# Patient Record
Sex: FEMALE | Race: WHITE | Employment: OTHER | URBAN - METROPOLITAN AREA
[De-identification: names, ages, dates, MRNs, and addresses within clinical notes are randomized per-mention and may not be internally consistent; named-entity substitution may affect disease eponyms.]

---

## 2022-03-09 LAB — EF %, EXTERNAL: NORMAL

## 2022-11-28 ENCOUNTER — APPOINTMENT (OUTPATIENT)
Dept: VASCULAR SURGERY | Age: 67
End: 2022-11-28
Attending: INTERNAL MEDICINE
Payer: MEDICARE

## 2022-11-28 ENCOUNTER — APPOINTMENT (OUTPATIENT)
Dept: GENERAL RADIOLOGY | Age: 67
End: 2022-11-28
Attending: INTERNAL MEDICINE
Payer: MEDICARE

## 2022-11-28 ENCOUNTER — APPOINTMENT (OUTPATIENT)
Dept: VASCULAR SURGERY | Age: 67
End: 2022-11-28
Attending: PHYSICIAN ASSISTANT
Payer: MEDICARE

## 2022-11-28 ENCOUNTER — APPOINTMENT (OUTPATIENT)
Dept: NON INVASIVE DIAGNOSTICS | Age: 67
End: 2022-11-28
Attending: PHYSICIAN ASSISTANT
Payer: MEDICARE

## 2022-11-28 ENCOUNTER — APPOINTMENT (OUTPATIENT)
Dept: CT IMAGING | Age: 67
End: 2022-11-28
Attending: PHYSICIAN ASSISTANT
Payer: MEDICARE

## 2022-11-28 ENCOUNTER — HOSPITAL ENCOUNTER (OUTPATIENT)
Age: 67
Setting detail: OUTPATIENT SURGERY
Discharge: HOME | End: 2022-11-28
Attending: INTERNAL MEDICINE | Admitting: INTERNAL MEDICINE
Payer: MEDICARE

## 2022-11-28 VITALS
RESPIRATION RATE: 17 BRPM | HEART RATE: 86 BPM | BODY MASS INDEX: 39.34 KG/M2 | HEIGHT: 71 IN | TEMPERATURE: 98 F | SYSTOLIC BLOOD PRESSURE: 119 MMHG | OXYGEN SATURATION: 97 % | WEIGHT: 281 LBS | DIASTOLIC BLOOD PRESSURE: 73 MMHG

## 2022-11-28 DIAGNOSIS — I35.0 AORTIC VALVE STENOSIS, ETIOLOGY OF CARDIAC VALVE DISEASE UNSPECIFIED: ICD-10-CM

## 2022-11-28 PROBLEM — E11.9 TYPE 2 DIABETES MELLITUS, WITHOUT LONG-TERM CURRENT USE OF INSULIN (HCC): Status: ACTIVE | Noted: 2022-11-28

## 2022-11-28 PROBLEM — I10 PRIMARY HYPERTENSION: Status: ACTIVE | Noted: 2022-11-28

## 2022-11-28 PROBLEM — E78.5 HLD (HYPERLIPIDEMIA): Status: ACTIVE | Noted: 2022-11-28

## 2022-11-28 PROBLEM — E78.5 HLD (HYPERLIPIDEMIA): Status: RESOLVED | Noted: 2022-11-28 | Resolved: 2022-11-28

## 2022-11-28 PROBLEM — I25.10 CORONARY ARTERY DISEASE INVOLVING NATIVE CORONARY ARTERY OF NATIVE HEART: Status: ACTIVE | Noted: 2022-11-28

## 2022-11-28 LAB
ALBUMIN SERPL-MCNC: 3.3 G/DL (ref 3.4–5)
ALBUMIN/GLOB SERPL: 0.9 {RATIO} (ref 0.8–1.7)
ALP SERPL-CCNC: 42 U/L (ref 45–117)
ALT SERPL-CCNC: 30 U/L (ref 13–56)
ANION GAP SERPL CALC-SCNC: 4 MMOL/L (ref 3–18)
ARTERIAL PATENCY WRIST A: POSITIVE
AST SERPL-CCNC: 31 U/L (ref 10–38)
BASE DEFICIT BLD-SCNC: 1.8 MMOL/L
BDY SITE: ABNORMAL
BILIRUB DIRECT SERPL-MCNC: 0.1 MG/DL (ref 0–0.2)
BILIRUB SERPL-MCNC: 0.6 MG/DL (ref 0.2–1)
BODY TEMPERATURE: 97.6
BUN SERPL-MCNC: 60 MG/DL (ref 7–18)
BUN/CREAT SERPL: 32 (ref 12–20)
CALCIUM SERPL-MCNC: 9.6 MG/DL (ref 8.5–10.1)
CHLORIDE SERPL-SCNC: 107 MMOL/L (ref 100–111)
CO2 SERPL-SCNC: 27 MMOL/L (ref 21–32)
CREAT SERPL-MCNC: 1.87 MG/DL (ref 0.6–1.3)
ECHO AO ASC DIAM: 2.8 CM
ECHO AO ASCENDING AORTA INDEX: 1.15 CM/M2
ECHO AO ROOT DIAM: 2.5 CM
ECHO AO ROOT INDEX: 1.02 CM/M2
ECHO AV MEAN GRADIENT: 57 MMHG
ECHO AV MEAN VELOCITY: 3.7 M/S
ECHO AV PEAK GRADIENT: 81 MMHG
ECHO AV PEAK VELOCITY: 4.5 M/S
ECHO AV VTI: 103.8 CM
ECHO LA VOL 4C: 131 ML (ref 22–52)
ECHO LA VOLUME INDEX A4C: 54 ML/M2 (ref 16–34)
ECHO LV E' SEPTAL VELOCITY: 5 CM/S
ECHO LV FRACTIONAL SHORTENING: 50 % (ref 28–44)
ECHO LV INTERNAL DIMENSION DIASTOLE INDEX: 1.72 CM/M2
ECHO LV INTERNAL DIMENSION DIASTOLIC: 4.2 CM (ref 3.9–5.3)
ECHO LV INTERNAL DIMENSION SYSTOLIC INDEX: 0.86 CM/M2
ECHO LV INTERNAL DIMENSION SYSTOLIC: 2.1 CM
ECHO LV IVSD: 1.5 CM (ref 0.6–0.9)
ECHO LV MASS 2D: 236.7 G (ref 67–162)
ECHO LV MASS INDEX 2D: 97 G/M2 (ref 43–95)
ECHO LV POSTERIOR WALL DIASTOLIC: 1.4 CM (ref 0.6–0.9)
ECHO LV RELATIVE WALL THICKNESS RATIO: 0.67
ECHO LVOT AREA: 2.5 CM2
ECHO LVOT DIAM: 1.8 CM
ECHO MV A VELOCITY: 1.92 M/S
ECHO MV E DECELERATION TIME (DT): 151.8 MS
ECHO MV E VELOCITY: 1.14 M/S
ECHO MV E/A RATIO: 0.59
ECHO MV E/E' SEPTAL: 22.8
ECHO MV MAX VELOCITY: 2 M/S
ECHO MV MEAN GRADIENT: 7 MMHG
ECHO MV MEAN VELOCITY: 1.2 M/S
ECHO MV PEAK GRADIENT: 16 MMHG
ECHO MV VTI: 35.1 CM
ECHO TV REGURGITANT MAX VELOCITY: 1.95 M/S
ECHO TV REGURGITANT PEAK GRADIENT: 15 MMHG
ERYTHROCYTE [DISTWIDTH] IN BLOOD BY AUTOMATED COUNT: 14.4 % (ref 11.6–14.5)
EST. AVERAGE GLUCOSE BLD GHB EST-MCNC: 117 MG/DL
GAS FLOW.O2 O2 DELIVERY SYS: ABNORMAL L/MIN
GAS FLOW.O2 SETTING OXYMISER: 16 BPM
GLOBULIN SER CALC-MCNC: 3.5 G/DL (ref 2–4)
GLUCOSE SERPL-MCNC: 128 MG/DL (ref 74–99)
HBA1C MFR BLD: 5.7 % (ref 4.2–5.6)
HCO3 BLD-SCNC: 22.2 MMOL/L (ref 22–26)
HCT VFR BLD AUTO: 31.6 % (ref 35–45)
HGB BLD-MCNC: 10.2 G/DL (ref 12–16)
INR PPP: 1.1 (ref 0.8–1.2)
LEFT BULB EDV: 50.2 CM/S
LEFT BULB PSV: 150 CM/S
LEFT CCA DIST DIAS: 13.9 CM/S
LEFT CCA DIST SYS: 105.9 CM/S
LEFT CCA MID DIAS: 12.27 CM/S
LEFT CCA MID SYS: 78.48 CM/S
LEFT CCA PROX DIAS: 17.1 CM/S
LEFT CCA PROX SYS: 80.1 CM/S
LEFT ECA DIAS: 13.89 CM/S
LEFT ECA SYS: 97.9 CM/S
LEFT ICA DIST DIAS: 31.6 CM/S
LEFT ICA DIST SYS: 108.2 CM/S
LEFT ICA MID DIAS: 33.9 CM/S
LEFT ICA MID SYS: 89.7 CM/S
LEFT ICA PROX DIAS: 36.2 CM/S
LEFT ICA PROX SYS: 126.8 CM/S
LEFT ICA/CCA SYS: 1.42 NO UNITS
LEFT VERTEBRAL DIAS: 6.15 CM/S
LEFT VERTEBRAL SYS: 47.9 CM/S
MCH RBC QN AUTO: 29.7 PG (ref 24–34)
MCHC RBC AUTO-ENTMCNC: 32.3 G/DL (ref 31–37)
MCV RBC AUTO: 92.1 FL (ref 78–100)
NRBC # BLD: 0 K/UL (ref 0–0.01)
NRBC BLD-RTO: 0 PER 100 WBC
PCO2 BLD: 33.1 MMHG (ref 35–45)
PH BLD: 7.43 [PH] (ref 7.35–7.45)
PLATELET # BLD AUTO: 210 K/UL (ref 135–420)
PMV BLD AUTO: 9.9 FL (ref 9.2–11.8)
PO2 BLD: 81 MMHG (ref 80–100)
POTASSIUM SERPL-SCNC: 4.8 MMOL/L (ref 3.5–5.5)
PROT SERPL-MCNC: 6.8 G/DL (ref 6.4–8.2)
PROTHROMBIN TIME: 14.1 SEC (ref 11.5–15.2)
RBC # BLD AUTO: 3.43 M/UL (ref 4.2–5.3)
RIGHT BULB EDV: 30.1 CM/S
RIGHT BULB PSV: 86.6 CM/S
RIGHT CCA DIST DIAS: 18.2 CM/S
RIGHT CCA DIST SYS: 64.4 CM/S
RIGHT CCA MID DIAS: 17.14 CM/S
RIGHT CCA MID SYS: 68.79 CM/S
RIGHT CCA PROX DIAS: 15.3 CM/S
RIGHT CCA PROX SYS: 51.5 CM/S
RIGHT ECA DIAS: 8.61 CM/S
RIGHT ECA SYS: 94.7 CM/S
RIGHT ICA DIST DIAS: 26.8 CM/S
RIGHT ICA DIST SYS: 78.5 CM/S
RIGHT ICA MID DIAS: 26.8 CM/S
RIGHT ICA MID SYS: 91.4 CM/S
RIGHT ICA PROX DIAS: 33.3 CM/S
RIGHT ICA PROX SYS: 107.6 CM/S
RIGHT ICA/CCA SYS: 1.7 NO UNITS
RIGHT VERTEBRAL DIAS: 25.37 CM/S
RIGHT VERTEBRAL SYS: 79.7 CM/S
SAO2 % BLD: 96.6 % (ref 92–97)
SERVICE CMNT-IMP: ABNORMAL
SODIUM SERPL-SCNC: 138 MMOL/L (ref 136–145)
SPECIMEN TYPE: ABNORMAL
TOTAL RESP. RATE, ITRR: 16
TSH SERPL DL<=0.05 MIU/L-ACNC: 0.99 UIU/ML (ref 0.36–3.74)
VAS LEFT SUBCLAVIAN PROX EDV: 0 CM/S
VAS LEFT SUBCLAVIAN PROX PSV: 94.4 CM/S
VAS RIGHT SUBCLAVIAN PROX EDV: 0 CM/S
VAS RIGHT SUBCLAVIAN PROX PSV: 74.6 CM/S
WBC # BLD AUTO: 7.9 K/UL (ref 4.6–13.2)

## 2022-11-28 PROCEDURE — 85027 COMPLETE CBC AUTOMATED: CPT

## 2022-11-28 PROCEDURE — 80076 HEPATIC FUNCTION PANEL: CPT

## 2022-11-28 PROCEDURE — C1769 GUIDE WIRE: HCPCS | Performed by: INTERNAL MEDICINE

## 2022-11-28 PROCEDURE — 99153 MOD SED SAME PHYS/QHP EA: CPT | Performed by: INTERNAL MEDICINE

## 2022-11-28 PROCEDURE — C1894 INTRO/SHEATH, NON-LASER: HCPCS | Performed by: INTERNAL MEDICINE

## 2022-11-28 PROCEDURE — 74011000636 HC RX REV CODE- 636: Performed by: INTERNAL MEDICINE

## 2022-11-28 PROCEDURE — C8929 TTE W OR WO FOL WCON,DOPPLER: HCPCS

## 2022-11-28 PROCEDURE — 77030027845 HC BND COM RDL D-STAT TELE -B: Performed by: INTERNAL MEDICINE

## 2022-11-28 PROCEDURE — 77030015766: Performed by: INTERNAL MEDICINE

## 2022-11-28 PROCEDURE — 93454 CORONARY ARTERY ANGIO S&I: CPT | Performed by: INTERNAL MEDICINE

## 2022-11-28 PROCEDURE — C1887 CATHETER, GUIDING: HCPCS | Performed by: INTERNAL MEDICINE

## 2022-11-28 PROCEDURE — 85610 PROTHROMBIN TIME: CPT

## 2022-11-28 PROCEDURE — 93005 ELECTROCARDIOGRAM TRACING: CPT

## 2022-11-28 PROCEDURE — 93880 EXTRACRANIAL BILAT STUDY: CPT

## 2022-11-28 PROCEDURE — 71045 X-RAY EXAM CHEST 1 VIEW: CPT

## 2022-11-28 PROCEDURE — 74011000258 HC RX REV CODE- 258: Performed by: INTERNAL MEDICINE

## 2022-11-28 PROCEDURE — 83036 HEMOGLOBIN GLYCOSYLATED A1C: CPT

## 2022-11-28 PROCEDURE — 36600 WITHDRAWAL OF ARTERIAL BLOOD: CPT

## 2022-11-28 PROCEDURE — 82803 BLOOD GASES ANY COMBINATION: CPT

## 2022-11-28 PROCEDURE — 74011250636 HC RX REV CODE- 250/636: Performed by: INTERNAL MEDICINE

## 2022-11-28 PROCEDURE — 93971 EXTREMITY STUDY: CPT

## 2022-11-28 PROCEDURE — 74011250637 HC RX REV CODE- 250/637

## 2022-11-28 PROCEDURE — 74011000250 HC RX REV CODE- 250: Performed by: INTERNAL MEDICINE

## 2022-11-28 PROCEDURE — 80048 BASIC METABOLIC PNL TOTAL CA: CPT

## 2022-11-28 PROCEDURE — 84443 ASSAY THYROID STIM HORMONE: CPT

## 2022-11-28 PROCEDURE — 99152 MOD SED SAME PHYS/QHP 5/>YRS: CPT | Performed by: INTERNAL MEDICINE

## 2022-11-28 PROCEDURE — 99205 OFFICE O/P NEW HI 60 MIN: CPT | Performed by: PHYSICIAN ASSISTANT

## 2022-11-28 PROCEDURE — 86900 BLOOD TYPING SEROLOGIC ABO: CPT

## 2022-11-28 PROCEDURE — 71250 CT THORAX DX C-: CPT

## 2022-11-28 PROCEDURE — 77030013797 HC KT TRNSDUC PRSSR EDWD -A: Performed by: INTERNAL MEDICINE

## 2022-11-28 RX ORDER — MIDAZOLAM HYDROCHLORIDE 1 MG/ML
INJECTION, SOLUTION INTRAMUSCULAR; INTRAVENOUS AS NEEDED
Status: DISCONTINUED | OUTPATIENT
Start: 2022-11-28 | End: 2022-11-28 | Stop reason: HOSPADM

## 2022-11-28 RX ORDER — GUAIFENESIN 100 MG/5ML
81 LIQUID (ML) ORAL DAILY
Status: DISCONTINUED | OUTPATIENT
Start: 2022-11-28 | End: 2022-11-28 | Stop reason: HOSPADM

## 2022-11-28 RX ORDER — POTASSIUM CITRATE 10 MEQ/1
20 TABLET, EXTENDED RELEASE ORAL
COMMUNITY
End: 2022-11-28

## 2022-11-28 RX ORDER — SODIUM CHLORIDE 9 MG/ML
250 INJECTION, SOLUTION INTRAVENOUS AS NEEDED
Status: DISCONTINUED | OUTPATIENT
Start: 2022-11-28 | End: 2022-11-28 | Stop reason: HOSPADM

## 2022-11-28 RX ORDER — FUROSEMIDE 20 MG/1
20 TABLET ORAL DAILY
COMMUNITY
End: 2022-11-28

## 2022-11-28 RX ORDER — HEPARIN SODIUM 200 [USP'U]/100ML
INJECTION, SOLUTION INTRAVENOUS
Status: COMPLETED | OUTPATIENT
Start: 2022-11-28 | End: 2022-11-28

## 2022-11-28 RX ORDER — SODIUM CHLORIDE 0.9 % (FLUSH) 0.9 %
5-40 SYRINGE (ML) INJECTION EVERY 8 HOURS
Status: DISCONTINUED | OUTPATIENT
Start: 2022-11-28 | End: 2022-11-28 | Stop reason: HOSPADM

## 2022-11-28 RX ORDER — GUAIFENESIN 100 MG/5ML
81 LIQUID (ML) ORAL
Status: DISCONTINUED | OUTPATIENT
Start: 2022-11-28 | End: 2022-11-28 | Stop reason: HOSPADM

## 2022-11-28 RX ORDER — HEPARIN SODIUM 1000 [USP'U]/ML
INJECTION, SOLUTION INTRAVENOUS; SUBCUTANEOUS AS NEEDED
Status: DISCONTINUED | OUTPATIENT
Start: 2022-11-28 | End: 2022-11-28 | Stop reason: HOSPADM

## 2022-11-28 RX ORDER — SODIUM CHLORIDE 0.9 % (FLUSH) 0.9 %
5-40 SYRINGE (ML) INJECTION AS NEEDED
Status: DISCONTINUED | OUTPATIENT
Start: 2022-11-28 | End: 2022-11-28 | Stop reason: HOSPADM

## 2022-11-28 RX ORDER — FENTANYL CITRATE 50 UG/ML
INJECTION, SOLUTION INTRAMUSCULAR; INTRAVENOUS AS NEEDED
Status: DISCONTINUED | OUTPATIENT
Start: 2022-11-28 | End: 2022-11-28 | Stop reason: HOSPADM

## 2022-11-28 RX ORDER — GUAIFENESIN 100 MG/5ML
LIQUID (ML) ORAL
Status: COMPLETED
Start: 2022-11-28 | End: 2022-11-28

## 2022-11-28 RX ORDER — SODIUM CHLORIDE 9 MG/ML
100 INJECTION, SOLUTION INTRAVENOUS CONTINUOUS
Status: DISCONTINUED | OUTPATIENT
Start: 2022-11-28 | End: 2022-11-28 | Stop reason: HOSPADM

## 2022-11-28 RX ORDER — LIDOCAINE HYDROCHLORIDE 10 MG/ML
INJECTION, SOLUTION EPIDURAL; INFILTRATION; INTRACAUDAL; PERINEURAL AS NEEDED
Status: DISCONTINUED | OUTPATIENT
Start: 2022-11-28 | End: 2022-11-28 | Stop reason: HOSPADM

## 2022-11-28 RX ADMIN — SODIUM CHLORIDE 100 ML/HR: 9 INJECTION, SOLUTION INTRAVENOUS at 08:04

## 2022-11-28 RX ADMIN — ASPIRIN 81 MG: 81 TABLET, CHEWABLE ORAL at 07:31

## 2022-11-28 RX ADMIN — Medication 81 MG: at 07:31

## 2022-11-28 RX ADMIN — PERFLUTREN 2 ML: 6.52 INJECTION, SUSPENSION INTRAVENOUS at 14:57

## 2022-11-28 NOTE — PROCEDURES
Cardiac Catheterization Procedure Note    Patient: Shyam Teague Age: 79 y.o. Sex: female    YOB: 1955 Admit Date: 11/28/2022 PCP: Maddie Vazquez MD   MRN: [id number]  CSN: [id number]           DATE: November 28, 2022   PHYSICIAN: Dr. Rene Padron MD, [facility]    POSTOPERATIVE DIAGNOSIS:   SOB, progressive  Severe AS  ASCVD with LAD stenosis  CHF, mixed    INDICATIONS:   SOB, progressive  Severe AS  ASCVD with LAD stenosis  CHF, mixed    PROCEDURE PERFORMED:   1. Left heart cardiac catheterization. 2. Selective left and right coronary angiography. 3. Left ventriculography. 4. Aortic root angiography  5. Right radial arterial access using ultrasound guidance  6. Radiologic supervision and interpretation  7. Aborted planned LAD stenting, aborted    DESCRIPTION OF PROCEDURE:   After informed consent where the procedure was discussed both during outpatient evaluation and again immediately prior to the procedure, the patient was brought to the cardiac catheterization suite. The patient was prepped and draped in the usual sterile fashion. After documentation of normal Adebayo and reverse Adebayo tests right radial artery was used for the procedure. After local lidocaine infiltration above the radial artery, right radial access was achieved using ultrasound guidance and with micro puncture needle. A 6Fr glide sheath was advanced and placed into the right radial artery using Seldinger technique. Combination of heparin, verapamil and nitroglycerin were injected into the artery through the sheath. A 5Fr. TIG catheter was advanced over J-glide wire and used to perform left ventriculography, selective right and left coronary angiography and aortic root angiography with images obtained in multiple projections and angulations. Planned PCI attempt to treat LAD stenosis was aborted due to additional imaging showing LM stenosis.     At the conclusion, all procedure catheters and wires were removed. Hemostasis was to be achieved using manual pressure with patient instructed to keep the right arm straight for 2 hours. MODERATE CONSCIOUS SEDATION:  Sedation was provided under my direct supervision with a sedation trained nurse using 1 mg of IV Versed and 50 mcg of IV Fentanyl. See hospital trained nurse sedation orders for pre and post service care. COMPLICATIONS: None    ESTIMATED BLOOD LOSS: None    DIAGNOSTIC CORONARY ANGIOGRAPHY FINDINGS:   The diagnostic angiography was characterized by right dominant coronary anatomy with no significant obstructive CAD    Left Main: The left main has an eccentric LM stenosis 70% narrowed with mild dampening with a 6Fr catheter  LAD: The LAD is a large sized vessel with proximal 40% narrowing and mid artery 80% stenosis involving a diagonal branch  Circumflex: The circumflex coronary artery was a large vessel with mid artery 40% narrowing  RCA: The RCA was a large dominant artery and appeared unobstructed    LEFT VENTRICULOGRAPHY:   Not done    AORTIC ROOT ANGIOGRAPHY:   Demonstrated normal type 1 aortic root with no aortic insufficiency. PLAN and RECOMMENDATION:  Medical management focused on prevention and disease modification.

## 2022-11-28 NOTE — PROGRESS NOTES
Right brachial sheath removed, sterile hemostatic dressing applied, pressure held for 20 min. Pressures dressing required and applied. Splint applied. Good radial pulse distal circulation and neuro check.

## 2022-11-28 NOTE — Clinical Note
Right groin, right radial and right brachial prepped with ChloraPrep and draped. Wet prep elapsed drying time: 3 mins.

## 2022-11-28 NOTE — Clinical Note
Contrast Dose Calculator:   Patient's age: 79.   Patient's sex: Female. Patient weight (kg) = 127.5. Creatinine level (mg/dL) = 1.12. Creatinine clearance (mL/min): 98.   Contrast concentration (mg/mL) = 300. MACD = 300 mL. Max Contrast dose per Creatinine Cl calculator = 220.5 mL.

## 2022-11-28 NOTE — Clinical Note
TRANSFER - IN REPORT:     Verbal report received from: Marlon Bruner RN. Report consisted of patient's Situation, Background, Assessment and   Recommendations(SBAR). Opportunity for questions and clarification was provided. Assessment completed upon patient's arrival to unit and care assumed. Patient transported with a Registered Nurse.

## 2022-11-28 NOTE — DISCHARGE INSTRUCTIONS
CALL CT SURGERY [phone] for any questions. Tentative CABG scheduled 12/5/22. [facility] DISCHARGE SUMMARY from Nurse:    Please hold your METFORMIN for 48 hours after your discharge. Restart as prescribed. PATIENT INSTRUCTIONS:    After general anesthesia or intravenous sedation, for 24 hours or while taking prescription Narcotics:  Limit your activities  Do not drive and operate hazardous machinery  Do not make important personal or business decisions  Do  not drink alcoholic beverages  If you have not urinated within 8 hours after discharge, please contact your surgeon on call. Report the following to your surgeon:  Excessive pain, swelling, redness or odor of or around the surgical area  Temperature over 100.5  Nausea and vomiting lasting longer than 4 hours or if unable to take medications  Any signs of decreased circulation or nerve impairment to extremity: change in color, persistent  numbness, tingling, coldness or increase pain  Any questions    What to do at Home:  Recommended activity: No lifting, Driving, or Strenuous exercise for 24 hours. If you experience any of the following symptoms bleeding,swelling,acute pain or numbness, fever, please follow up with Dr. Keily Kerr MD.    *  Please give a list of your current medications to your Primary Care Provider. *  Please update this list whenever your medications are discontinued, doses are      changed, or new medications (including over-the-counter products) are added. *  Please carry medication information at all times in case of emergency situations. These are general instructions for a healthy lifestyle:    No smoking/ No tobacco products/ Avoid exposure to second hand smoke  Surgeon General's Warning:  Quitting smoking now greatly reduces serious risk to your health.     Obesity, smoking, and sedentary lifestyle greatly increases your risk for illness    A healthy diet, regular physical exercise & weight monitoring are important for maintaining a healthy lifestyle    You may be retaining fluid if you have a history of heart failure or if you experience any of the following symptoms:  Weight gain of 3 pounds or more overnight or 5 pounds in a week, increased swelling in our hands or feet or shortness of breath while lying flat in bed. Please call your doctor as soon as you notice any of these symptoms; do not wait until your next office visit. The discharge information has been reviewed with the patient. The patient verbalized understanding. Discharge medications reviewed with the patient and appropriate educational materials and side effects teaching were provided. ___________________________________________________________________________________________________________________________________     Coronary Angiogram: What to Expect at [facility]     A coronary angiogram is a test to examine the large blood vessels of your heart (coronary arteries). The doctor inserted a thin, flexible tube (catheter) into a blood vessel in your groin or wrist.  Your groin or wrist may have a bruise and feel sore for a few days after the procedure. You can do light activities around the house. But do not do anything strenuous until your doctor says it is okay. This may be for several days. This care sheet gives you a general idea about how long it will take for you to recover. But each person recovers at a different pace. Follow the steps below to feel better as quickly as possible. How can you care for yourself at home? Activity    If the doctor gave you a sedative: For 24 hours, don't do anything that requires attention to detail, such as going to work, making important decisions, or signing any legal documents. It takes time for the medicine's effects to completely wear off. For your safety, do not drive or operate any machinery that could be dangerous.  Wait until the medicine wears off and you can think clearly and react easily. Do not do strenuous exercise and do not lift, pull, or push anything heavy until your doctor says it is okay. This may be for several days. You can walk around the house and do light activity, such as cooking. If the catheter was placed in your groin, try not to walk up stairs for the first couple of days. If the catheter was placed in your wrist, do not bend your wrist deeply for the first couple of days. Be careful using your hand to get into and out of a chair or bed. Get regular exercise. Walking may be a good choice. Try for at least 30 minutes on most days of the week. Diet    Drink plenty of fluids to help your body flush out the dye. If you have kidney, heart, or liver disease and have to limit fluids, talk with your doctor before you increase the amount of fluids you drink. Keep eating a heart-healthy diet that has lots of fruits, vegetables, and whole grains. If you have not been eating this way, talk to your doctor. You also may want to talk to a dietitian. This expert can help you to learn about healthy foods and plan meals. Medicines    Your doctor will tell you if and when you can restart your medicines. You will also be given instructions about taking any new medicines. If you stopped taking aspirin or some other blood thinner, your doctor will tell you when to start taking it again. Your doctor may prescribe a blood-thinning medicine like aspirin or clopidogrel (Plavix). It is very important that you take these medicines exactly as directed in order to keep the coronary artery open and reduce your risk of a heart attack. Be safe with medicines. Call your doctor if you think you are having a problem with your medicine. Care of the catheter site    For 1 or 2 days, keep a bandage over the spot where the catheter was inserted. The bandage probably will fall off in this time.      Put ice or a cold pack on the area for 10 to 20 minutes at a time to help with soreness or swelling. Put a thin cloth between the ice and your skin. You may shower 24 to 48 hours after the procedure, if your doctor okays it. Pat the incision dry. Do not soak the catheter site until it is healed. Don't take a bath for 1 week, or until your doctor tells you it is okay. Watch for bleeding from the site. A small amount of blood (up to the size of a quarter) on the bandage can be normal.     If you are bleeding, lie down and press on the area for 15 minutes to try to make it stop. If the bleeding does not stop, call your doctor or seek immediate medical care. Follow-up care is a key part of your treatment and safety. Be sure to make and go to all appointments, and call your doctor if you are having problems. It's also a good idea to know your test results and keep a list of the medicines you take. When should you call for help? Call 911 anytime you think you may need emergency care. For example, call if:    You passed out (lost consciousness). You have severe trouble breathing. You have sudden chest pain and shortness of breath, or you cough up blood. You have symptoms of a heart attack. These may include:  Chest pain or pressure, or a strange feeling in the chest.  Sweating. Shortness of breath. Nausea or vomiting. Pain, pressure, or a strange feeling in the back, neck, jaw, or upper belly, or in one or both shoulders or arms. Lightheadedness or sudden weakness. A fast or irregular heartbeat. After you call 911, the  may tell you to chew 1 adult-strength or 2 to 4 low-dose aspirin. Wait for an ambulance. Do not try to drive yourself. You have been diagnosed with angina, and you have symptoms that do not go away with rest or are not getting better within 5 minutes after you take a dose of nitroglycerin.    Call your doctor now or seek immediate medical care if:    You are bleeding from the area where the catheter was put in your artery. You have a fast-growing, painful lump at the catheter site. You have signs of infection, such as: Increased pain, swelling, warmth, or redness. Red streaks leading from the catheter site. Pus draining from the catheter site. A fever. Your leg or hand is painful, looks blue, or feels cold, numb, or tingly. Watch closely for changes in your health, and be sure to contact your doctor if you have any problems. Where can you learn more? Go to http://www.gray.com/  Enter D192 in the search box to learn more about \"Coronary Angiogram: What to Expect at Home. \"  Current as of: January 10, 2022               Content Version: 13.4  © 3470-1406 [employer]. Care instructions adapted under license by [employer] (which disclaims liability or warranty for this information). If you have questions about a medical condition or this instruction, always ask your healthcare professional. [place] [address] any warranty or liability for your use of this information. [facility] Activation    Thank you for requesting access to [facility]. Please follow the instructions below to securely access and download your online medical record. [facility] allows you to send messages to your doctor, view your test results, renew your prescriptions, schedule appointments, and more. How Do I Sign Up? In your internet browser, go to [email or web address]. Click on the First Time User? Click Here link in the Sign In box. You will see the New Member Sign Up page. Enter your [facility] Access Code exactly as it appears below. You will not need to use this code after youve completed the sign-up process. If you do not sign up before the expiration date, you must request a new code. [facility] Access Code:  Activation code not generated  Current [facility] Status: Active (This is the date your [facility] access code will )    Enter the last four digits of your Social Security Number (xxxx) and Date of Birth (mm/dd/yyyy) as indicated and click Submit. You will be taken to the next sign-up page. Create a [facility] ID. This will be your [facility] login ID and cannot be changed, so think of one that is secure and easy to remember. Create a [facility] password. You can change your password at any time. Enter your Password Reset Question and Answer. This can be used at a later time if you forget your password. Enter your e-mail address. You will receive e-mail notification when new information is available in [address] Click Sign Up. You can now view and download portions of your medical record. Click the Reaction link to download a portable copy of your medical information. Additional Information    If you have questions, please visit the Frequently Asked Questions section of the [facility] website at [email or web address] Remember, [facility] is NOT to be used for urgent needs. For medical emergencies, dial 911.    Patient armband removed and shredded

## 2022-11-28 NOTE — PROGRESS NOTES
Right wrist D-STAT band removed, sterile hemostatic dressing applied. Pressure held for 10 min. Splint applied. Safety instructions reviewed with the patient. Normal radial pulse, normal distal circulation and neuro check.

## 2022-11-28 NOTE — H&P
History and Physical Note      Patient: Marco Older Age: 79 y.o. Sex: female    YOB: 1955 Admit Date: 11/28/2022 PCP: Sonu Bonilla MD   MRN: [id number]  CSN: [id number]        H&P/Interval Update Note: The H&P has been reviewed, the patient has been examined and I concur with the findings of the H&P. There are no significant changes. It is appropriate to proceed with the planned procedure. 80 yo with severe aortic stenosis and CAD with progressive SOB. Has to stop and rest after 12 steps. No syncope. Review of Symptoms/Physical Examination (select if normal, otherwise findings as documented):  Head/Neck, Airway, Chest/Lungs, Heart, Abdomen, GI, , Extremities and Neurological    EXAM:  Visit Vitals  Breastfeeding No    Harsh late peaking crescendo mumrur. Abd soft with nromal BS. Large legs with 2+ edema. Jvp 10. Lungs clear. JVP flat. Grossly normal neuro exam.      Assessment:  Aortic stenosis, severe  ASCVD    Plan:  Left heart catheterization and PCI if indicated    Informed Consent:  I have discussed the planned procedure and any reasonable alternative options, including the risks, benefits, potential complications and side effects associated with the planned procedure and alternatives (including the risks of not undergoing the procedure), potential problems that might occur during recuperation, and the likelihood of achieving goals, the name of any additional practitioners performing any other specified significant tasks during the procedure, and the plan for sedation or anesthesia with the patient and (if present) family. The patient was provided an opportunity to ask questions; all questions were answered.   After careful consideration, the patient wishes to proceed with the planned procedure.  ________________________________________________________________________    Indication(s) for Cath Lab Visit: Aortic stenosis, ASCVD with LAD stenosis, progressive SOB     Chest Pain Symptom Assessment: dyspnea    Anginal Class w/in 2 weeks:  CCS 3    Heart Failure: Yes     Heart Failure Type/Class: Chronic systolic (congestive) heart failure-I50.22   Newly Diagnosed: Yes   _______________________________________________________________________    Pre-Sedation Assessment For Procedures Without An Anesthesia Provider  (must be completed within 48 hours prior to procedure)    1)  Plan for sedation/anesthesia:  Lidocaine       Target sedation level: Moderate        IV sedation medications:  Versed and Fentanyl    2)  ASA (American Society of Anesthesiologists) patient classification: 2     3)  Mallampati Score: 1  ________________________________________________________________________    Immediate Pre-Sedation Assessment  (Complete immediately prior to procedural sedation if there is no anesthesia provider)    The patient was examined immediately prior to sedation and is deemed to be an appropriate candidate for the planned sedation.     Vandana Baugh MD   November 28, 2022   7:57 AM

## 2022-11-28 NOTE — Clinical Note
TRANSFER - OUT REPORT:     Verbal report given to: Elie Murphy RN. Report consisted of patient's Situation, Background, Assessment and   Recommendations(SBAR). Opportunity for questions and clarification was provided. Patient transported with a Registered Nurse. Patient transported to: holding area.

## 2022-11-28 NOTE — PROGRESS NOTES
Arm splint removed and right wrist splint applied. Both sites without bleeding or swelling. Normal radial pulse, normal distal circulation and neuro check.

## 2022-11-29 ENCOUNTER — TELEPHONE (OUTPATIENT)
Dept: CARDIOTHORACIC SURGERY | Age: 67
End: 2022-11-29

## 2022-11-29 LAB
ATRIAL RATE: 90 BPM
CALCULATED P AXIS, ECG09: 49 DEGREES
CALCULATED R AXIS, ECG10: 20 DEGREES
CALCULATED T AXIS, ECG11: 68 DEGREES
DIAGNOSIS, 93000: NORMAL
LEFT CFV DIAM: 1.39 CM
LEFT FV PROX DIAM: 1.13 CM
LEFT GSV AT KNEE DIAM: 0.56 CM
LEFT GSV BK DIST DIAM: 0.2 CM
LEFT GSV BK MID DIAM: 0.33 CM
LEFT GSV BK PROX DIAM: 0.5 CM
LEFT GSV JUNC DIAM: 0.6 CM
LEFT GSV THIGH DIST DIAM: 0.44 CM
LEFT GSV THIGH MID DIAM: 0.56 CM
LEFT GSV THIGH PROX DIAM: 0.68 CM
LEFT POPLITEAL VEIN DIAM: 0.89 CM
LEFT SSV DIST DIAM: 0.29 CM
LEFT SSV JUNCTION DIAM: 0.45 CM
LEFT SSV MID DIAM: 0.42 CM
LEFT SSV PROX DIAM: 0.5 CM
P-R INTERVAL, ECG05: 162 MS
Q-T INTERVAL, ECG07: 360 MS
QRS DURATION, ECG06: 72 MS
QTC CALCULATION (BEZET), ECG08: 440 MS
VENTRICULAR RATE, ECG03: 90 BPM

## 2022-11-29 RX ORDER — AMIODARONE HYDROCHLORIDE 200 MG/1
400 TABLET ORAL 2 TIMES DAILY WITH MEALS
Qty: 20 TABLET | Refills: 0 | Status: SHIPPED | OUTPATIENT
Start: 2022-11-29 | End: 2022-12-04

## 2022-11-29 NOTE — TELEPHONE ENCOUNTER
[address] Thoracic Surgery  [address] [facility], [address] [phone]         Pre-op Cardiac Surgery Instructions       Patient Name:  Harleen Flanagan    YOB: 1955    Procedure Date and Time:  Monday, December 5, 2022 at 7:30 am    Check In Time:  5:30 am  at the main entrance of [facility]. (Usually 2 hours prior to procedure time)      Complete your three showers using the Hibiclens surgical soap starting on 12/2/22 night, 12/3/22 night, and 12/4/22 night. Be sure to use a clean washcloth each time. Must be different from your normal showering washcloth. Use the Bactroban nasal ointment as instructed above after each shower. Start taking the new medication called Amiodarone that was prescribed and sent to [facility]. Please take this medication with food. Nothing to eat or drink after midnight on 11/4/22 night. Hold ALL meds including Metformin, Insulin, ACEs, ARBs, Fish Oil on the day of surgery. Please bring all your medication bottles to the hospital the morning of your surgery. 7.   Begin practicing the incentive spirometer, as instructed. At least 10 times four times a day. 8. Plan to have someone at home with you to assist or you may need to consider a rehab facility pending insurance coverage. 9. If you have any questions or concerns prior to your surgery, please call the main   office number at [phone] to speak with the nurse(Scott).

## 2022-11-30 ENCOUNTER — HOSPITAL ENCOUNTER (OUTPATIENT)
Dept: LAB | Age: 67
Discharge: HOME | End: 2022-11-30
Payer: MEDICARE

## 2022-11-30 DIAGNOSIS — I35.0 AORTIC VALVE STENOSIS, ETIOLOGY OF CARDIAC VALVE DISEASE UNSPECIFIED: ICD-10-CM

## 2022-11-30 LAB
ANION GAP SERPL CALC-SCNC: 6 MMOL/L (ref 3–18)
BUN SERPL-MCNC: 45 MG/DL (ref 7–18)
BUN/CREAT SERPL: 29 (ref 12–20)
CALCIUM SERPL-MCNC: 9.5 MG/DL (ref 8.5–10.1)
CHLORIDE SERPL-SCNC: 104 MMOL/L (ref 100–111)
CO2 SERPL-SCNC: 28 MMOL/L (ref 21–32)
CREAT SERPL-MCNC: 1.56 MG/DL (ref 0.6–1.3)
GLUCOSE SERPL-MCNC: 128 MG/DL (ref 74–99)
POTASSIUM SERPL-SCNC: 4.6 MMOL/L (ref 3.5–5.5)
SODIUM SERPL-SCNC: 138 MMOL/L (ref 136–145)

## 2022-11-30 PROCEDURE — 80048 BASIC METABOLIC PNL TOTAL CA: CPT

## 2022-11-30 PROCEDURE — 36415 COLL VENOUS BLD VENIPUNCTURE: CPT

## 2022-12-01 DIAGNOSIS — I35.0 AORTIC VALVE STENOSIS, ACQUIRED: Primary | ICD-10-CM

## 2022-12-02 ENCOUNTER — HOSPITAL ENCOUNTER (OUTPATIENT)
Dept: PREADMISSION TESTING | Age: 67
Discharge: HOME | End: 2022-12-02

## 2022-12-02 DIAGNOSIS — I25.10 CORONARY ARTERY DISEASE INVOLVING NATIVE CORONARY ARTERY OF NATIVE HEART, UNSPECIFIED WHETHER ANGINA PRESENT: Primary | ICD-10-CM

## 2022-12-02 DIAGNOSIS — I35.0 AORTIC VALVE STENOSIS, ACQUIRED: ICD-10-CM

## 2022-12-02 NOTE — PROGRESS NOTES
[facility]         2 units PRBC and one unit Platelets allocated for 12/5/22 surgery

## 2022-12-04 LAB
ABO + RH BLD: NORMAL
BLD PROD TYP BPU: NORMAL
BLD PROD TYP BPU: NORMAL
BLOOD GROUP ANTIBODIES SERPL: NORMAL
BPU ID: NORMAL
BPU ID: NORMAL
CROSSMATCH RESULT,%XM: NORMAL
CROSSMATCH RESULT,%XM: NORMAL
SPECIMEN EXP DATE BLD: NORMAL
STATUS OF UNIT,%ST: NORMAL
STATUS OF UNIT,%ST: NORMAL
UNIT DIVISION, %UDIV: 0
UNIT DIVISION, %UDIV: 0

## 2022-12-04 RX ORDER — METOPROLOL TARTRATE 5 MG/5ML
2.5 INJECTION INTRAVENOUS ONCE
Status: CANCELLED | OUTPATIENT
Start: 2022-12-05 | End: 2022-12-05

## 2022-12-05 ENCOUNTER — ANESTHESIA EVENT (OUTPATIENT)
Dept: CARDIOTHORACIC SURGERY | Age: 67
DRG: 219 | End: 2022-12-05
Payer: MEDICARE

## 2022-12-05 ENCOUNTER — ANESTHESIA (OUTPATIENT)
Dept: CARDIOTHORACIC SURGERY | Age: 67
DRG: 219 | End: 2022-12-05
Payer: MEDICARE

## 2022-12-05 ENCOUNTER — HOSPITAL ENCOUNTER (INPATIENT)
Age: 67
LOS: 10 days | Discharge: NURSING, REHAB OR LONG-TERM CARE | End: 2022-12-15
Attending: THORACIC SURGERY (CARDIOTHORACIC VASCULAR SURGERY) | Admitting: THORACIC SURGERY (CARDIOTHORACIC VASCULAR SURGERY)
Payer: MEDICARE

## 2022-12-05 DIAGNOSIS — I25.10 CORONARY ARTERY DISEASE DUE TO CALCIFIED CORONARY LESION: ICD-10-CM

## 2022-12-05 DIAGNOSIS — I35.0 AORTIC VALVE STENOSIS, ETIOLOGY OF CARDIAC VALVE DISEASE UNSPECIFIED: ICD-10-CM

## 2022-12-05 DIAGNOSIS — G47.01 INSOMNIA DUE TO MEDICAL CONDITION: ICD-10-CM

## 2022-12-05 DIAGNOSIS — I25.84 CORONARY ARTERY DISEASE DUE TO CALCIFIED CORONARY LESION: ICD-10-CM

## 2022-12-05 DIAGNOSIS — Z95.2 S/P AVR (AORTIC VALVE REPLACEMENT) AND AORTOPLASTY: ICD-10-CM

## 2022-12-05 DIAGNOSIS — Z95.1 S/P CABG X 2: Primary | ICD-10-CM

## 2022-12-05 LAB
ANION GAP BLD CALC-SCNC: 11 MMOL/L (ref 10–20)
ANION GAP SERPL CALC-SCNC: 5 MMOL/L (ref 3–18)
BASE DEFICIT BLD-SCNC: 3.8 MMOL/L
BUN SERPL-MCNC: 37 MG/DL (ref 7–18)
BUN/CREAT SERPL: 21 (ref 12–20)
CA-I BLD-MCNC: 1.25 MMOL/L (ref 1.12–1.32)
CALCIUM SERPL-MCNC: 9.1 MG/DL (ref 8.5–10.1)
CHLORIDE BLD-SCNC: 108 MMOL/L (ref 98–107)
CHLORIDE SERPL-SCNC: 106 MMOL/L (ref 100–111)
CO2 BLD-SCNC: 22 MMOL/L (ref 19–24)
CO2 SERPL-SCNC: 27 MMOL/L (ref 21–32)
CREAT BLD-MCNC: 1.52 MG/DL (ref 0.6–1.3)
CREAT SERPL-MCNC: 1.74 MG/DL (ref 0.6–1.3)
GLUCOSE BLD STRIP.AUTO-MCNC: 120 MG/DL (ref 70–110)
GLUCOSE BLD STRIP.AUTO-MCNC: 133 MG/DL (ref 70–110)
GLUCOSE BLD STRIP.AUTO-MCNC: 142 MG/DL (ref 70–110)
GLUCOSE BLD STRIP.AUTO-MCNC: 155 MG/DL (ref 70–110)
GLUCOSE BLD-MCNC: 115 MG/DL (ref 65–100)
GLUCOSE SERPL-MCNC: 144 MG/DL (ref 74–99)
HCO3 BLD-SCNC: 22.7 MMOL/L (ref 22–26)
HISTORY CHECKED?,CKHIST: NORMAL
LACTATE BLD-SCNC: 0.51 MMOL/L (ref 0.4–2)
PCO2 BLD: 46.9 MMHG (ref 35–45)
PH BLD: 7.29 [PH] (ref 7.35–7.45)
PO2 BLD: 280 MMHG (ref 80–100)
POTASSIUM BLD-SCNC: 4.4 MMOL/L (ref 3.5–5.1)
POTASSIUM SERPL-SCNC: 4.6 MMOL/L (ref 3.5–5.5)
SAO2 % BLD: 100 %
SERVICE CMNT-IMP: ABNORMAL
SODIUM BLD-SCNC: 140 MMOL/L (ref 136–145)
SODIUM SERPL-SCNC: 138 MMOL/L (ref 136–145)
SPECIMEN SITE: ABNORMAL

## 2022-12-05 PROCEDURE — 77030008683 HC TU ET CUF COVD -A: Performed by: ANESTHESIOLOGY

## 2022-12-05 PROCEDURE — 77030018723 HC ELCTRD BLD COVD -A: Performed by: THORACIC SURGERY (CARDIOTHORACIC VASCULAR SURGERY)

## 2022-12-05 PROCEDURE — 77030040392 HC DRSG OPTIFOAM MDII -A: Performed by: THORACIC SURGERY (CARDIOTHORACIC VASCULAR SURGERY)

## 2022-12-05 PROCEDURE — 77030025415: Performed by: THORACIC SURGERY (CARDIOTHORACIC VASCULAR SURGERY)

## 2022-12-05 PROCEDURE — 77030015758: Performed by: THORACIC SURGERY (CARDIOTHORACIC VASCULAR SURGERY)

## 2022-12-05 PROCEDURE — 77030014491 HC PLEDG PTFE BARD -A: Performed by: THORACIC SURGERY (CARDIOTHORACIC VASCULAR SURGERY)

## 2022-12-05 PROCEDURE — 77030002986 HC SUT PROL J&J -A: Performed by: THORACIC SURGERY (CARDIOTHORACIC VASCULAR SURGERY)

## 2022-12-05 PROCEDURE — 77030013313: Performed by: THORACIC SURGERY (CARDIOTHORACIC VASCULAR SURGERY)

## 2022-12-05 PROCEDURE — 77030033070 HC RLD QLC FPCH COR-KNOT LSIS -C: Performed by: THORACIC SURGERY (CARDIOTHORACIC VASCULAR SURGERY)

## 2022-12-05 PROCEDURE — 85347 COAGULATION TIME ACTIVATED: CPT

## 2022-12-05 PROCEDURE — 82962 GLUCOSE BLOOD TEST: CPT

## 2022-12-05 PROCEDURE — B246ZZ4 ULTRASONOGRAPHY OF RIGHT AND LEFT HEART, TRANSESOPHAGEAL: ICD-10-PCS | Performed by: ANESTHESIOLOGY

## 2022-12-05 PROCEDURE — 77030016037 HC MANFLD MULTI QUES -B: Performed by: ANESTHESIOLOGY

## 2022-12-05 PROCEDURE — 77030018390 HC SPNG HEMSTAT2 J&J -B: Performed by: THORACIC SURGERY (CARDIOTHORACIC VASCULAR SURGERY)

## 2022-12-05 PROCEDURE — 77030010818: Performed by: THORACIC SURGERY (CARDIOTHORACIC VASCULAR SURGERY)

## 2022-12-05 PROCEDURE — 74011000250 HC RX REV CODE- 250: Performed by: ANESTHESIOLOGY

## 2022-12-05 PROCEDURE — 74011250636 HC RX REV CODE- 250/636: Performed by: PHYSICIAN ASSISTANT

## 2022-12-05 PROCEDURE — 02HV33Z INSERTION OF INFUSION DEVICE INTO SUPERIOR VENA CAVA, PERCUTANEOUS APPROACH: ICD-10-PCS | Performed by: ANESTHESIOLOGY

## 2022-12-05 PROCEDURE — 77030026855 HC PNCH AORT MYO AEMC -B: Performed by: THORACIC SURGERY (CARDIOTHORACIC VASCULAR SURGERY)

## 2022-12-05 PROCEDURE — 74011000250 HC RX REV CODE- 250: Performed by: PHYSICIAN ASSISTANT

## 2022-12-05 PROCEDURE — 77030019896 HC KT ARTERIAL LN TELE -B: Performed by: THORACIC SURGERY (CARDIOTHORACIC VASCULAR SURGERY)

## 2022-12-05 PROCEDURE — 77030005401 HC CATH RAD ARRO -A: Performed by: ANESTHESIOLOGY

## 2022-12-05 PROCEDURE — 77030033068 HC DEV COR-KNOT SUT KT LSIS -E: Performed by: THORACIC SURGERY (CARDIOTHORACIC VASCULAR SURGERY)

## 2022-12-05 PROCEDURE — 77030018719 HC DRSG PTCH ANTIMIC J&J -A: Performed by: ANESTHESIOLOGY

## 2022-12-05 PROCEDURE — 77030008771 HC TU NG SALEM SUMP -A: Performed by: ANESTHESIOLOGY

## 2022-12-05 PROCEDURE — 77030020061 HC IV BLD WRMR ADMIN SET 3M -B: Performed by: ANESTHESIOLOGY

## 2022-12-05 PROCEDURE — 77030002933 HC SUT MCRYL J&J -A: Performed by: THORACIC SURGERY (CARDIOTHORACIC VASCULAR SURGERY)

## 2022-12-05 PROCEDURE — 77030008500 HC TBNG CONN PRSS BD -A: Performed by: ANESTHESIOLOGY

## 2022-12-05 PROCEDURE — 77030010813: Performed by: THORACIC SURGERY (CARDIOTHORACIC VASCULAR SURGERY)

## 2022-12-05 PROCEDURE — 76060000036 HC ANESTHESIA 2.5 TO 3 HR: Performed by: THORACIC SURGERY (CARDIOTHORACIC VASCULAR SURGERY)

## 2022-12-05 PROCEDURE — 77030013079 HC BLNKT BAIR HGGR 3M -A: Performed by: ANESTHESIOLOGY

## 2022-12-05 PROCEDURE — 80048 BASIC METABOLIC PNL TOTAL CA: CPT

## 2022-12-05 PROCEDURE — 77030002913 HC SUT ETHBND J&J -B: Performed by: THORACIC SURGERY (CARDIOTHORACIC VASCULAR SURGERY)

## 2022-12-05 PROCEDURE — 77030033036 HC BLWR MISTR ACUMIST MEDT -C: Performed by: THORACIC SURGERY (CARDIOTHORACIC VASCULAR SURGERY)

## 2022-12-05 PROCEDURE — 77030013140 HC MSK NEB VYRM -A: Performed by: ANESTHESIOLOGY

## 2022-12-05 PROCEDURE — 77030005401 HC CATH RAD ARRO -A: Performed by: THORACIC SURGERY (CARDIOTHORACIC VASCULAR SURGERY)

## 2022-12-05 PROCEDURE — 77030007667 HC INSRT SUT HLD MEDT -B: Performed by: THORACIC SURGERY (CARDIOTHORACIC VASCULAR SURGERY)

## 2022-12-05 PROCEDURE — 77030018836 HC SOL IRR NACL ICUM -A: Performed by: THORACIC SURGERY (CARDIOTHORACIC VASCULAR SURGERY)

## 2022-12-05 PROCEDURE — 77030015683 HC ADPT CRDPLG MEDT -B: Performed by: THORACIC SURGERY (CARDIOTHORACIC VASCULAR SURGERY)

## 2022-12-05 PROCEDURE — 77030012407 HC DRN WND BARD -B: Performed by: THORACIC SURGERY (CARDIOTHORACIC VASCULAR SURGERY)

## 2022-12-05 PROCEDURE — 74011250637 HC RX REV CODE- 250/637: Performed by: PHYSICIAN ASSISTANT

## 2022-12-05 PROCEDURE — 77030002987 HC SUT PROL J&J -B: Performed by: THORACIC SURGERY (CARDIOTHORACIC VASCULAR SURGERY)

## 2022-12-05 PROCEDURE — 77030022199 HC SYS HARV VESL GTNG -G1: Performed by: THORACIC SURGERY (CARDIOTHORACIC VASCULAR SURGERY)

## 2022-12-05 PROCEDURE — 77030002945 HC SUT NRLN J&J -A: Performed by: THORACIC SURGERY (CARDIOTHORACIC VASCULAR SURGERY)

## 2022-12-05 PROCEDURE — C1769 GUIDE WIRE: HCPCS | Performed by: THORACIC SURGERY (CARDIOTHORACIC VASCULAR SURGERY)

## 2022-12-05 PROCEDURE — 76010000109 HC CV SURG 2.5 TO 3 HR: Performed by: THORACIC SURGERY (CARDIOTHORACIC VASCULAR SURGERY)

## 2022-12-05 PROCEDURE — 77030038692 HC WND DEB SYS IRMX -B: Performed by: THORACIC SURGERY (CARDIOTHORACIC VASCULAR SURGERY)

## 2022-12-05 PROCEDURE — 77030018547 HC SUT ETHBND1 J&J -B: Performed by: THORACIC SURGERY (CARDIOTHORACIC VASCULAR SURGERY)

## 2022-12-05 PROCEDURE — 77030040361 HC SLV COMPR DVT MDII -B: Performed by: THORACIC SURGERY (CARDIOTHORACIC VASCULAR SURGERY)

## 2022-12-05 PROCEDURE — 77030011267 HC ELECTRD BLD COVD -A: Performed by: THORACIC SURGERY (CARDIOTHORACIC VASCULAR SURGERY)

## 2022-12-05 PROCEDURE — 77030031139 HC SUT VCRL2 J&J -A: Performed by: THORACIC SURGERY (CARDIOTHORACIC VASCULAR SURGERY)

## 2022-12-05 PROCEDURE — 77030010929 HC CLMP VASC FGRTY EDWD -B: Performed by: THORACIC SURGERY (CARDIOTHORACIC VASCULAR SURGERY)

## 2022-12-05 PROCEDURE — 74011250636 HC RX REV CODE- 250/636: Performed by: ANESTHESIOLOGY

## 2022-12-05 PROCEDURE — C1751 CATH, INF, PER/CENT/MIDLINE: HCPCS | Performed by: THORACIC SURGERY (CARDIOTHORACIC VASCULAR SURGERY)

## 2022-12-05 PROCEDURE — 77030041021 HC STBLZR CARD OCTPS MEDT -G1: Performed by: THORACIC SURGERY (CARDIOTHORACIC VASCULAR SURGERY)

## 2022-12-05 PROCEDURE — 65620000000 HC RM CCU GENERAL

## 2022-12-05 PROCEDURE — 77030010827: Performed by: THORACIC SURGERY (CARDIOTHORACIC VASCULAR SURGERY)

## 2022-12-05 PROCEDURE — 77030040922 HC BLNKT HYPOTHRM STRY -A: Performed by: THORACIC SURGERY (CARDIOTHORACIC VASCULAR SURGERY)

## 2022-12-05 PROCEDURE — 77030028584 HC ELECTRD ECG PHYS -B: Performed by: THORACIC SURGERY (CARDIOTHORACIC VASCULAR SURGERY)

## 2022-12-05 PROCEDURE — 77030002996 HC SUT SLK J&J -A: Performed by: THORACIC SURGERY (CARDIOTHORACIC VASCULAR SURGERY)

## 2022-12-05 PROCEDURE — 77030026918 HC ADMN ST IV BLD BD -A: Performed by: ANESTHESIOLOGY

## 2022-12-05 PROCEDURE — 77030002996 HC SUT SLK J&J -A: Performed by: ANESTHESIOLOGY

## 2022-12-05 PROCEDURE — 77030034848: Performed by: THORACIC SURGERY (CARDIOTHORACIC VASCULAR SURGERY)

## 2022-12-05 PROCEDURE — 77030013797 HC KT TRNSDUC PRSSR EDWD -A: Performed by: ANESTHESIOLOGY

## 2022-12-05 PROCEDURE — 82947 ASSAY GLUCOSE BLOOD QUANT: CPT

## 2022-12-05 PROCEDURE — 2709999900 HC NON-CHARGEABLE SUPPLY: Performed by: THORACIC SURGERY (CARDIOTHORACIC VASCULAR SURGERY)

## 2022-12-05 RX ORDER — SODIUM CHLORIDE 0.9 % (FLUSH) 0.9 %
5-40 SYRINGE (ML) INJECTION EVERY 8 HOURS
Status: DISCONTINUED | OUTPATIENT
Start: 2022-12-05 | End: 2022-12-07

## 2022-12-05 RX ORDER — MUPIROCIN 20 MG/G
OINTMENT TOPICAL 2 TIMES DAILY
Status: DISPENSED | OUTPATIENT
Start: 2022-12-05 | End: 2022-12-10

## 2022-12-05 RX ORDER — VANCOMYCIN HYDROCHLORIDE 1 G/20ML
INJECTION, POWDER, LYOPHILIZED, FOR SOLUTION INTRAVENOUS
Status: DISPENSED
Start: 2022-12-05 | End: 2022-12-05

## 2022-12-05 RX ORDER — MANNITOL 20 G/100ML
INJECTION, SOLUTION INTRAVENOUS
Status: DISCONTINUED | OUTPATIENT
Start: 2022-12-05 | End: 2022-12-05 | Stop reason: HOSPADM

## 2022-12-05 RX ORDER — DEXTROSE MONOHYDRATE 100 MG/ML
0-250 INJECTION, SOLUTION INTRAVENOUS AS NEEDED
Status: DISCONTINUED | OUTPATIENT
Start: 2022-12-05 | End: 2022-12-08 | Stop reason: SDUPTHER

## 2022-12-05 RX ORDER — MANNITOL 20 G/100ML
INJECTION, SOLUTION INTRAVENOUS
Status: COMPLETED
Start: 2022-12-05 | End: 2022-12-05

## 2022-12-05 RX ORDER — PROPOFOL 10 MG/ML
INJECTION, EMULSION INTRAVENOUS
Status: COMPLETED
Start: 2022-12-05 | End: 2022-12-05

## 2022-12-05 RX ORDER — AMINOCAPROIC ACID 250 MG/ML
INJECTION, SOLUTION INTRAVENOUS AS NEEDED
Status: DISCONTINUED | OUTPATIENT
Start: 2022-12-05 | End: 2022-12-05 | Stop reason: HOSPADM

## 2022-12-05 RX ORDER — INSULIN LISPRO 100 [IU]/ML
INJECTION, SOLUTION INTRAVENOUS; SUBCUTANEOUS
Status: DISCONTINUED | OUTPATIENT
Start: 2022-12-05 | End: 2022-12-07

## 2022-12-05 RX ORDER — ONDANSETRON 2 MG/ML
4 INJECTION INTRAMUSCULAR; INTRAVENOUS
Status: DISCONTINUED | OUTPATIENT
Start: 2022-12-05 | End: 2022-12-15 | Stop reason: HOSPADM

## 2022-12-05 RX ORDER — PROPOFOL 10 MG/ML
INJECTION, EMULSION INTRAVENOUS AS NEEDED
Status: DISCONTINUED | OUTPATIENT
Start: 2022-12-05 | End: 2022-12-05 | Stop reason: HOSPADM

## 2022-12-05 RX ORDER — HEPARIN SODIUM 1000 [USP'U]/ML
INJECTION, SOLUTION INTRAVENOUS; SUBCUTANEOUS
Status: DISPENSED
Start: 2022-12-05 | End: 2022-12-05

## 2022-12-05 RX ORDER — ROCURONIUM BROMIDE 10 MG/ML
INJECTION, SOLUTION INTRAVENOUS AS NEEDED
Status: DISCONTINUED | OUTPATIENT
Start: 2022-12-05 | End: 2022-12-05 | Stop reason: HOSPADM

## 2022-12-05 RX ORDER — SODIUM CHLORIDE 9 MG/ML
75 INJECTION, SOLUTION INTRAVENOUS CONTINUOUS
Status: DISCONTINUED | OUTPATIENT
Start: 2022-12-05 | End: 2022-12-05

## 2022-12-05 RX ORDER — FENTANYL CITRATE 50 UG/ML
INJECTION, SOLUTION INTRAMUSCULAR; INTRAVENOUS AS NEEDED
Status: DISCONTINUED | OUTPATIENT
Start: 2022-12-05 | End: 2022-12-05 | Stop reason: HOSPADM

## 2022-12-05 RX ORDER — MIDAZOLAM HYDROCHLORIDE 1 MG/ML
INJECTION, SOLUTION INTRAMUSCULAR; INTRAVENOUS AS NEEDED
Status: DISCONTINUED | OUTPATIENT
Start: 2022-12-05 | End: 2022-12-05 | Stop reason: HOSPADM

## 2022-12-05 RX ORDER — SODIUM CHLORIDE 0.9 % (FLUSH) 0.9 %
5-40 SYRINGE (ML) INJECTION AS NEEDED
Status: DISCONTINUED | OUTPATIENT
Start: 2022-12-05 | End: 2022-12-07

## 2022-12-05 RX ORDER — SUCCINYLCHOLINE CHLORIDE 20 MG/ML
INJECTION INTRAMUSCULAR; INTRAVENOUS AS NEEDED
Status: DISCONTINUED | OUTPATIENT
Start: 2022-12-05 | End: 2022-12-05 | Stop reason: HOSPADM

## 2022-12-05 RX ORDER — PAPAVERINE HYDROCHLORIDE 30 MG/ML
INJECTION INTRAMUSCULAR; INTRAVENOUS
Status: DISPENSED
Start: 2022-12-05 | End: 2022-12-05

## 2022-12-05 RX ORDER — MAGNESIUM SULFATE 100 %
4 CRYSTALS MISCELLANEOUS AS NEEDED
Status: DISCONTINUED | OUTPATIENT
Start: 2022-12-05 | End: 2022-12-08 | Stop reason: SDUPTHER

## 2022-12-05 RX ORDER — ACETAMINOPHEN 650 MG/1
SUPPOSITORY RECTAL
Status: DISCONTINUED
Start: 2022-12-05 | End: 2022-12-05 | Stop reason: WASHOUT

## 2022-12-05 RX ADMIN — FENTANYL CITRATE 150 MCG: 50 INJECTION INTRAMUSCULAR; INTRAVENOUS at 08:55

## 2022-12-05 RX ADMIN — AMINOCAPROIC ACID 1 G/HR: 250 INJECTION, SOLUTION INTRAVENOUS at 07:55

## 2022-12-05 RX ADMIN — SODIUM CHLORIDE, PRESERVATIVE FREE 10 ML: 5 INJECTION INTRAVENOUS at 22:28

## 2022-12-05 RX ADMIN — ROCURONIUM BROMIDE 50 MG: 50 INJECTION INTRAVENOUS at 07:55

## 2022-12-05 RX ADMIN — CEFAZOLIN SODIUM 3 G: 1 INJECTION, POWDER, FOR SOLUTION INTRAMUSCULAR; INTRAVENOUS at 09:02

## 2022-12-05 RX ADMIN — SODIUM CHLORIDE 75 ML/HR: 9 INJECTION, SOLUTION INTRAVENOUS at 06:54

## 2022-12-05 RX ADMIN — PROPOFOL 150 MG: 10 INJECTION, EMULSION INTRAVENOUS at 07:55

## 2022-12-05 RX ADMIN — ROCURONIUM BROMIDE 50 MG: 50 INJECTION INTRAVENOUS at 09:12

## 2022-12-05 RX ADMIN — MIDAZOLAM HYDROCHLORIDE 2 MG: 2 INJECTION, SOLUTION INTRAMUSCULAR; INTRAVENOUS at 07:55

## 2022-12-05 RX ADMIN — FENTANYL CITRATE 100 MCG: 50 INJECTION INTRAMUSCULAR; INTRAVENOUS at 07:55

## 2022-12-05 RX ADMIN — MUPIROCIN: 20 OINTMENT TOPICAL at 17:50

## 2022-12-05 RX ADMIN — SUCCINYLCHOLINE CHLORIDE 100 MG: 20 INJECTION, SOLUTION INTRAMUSCULAR; INTRAVENOUS at 07:55

## 2022-12-05 RX ADMIN — ONDANSETRON 4 MG: 2 INJECTION INTRAMUSCULAR; INTRAVENOUS at 23:16

## 2022-12-05 RX ADMIN — SODIUM CHLORIDE, PRESERVATIVE FREE 10 ML: 5 INJECTION INTRAVENOUS at 16:33

## 2022-12-05 RX ADMIN — MANNITOL: 20 INJECTION, SOLUTION INTRAVENOUS at 08:40

## 2022-12-05 RX ADMIN — PHENYLEPHRINE HYDROCHLORIDE 20 MCG/MIN: 10 INJECTION INTRAVENOUS at 09:08

## 2022-12-05 RX ADMIN — AMINOCAPROIC ACID 5 G: 250 INJECTION, SOLUTION INTRAVENOUS at 07:55

## 2022-12-05 NOTE — ANESTHESIA PROCEDURE NOTES
Arterial Line Placement    Start time: 12/5/2022 8:49 AM  End time: 12/5/2022 8:49 AM  Performed by: Daniela Sosa MD  Authorized by: Daniela Sosa MD     Pre-Procedure  Indications:  Arterial pressure monitoring and blood sampling  Preanesthetic Checklist: patient identified, risks and benefits discussed, anesthesia consent, site marked, patient being monitored, timeout performed, patient being monitored and fire risk safety assessment completed and verbalized    Timeout Time: 08:49 EST      Procedure:   Prep:  Chlorhexidine  Seldinger Technique?: Yes    Orientation:  Left  Location:  Radial artery  Catheter size:  20 G  Number of attempts:  1  Cont Cardiac Output Sensor: No      Assessment:   Post-procedure:  Line secured and sterile dressing applied  Patient Tolerance:  Patient tolerated the procedure well with no immediate complications

## 2022-12-05 NOTE — PROGRESS NOTES
TRANSFER - OUT REPORT:    Verbal report given to Verito Dobson RN on [employer]  being transferred to CVT ICU for routine progression of care       Report consisted of patients Situation, Background, Assessment and   Recommendations(SBAR). Information from the following report(s) SBAR, OR Summary, Intake/Output, and MAR was reviewed with the receiving nurse. Lines:   Single Lumen Venous Catheter 12/05/22 Right (Active)       Peripheral IV 12/05/22 Right Antecubital (Active)       Arterial Line 12/05/22 (Active)       Arterial Line 12/05/22 (Active)        Opportunity for questions and clarification was provided.       Patient transported with:   Monitor  Registered Nurse

## 2022-12-05 NOTE — H&P
Mirta Jacobs is a 79 y.o. female who is being seen on consult for AS/CAD, at  Dr. Prince Gonsalez request.     Assessment:   Severe aortic stenosis   Left main and 2 vessel CAD   Obesity severe  BMI > 35   DM2 A1C 5.7  HTN  CKD? Elevated creatinine  H/o RIGHT leg vein sclerosis  Seasonal allergies on singulair  former tobacco use quit 2017  COVID vaccinated and boosted     Cardiac risk factors:  smoking/ tobacco exposure, family history, dyslipidemia, diabetes mellitus, obesity,  hypertension, stress, valvular heart disease  Plan: May benefit from SAVR/CABG. Will need to discontinue ACEi/ARB 48h prior to OR to avoid perioperative vasoplegic syndrome. Will also need the following tests to complete the workup and risk stratification. Type and Cross 2 units PRBC and 1 unit PLT  Coagulation profile / INR  Liver function tests  TSH  HbA1c  Echocardiogram resutlts  Need CT results  Room air arterial blood gas h/o smoking  Amiodarone for Atrial Fibrillation prophylaxis     Subjective:   No chief complaint on file. History of Present Illness:   Mirta Jacobs is a 79 y.o. female with history of HTN, DM2, aortic stenosis and cad  who presented for elective cardiac cath for possible stenting. She was found to have ostial LAD disease not amenable to stenting. She reports chest pressure with rest and exertion. SOB with exertion. Past Medical History:   Diagnosis Date    Aortic stenosis 11/28/2022    Coronary artery disease involving native coronary artery of native heart 11/28/2022    Primary hypertension 11/28/2022    Type 2 diabetes mellitus, without long-term current use of insulin ([address] 11/28/2022               Past Surgical History:   Procedure Laterality Date    HX HYSTERECTOMY             Social History:   She  reports that she has been smoking. She has been smoking an average of 1.5 packs per day. She does not have any smokeless tobacco history on file.   She  reports current alcohol use.      Family History:   No family history on file. No family history pertinent to CAD. Allergies and Intolerances: Allergies   Allergen Reactions    Albuterol Sulfate Shortness of Breath    Entex [Pseudoephedrine Tannate] Rash    Terbutaline Shortness of Breath    Terramycin [Oxytetracycline] Unknown (comments)         Home Medications:                Current Facility-Administered Medications   Medication Dose Route Frequency Provider Last Rate Last Admin    sodium chloride (NS) flush 5-40 mL  5-40 mL IntraVENous Q8H Willow Benitez APRN        sodium chloride (NS) flush 5-40 mL  5-40 mL IntraVENous PRN GEOVANNA Edmondson        aspirin chewable tablet 81 mg  81 mg Oral DAILY ShabanaGEOVANNA Read   81 mg at 22 7987    aspirin chewable tablet 81 mg  81 mg Oral NOW Raynard Lefort, MD        0.9% sodium chloride infusion  100 mL/hr IntraVENous CONTINUOUS Raynard Lefort,  mL/hr at 22 0900 250 mL at 22 0900      Prior to Admission Medications   Prescriptions Last Dose Informant Patient Reported? Taking?   atorvastatin (LIPITOR) 40 mg tablet 2022 at 1900   Yes Yes   Sig: Take 40 mg by mouth in the morning. azelastine (ASTELIN) 137 mcg (0.1 %) nasal spray 2022 at 0700   Yes Yes   Si Spray two (2) times a day. Use in each nostril as directed   chlorthalidone (HYGROTON) 25 mg tablet 2022 at 0700   Yes Yes   Sig: Take 25 mg by mouth in the morning. dulaglutide (Trulicity) 3 WL/6.7 mL pnij 2022 at 2130   Yes Yes   Sig: by SubCUTAneous route. empagliflozin (JARDIANCE) 25 mg tablet 2022 at 0700   Yes Yes   Sig: Take  by mouth daily. fexofenadine-pseudoephedrine (ALLEGRA-D)  mg Tb12 2022 at 0700   Yes Yes   Sig: Take 1 Tab by mouth every twelve (12) hours. fluticasone propionate (FLONASE) 50 mcg/actuation nasal spray 2022 at 1900   Yes Yes   Si Sprays by Both Nostrils route daily.    furosemide (Lasix) 20 mg tablet 11/27/2022 at 1900   Yes Yes   Sig: Take 20 mg by mouth daily. lisinopriL (PRINIVIL, ZESTRIL) 20 mg tablet 11/27/2022 at 0700   Yes Yes   Sig: Take 20 mg by mouth in the morning. metFORMIN (GLUCOPHAGE) 500 mg tablet 11/27/2022 at 1900   Yes Yes   Sig: Take 500 mg by mouth two (2) times daily (with meals). montelukast (SINGULAIR) 10 mg tablet 11/27/2022 at 1900   Yes Yes   Sig: Take 10 mg by mouth in the morning.   multivitamin (ONE A DAY) tablet 11/27/2022 at 0700   Yes Yes   Sig: Take 1 Tablet by mouth daily. potassium citrate (UROCIT-K10) 10 mEq (1,080 mg) TbER 11/27/2022 at 1900   Yes Yes   Sig: Take 20 mEq by mouth. Facility-Administered Medications: None      No current outpatient medications on file.          Review of Systems: (NEGATIVE unless checked or in HPI.)   Cardiac:   [] Chest pain or chest pressure  [] Shortness of breath upon activity  [] Shortness of breath when lying flat  [] Irregular heart rhythm     Vascular:   [] Pain in calf, thigh, or hip brought on by walking  [] Pain in feet at night that wakes you up from your sleep  [] Blood clot in your veins  [] Leg swelling  [] bulging leg veins     Pulmonary:   [] Oxygen at home  [] Productive cough  [] Wheezing     Neurologic:   [] Sudden weakness in arms or legs  [] Sudden numbness in arms or legs  [] Sudden onset of difficult speaking or slurred speech  [] Temporary loss of vision in one eye  [] Problems with dizziness     Gastrointestinal:   [] Blood in stool  [] Vomited blood     Genitourinary:   [] Burning when urinating  [] Blood in urine     Psychiatric:   [] Major depression     Hematologic:   [] Bleeding problems  [] Problems with blood clotting  [] bulging leg veins  [] calf pain with exertion     Dermatologic:   [] Rashes or ulcers     Constitutional:   [] Fever or chills  [] weight gain or loss     Ear/Nose/Throat:   [x] Dentition - sees dentist regularly  [] Change in hearing  [] Nose bleeds  [] Sore throat Musculoskeletal:   [] Back pain  [] Joint pain  [] Muscle pain     Physical Examination:   Visit Vitals  BP (!) 93/49 (BP 1 Location: Left upper arm, BP Patient Position: Reclining)   Pulse 88   Temp 98 °F (36.7 °C)   Resp 18   SpO2 94%   Breastfeeding No      PHYSICAL EXAMINATION:  Vital signs:       BP Readings from Last 3 Encounters:   22 (!) 93/49   22 (!) 156/58   16 154/75      Temp (24hrs), Av °F (36.7 °C), Min:98 °F (36.7 °C), Max:98 °F (36.7 °C)         Wt Readings from Last 3 Encounters:   22 127.5 kg (281 lb 1.4 oz)   16 122.5 kg (270 lb)          Ht Readings from Last 3 Encounters:   22 5' 11\" (1.803 m)   16 6' (1.829 m)      There is no height or weight on file to calculate BSA. General:   awake alert and oriented   Skin:   no rashes or skin lesions noted on limited exam   HEENT:  Normocephalic, atraumatic, PERRL, EOMI, no scleral icterus or pallor; no conjunctival hemmohage;  nasal and oral mucous are moist and without evidence of lesions. No thrush. Dentition good Neck supple, no bruits. Lymph Nodes:   no cervical, axillary or inguinal adenopathy   Lungs:   non-labored, bilaterally clear to aspiration- no crackles wheezes rales or rhonchi   Heart:  RRR, s1 and s2; no murmurs rubs or gallops, no edema, + pedal pulses   Abdomen:  soft, non-distended, active bowel sounds, no hepatomegaly, no splenomegaly. Appropriate surgical scars for stated surgeries. Non-tender   Genitourinary:  deferred   Extremities:   no clubbing, cyanosis; no joint effusions or swelling; Full ROM of all large joints to the upper and lower extremities; muscle mass appropriate for age   Neurologic:  No gross focal sensory abnormalities; 5/5 muscle strength to upper and lower extremities. Speech appropirate. Cranial nerves intact   Psychiatric:   appropriate and interactive.          Laboratory Data:            Lab Results   Component Value Date/Time     WBC 7.9 2022 07:40 AM HGB 10.2 (L) 11/28/2022 07:40 AM     HCT 31.6 (L) 11/28/2022 07:40 AM     PLATELET 014 72/46/6266 07:40 AM            Lab Results   Component Value Date/Time     Sodium 138 11/28/2022 07:40 AM     Potassium 4.8 11/28/2022 07:40 AM     Chloride 107 11/28/2022 07:40 AM     CO2 27 11/28/2022 07:40 AM     Glucose 128 (H) 11/28/2022 07:40 AM     BUN 60 (H) 11/28/2022 07:40 AM     Creatinine 1.87 (H) 11/28/2022 07:40 AM               Lab Results   Component Value Date/Time     Hemoglobin A1c 5.7 (H) 11/28/2022 07:40 AM          Recent Labs     11/28/22  0740   CA 9.6               Lab Results   Component Value Date/Time     TSH 0.99 11/28/2022 07:40 AM         EKG: (independently reviewed)   EKG Results         Procedure 720 Value Units Date/Time     EKG, 12 LEAD, INITIAL [[id number]] Collected: 11/28/22 0726     Order Status: Completed Updated: 11/28/22 0937       Ventricular Rate 90 BPM         Atrial Rate 90 BPM         P-R Interval 162 ms         QRS Duration 72 ms         Q-T Interval 360 ms         QTC Calculation (Bezet) 440 ms         Calculated P Axis 49 degrees         Calculated R Axis 20 degrees         Calculated T Axis 68 degrees         Diagnosis --       Normal sinus rhythm  Possible Left atrial enlargement  Low voltage QRS  Cannot rule out Anterior infarct , age undetermined  Abnormal ECG  No previous ECGs available                RADIOLOGY DATA: (images independently reviewed)    XR Results (most recent):  Results from Hospital Encounter encounter on 11/28/22     XR CHEST PORT     Narrative  EXAM: XR CHEST PORT     CLINICAL INDICATION/HISTORY: 79 years Female. Pre-TAVR baseline testing. Additional History: None     TECHNIQUE: Frontal view of the chest     COMPARISON: No comparison study is available at the time of this dictation. FINDINGS:     Partial obscuration of the lung apices secondary to overlying necklace. Multiple  devices project over the patient.      The cardiac silhouette is mild to moderately enlarged. Aortic calcifications  noted. Mild-to-moderate pulmonary vascular congestion. Diffuse bilateral  interstitial opacities. Hazy opacities at the peripheral right midlung zone,  likely subsegmental atelectasis or scar. Blunting of the right costophrenic sulcus. Left costophrenic sulcus appears  sharp. No definite pneumothorax. No acute osseous abnormality appreciated. Chronic fracture of the midshaft of  the right clavicle. Impression  1. Pulmonary vascular congestion and diffuse bilateral interstitial edema  versus infiltrates. 2.  Possible small right pleural effusion. 3.  Chronic silhouette enlargement. Burt Leventhal, PA-C  [facility]  [phone]        ATTENDING ADDENDUM:     I saw and examined the patient. I agree with history and physical examination as well as assessment documented above. 77-year-old patient with documented severe aortic valve stenosis symptomatic with increasing shortness of breath with exertion, severe orthopnea and paroxysmal nocturnal dyspnea as well as increasing peripheral edema on diuretics. The patient was seen and evaluated by Dr. Alek Aparicio and was brought in for a left heart catheterization with left and right coronary angiography today. I had the chance to review the patient's coronary angiogram images personally. I also discussed the coronary angiogram images as well as echocardiogram results with Dr. Leroy Valiente. The patient has severe ostial left mainstem coronary artery stenosis as well as severe left anterior descending artery stenosis. I think the patient will benefit from coronary revascularization as well as aortic valve replacement on an urgent basis due to symptomatic nature of both as well as presence of left mainstem coronary artery stenosis.   I spoke to the patient in great detail, explained her the significance of aortic valve stenosis, explained her the significance of left mainstem coronary artery disease as well as left anterior descending artery stenosis. I explained the treatment options including continued medical therapy which is not recommended, percutaneous coronary interventions for the left mainstem coronary artery left anterior descending artery lesions as well as TAVR for aortic valve replacement. I also explained her the option of coronary artery bypass graft surgery as well as surgical aortic valve replacement at the same time with a possible aortic root enlargement. I went over the risks and benefits of all 3 treatment options in great detail. The procedure aortic valve replacement and coronary artery bypass grafting, postprocedure hospital stay, recuperation after discharge from the hospital was discussed with the patient in great detail. Risks of the procedure including but not limited to infection the sternotomy incision, which might require drainage and debridement, bleeding and blood transfusion, perioperative myocardial infarction, perioperative stroke, necessity for permanent pacemaker, perioperative death was discussed with the patient in great detail. Patient understands and she is ready to proceed. I answered all of her questions to her satisfaction. Patient's creatinine is 1.87 today. Her creatinine in August 2022 was 1.1. I discussed with Dr. Alfred Carpio, patient has been on diuretics for worsening congestive heart failure since August.  She is also on lisinopril. We agreed to stop Lasix and lisinopril for 2 days. The plan is to check another creatinine on 11/30/2022. This will also give her kidneys a chance to recover from the contrast exposure today. Patient will also be instructed to call if her heart failure symptoms worsen in the meantime. I offered the patient to have surgery during this week, the patient would like to wait until next week.   She will be scheduled for aortic valve replacement and coronary artery bypass grafting 7 days from today. We will repeat an echocardiogram here at [facility] as her initial echocardiogram was done at another facility. I will also obtain a CT scan of the chest without contrast to evaluate the ascending aortic and arch atherosclerosis. She has a history of vein sclerosing on the right lower extremity. We will map her left lower extremity for adequacy of saphenous vein conduit.

## 2022-12-05 NOTE — PROGRESS NOTES
conducted a pre-surgery visit with Yudy Faria, who is a 79 y.o.,female. The  provided the following Interventions:  Initiated a relationship of care and support. Offered prayer and assurance of continued prayers on patient's behalf. There is no advance directive seen. Plan:  Chaplains will continue to follow and will provide pastoral care on an as needed/requested basis.  recommends bedside caregivers page  on duty if patient shows signs of acute spiritual or emotional distress.    Reiseñor 3   Board Certified [address] [place] [address]   [phone]

## 2022-12-05 NOTE — ANESTHESIA PREPROCEDURE EVALUATION
Relevant Problems   No relevant active problems       Anesthetic History   No history of anesthetic complications            Review of Systems / Medical History  Patient summary reviewed and pertinent labs reviewed    Pulmonary          Undiagnosed apnea      Comments: smoker   Neuro/Psych   Within defined limits           Cardiovascular    Hypertension  Valvular problems/murmurs: mitral stenosis and aortic stenosis        CAD and hyperlipidemia    Exercise tolerance: <4 METS  Comments: Severe AS  Mod MS  EF 55%   GI/Hepatic/Renal         Renal disease: CRI       Endo/Other    Diabetes: type 2    Anemia     Other Findings              Physical Exam    Airway  Mallampati: II  TM Distance: > 6 cm  Neck ROM: normal range of motion   Mouth opening: Normal     Cardiovascular    Rhythm: regular  Rate: normal    Murmur: Grade 4, Aortic area and Mitral area     Dental  No notable dental hx       Pulmonary  Breath sounds clear to auscultation               Abdominal  GI exam deferred       Other Findings            Anesthetic Plan    ASA: 4  Anesthesia type: general    Monitoring Plan: DIDIER, [place], CVP, Continuous noninvasive hemodynamic monitoring, BIS and Arterial line      Induction: Intravenous  Anesthetic plan and risks discussed with: Patient

## 2022-12-05 NOTE — ACP (ADVANCE CARE PLANNING)
Advance Care Planning   Advance Care Planning Inpatient Note  [facility]  Spiritual Care Department    Today's Date: 12/5/2022  Unit: [facility] CARDIAC SURGERY    Received request from . Upon review of chart and communication with care team, patient's decision making abilities are not in question. Patient was/were present in the room during visit. Goals of ACP Conversation:  Discuss Advance Care planning documents    Health Care Decision Makers:    No healthcare decision makers have been documented. Click here to complete [address] including selection of the Healthcare Decision Maker Relationship (ie \"Primary\")  Summary:  No Decision Maker named by patient at this time    Advance Care Planning Documents (Patient Wishes) on file:  None     Assessment:    Patient seen this morning as a pre-op visit and she will be staying following her surgery. There is no advance directive present.     Interventions:  Deferred conversation as patient not interested in completing an advance directive at this time    Care Preferences Communicated:  No    Outcomes/Plan:      [facility] on 12/5/2022 at 9:58 AM

## 2022-12-05 NOTE — OP NOTES
Date of procedure 12/5/2022  Preoperative diagnosis: Severe aortic valve stenosis, symptomatic, nonrheumatic, severe three-vessel coronary artery disease, symptomatic  Postop diagnosis: Same   Name of procedure: Procedure aborted due to equipment malfunction of the defibrillator device and paddles  Anesthesia: General endotracheal  Anesthesiologist: Dr. Dhruv Vidales  Intraprocedural complications: None  Estimated blood loss: None  Description:  Patient was taken the operating, placed by position on the operating table. General endotracheal anesthesia was induced without incidence. After placement of adequate monitoring lines and catheters, patient's anterior neck anterior chest anterior abdomen bilateral groins bilateral lower extremities were prepped and draped in usual sterile fashion. Adequate timeout procedures were performed. During the timeout procedures, it became apparent that the defibrillator device and defibrillator hand-held paddles were not compatible and we were not able to make the device and the paddles generate a shock. There was a continuous error message in the system. The biomedical engineering service was contacted. They were not able to have the equipment function reliably either. As this was a rather complicated case that would require prolonged cardioplegic arrest and most probably require defibrillation of the heart at the end of the procedure, I decided to cancel the procedure as we did not have 100% functioning defibrillator system. I informed the patient's sister and explained her the reason for cancellation. The patient was extubated and was brought to intensive care unit in stable condition.

## 2022-12-05 NOTE — ANESTHESIA PROCEDURE NOTES
Central Line Placement    Start time: 12/5/2022 8:50 AM  End time: 12/5/2022 8:50 AM  Performed by: Bozena Weaver MD  Authorized by: Bozena Weaver MD     Indications: vascular access, central pressure monitoring and need for vasopressors  Preanesthetic Checklist: patient identified, risks and benefits discussed, anesthesia consent, site marked, patient being monitored, timeout performed and fire risk safety assessment completed and verbalized    Timeout Time: 08:50 EST       Pre-procedure: All elements of maximal sterile barrier technique followed? Yes    2% Chlorhexidine for cutaneous antisepsis, Hand hygiene performed prior to catheter insertion and Ultrasound guidance    Sterile Ultrasound Technique followed?: Yes        Ultrasound Image Stored?  Image stored    Procedure:   Prep:  Chlorhexidine  Location: internal jugular  Orientation:  Right  Patient position:  Trendelenburg  Catheter type:  Single lumen  Catheter size:  7 Fr  Catheter length:  10 cm  Number of attempts:  1  Successful placement: Yes      Assessment:   Post-procedure:  Catheter secured, sterile dressing applied and sterile dressing with CHG applied  Assessment:  Blood return through all ports, free fluid flow and guidewire removal verified  Insertion:  Uncomplicated  Patient tolerance:  Patient tolerated the procedure well with no immediate complications

## 2022-12-05 NOTE — ANESTHESIA PROCEDURE NOTES
DIDIER        Procedure Details: probe placement, image aquisition & interpretation    Site marked, Timeout performed, 08:54 EST  Risks and benefits discussed with the patient and plans are to proceed    Procedure Note    Performed by: Israel Quintero MD  Authorized by: Israel Quintero MD     Indications: assessment of ascending aorta, assessment of surgical repair and suspected pericardial effusion  Modalities: PWD, CWD, CF, 2D  Probe Type: multiplane  Insertion: atraumatic  Patient Status: intubated and sedated  Echocardiographic and Doppler Measurements   Aorta  Size  Diam(cm)  Dissection PlaqueThick(mm)  Plaque Mobile    Ascending normal 2.4 No 0-3 No    Arch normal  No 0-3 No    Descending normal  No 0-3 No          Valves  Annulus  Stenosis  Area/Grad  Regurg  Leaflet   Morph  Leaflet   Motion    Aortic calcified severe 1.0 0 calcified, thickened restricted    Mitral  moderate  0 thickened restricted    Tricuspid normal none  0 normal normal          Atria  Size  SEC (smoke)  Thrombus  Tumor  Device    Rt Atrium normal No No No No    Lt Atrium dilated No No No No     Interatrial Septum Morphology: lipomatous hypertrophy    Interventricular Septum Morphology: normal  Ventricle  Cavity Size  Cavity Dimension Hypertrophy  Thrombus  Gloal FXN  EF    RV normal  No no normal     LV dilated  Yes No normal 50       Regional Function  (1 = normal, 2 = mildly hypokinetic, 3 = severely hypokinetic, 4 = akinetic, 5 = dyskinetic) LAV - [place]   ME LAV = 0  ME LAV = 90  ME LAV = 130                                     Pericardium: normal  Effusion: moderate  Post Intervention Follow-up Study  Ventricular Global Function: unchanged  Ventricular Regional Function: unchanged     Valve  Function  Regurgitation  Area    Aortic       Mitral no change      Tricuspid no change      Prosthetic        Complications: None

## 2022-12-05 NOTE — PERIOP NOTES
Pt . Dr Ruth Richmond informed. States no coverage at this time. \"States will address in the OR\" Reported to circulating nurse.

## 2022-12-05 NOTE — CONSULTS
Consult Note  Consult requested by: Marcela Morgan MD   Yandy Matias is a 79 y.o. female [address] [facility] who is being seen on consult for OSVALDO          HPI:  79 yr old obese female with hx of aortic stenosis, cad, hypertension, diabetes who presents for elective CABG and aortic valve surgery. SOB on exertion is her main complaint. Has LE edema  Surgery today was aborted due to lack of equipment  Had cardiac cath on 11/28 which showed severe AS and 3 vessel disease? Has hx of some CKD Stage 3 due to cardiac issues, htn and diabetes. Baseline cr around 1.12 with eGFR of 52. Cr on 11/28 was 1.87. Currently it is 1.74. Not aware of kidney issues in past.  Never saw a nephrologist or urologist.  No urological procedure in past.  No urinary complaints like hematuria,dysuria.   No NSAID use    Past Medical History:   Diagnosis Date    Aortic stenosis 11/28/2022    Coronary artery disease involving native coronary artery of native heart 11/28/2022    Primary hypertension 11/28/2022    Type 2 diabetes mellitus, without long-term current use of insulin ([facility] [address] 11/28/2022      Past Surgical History:   Procedure Laterality Date    HX HYSTERECTOMY         Social History     Socioeconomic History    Marital status:      Spouse name: Not on file    Number of children: Not on file    Years of education: Not on file    Highest education level: Not on file   Occupational History    Not on file   Tobacco Use    Smoking status: Heavy Smoker     Packs/day: 1.50     Types: Cigarettes    Smokeless tobacco: Not on file   Substance and Sexual Activity    Alcohol use: Yes     Comment: 1    Drug use: No    Sexual activity: Not on file   Other Topics Concern    Not on file   Social History Narrative    Not on file     Social Determinants of Health     Financial Resource Strain: Not on file   Food Insecurity: Not on file   Transportation Needs: Not on file   Physical Activity: Not on file   Stress: Not on file   Social Connections: Not on file   Intimate Partner Violence: Not on file   Housing Stability: Not on file       History reviewed. No pertinent family history. Allergies   Allergen Reactions    Albuterol Sulfate Shortness of Breath    Entex [Pseudoephedrine Tannate] Rash    Terbutaline Shortness of Breath    Terramycin [Oxytetracycline] Unknown (comments)        Home Medications:     Prior to Admission Medications   Prescriptions Last Dose Informant Patient Reported? Taking?   atorvastatin (LIPITOR) 40 mg tablet 2022  Yes Yes   Sig: Take 40 mg by mouth in the morning. azelastine (ASTELIN) 137 mcg (0.1 %) nasal spray 2022  Yes Yes   Si Spray two (2) times a day. Use in each nostril as directed   chlorthalidone (HYGROTON) 25 mg tablet 2022  Yes Yes   Sig: Take 25 mg by mouth in the morning. dulaglutide (Trulicity) 3 VR/5.4 mL pnij 2022  Yes Yes   Sig: by SubCUTAneous route. empagliflozin (JARDIANCE) 25 mg tablet 2022  Yes Yes   Sig: Take  by mouth daily. fexofenadine-pseudoephedrine (ALLEGRA-D)  mg Tb12 2022  Yes Yes   Sig: Take 1 Tab by mouth every twelve (12) hours. fluticasone propionate (FLONASE) 50 mcg/actuation nasal spray 2022  Yes Yes   Si Sprays by Both Nostrils route daily. metFORMIN (GLUCOPHAGE) 500 mg tablet 2022  Yes Yes   Sig: Take 500 mg by mouth two (2) times daily (with meals). montelukast (SINGULAIR) 10 mg tablet 2022  Yes Yes   Sig: Take 10 mg by mouth in the morning.   multivitamin (ONE A DAY) tablet 2022  Yes Yes   Sig: Take 1 Tablet by mouth daily.       Facility-Administered Medications: None       Current Facility-Administered Medications   Medication Dose Route Frequency    0.9% sodium chloride infusion  75 mL/hr IntraVENous CONTINUOUS    sodium chloride (NS) flush 5-40 mL  5-40 mL IntraVENous Q8H    sodium chloride (NS) flush 5-40 mL  5-40 mL IntraVENous PRN    mupirocin (BACTROBAN) 2 % ointment   Topical BID    vancomycin (VANCOCIN) 1,000 mg injection        papaverine 30 mg/mL injection        heparin (porcine) 1,000 unit/mL injection        insulin regular (MYXREDLIN, NOVOLIN, HUMULIN) 100 unit/100 mL (1 unit/mL) premix infusion        vancomycin (VANCOCIN) 1,000 mg injection        sugammadex (BRIDION) 100 mg/mL injection        insulin lispro (HUMALOG) injection   SubCUTAneous AC&HS    glucose chewable tablet 16 g  4 Tablet Oral PRN    glucagon (GLUCAGEN) injection 1 mg  1 mg IntraMUSCular PRN    dextrose 10% infusion 0-250 mL  0-250 mL IntraVENous PRN       Review of Systems:     Complete 10-point review of systems were obtained and discussed in length  with the patient. Complete review of systems was negative/unremarkable  except as mentioned in HPI section. Data Review:    Labs: Results:       Chemistry Recent Labs     12/05/22  0645   *      K 4.6      CO2 27   BUN 37*   CREA 1.74*   CA 9.1   AGAP 5   BUCR 21*      CBC w/Diff No results for input(s): WBC, RBC, HGB, HCT, PLT, GRANS, LYMPH, EOS, HGBEXT, HCTEXT, PLTEXT in the last 72 hours. Coagulation No results for input(s): PTP, INR, APTT, INREXT in the last 72 hours. Iron/Ferritin No results for input(s): IRON in the last 72 hours. No lab exists for component: TIBCCALC   BNP No results for input(s): BNPP in the last 72 hours. Cardiac Enzymes No results for input(s): CPK, CKND1, RHODA in the last 72 hours. No lab exists for component: CKRMB, TROIP   Liver Enzymes No results for input(s): TP, ALB, TBIL, AP in the last 72 hours.     No lab exists for component: SGOT, GPT, DBIL   Thyroid Studies Lab Results   Component Value Date/Time    TSH 0.99 11/28/2022 07:40 AM            Physical Assessment:   Visit Vitals  /61   Pulse 73   Temp 98.4 °F (36.9 °C)   Resp 14   Ht 5' 11\" (1.803 m)   Wt 130 kg (286 lb 9.6 oz)   SpO2 100%   BMI 39.97 kg/m²     Weight change:     Intake/Output Summary (Last 24 hours) at 12/5/2022 1512  Last data filed at 12/5/2022 1248  Gross per 24 hour   Intake 280 ml   Output 475 ml   Net -195 ml     Physical Exam:   General: comfortable, no acute distress   HEENT sclera anicteric, supple neck, no thyromegaly  CVS: S1S2 heard,  no rub. Positive SM in AA  RS: + air entry b/l,   Abd: Soft, Non tender, Not distended, Positive bowel sounds, no organomegaly, no CVA / supra pubic tenderness  Neuro: non focal, awake, alert , CN II-XII are grossly intact  Extrm: Plus 2 edema, no cyanosis, clubbing   Skin: no visible  Rash  Musculoskeletal: No gross joints or bone deformities     Procedures/imaging: see electronic medical records for all procedures, Xrays and details which were not copied into this note but were reviewed prior to creation of Plan      Impression & Plan:   IMPRESSION:   OSVALDO ? contrast nephropathy on CKD stage 3  Underlying diabetic and hypertensive nephropathy  Controlled diabetes  Severe AS   Diabetes  Hypertension   PLAN:    Follow cr for now  Monitor output closely  Avoid ACE I or diuretics for now  Hesitant to use fluids this far out from  contrast procedure especially with her cardiac status. will see how her cr does by tomorrow and decide whether she needs gentle fluid infusion. Keep MAP>65 at all times  Her risk for dialysis with CABG is around 2-4 percent. I explained this to her. She is ok with this. Her risk goes high with use of IABP or hypotension etc around procedure.   Daily labs for now         Soledad Croft MD  December 5, 2022  [facility]  Office [phone]

## 2022-12-05 NOTE — ANESTHESIA POSTPROCEDURE EVALUATION
Procedure(s):  CORONARY ARTERY BYPASS GRAFT TIMES TWO; AORTIC VALVE REPAIR/REPLACEMENT (INSPIRIS). general    Anesthesia Post Evaluation      Multimodal analgesia: multimodal analgesia used between 6 hours prior to anesthesia start to PACU discharge  Patient location during evaluation: ICU  Patient participation: complete - patient participated  Level of consciousness: awake  Pain score: 1  Airway patency: patent  Anesthetic complications: no  Cardiovascular status: acceptable  Respiratory status: acceptable  Hydration status: acceptable  Post anesthesia nausea and vomiting:  none  Final Post Anesthesia Temperature Assessment:  Normothermia (36.0-37.5 degrees C)      INITIAL Post-op Vital signs:   Vitals Value Taken Time   /61 12/05/22 1200   Temp 36.8 °C (98.2 °F) 12/05/22 1044   Pulse 72 12/05/22 1217   Resp 28 12/05/22 1217   SpO2 98 % 12/05/22 1217   Vitals shown include unvalidated device data.

## 2022-12-06 ENCOUNTER — ANESTHESIA EVENT (OUTPATIENT)
Dept: CARDIOTHORACIC SURGERY | Age: 67
DRG: 219 | End: 2022-12-06
Payer: MEDICARE

## 2022-12-06 ENCOUNTER — APPOINTMENT (OUTPATIENT)
Dept: GENERAL RADIOLOGY | Age: 67
End: 2022-12-06
Attending: PHYSICIAN ASSISTANT
Payer: MEDICARE

## 2022-12-06 LAB
ANION GAP SERPL CALC-SCNC: 5 MMOL/L (ref 3–18)
BASOPHILS # BLD: 0.1 K/UL (ref 0–0.1)
BASOPHILS NFR BLD: 1 % (ref 0–2)
BUN SERPL-MCNC: 30 MG/DL (ref 7–18)
BUN/CREAT SERPL: 22 (ref 12–20)
CALCIUM SERPL-MCNC: 8.4 MG/DL (ref 8.5–10.1)
CHLORIDE SERPL-SCNC: 106 MMOL/L (ref 100–111)
CO2 SERPL-SCNC: 24 MMOL/L (ref 21–32)
CREAT SERPL-MCNC: 1.38 MG/DL (ref 0.6–1.3)
DIFFERENTIAL METHOD BLD: ABNORMAL
EOSINOPHIL # BLD: 0.8 K/UL (ref 0–0.4)
EOSINOPHIL NFR BLD: 8 % (ref 0–5)
ERYTHROCYTE [DISTWIDTH] IN BLOOD BY AUTOMATED COUNT: 14.6 % (ref 11.6–14.5)
GLUCOSE BLD STRIP.AUTO-MCNC: 120 MG/DL (ref 70–110)
GLUCOSE BLD STRIP.AUTO-MCNC: 128 MG/DL (ref 70–110)
GLUCOSE BLD STRIP.AUTO-MCNC: 140 MG/DL (ref 70–110)
GLUCOSE BLD STRIP.AUTO-MCNC: 156 MG/DL (ref 70–110)
GLUCOSE SERPL-MCNC: 113 MG/DL (ref 74–99)
HCT VFR BLD AUTO: 29.5 % (ref 35–45)
HGB BLD-MCNC: 9.5 G/DL (ref 12–16)
IMM GRANULOCYTES # BLD AUTO: 0 K/UL (ref 0–0.04)
IMM GRANULOCYTES NFR BLD AUTO: 0 % (ref 0–0.5)
LYMPHOCYTES # BLD: 2.9 K/UL (ref 0.9–3.6)
LYMPHOCYTES NFR BLD: 30 % (ref 21–52)
MCH RBC QN AUTO: 29.9 PG (ref 24–34)
MCHC RBC AUTO-ENTMCNC: 32.2 G/DL (ref 31–37)
MCV RBC AUTO: 92.8 FL (ref 78–100)
MONOCYTES # BLD: 0.9 K/UL (ref 0.05–1.2)
MONOCYTES NFR BLD: 9 % (ref 3–10)
NEUTS SEG # BLD: 5 K/UL (ref 1.8–8)
NEUTS SEG NFR BLD: 52 % (ref 40–73)
NRBC # BLD: 0 K/UL (ref 0–0.01)
NRBC BLD-RTO: 0 PER 100 WBC
PLATELET # BLD AUTO: 246 K/UL (ref 135–420)
PMV BLD AUTO: 10.1 FL (ref 9.2–11.8)
POTASSIUM SERPL-SCNC: 4.1 MMOL/L (ref 3.5–5.5)
RBC # BLD AUTO: 3.18 M/UL (ref 4.2–5.3)
SODIUM SERPL-SCNC: 135 MMOL/L (ref 136–145)
WBC # BLD AUTO: 9.7 K/UL (ref 4.6–13.2)

## 2022-12-06 PROCEDURE — 71045 X-RAY EXAM CHEST 1 VIEW: CPT

## 2022-12-06 PROCEDURE — 65620000000 HC RM CCU GENERAL

## 2022-12-06 PROCEDURE — 2709999900 HC NON-CHARGEABLE SUPPLY

## 2022-12-06 PROCEDURE — 74011636637 HC RX REV CODE- 636/637: Performed by: PHYSICIAN ASSISTANT

## 2022-12-06 PROCEDURE — 94762 N-INVAS EAR/PLS OXIMTRY CONT: CPT

## 2022-12-06 PROCEDURE — 74011250637 HC RX REV CODE- 250/637: Performed by: INTERNAL MEDICINE

## 2022-12-06 PROCEDURE — 74011250637 HC RX REV CODE- 250/637: Performed by: PHYSICIAN ASSISTANT

## 2022-12-06 PROCEDURE — 74011000250 HC RX REV CODE- 250: Performed by: PHYSICIAN ASSISTANT

## 2022-12-06 PROCEDURE — 94640 AIRWAY INHALATION TREATMENT: CPT

## 2022-12-06 PROCEDURE — 85025 COMPLETE CBC W/AUTO DIFF WBC: CPT

## 2022-12-06 PROCEDURE — 99024 POSTOP FOLLOW-UP VISIT: CPT | Performed by: PHYSICIAN ASSISTANT

## 2022-12-06 PROCEDURE — 82962 GLUCOSE BLOOD TEST: CPT

## 2022-12-06 PROCEDURE — 99222 1ST HOSP IP/OBS MODERATE 55: CPT | Performed by: INTERNAL MEDICINE

## 2022-12-06 PROCEDURE — 80048 BASIC METABOLIC PNL TOTAL CA: CPT

## 2022-12-06 RX ORDER — GUAIFENESIN 600 MG/1
600 TABLET, EXTENDED RELEASE ORAL
Status: DISCONTINUED | OUTPATIENT
Start: 2022-12-06 | End: 2022-12-15 | Stop reason: HOSPADM

## 2022-12-06 RX ORDER — METOPROLOL TARTRATE 5 MG/5ML
2.5 INJECTION INTRAVENOUS ONCE
Status: COMPLETED | OUTPATIENT
Start: 2022-12-07 | End: 2022-12-07

## 2022-12-06 RX ORDER — SODIUM CHLORIDE 9 MG/ML
250 INJECTION, SOLUTION INTRAVENOUS AS NEEDED
Status: DISCONTINUED | OUTPATIENT
Start: 2022-12-06 | End: 2022-12-08

## 2022-12-06 RX ORDER — FUROSEMIDE 20 MG/1
20 TABLET ORAL DAILY
Status: DISCONTINUED | OUTPATIENT
Start: 2022-12-06 | End: 2022-12-07

## 2022-12-06 RX ORDER — ACETAMINOPHEN 325 MG/1
650 TABLET ORAL
Status: DISCONTINUED | OUTPATIENT
Start: 2022-12-06 | End: 2022-12-08

## 2022-12-06 RX ADMIN — MUPIROCIN: 20 OINTMENT TOPICAL at 17:40

## 2022-12-06 RX ADMIN — SODIUM CHLORIDE, PRESERVATIVE FREE 10 ML: 5 INJECTION INTRAVENOUS at 06:15

## 2022-12-06 RX ADMIN — Medication 2 UNITS: at 12:01

## 2022-12-06 RX ADMIN — SODIUM CHLORIDE, PRESERVATIVE FREE 30 ML: 5 INJECTION INTRAVENOUS at 13:06

## 2022-12-06 RX ADMIN — SODIUM CHLORIDE, PRESERVATIVE FREE 10 ML: 5 INJECTION INTRAVENOUS at 22:13

## 2022-12-06 RX ADMIN — MUPIROCIN: 20 OINTMENT TOPICAL at 09:01

## 2022-12-06 RX ADMIN — ACETAMINOPHEN 650 MG: 325 TABLET, FILM COATED ORAL at 12:06

## 2022-12-06 RX ADMIN — FUROSEMIDE 20 MG: 20 TABLET ORAL at 13:06

## 2022-12-06 NOTE — PROGRESS NOTES
[facility]          Chief Complaint:  Follow up for CAD and AS    Interval History:  left radial a line removed due to poor function and edema. ROS:    denies SOB or CP.  + chronic chest congestion she states from seasonal allergies    Exam:   Visit Vitals  BP (!) 115/56 (BP 1 Location: Right upper arm, BP Patient Position: At rest;Sitting)   Pulse 87   Temp 98 °F (36.7 °C)   Resp 17   Ht 5' 11\" (1.803 m)   Wt 130 kg (286 lb 9.6 oz)   SpO2 94%   BMI 39.97 kg/m²        Const:  alert and oriented. NAD   CV:   RRR without murmur or rub. PMI not displaced. Severe BLE edema   Respiratory:  Clear to auscultation without wheezes, rales or rhonchi. No accessory muscle use. ABD:  not distended but obese. Active sounds. SNT    Assessment:  Severe AS and 2v CAD    PLAN:  CXR. ACE wrap and elevate lower legs. NPO after mn for CABG, AVR tomorrow.  Cardiology consulted

## 2022-12-06 NOTE — CONSULTS
[facility] - Consult Note    Consultation request by Sameera Grullon MD for advice/opinion related to evaluating CAD (coronary artery disease) [I25.10]; Aortic stenosis [I35.0]    Date of  Admission: 12/5/2022  5:51 AM   Primary Care Physician:  Juliann Guillory MD     Assessment:     -Coronary artery disease with left main disease, EF 55% November 2022. Heart catheterization November 28, 2022 with left main 70%, LAD 40%, diagonal 80%  -Severe aortic stenosis, mean gradient 57 mmHg by echocardiogram November 28, 2022  -PAD with moderate left THOMAS 50-69% by ultrasound 11/2022  -Diabetes  -Hypertension  -Chronic kidney disease, stage III  -Chronic anemia    Primary cardiologist: Dr. Danielle Farr:     Patient is scheduled for CABG with aortic valve surgery tomorrow. Creatinine improving. Appreciate nephrology input. We will see postoperatively tomorrow. History of Present Illness: This is a 79 y.o. female admitted for CAD (coronary artery disease) [I25.10]; Aortic stenosis [I35.0]. Patient complains of:    Patient was scheduled for CABG and aortic valve surgery yesterday. Procedure was aborted due to equipment issues. Tentative plan is for CABG and AVR tomorrow. Initial attempt November 28 was to stent the LAD however upon further evaluation was found to have significant left main disease and therefore planning CABG. Patient has chronic chest congestion without any current shortness of breath or chest pain. Initially presented with shortness of breath triggering work-up this spring by her immunologist.    Patient has a history of hypertension as well as diabetes and kidney disease. Found to have ostial LAD disease not amenable to stenting as well as significant aortic stenosis.     Review of Symptoms:  Except as stated above include:  Constitutional:  Negative  Ears, nose, throat:  Negative  Respiratory: Chronic dyspnea  Cardiovascular:  negative  Gastrointestinal: negative  Genitourinary:  negative  Musculoskeletal:  Negative  Neurological:  Negative  Dermatological:  Negative  Hematological: Negative  Endocrinological: Negative  Allergy: Negative  Psychological:  Negative       Past Medical History:     Past Medical History:   Diagnosis Date    Aortic stenosis 11/28/2022    Coronary artery disease involving native coronary artery of native heart 11/28/2022    Primary hypertension 11/28/2022    Type 2 diabetes mellitus, without long-term current use of insulin ([address] 11/28/2022         Social History:     Social History     Socioeconomic History    Marital status:    Tobacco Use    Smoking status: Heavy Smoker     Packs/day: 1.50     Types: Cigarettes   Substance and Sexual Activity    Alcohol use: Yes     Comment: 1    Drug use: No        Family History:   History reviewed. No pertinent family history. Medications:      Allergies   Allergen Reactions    Albuterol Sulfate Shortness of Breath    Entex [Pseudoephedrine Tannate] Rash    Terbutaline Shortness of Breath    Terramycin [Oxytetracycline] Unknown (comments)        Current Facility-Administered Medications   Medication Dose Route Frequency    acetaminophen (TYLENOL) tablet 650 mg  650 mg Oral Q4H PRN    sodium chloride (OCEAN) 0.65 % nasal squeeze bottle 2 Spray  2 Spray Both Nostrils Q2H PRN    guaiFENesin ER (MUCINEX) tablet 600 mg  600 mg Oral Q8H PRN    [START ON 12/7/2022] metoprolol (LOPRESSOR) injection 2.5 mg  2.5 mg IntraVENous ONCE    [START ON 12/7/2022] ceFAZolin (ANCEF) 3 g in sterile water (preservative free) 30 mL IV syringe  3 g IntraVENous ONCE    furosemide (LASIX) tablet 20 mg  20 mg Oral DAILY    0.9% sodium chloride infusion 250 mL  250 mL IntraVENous PRN    sodium chloride (NS) flush 5-40 mL  5-40 mL IntraVENous Q8H    sodium chloride (NS) flush 5-40 mL  5-40 mL IntraVENous PRN    mupirocin (BACTROBAN) 2 % ointment   Topical BID    ondansetron (ZOFRAN) injection 4 mg  4 mg IntraVENous Q4H PRN    insulin lispro (HUMALOG) injection   SubCUTAneous AC&HS    glucose chewable tablet 16 g  4 Tablet Oral PRN    glucagon (GLUCAGEN) injection 1 mg  1 mg IntraMUSCular PRN    dextrose 10% infusion 0-250 mL  0-250 mL IntraVENous PRN         Physical Exam:   Visit Vitals  /62   Pulse 83   Temp 98 °F (36.7 °C)   Resp 18   Ht 5' 11\" (1.803 m)   Wt 130 kg (286 lb 9.6 oz)   SpO2 94%   BMI 39.97 kg/m²     BP Readings from Last 3 Encounters:   12/06/22 109/62   11/28/22 119/73   08/02/22 (!) 156/58     Pulse Readings from Last 3 Encounters:   12/06/22 83   11/28/22 86   08/02/22 77     Wt Readings from Last 3 Encounters:   12/05/22 130 kg (286 lb 9.6 oz)   11/28/22 127.5 kg (281 lb)   08/02/22 127.5 kg (281 lb 1.4 oz)       General:  alert, cooperative, no distress, appears stated age  Neck:  nontender  Lungs:  clear to auscultation bilaterally  Heart:  regular rate and rhythm, FRANSISCA  Abdomen:  abdomen is soft without significant tenderness, masses, organomegaly or guarding  Extremities:  extremities normal, atraumatic, no cyanosis or edema  Skin: Warm and dry.  no hyperpigmentation, vitiligo, or suspicious lesions  Neuro: alert, oriented x3, affect appropriate, no focal neurological deficits, moves all extremities well, no involuntary movements, reflexes at knee and ankle intact  Psych: non focal     Data Review:     Recent Labs     12/06/22  0358   WBC 9.7   HGB 9.5*   HCT 29.5*        Recent Labs     12/06/22  0358 12/05/22  0645   * 138   K 4.1 4.6    106   CO2 24 27   * 144*   BUN 30* 37*   CREA 1.38* 1.74*   CA 8.4* 9.1       Results for orders placed or performed during the hospital encounter of 11/28/22   EKG, 12 LEAD, INITIAL   Result Value Ref Range    Ventricular Rate 90 BPM    Atrial Rate 90 BPM    P-R Interval 162 ms    QRS Duration 72 ms    Q-T Interval 360 ms    QTC Calculation (Bezet) 440 ms    Calculated P Axis 49 degrees    Calculated R Axis 20 degrees    Calculated T Axis 68 degrees    Diagnosis       Normal sinus rhythm  Possible Left atrial enlargement  Low voltage QRS  Cannot rule out Anterior infarct , age undetermined  Abnormal ECG  No previous ECGs available  Confirmed by Misael Aponte MD, ----- ([id number]) on 11/29/2022 10:42:23 AM         All Cardiac Markers in the last 24 hours:  No results found for: CPK, CK, CKMMB, CKMB, RCK3, CKMBT, CKNDX, CKND1, RHODA, TROPT, TROIQ, AKSH, TROPT, TNIPOC, BNP, BNPP    Last Lipid:  No results found for: CHOL, CHOLX, CHLST, CHOLV, HDL, HDLP, LDL, LDLC, DLDLP, TGLX, TRIGL, TRIGP, CHHD, CHHDX    Signed By: Huey Boyd MD     December 6, 2022

## 2022-12-06 NOTE — PROGRESS NOTES
0715: Bedside and Verbal shift change report given to  writer by Jacinda Mercer. Report included the following information Procedure Summary, Intake/Output, MAR, Recent Results, Cardiac Rhythm ., Alarm Parameters , Quality Measures, and Dual Neuro Assessment. Opportunity for questions and clarification was provided. Assessment completed upon patients arrival to unit and care assumed. 0800: Pt awake, in seated position in bed, eating breakfast - AM BG obtained, 120mg/dl. Pt in no acute distress, denies pain or other discomfort. Noted left wrist edema, soft to palpation, left radial arterial line in place with (+) blood return, waveform dampened. Right arm NBP  10mmHg higher than arterial line pressure. 1025: G. Marlon Heredia, [id number] notified left radial arterial waveform appears dampened, line positional with swelling anterior and posterior wrist- no bleeding from site. Left radial arterial line removed per KO Macdonald order with direct pressure held to site until hemostasis achieved. 1200: PEP therapy ordered with ocean nasal spray and mucinex for Nasal and upper respiratory congestion. Pt with strong, productive cough with white phlegm. Pre op teaching performed with pt and her sister present included: heart hugger, incentive Spirometer, PEP [place]), activity and dietary restrictions as well as Diabetic management. Pt stated, \"I am not sure that I will do all of that\". Writer and Pts' sister provided emotional support and \"One day at a time \" approach to \"getting through the postoperative course\". Incentive spirometry x10 hourly with max goal 1350ml/breath and ROM activities and PEP therapy completed. 1920: Bedside and Verbal shift change report given to LULY Taylor RN by RORY Blackwell RN/writer. Report included the following information Procedure Summary, Intake/Output, MAR, Accordion, Recent Results, Med Rec Status, Cardiac Rhythm ., Alarm Parameters , Quality Measures, and Dual Neuro Assessment.

## 2022-12-06 NOTE — PROGRESS NOTES
1900: Bedside shift change report given to this nurse (oncoming nurse) by MINNIE Escobar RN (offgoing nurse). Report included the following information SBAR, Kardex, and Cardiac Rhythm NSR . Wound Prevention Checklist    Patient: Lashawn Lancaster (14 y.o. female)  Date: 12/5/2022  Diagnosis: CAD (coronary artery disease) [I25.10]  Aortic stenosis [I35.0] <principal problem not specified>    Precautions:         []  Heel prevention boots placed on patient    [x]  Patient turned q2h during shift    []  Lift team ordered    [x]  Patient on [place] bed/Specialty bed    [x]  Each Wound is documented during shift (Stage, Color, drainage, odor, measurements, and dressings)    [x]  Dual skin check done with Uriel Browne RN  Patient resting in bed, NSR on monitor. O2 sat in mid 90's on RA. Chapa in place and draining. No distress noted  2100: Patient resting in bed, continues to have productive cough resulting in thick white sputum. Nausea present. PRN medications given as ordered. 2300: Patient resting in bed, no distress noted. 0130: Patient sleeping, no distress noted. 0330: Patient sleeping, no distress noted  0530: Patient sleeping, no distress noted  0645: Dr Keiko Taylor at bedside  0700: Report given to JAVY Cadena

## 2022-12-06 NOTE — PROGRESS NOTES
RENAL DAILY PROGRESS NOTE              Subjective:       Complaint:    Overnight events noted  no nausea, vomiting, chest pain, short of breath, cough, seizure. IMPRESSION:   IMPRESSION:   OSVALDO ? contrast nephropathy on CKD stage 3  Underlying diabetic and hypertensive nephropathy  Controlled diabetes  Severe AS   Diabetes  Hypertension  Hyponatremia ?hypervolemic   PLAN:    Follow cr for now  Monitor output closely  Give a small dose of diuretic today  Keep MAP>65 at all times  Her risk for dialysis with CABG is around 2-4 percent. I explained this to her. She is ok with this. Her risk goes high with use of IABP or hypotension etc around procedure. Daily labs for now            Current Facility-Administered Medications   Medication Dose Route Frequency    acetaminophen (TYLENOL) tablet 650 mg  650 mg Oral Q4H PRN    sodium chloride (OCEAN) 0.65 % nasal squeeze bottle 2 Spray  2 Spray Both Nostrils Q2H PRN    guaiFENesin ER (MUCINEX) tablet 600 mg  600 mg Oral Q8H PRN    [START ON 12/7/2022] metoprolol (LOPRESSOR) injection 2.5 mg  2.5 mg IntraVENous ONCE    [START ON 12/7/2022] ceFAZolin (ANCEF) 3 g in sterile water (preservative free) 30 mL IV syringe  3 g IntraVENous ONCE    sodium chloride (NS) flush 5-40 mL  5-40 mL IntraVENous Q8H    sodium chloride (NS) flush 5-40 mL  5-40 mL IntraVENous PRN    mupirocin (BACTROBAN) 2 % ointment   Topical BID    ondansetron (ZOFRAN) injection 4 mg  4 mg IntraVENous Q4H PRN    insulin lispro (HUMALOG) injection   SubCUTAneous AC&HS    glucose chewable tablet 16 g  4 Tablet Oral PRN    glucagon (GLUCAGEN) injection 1 mg  1 mg IntraMUSCular PRN    dextrose 10% infusion 0-250 mL  0-250 mL IntraVENous PRN       Review of Symptoms: comprehensive ROS negative except above.    Objective:   Patient Vitals for the past 24 hrs:   Temp Pulse Resp BP SpO2   12/06/22 1200 98 °F (36.7 °C) 83 18 109/62 94 %   12/06/22 1100 -- 81 20 (!) 109/59 93 %   12/06/22 1000 -- 87 21 104/69 94 %   12/06/22 0900 -- 87 17 103/62 94 %   12/06/22 0800 97.5 °F (36.4 °C) 81 22 (!) 115/56 94 %   12/06/22 0700 -- 82 17 (!) 114/58 98 %   12/06/22 0600 -- 81 20 (!) 109/49 97 %   12/06/22 0500 -- 85 18 (!) 112/56 98 %   12/06/22 0400 98 °F (36.7 °C) 86 19 (!) 113/54 96 %   12/06/22 0300 -- 90 28 (!) 111/58 94 %   12/06/22 0200 -- 84 21 (!) 99/45 95 %   12/06/22 0100 -- 82 18 (!) 110/51 96 %   12/06/22 0000 97.9 °F (36.6 °C) 85 22 (!) 115/55 93 %   12/05/22 2300 -- 88 20 117/62 95 %   12/05/22 2200 -- 87 22 (!) 110/53 93 %   12/05/22 2100 -- 82 23 (!) 110/55 93 %   12/05/22 2000 98 °F (36.7 °C) 84 18 (!) 118/58 97 %   12/05/22 1900 -- 80 16 (!) 107/52 95 %        Weight change:      12/04 1901 - 12/06 0700  In: 280 [I.V.:280]  Out: 1555 [Urine:1555]    Intake/Output Summary (Last 24 hours) at 12/6/2022 1236  Last data filed at 12/6/2022 1218  Gross per 24 hour   Intake 360 ml   Output 1680 ml   Net -1320 ml     Physical Exam:   General: comfortable, no acute distress   HEENT sclera anicteric, supple neck, no thyromegaly  CVS: S1S2 heard,  no rub  RS: + air entry b/l,   Abd: Soft, Non tender, Not distended, Positive bowel sounds, no organomegaly, no CVA / supra pubic tenderness  Neuro: non focal, awake, alert , CN II-XII are grossly intact  Extrm: plus 2 edema, no cyanosis, clubbing   Skin: no visible  Rash  Musculoskeletal: No gross joints or bone deformities         Data Review:     LABS:   Hematology:   Recent Labs     12/06/22 0358   WBC 9.7   HGB 9.5*   HCT 29.5*     Chemistry:   Recent Labs     12/06/22 0358 12/05/22 0645   BUN 30* 37*   CREA 1.38* 1.74*   CA 8.4* 9.1   K 4.1 4.6   * 138    106   CO2 24 27   * 144*            Procedures/imaging: see electronic medical records for all procedures, Xrays and details which were not copied into this note but were reviewed prior to creation of Plan          Assessment & Plan:     See above        Stephanie Irby MD  12/6/2022

## 2022-12-06 NOTE — PROGRESS NOTES
Problem: Falls - Risk of  Goal: *Absence of Falls  Description: Document Santino Pedraza Fall Risk and appropriate interventions in the flowsheet.   Outcome: Progressing Towards Goal  Note: Fall Risk Interventions:  Mobility Interventions: Bed/chair exit alarm, Communicate number of staff needed for ambulation/transfer, Patient to call before getting OOB         Medication Interventions: Patient to call before getting OOB, Evaluate medications/consider consulting pharmacy, Bed/chair exit alarm         History of Falls Interventions: Bed/chair exit alarm, Evaluate medications/consider consulting pharmacy, Door open when patient unattended

## 2022-12-07 ENCOUNTER — APPOINTMENT (OUTPATIENT)
Dept: GENERAL RADIOLOGY | Age: 67
End: 2022-12-07
Attending: PHYSICIAN ASSISTANT
Payer: MEDICARE

## 2022-12-07 ENCOUNTER — ANESTHESIA (OUTPATIENT)
Dept: CARDIOTHORACIC SURGERY | Age: 67
DRG: 219 | End: 2022-12-07
Payer: MEDICARE

## 2022-12-07 LAB
ADMINISTERED INITIALS, ADMINIT: NORMAL
ANION GAP BLD CALC-SCNC: 10 MMOL/L (ref 10–20)
ANION GAP BLD CALC-SCNC: 10 MMOL/L (ref 10–20)
ANION GAP BLD CALC-SCNC: 11 MMOL/L (ref 10–20)
ANION GAP BLD CALC-SCNC: 12 MMOL/L (ref 10–20)
ANION GAP BLD CALC-SCNC: 12 MMOL/L (ref 10–20)
ANION GAP BLD CALC-SCNC: 13 MMOL/L (ref 10–20)
ANION GAP BLD CALC-SCNC: 14 MMOL/L (ref 10–20)
ANION GAP BLD CALC-SCNC: 15 MMOL/L (ref 10–20)
ANION GAP SERPL CALC-SCNC: 10 MMOL/L (ref 3–18)
ANION GAP SERPL CALC-SCNC: 6 MMOL/L (ref 3–18)
APTT PPP: 32.7 SEC (ref 23–36.4)
ARTERIAL PATENCY WRIST A: ABNORMAL
ATRIAL RATE: 97 BPM
BASE DEFICIT BLD-SCNC: 0.6 MMOL/L
BASE DEFICIT BLD-SCNC: 1.4 MMOL/L
BASE DEFICIT BLD-SCNC: 1.4 MMOL/L
BASE DEFICIT BLD-SCNC: 1.9 MMOL/L
BASE DEFICIT BLD-SCNC: 2 MMOL/L
BASE DEFICIT BLD-SCNC: 2.3 MMOL/L
BASE DEFICIT BLD-SCNC: 2.5 MMOL/L
BASE DEFICIT BLD-SCNC: 2.7 MMOL/L
BASE DEFICIT BLD-SCNC: 2.9 MMOL/L
BASE DEFICIT BLD-SCNC: 3 MMOL/L
BASE DEFICIT BLD-SCNC: 3.7 MMOL/L
BASE DEFICIT BLD-SCNC: 4 MMOL/L
BASE DEFICIT BLD-SCNC: 4.4 MMOL/L
BASE DEFICIT BLD-SCNC: 4.7 MMOL/L
BASE DEFICIT BLD-SCNC: 5.2 MMOL/L
BASE DEFICIT BLD-SCNC: 5.9 MMOL/L
BASOPHILS # BLD: 0 K/UL (ref 0–0.1)
BASOPHILS # BLD: 0.1 K/UL (ref 0–0.1)
BASOPHILS NFR BLD: 0 % (ref 0–2)
BASOPHILS NFR BLD: 1 % (ref 0–2)
BDY SITE: ABNORMAL
BODY TEMPERATURE: 98
BODY TEMPERATURE: 98.7
BUN SERPL-MCNC: 26 MG/DL (ref 7–18)
BUN SERPL-MCNC: 27 MG/DL (ref 7–18)
BUN/CREAT SERPL: 18 (ref 12–20)
BUN/CREAT SERPL: 21 (ref 12–20)
CA-I BLD-MCNC: 0.96 MMOL/L (ref 1.12–1.32)
CA-I BLD-MCNC: 0.98 MMOL/L (ref 1.12–1.32)
CA-I BLD-MCNC: 1.03 MMOL/L (ref 1.12–1.32)
CA-I BLD-MCNC: 1.06 MMOL/L (ref 1.12–1.32)
CA-I BLD-MCNC: 1.12 MMOL/L (ref 1.12–1.32)
CA-I BLD-MCNC: 1.13 MMOL/L (ref 1.12–1.32)
CA-I BLD-MCNC: 1.17 MMOL/L (ref 1.12–1.32)
CA-I BLD-MCNC: 1.17 MMOL/L (ref 1.12–1.32)
CA-I BLD-MCNC: 1.18 MMOL/L (ref 1.12–1.32)
CA-I BLD-MCNC: 1.19 MMOL/L (ref 1.12–1.32)
CALCIUM SERPL-MCNC: 8 MG/DL (ref 8.5–10.1)
CALCIUM SERPL-MCNC: 8.7 MG/DL (ref 8.5–10.1)
CALCULATED P AXIS, ECG09: 67 DEGREES
CALCULATED R AXIS, ECG10: 55 DEGREES
CALCULATED T AXIS, ECG11: 86 DEGREES
CHLORIDE BLD-SCNC: 100 MMOL/L (ref 98–107)
CHLORIDE BLD-SCNC: 102 MMOL/L (ref 98–107)
CHLORIDE BLD-SCNC: 105 MMOL/L (ref 98–107)
CHLORIDE BLD-SCNC: 105 MMOL/L (ref 98–107)
CHLORIDE BLD-SCNC: 106 MMOL/L (ref 98–107)
CHLORIDE BLD-SCNC: 97 MMOL/L (ref 98–107)
CHLORIDE BLD-SCNC: 98 MMOL/L (ref 98–107)
CHLORIDE BLD-SCNC: 99 MMOL/L (ref 98–107)
CHLORIDE SERPL-SCNC: 104 MMOL/L (ref 100–111)
CHLORIDE SERPL-SCNC: 106 MMOL/L (ref 100–111)
CO2 BLD-SCNC: 20 MMOL/L (ref 19–24)
CO2 BLD-SCNC: 21 MMOL/L (ref 19–24)
CO2 BLD-SCNC: 22 MMOL/L (ref 19–24)
CO2 BLD-SCNC: 23 MMOL/L (ref 19–24)
CO2 BLD-SCNC: 24 MMOL/L (ref 19–24)
CO2 BLD-SCNC: 25 MMOL/L (ref 19–24)
CO2 SERPL-SCNC: 23 MMOL/L (ref 21–32)
CO2 SERPL-SCNC: 27 MMOL/L (ref 21–32)
CREAT BLD-MCNC: 1.24 MG/DL (ref 0.6–1.3)
CREAT BLD-MCNC: 1.24 MG/DL (ref 0.6–1.3)
CREAT BLD-MCNC: 1.27 MG/DL (ref 0.6–1.3)
CREAT BLD-MCNC: 1.32 MG/DL (ref 0.6–1.3)
CREAT BLD-MCNC: 1.34 MG/DL (ref 0.6–1.3)
CREAT BLD-MCNC: 1.36 MG/DL (ref 0.6–1.3)
CREAT BLD-MCNC: 1.37 MG/DL (ref 0.6–1.3)
CREAT BLD-MCNC: 1.39 MG/DL (ref 0.6–1.3)
CREAT BLD-MCNC: 1.4 MG/DL (ref 0.6–1.3)
CREAT BLD-MCNC: 1.41 MG/DL (ref 0.6–1.3)
CREAT BLD-MCNC: 1.43 MG/DL (ref 0.6–1.3)
CREAT BLD-MCNC: 1.45 MG/DL (ref 0.6–1.3)
CREAT SERPL-MCNC: 1.31 MG/DL (ref 0.6–1.3)
CREAT SERPL-MCNC: 1.43 MG/DL (ref 0.6–1.3)
D50 ADMINISTERED, D50ADM: 0 ML
D50 ORDER, D50ORD: 0 ML
DIAGNOSIS, 93000: NORMAL
DIFFERENTIAL METHOD BLD: ABNORMAL
DIFFERENTIAL METHOD BLD: ABNORMAL
EOSINOPHIL # BLD: 0 K/UL (ref 0–0.4)
EOSINOPHIL # BLD: 0.8 K/UL (ref 0–0.4)
EOSINOPHIL NFR BLD: 0 % (ref 0–5)
EOSINOPHIL NFR BLD: 9 % (ref 0–5)
ERYTHROCYTE [DISTWIDTH] IN BLOOD BY AUTOMATED COUNT: 14.2 % (ref 11.6–14.5)
ERYTHROCYTE [DISTWIDTH] IN BLOOD BY AUTOMATED COUNT: 14.6 % (ref 11.6–14.5)
FIO2 ON VENT: 80 %
GAS FLOW.O2 O2 DELIVERY SYS: ABNORMAL L/MIN
GAS FLOW.O2 SETTING OXYMISER: 19 BPM
GAS FLOW.O2 SETTING OXYMISER: 22 BPM
GAS FLOW.O2 SETTING OXYMISER: 22 BPM
GLUCOSE BLD STRIP.AUTO-MCNC: 128 MG/DL (ref 70–110)
GLUCOSE BLD STRIP.AUTO-MCNC: 134 MG/DL (ref 70–110)
GLUCOSE BLD STRIP.AUTO-MCNC: 141 MG/DL (ref 70–110)
GLUCOSE BLD STRIP.AUTO-MCNC: 154 MG/DL (ref 70–110)
GLUCOSE BLD STRIP.AUTO-MCNC: 158 MG/DL (ref 70–110)
GLUCOSE BLD STRIP.AUTO-MCNC: 170 MG/DL (ref 70–110)
GLUCOSE BLD STRIP.AUTO-MCNC: 187 MG/DL (ref 74–106)
GLUCOSE BLD STRIP.AUTO-MCNC: 198 MG/DL (ref 70–110)
GLUCOSE BLD-MCNC: 146 MG/DL (ref 65–100)
GLUCOSE BLD-MCNC: 148 MG/DL (ref 65–100)
GLUCOSE BLD-MCNC: 151 MG/DL (ref 65–100)
GLUCOSE BLD-MCNC: 151 MG/DL (ref 65–100)
GLUCOSE BLD-MCNC: 178 MG/DL (ref 65–100)
GLUCOSE BLD-MCNC: 181 MG/DL (ref 65–100)
GLUCOSE BLD-MCNC: 202 MG/DL (ref 65–100)
GLUCOSE BLD-MCNC: 216 MG/DL (ref 65–100)
GLUCOSE BLD-MCNC: 233 MG/DL (ref 65–100)
GLUCOSE BLD-MCNC: 235 MG/DL (ref 65–100)
GLUCOSE BLD-MCNC: 253 MG/DL (ref 65–100)
GLUCOSE BLD-MCNC: 267 MG/DL (ref 65–100)
GLUCOSE SERPL-MCNC: 125 MG/DL (ref 74–99)
GLUCOSE SERPL-MCNC: 240 MG/DL (ref 74–99)
GLUCOSE, GLC: 128 MG/DL
GLUCOSE, GLC: 134 MG/DL
GLUCOSE, GLC: 154 MG/DL
GLUCOSE, GLC: 158 MG/DL
GLUCOSE, GLC: 170 MG/DL
GLUCOSE, GLC: 187 MG/DL
GLUCOSE, GLC: 198 MG/DL
GLUCOSE, GLC: 224 MG/DL
GLUCOSE, GLC: 233 MG/DL
HCO3 BLD-SCNC: 20.3 MMOL/L (ref 22–26)
HCO3 BLD-SCNC: 20.3 MMOL/L (ref 22–26)
HCO3 BLD-SCNC: 20.9 MMOL/L (ref 22–26)
HCO3 BLD-SCNC: 21.4 MMOL/L (ref 22–26)
HCO3 BLD-SCNC: 21.7 MMOL/L (ref 22–26)
HCO3 BLD-SCNC: 22 MMOL/L (ref 22–26)
HCO3 BLD-SCNC: 22.1 MMOL/L (ref 22–26)
HCO3 BLD-SCNC: 22.1 MMOL/L (ref 22–26)
HCO3 BLD-SCNC: 22.5 MMOL/L (ref 22–26)
HCO3 BLD-SCNC: 22.6 MMOL/L (ref 22–26)
HCO3 BLD-SCNC: 22.7 MMOL/L (ref 22–26)
HCO3 BLD-SCNC: 22.9 MMOL/L (ref 22–26)
HCO3 BLD-SCNC: 23 MMOL/L (ref 22–26)
HCO3 BLD-SCNC: 24 MMOL/L (ref 22–26)
HCO3 BLD-SCNC: 24.3 MMOL/L (ref 22–26)
HCO3 BLD-SCNC: 24.4 MMOL/L (ref 22–26)
HCO3 BLD-SCNC: 24.6 MMOL/L (ref 22–26)
HCO3 BLD-SCNC: 25.1 MMOL/L (ref 22–26)
HCT VFR BLD AUTO: 31.8 % (ref 35–45)
HCT VFR BLD AUTO: 32.2 % (ref 35–45)
HGB BLD-MCNC: 10.2 G/DL (ref 12–16)
HGB BLD-MCNC: 10.5 G/DL (ref 12–16)
HIGH TARGET, HITG: 150 MG/DL
IMM GRANULOCYTES # BLD AUTO: 0 K/UL
IMM GRANULOCYTES # BLD AUTO: 0 K/UL (ref 0–0.04)
IMM GRANULOCYTES NFR BLD AUTO: 0 %
IMM GRANULOCYTES NFR BLD AUTO: 0 % (ref 0–0.5)
INR PPP: 1.4 (ref 0.8–1.2)
INSULIN ADMINSTERED, INSADM: 3.5 UNITS/HOUR
INSULIN ADMINSTERED, INSADM: 4.9 UNITS/HOUR
INSULIN ADMINSTERED, INSADM: 5.4 UNITS/HOUR
INSULIN ADMINSTERED, INSADM: 5.5 UNITS/HOUR
INSULIN ADMINSTERED, INSADM: 5.9 UNITS/HOUR
INSULIN ADMINSTERED, INSADM: 6.4 UNITS/HOUR
INSULIN ADMINSTERED, INSADM: 6.6 UNITS/HOUR
INSULIN ADMINSTERED, INSADM: 6.6 UNITS/HOUR
INSULIN ADMINSTERED, INSADM: 7.8 UNITS/HOUR
INSULIN ORDER, INSORD: 3.5 UNITS/HOUR
INSULIN ORDER, INSORD: 4.9 UNITS/HOUR
INSULIN ORDER, INSORD: 5.4 UNITS/HOUR
INSULIN ORDER, INSORD: 5.5 UNITS/HOUR
INSULIN ORDER, INSORD: 5.9 UNITS/HOUR
INSULIN ORDER, INSORD: 6.4 UNITS/HOUR
INSULIN ORDER, INSORD: 6.6 UNITS/HOUR
INSULIN ORDER, INSORD: 6.6 UNITS/HOUR
INSULIN ORDER, INSORD: 7.8 UNITS/HOUR
IPAP/PIP, IPAPIP: 29
LACTATE BLD-SCNC: 0.77 MMOL/L (ref 0.4–2)
LACTATE BLD-SCNC: 0.82 MMOL/L (ref 0.4–2)
LACTATE BLD-SCNC: 0.83 MMOL/L (ref 0.4–2)
LACTATE BLD-SCNC: 0.9 MMOL/L (ref 0.4–2)
LACTATE BLD-SCNC: 0.92 MMOL/L (ref 0.4–2)
LACTATE BLD-SCNC: 1.06 MMOL/L (ref 0.4–2)
LACTATE BLD-SCNC: 1.1 MMOL/L (ref 0.4–2)
LACTATE BLD-SCNC: 1.75 MMOL/L (ref 0.4–2)
LACTATE BLD-SCNC: 2.48 MMOL/L (ref 0.4–2)
LACTATE BLD-SCNC: 2.72 MMOL/L (ref 0.4–2)
LACTATE BLD-SCNC: 2.74 MMOL/L (ref 0.4–2)
LACTATE BLD-SCNC: 3.13 MMOL/L (ref 0.4–2)
LACTATE SERPL-SCNC: 3.6 MMOL/L (ref 0.4–2)
LOW TARGET, LOT: 80 MG/DL
LYMPHOCYTES # BLD: 2.2 K/UL (ref 0.9–3.6)
LYMPHOCYTES # BLD: 2.5 K/UL (ref 0.9–3.6)
LYMPHOCYTES NFR BLD: 11 % (ref 21–52)
LYMPHOCYTES NFR BLD: 29 % (ref 21–52)
MAGNESIUM SERPL-MCNC: 2.7 MG/DL (ref 1.6–2.6)
MCH RBC QN AUTO: 29.7 PG (ref 24–34)
MCH RBC QN AUTO: 29.9 PG (ref 24–34)
MCHC RBC AUTO-ENTMCNC: 32.1 G/DL (ref 31–37)
MCHC RBC AUTO-ENTMCNC: 32.6 G/DL (ref 31–37)
MCV RBC AUTO: 91.7 FL (ref 78–100)
MCV RBC AUTO: 92.4 FL (ref 78–100)
MINUTES UNTIL NEXT BG, NBG: 60 MIN
MONOCYTES # BLD: 0.8 K/UL (ref 0.05–1.2)
MONOCYTES # BLD: 1.4 K/UL (ref 0.05–1.2)
MONOCYTES NFR BLD: 7 % (ref 3–10)
MONOCYTES NFR BLD: 9 % (ref 3–10)
MULTIPLIER, MUL: 0.02
MULTIPLIER, MUL: 0.03
MULTIPLIER, MUL: 0.04
MULTIPLIER, MUL: 0.05
MULTIPLIER, MUL: 0.06
MULTIPLIER, MUL: 0.07
MULTIPLIER, MUL: 0.08
NEUTS BAND NFR BLD MANUAL: 4 % (ref 0–5)
NEUTS SEG # BLD: 16.2 K/UL (ref 1.8–8)
NEUTS SEG # BLD: 4.5 K/UL (ref 1.8–8)
NEUTS SEG NFR BLD: 52 % (ref 40–73)
NEUTS SEG NFR BLD: 78 % (ref 40–73)
NRBC # BLD: 0 K/UL (ref 0–0.01)
NRBC # BLD: 0 K/UL (ref 0–0.01)
NRBC BLD-RTO: 0 PER 100 WBC
NRBC BLD-RTO: 0 PER 100 WBC
O2/TOTAL GAS SETTING VFR VENT: 40 %
O2/TOTAL GAS SETTING VFR VENT: 60 %
ORDER INITIALS, ORDINIT: NORMAL
P-R INTERVAL, ECG05: 168 MS
PAW @ MEAN EXP FLOW ON VENT: 13 CMH2O
PAW @ MEAN EXP FLOW ON VENT: 14 CMH2O
PAW @ MEAN EXP FLOW ON VENT: 15 CMH2O
PCO2 BLD: 34.5 MMHG (ref 35–45)
PCO2 BLD: 37.2 MMHG (ref 35–45)
PCO2 BLD: 37.2 MMHG (ref 35–45)
PCO2 BLD: 38.1 MMHG (ref 35–45)
PCO2 BLD: 38.3 MMHG (ref 35–45)
PCO2 BLD: 39 MMHG (ref 35–45)
PCO2 BLD: 41.5 MMHG (ref 35–45)
PCO2 BLD: 42.3 MMHG (ref 35–45)
PCO2 BLD: 43.8 MMHG (ref 35–45)
PCO2 BLD: 44.4 MMHG (ref 35–45)
PCO2 BLD: 44.7 MMHG (ref 35–45)
PCO2 BLD: 44.9 MMHG (ref 35–45)
PCO2 BLD: 45.3 MMHG (ref 35–45)
PCO2 BLD: 46.9 MMHG (ref 35–45)
PCO2 BLD: 47.9 MMHG (ref 35–45)
PCO2 BLD: 55.1 MMHG (ref 35–45)
PCO2 BLDV: 37.6 MMHG (ref 41–51)
PCO2 BLDV: 54.6 MMHG (ref 41–51)
PEEP RESPIRATORY: 10 CMH2O
PEEP RESPIRATORY: 10 CMH2O
PEEP RESPIRATORY: 10 CM[H2O]
PEEP RESPIRATORY: 8 CMH2O
PH BLD: 7.26 [PH] (ref 7.35–7.45)
PH BLD: 7.28 [PH] (ref 7.35–7.45)
PH BLD: 7.29 [PH] (ref 7.35–7.45)
PH BLD: 7.29 [PH] (ref 7.35–7.45)
PH BLD: 7.3 [PH] (ref 7.35–7.45)
PH BLD: 7.32 [PH] (ref 7.35–7.45)
PH BLD: 7.33 [PH] (ref 7.35–7.45)
PH BLD: 7.34 [PH] (ref 7.35–7.45)
PH BLD: 7.34 [PH] (ref 7.35–7.45)
PH BLD: 7.35 [PH] (ref 7.35–7.45)
PH BLD: 7.38 [PH] (ref 7.35–7.45)
PH BLD: 7.39 [PH] (ref 7.35–7.45)
PH BLD: 7.41 [PH] (ref 7.35–7.45)
PH BLD: 7.41 [PH] (ref 7.35–7.45)
PH BLDV: 7.27 [PH] (ref 7.32–7.42)
PH BLDV: 7.36 [PH] (ref 7.32–7.42)
PIP ISTAT,IPIP: 25
PIP ISTAT,IPIP: 28
PLATELET # BLD AUTO: 226 K/UL (ref 135–420)
PLATELET # BLD AUTO: 242 K/UL (ref 135–420)
PLATELET COMMENTS,PCOM: ABNORMAL
PMV BLD AUTO: 10 FL (ref 9.2–11.8)
PMV BLD AUTO: 10 FL (ref 9.2–11.8)
PO2 BLD: 101 MMHG (ref 80–100)
PO2 BLD: 101 MMHG (ref 80–100)
PO2 BLD: 127 MMHG (ref 80–100)
PO2 BLD: 145 MMHG (ref 80–100)
PO2 BLD: 153 MMHG (ref 80–100)
PO2 BLD: 166 MMHG (ref 80–100)
PO2 BLD: 168 MMHG (ref 80–100)
PO2 BLD: 199 MMHG (ref 80–100)
PO2 BLD: 226 MMHG (ref 80–100)
PO2 BLD: 251 MMHG (ref 80–100)
PO2 BLD: 274 MMHG (ref 80–100)
PO2 BLD: 332 MMHG (ref 80–100)
PO2 BLD: 333 MMHG (ref 80–100)
PO2 BLD: 397 MMHG (ref 80–100)
PO2 BLD: 86 MMHG (ref 80–100)
PO2 BLD: 95 MMHG (ref 80–100)
PO2 BLDV: 149 MMHG (ref 25–40)
PO2 BLDV: 254 MMHG (ref 25–40)
POTASSIUM BLD-SCNC: 3.6 MMOL/L (ref 3.5–5.1)
POTASSIUM BLD-SCNC: 3.9 MMOL/L (ref 3.5–5.1)
POTASSIUM BLD-SCNC: 4.2 MMOL/L (ref 3.5–5.1)
POTASSIUM BLD-SCNC: 4.3 MMOL/L (ref 3.5–5.1)
POTASSIUM BLD-SCNC: 4.7 MMOL/L (ref 3.5–5.1)
POTASSIUM BLD-SCNC: 4.9 MMOL/L (ref 3.5–5.1)
POTASSIUM BLD-SCNC: 6.1 MMOL/L (ref 3.5–5.1)
POTASSIUM SERPL-SCNC: 4.1 MMOL/L (ref 3.5–5.5)
POTASSIUM SERPL-SCNC: 4.3 MMOL/L (ref 3.5–5.5)
PRESSURE SUPPORT SETTING VENT: 10 CMH2O
PRESSURE SUPPORT SETTING VENT: 10 CM[H2O]
PROTHROMBIN TIME: 17.2 SEC (ref 11.5–15.2)
Q-T INTERVAL, ECG07: 376 MS
QRS DURATION, ECG06: 74 MS
QTC CALCULATION (BEZET), ECG08: 477 MS
RBC # BLD AUTO: 3.44 M/UL (ref 4.2–5.3)
RBC # BLD AUTO: 3.51 M/UL (ref 4.2–5.3)
RBC MORPH BLD: ABNORMAL
SAO2 % BLD: 100 %
SAO2 % BLD: 95.7 % (ref 92–97)
SAO2 % BLD: 96.7 % (ref 92–97)
SAO2 % BLD: 97.3 % (ref 92–97)
SAO2 % BLD: 98 %
SAO2 % BLD: 99 %
SAO2 % BLD: 99 % (ref 92–97)
SAO2 % BLD: 99.2 % (ref 92–97)
SAO2 % BLD: 99.8 % (ref 92–97)
SERVICE CMNT-IMP: ABNORMAL
SODIUM BLD-SCNC: 134 MMOL/L (ref 136–145)
SODIUM BLD-SCNC: 135 MMOL/L (ref 136–145)
SODIUM BLD-SCNC: 135 MMOL/L (ref 136–145)
SODIUM BLD-SCNC: 136 MMOL/L (ref 136–145)
SODIUM BLD-SCNC: 137 MMOL/L (ref 136–145)
SODIUM BLD-SCNC: 138 MMOL/L (ref 136–145)
SODIUM BLD-SCNC: 138 MMOL/L (ref 136–145)
SODIUM SERPL-SCNC: 137 MMOL/L (ref 136–145)
SODIUM SERPL-SCNC: 139 MMOL/L (ref 136–145)
SPECIMEN SITE: ABNORMAL
SPECIMEN TYPE: ABNORMAL
TOTAL RESP. RATE, ITRR: 16
TOTAL RESP. RATE, ITRR: 19
TOTAL RESP. RATE, ITRR: 22
VENTILATION MODE VENT: ABNORMAL
VENTRICULAR RATE, ECG03: 97 BPM
VT SETTING VENT: 520 ML
WBC # BLD AUTO: 19.8 K/UL (ref 4.6–13.2)
WBC # BLD AUTO: 8.6 K/UL (ref 4.6–13.2)

## 2022-12-07 PROCEDURE — 06BQ4ZZ EXCISION OF LEFT SAPHENOUS VEIN, PERCUTANEOUS ENDOSCOPIC APPROACH: ICD-10-PCS | Performed by: THORACIC SURGERY (CARDIOTHORACIC VASCULAR SURGERY)

## 2022-12-07 PROCEDURE — 77030012407 HC DRN WND BARD -B: Performed by: THORACIC SURGERY (CARDIOTHORACIC VASCULAR SURGERY)

## 2022-12-07 PROCEDURE — 83735 ASSAY OF MAGNESIUM: CPT

## 2022-12-07 PROCEDURE — 94762 N-INVAS EAR/PLS OXIMTRY CONT: CPT

## 2022-12-07 PROCEDURE — 2709999900 HC NON-CHARGEABLE SUPPLY: Performed by: THORACIC SURGERY (CARDIOTHORACIC VASCULAR SURGERY)

## 2022-12-07 PROCEDURE — 83605 ASSAY OF LACTIC ACID: CPT

## 2022-12-07 PROCEDURE — 77030041021 HC STBLZR CARD OCTPS MEDT -G1: Performed by: THORACIC SURGERY (CARDIOTHORACIC VASCULAR SURGERY)

## 2022-12-07 PROCEDURE — 77030010818: Performed by: THORACIC SURGERY (CARDIOTHORACIC VASCULAR SURGERY)

## 2022-12-07 PROCEDURE — 74011250637 HC RX REV CODE- 250/637: Performed by: PHYSICIAN ASSISTANT

## 2022-12-07 PROCEDURE — 77030032400 HC CATH VENT LT HRT LIVA -B: Performed by: THORACIC SURGERY (CARDIOTHORACIC VASCULAR SURGERY)

## 2022-12-07 PROCEDURE — 77030010938 HC CLP LIG TELE -A: Performed by: THORACIC SURGERY (CARDIOTHORACIC VASCULAR SURGERY)

## 2022-12-07 PROCEDURE — 74011250636 HC RX REV CODE- 250/636: Performed by: ANESTHESIOLOGY

## 2022-12-07 PROCEDURE — 77030031139 HC SUT VCRL2 J&J -A: Performed by: THORACIC SURGERY (CARDIOTHORACIC VASCULAR SURGERY)

## 2022-12-07 PROCEDURE — APPNB30 APP NON BILLABLE TIME 0-30 MINS: Performed by: PHYSICIAN ASSISTANT

## 2022-12-07 PROCEDURE — 77030041469 HC VLV AORT INSPIRIS EDWD -K4: Performed by: THORACIC SURGERY (CARDIOTHORACIC VASCULAR SURGERY)

## 2022-12-07 PROCEDURE — 77030011267 HC ELECTRD BLD COVD -A: Performed by: THORACIC SURGERY (CARDIOTHORACIC VASCULAR SURGERY)

## 2022-12-07 PROCEDURE — 33411 REPLACEMENT OF AORTIC VALVE: CPT | Performed by: THORACIC SURGERY (CARDIOTHORACIC VASCULAR SURGERY)

## 2022-12-07 PROCEDURE — 77030014491 HC PLEDG PTFE BARD -A: Performed by: THORACIC SURGERY (CARDIOTHORACIC VASCULAR SURGERY)

## 2022-12-07 PROCEDURE — 77030010813: Performed by: THORACIC SURGERY (CARDIOTHORACIC VASCULAR SURGERY)

## 2022-12-07 PROCEDURE — 85610 PROTHROMBIN TIME: CPT

## 2022-12-07 PROCEDURE — 77030033069 HC RLD QLC SGL COR-KNOT LSIS -B: Performed by: THORACIC SURGERY (CARDIOTHORACIC VASCULAR SURGERY)

## 2022-12-07 PROCEDURE — 74011000250 HC RX REV CODE- 250: Performed by: ANESTHESIOLOGY

## 2022-12-07 PROCEDURE — 77030033070 HC RLD QLC FPCH COR-KNOT LSIS -C: Performed by: THORACIC SURGERY (CARDIOTHORACIC VASCULAR SURGERY)

## 2022-12-07 PROCEDURE — 33533 CABG ARTERIAL SINGLE: CPT | Performed by: THORACIC SURGERY (CARDIOTHORACIC VASCULAR SURGERY)

## 2022-12-07 PROCEDURE — 93005 ELECTROCARDIOGRAM TRACING: CPT

## 2022-12-07 PROCEDURE — C1751 CATH, INF, PER/CENT/MIDLINE: HCPCS | Performed by: THORACIC SURGERY (CARDIOTHORACIC VASCULAR SURGERY)

## 2022-12-07 PROCEDURE — 33517 CABG ARTERY-VEIN SINGLE: CPT | Performed by: THORACIC SURGERY (CARDIOTHORACIC VASCULAR SURGERY)

## 2022-12-07 PROCEDURE — 74011000250 HC RX REV CODE- 250: Performed by: PHYSICIAN ASSISTANT

## 2022-12-07 PROCEDURE — 74011000272 HC RX REV CODE- 272: Performed by: THORACIC SURGERY (CARDIOTHORACIC VASCULAR SURGERY)

## 2022-12-07 PROCEDURE — 74011000258 HC RX REV CODE- 258: Performed by: ANESTHESIOLOGY

## 2022-12-07 PROCEDURE — C1768 GRAFT, VASCULAR: HCPCS | Performed by: THORACIC SURGERY (CARDIOTHORACIC VASCULAR SURGERY)

## 2022-12-07 PROCEDURE — 77030033068 HC DEV COR-KNOT SUT KT LSIS -E: Performed by: THORACIC SURGERY (CARDIOTHORACIC VASCULAR SURGERY)

## 2022-12-07 PROCEDURE — 77030013313: Performed by: THORACIC SURGERY (CARDIOTHORACIC VASCULAR SURGERY)

## 2022-12-07 PROCEDURE — 77030010827: Performed by: THORACIC SURGERY (CARDIOTHORACIC VASCULAR SURGERY)

## 2022-12-07 PROCEDURE — 94002 VENT MGMT INPAT INIT DAY: CPT

## 2022-12-07 PROCEDURE — 74011250636 HC RX REV CODE- 250/636: Performed by: PHYSICIAN ASSISTANT

## 2022-12-07 PROCEDURE — 77030002933 HC SUT MCRYL J&J -A: Performed by: THORACIC SURGERY (CARDIOTHORACIC VASCULAR SURGERY)

## 2022-12-07 PROCEDURE — 77030015758: Performed by: THORACIC SURGERY (CARDIOTHORACIC VASCULAR SURGERY)

## 2022-12-07 PROCEDURE — P9073 PLATELETS PHERESIS PATH REDU: HCPCS

## 2022-12-07 PROCEDURE — 77030002982 HC SUT POLYSRB J&J -A: Performed by: THORACIC SURGERY (CARDIOTHORACIC VASCULAR SURGERY)

## 2022-12-07 PROCEDURE — 5A1221Z PERFORMANCE OF CARDIAC OUTPUT, CONTINUOUS: ICD-10-PCS | Performed by: THORACIC SURGERY (CARDIOTHORACIC VASCULAR SURGERY)

## 2022-12-07 PROCEDURE — 77030018390 HC SPNG HEMSTAT2 J&J -B: Performed by: THORACIC SURGERY (CARDIOTHORACIC VASCULAR SURGERY)

## 2022-12-07 PROCEDURE — 77030015683 HC ADPT CRDPLG MEDT -B: Performed by: THORACIC SURGERY (CARDIOTHORACIC VASCULAR SURGERY)

## 2022-12-07 PROCEDURE — 77030002912 HC SUT ETHBND J&J -A: Performed by: THORACIC SURGERY (CARDIOTHORACIC VASCULAR SURGERY)

## 2022-12-07 PROCEDURE — 77030018547 HC SUT ETHBND1 J&J -B: Performed by: THORACIC SURGERY (CARDIOTHORACIC VASCULAR SURGERY)

## 2022-12-07 PROCEDURE — 77030019896 HC KT ARTERIAL LN TELE -B: Performed by: THORACIC SURGERY (CARDIOTHORACIC VASCULAR SURGERY)

## 2022-12-07 PROCEDURE — 85730 THROMBOPLASTIN TIME PARTIAL: CPT

## 2022-12-07 PROCEDURE — 74011000258 HC RX REV CODE- 258: Performed by: PHYSICIAN ASSISTANT

## 2022-12-07 PROCEDURE — 80048 BASIC METABOLIC PNL TOTAL CA: CPT

## 2022-12-07 PROCEDURE — C1769 GUIDE WIRE: HCPCS | Performed by: THORACIC SURGERY (CARDIOTHORACIC VASCULAR SURGERY)

## 2022-12-07 PROCEDURE — 77030040392 HC DRSG OPTIFOAM MDII -A: Performed by: THORACIC SURGERY (CARDIOTHORACIC VASCULAR SURGERY)

## 2022-12-07 PROCEDURE — 65620000000 HC RM CCU GENERAL

## 2022-12-07 PROCEDURE — 77030028842 HC SHNT IC CLRVW MEDT -B: Performed by: THORACIC SURGERY (CARDIOTHORACIC VASCULAR SURGERY)

## 2022-12-07 PROCEDURE — 85025 COMPLETE CBC W/AUTO DIFF WBC: CPT

## 2022-12-07 PROCEDURE — 77030038692 HC WND DEB SYS IRMX -B: Performed by: THORACIC SURGERY (CARDIOTHORACIC VASCULAR SURGERY)

## 2022-12-07 PROCEDURE — 021009W BYPASS CORONARY ARTERY, ONE ARTERY FROM AORTA WITH AUTOLOGOUS VENOUS TISSUE, OPEN APPROACH: ICD-10-PCS | Performed by: THORACIC SURGERY (CARDIOTHORACIC VASCULAR SURGERY)

## 2022-12-07 PROCEDURE — 77030022199 HC SYS HARV VESL GTNG -G1: Performed by: THORACIC SURGERY (CARDIOTHORACIC VASCULAR SURGERY)

## 2022-12-07 PROCEDURE — B246ZZ4 ULTRASONOGRAPHY OF RIGHT AND LEFT HEART, TRANSESOPHAGEAL: ICD-10-PCS | Performed by: ANESTHESIOLOGY

## 2022-12-07 PROCEDURE — 77030033036 HC BLWR MISTR ACUMIST MEDT -C: Performed by: THORACIC SURGERY (CARDIOTHORACIC VASCULAR SURGERY)

## 2022-12-07 PROCEDURE — 99024 POSTOP FOLLOW-UP VISIT: CPT | Performed by: PHYSICIAN ASSISTANT

## 2022-12-07 PROCEDURE — 82962 GLUCOSE BLOOD TEST: CPT

## 2022-12-07 PROCEDURE — 77030035694 HC MSK BIPAP FLL FAC PERFMAX PHIL -B

## 2022-12-07 PROCEDURE — 31500 INSERT EMERGENCY AIRWAY: CPT

## 2022-12-07 PROCEDURE — 82803 BLOOD GASES ANY COMBINATION: CPT

## 2022-12-07 PROCEDURE — 77030002945 HC SUT NRLN J&J -A: Performed by: THORACIC SURGERY (CARDIOTHORACIC VASCULAR SURGERY)

## 2022-12-07 PROCEDURE — 36600 WITHDRAWAL OF ARTERIAL BLOOD: CPT

## 2022-12-07 PROCEDURE — 77030002986 HC SUT PROL J&J -A: Performed by: THORACIC SURGERY (CARDIOTHORACIC VASCULAR SURGERY)

## 2022-12-07 PROCEDURE — 33517 CABG ARTERY-VEIN SINGLE: CPT | Performed by: PHYSICIAN ASSISTANT

## 2022-12-07 PROCEDURE — 76060000045 HC ANESTHESIA 7 TO 7.5 HR: Performed by: THORACIC SURGERY (CARDIOTHORACIC VASCULAR SURGERY)

## 2022-12-07 PROCEDURE — 74011636637 HC RX REV CODE- 636/637: Performed by: ANESTHESIOLOGY

## 2022-12-07 PROCEDURE — 77030025415: Performed by: THORACIC SURGERY (CARDIOTHORACIC VASCULAR SURGERY)

## 2022-12-07 PROCEDURE — 02HV33Z INSERTION OF INFUSION DEVICE INTO SUPERIOR VENA CAVA, PERCUTANEOUS APPROACH: ICD-10-PCS | Performed by: ANESTHESIOLOGY

## 2022-12-07 PROCEDURE — 33533 CABG ARTERIAL SINGLE: CPT | Performed by: PHYSICIAN ASSISTANT

## 2022-12-07 PROCEDURE — 77030007667 HC INSRT SUT HLD MEDT -B: Performed by: THORACIC SURGERY (CARDIOTHORACIC VASCULAR SURGERY)

## 2022-12-07 PROCEDURE — 77030005401 HC CATH RAD ARRO -A: Performed by: THORACIC SURGERY (CARDIOTHORACIC VASCULAR SURGERY)

## 2022-12-07 PROCEDURE — 77030034848: Performed by: THORACIC SURGERY (CARDIOTHORACIC VASCULAR SURGERY)

## 2022-12-07 PROCEDURE — 33508 ENDOSCOPIC VEIN HARVEST: CPT | Performed by: THORACIC SURGERY (CARDIOTHORACIC VASCULAR SURGERY)

## 2022-12-07 PROCEDURE — 77030002987 HC SUT PROL J&J -B: Performed by: THORACIC SURGERY (CARDIOTHORACIC VASCULAR SURGERY)

## 2022-12-07 PROCEDURE — 77030002996 HC SUT SLK J&J -A: Performed by: THORACIC SURGERY (CARDIOTHORACIC VASCULAR SURGERY)

## 2022-12-07 PROCEDURE — 71045 X-RAY EXAM CHEST 1 VIEW: CPT

## 2022-12-07 PROCEDURE — 74011636637 HC RX REV CODE- 636/637: Performed by: PHYSICIAN ASSISTANT

## 2022-12-07 PROCEDURE — 74011250636 HC RX REV CODE- 250/636: Performed by: THORACIC SURGERY (CARDIOTHORACIC VASCULAR SURGERY)

## 2022-12-07 PROCEDURE — P9016 RBC LEUKOCYTES REDUCED: HCPCS

## 2022-12-07 PROCEDURE — 76010000223 HC CV SURG 7 TO 7.5 HR INTENSV-TIER 1: Performed by: THORACIC SURGERY (CARDIOTHORACIC VASCULAR SURGERY)

## 2022-12-07 PROCEDURE — 88305 TISSUE EXAM BY PATHOLOGIST: CPT

## 2022-12-07 PROCEDURE — 74011636637 HC RX REV CODE- 636/637: Performed by: THORACIC SURGERY (CARDIOTHORACIC VASCULAR SURGERY)

## 2022-12-07 PROCEDURE — 77030002913 HC SUT ETHBND J&J -B: Performed by: THORACIC SURGERY (CARDIOTHORACIC VASCULAR SURGERY)

## 2022-12-07 PROCEDURE — 77030018836 HC SOL IRR NACL ICUM -A: Performed by: THORACIC SURGERY (CARDIOTHORACIC VASCULAR SURGERY)

## 2022-12-07 PROCEDURE — 02RF08Z REPLACEMENT OF AORTIC VALVE WITH ZOOPLASTIC TISSUE, OPEN APPROACH: ICD-10-PCS | Performed by: THORACIC SURGERY (CARDIOTHORACIC VASCULAR SURGERY)

## 2022-12-07 PROCEDURE — 88311 DECALCIFY TISSUE: CPT

## 2022-12-07 PROCEDURE — 33411 REPLACEMENT OF AORTIC VALVE: CPT | Performed by: PHYSICIAN ASSISTANT

## 2022-12-07 PROCEDURE — 77030026855 HC PNCH AORT MYO AEMC -B: Performed by: THORACIC SURGERY (CARDIOTHORACIC VASCULAR SURGERY)

## 2022-12-07 PROCEDURE — 02100A9 BYPASS CORONARY ARTERY, ONE ARTERY FROM LEFT INTERNAL MAMMARY WITH AUTOLOGOUS ARTERIAL TISSUE, OPEN APPROACH: ICD-10-PCS | Performed by: THORACIC SURGERY (CARDIOTHORACIC VASCULAR SURGERY)

## 2022-12-07 PROCEDURE — 77030018723 HC ELCTRD BLD COVD -A: Performed by: THORACIC SURGERY (CARDIOTHORACIC VASCULAR SURGERY)

## 2022-12-07 PROCEDURE — 77030010929 HC CLMP VASC FGRTY EDWD -B: Performed by: THORACIC SURGERY (CARDIOTHORACIC VASCULAR SURGERY)

## 2022-12-07 PROCEDURE — 77030010516 HC APPL HEMA CLP TELE -B: Performed by: THORACIC SURGERY (CARDIOTHORACIC VASCULAR SURGERY)

## 2022-12-07 PROCEDURE — P9045 ALBUMIN (HUMAN), 5%, 250 ML: HCPCS | Performed by: PHYSICIAN ASSISTANT

## 2022-12-07 PROCEDURE — 94660 CPAP INITIATION&MGMT: CPT

## 2022-12-07 PROCEDURE — 33508 ENDOSCOPIC VEIN HARVEST: CPT | Performed by: PHYSICIAN ASSISTANT

## 2022-12-07 PROCEDURE — 82947 ASSAY GLUCOSE BLOOD QUANT: CPT

## 2022-12-07 PROCEDURE — 85347 COAGULATION TIME ACTIVATED: CPT

## 2022-12-07 PROCEDURE — 74011000250 HC RX REV CODE- 250: Performed by: THORACIC SURGERY (CARDIOTHORACIC VASCULAR SURGERY)

## 2022-12-07 DEVICE — IMPLANTABLE DEVICE
Type: IMPLANTABLE DEVICE | Site: HEART | Status: FUNCTIONAL
Brand: EDWARDS BOVINE PERICARDIAL PATCH

## 2022-12-07 DEVICE — INSPIRIS RESILIA AORTIC VALVE
Type: IMPLANTABLE DEVICE | Site: AORTIC VALVE | Status: FUNCTIONAL
Brand: INSPIRIS RESILIA AORTIC VALVE

## 2022-12-07 RX ORDER — MAGNESIUM SULFATE 100 %
4 CRYSTALS MISCELLANEOUS AS NEEDED
Status: DISCONTINUED | OUTPATIENT
Start: 2022-12-07 | End: 2022-12-08 | Stop reason: SDUPTHER

## 2022-12-07 RX ORDER — MANNITOL 20 G/100ML
INJECTION, SOLUTION INTRAVENOUS
Status: DISCONTINUED | OUTPATIENT
Start: 2022-12-07 | End: 2022-12-07 | Stop reason: HOSPADM

## 2022-12-07 RX ORDER — NOREPINEPHRINE BITARTRATE/D5W 8 MG/250ML
2-16 PLASTIC BAG, INJECTION (ML) INTRAVENOUS
Status: DISCONTINUED | OUTPATIENT
Start: 2022-12-07 | End: 2022-12-08

## 2022-12-07 RX ORDER — DOCUSATE SODIUM 100 MG/1
100 CAPSULE, LIQUID FILLED ORAL 2 TIMES DAILY
Status: DISCONTINUED | OUTPATIENT
Start: 2022-12-07 | End: 2022-12-15 | Stop reason: HOSPADM

## 2022-12-07 RX ORDER — ASPIRIN 81 MG/1
81 TABLET ORAL DAILY
Status: DISCONTINUED | OUTPATIENT
Start: 2022-12-08 | End: 2022-12-15 | Stop reason: HOSPADM

## 2022-12-07 RX ORDER — HYDROCODONE BITARTRATE AND ACETAMINOPHEN 5; 325 MG/1; MG/1
1-2 TABLET ORAL
Status: DISCONTINUED | OUTPATIENT
Start: 2022-12-07 | End: 2022-12-08

## 2022-12-07 RX ORDER — FENTANYL CITRATE 50 UG/ML
12.5-25 INJECTION, SOLUTION INTRAMUSCULAR; INTRAVENOUS
Status: DISCONTINUED | OUTPATIENT
Start: 2022-12-07 | End: 2022-12-08

## 2022-12-07 RX ORDER — FENTANYL CITRATE 50 UG/ML
INJECTION, SOLUTION INTRAMUSCULAR; INTRAVENOUS AS NEEDED
Status: DISCONTINUED | OUTPATIENT
Start: 2022-12-07 | End: 2022-12-07 | Stop reason: HOSPADM

## 2022-12-07 RX ORDER — DESMOPRESSIN ACETATE 4 UG/ML
INJECTION, SOLUTION INTRAVENOUS; SUBCUTANEOUS
Status: COMPLETED
Start: 2022-12-07 | End: 2022-12-07

## 2022-12-07 RX ORDER — VANCOMYCIN HYDROCHLORIDE 1 G/20ML
INJECTION, POWDER, LYOPHILIZED, FOR SOLUTION INTRAVENOUS
Status: DISPENSED
Start: 2022-12-07 | End: 2022-12-08

## 2022-12-07 RX ORDER — SODIUM CHLORIDE 450 MG/100ML
500 INJECTION, SOLUTION INTRAVENOUS ONCE
Status: COMPLETED | OUTPATIENT
Start: 2022-12-07 | End: 2022-12-07

## 2022-12-07 RX ORDER — PROPOFOL 10 MG/ML
INJECTION, EMULSION INTRAVENOUS
Status: COMPLETED
Start: 2022-12-07 | End: 2022-12-07

## 2022-12-07 RX ORDER — HEPARIN SODIUM 5000 [USP'U]/ML
5000 INJECTION, SOLUTION INTRAVENOUS; SUBCUTANEOUS EVERY 8 HOURS
Status: DISCONTINUED | OUTPATIENT
Start: 2022-12-07 | End: 2022-12-15 | Stop reason: HOSPADM

## 2022-12-07 RX ORDER — PHENYLEPHRINE HCL IN 0.9% NACL 1 MG/10 ML
SYRINGE (ML) INTRAVENOUS AS NEEDED
Status: DISCONTINUED | OUTPATIENT
Start: 2022-12-07 | End: 2022-12-07 | Stop reason: HOSPADM

## 2022-12-07 RX ORDER — ATORVASTATIN CALCIUM 40 MG/1
40 TABLET, FILM COATED ORAL DAILY
Status: DISCONTINUED | OUTPATIENT
Start: 2022-12-08 | End: 2022-12-07

## 2022-12-07 RX ORDER — PROPOFOL 10 MG/ML
0-50 VIAL (ML) INTRAVENOUS
Status: DISCONTINUED | OUTPATIENT
Start: 2022-12-07 | End: 2022-12-08

## 2022-12-07 RX ORDER — CHLORHEXIDINE GLUCONATE 1.2 MG/ML
10 RINSE ORAL 2 TIMES DAILY
Status: DISCONTINUED | OUTPATIENT
Start: 2022-12-07 | End: 2022-12-15 | Stop reason: HOSPADM

## 2022-12-07 RX ORDER — ROCURONIUM BROMIDE 10 MG/ML
INJECTION, SOLUTION INTRAVENOUS AS NEEDED
Status: DISCONTINUED | OUTPATIENT
Start: 2022-12-07 | End: 2022-12-07 | Stop reason: HOSPADM

## 2022-12-07 RX ORDER — MANNITOL 20 G/100ML
INJECTION, SOLUTION INTRAVENOUS
Status: COMPLETED
Start: 2022-12-07 | End: 2022-12-07

## 2022-12-07 RX ORDER — ACETAMINOPHEN 325 MG/1
650 TABLET ORAL
Status: DISCONTINUED | OUTPATIENT
Start: 2022-12-07 | End: 2022-12-08 | Stop reason: SDUPTHER

## 2022-12-07 RX ORDER — VANCOMYCIN HYDROCHLORIDE 1 G/20ML
INJECTION, POWDER, LYOPHILIZED, FOR SOLUTION INTRAVENOUS AS NEEDED
Status: DISCONTINUED | OUTPATIENT
Start: 2022-12-07 | End: 2022-12-07 | Stop reason: HOSPADM

## 2022-12-07 RX ORDER — ATORVASTATIN CALCIUM 40 MG/1
40 TABLET, FILM COATED ORAL
Status: DISCONTINUED | OUTPATIENT
Start: 2022-12-07 | End: 2022-12-15 | Stop reason: HOSPADM

## 2022-12-07 RX ORDER — MIDAZOLAM HYDROCHLORIDE 1 MG/ML
INJECTION, SOLUTION INTRAMUSCULAR; INTRAVENOUS AS NEEDED
Status: DISCONTINUED | OUTPATIENT
Start: 2022-12-07 | End: 2022-12-07 | Stop reason: HOSPADM

## 2022-12-07 RX ORDER — PROTAMINE SULFATE 10 MG/ML
INJECTION, SOLUTION INTRAVENOUS AS NEEDED
Status: DISCONTINUED | OUTPATIENT
Start: 2022-12-07 | End: 2022-12-07 | Stop reason: HOSPADM

## 2022-12-07 RX ORDER — ALBUTEROL SULFATE 0.83 MG/ML
2.5 SOLUTION RESPIRATORY (INHALATION)
Status: DISCONTINUED | OUTPATIENT
Start: 2022-12-07 | End: 2022-12-15 | Stop reason: HOSPADM

## 2022-12-07 RX ORDER — OXYCODONE HYDROCHLORIDE 5 MG/1
5-10 TABLET ORAL
Status: DISPENSED | OUTPATIENT
Start: 2022-12-07 | End: 2022-12-08

## 2022-12-07 RX ORDER — CALCIUM CHLORIDE INJECTION 100 MG/ML
INJECTION, SOLUTION INTRAVENOUS AS NEEDED
Status: DISCONTINUED | OUTPATIENT
Start: 2022-12-07 | End: 2022-12-07 | Stop reason: HOSPADM

## 2022-12-07 RX ORDER — FAMOTIDINE 20 MG/1
20 TABLET, FILM COATED ORAL 2 TIMES DAILY
Status: DISCONTINUED | OUTPATIENT
Start: 2022-12-07 | End: 2022-12-11

## 2022-12-07 RX ORDER — LIDOCAINE HYDROCHLORIDE 20 MG/ML
INJECTION, SOLUTION EPIDURAL; INFILTRATION; INTRACAUDAL; PERINEURAL AS NEEDED
Status: DISCONTINUED | OUTPATIENT
Start: 2022-12-07 | End: 2022-12-07 | Stop reason: HOSPADM

## 2022-12-07 RX ORDER — ETOMIDATE 2 MG/ML
INJECTION INTRAVENOUS AS NEEDED
Status: DISCONTINUED | OUTPATIENT
Start: 2022-12-07 | End: 2022-12-07 | Stop reason: HOSPADM

## 2022-12-07 RX ORDER — PROPOFOL 10 MG/ML
INJECTION, EMULSION INTRAVENOUS
Status: DISCONTINUED | OUTPATIENT
Start: 2022-12-07 | End: 2022-12-07 | Stop reason: HOSPADM

## 2022-12-07 RX ORDER — NITROGLYCERIN 40 MG/100ML
0-200 INJECTION INTRAVENOUS
Status: DISCONTINUED | OUTPATIENT
Start: 2022-12-07 | End: 2022-12-08

## 2022-12-07 RX ORDER — SODIUM CHLORIDE 9 MG/ML
INJECTION, SOLUTION INTRAVENOUS
Status: DISCONTINUED | OUTPATIENT
Start: 2022-12-07 | End: 2022-12-07 | Stop reason: HOSPADM

## 2022-12-07 RX ORDER — MONTELUKAST SODIUM 10 MG/1
10 TABLET ORAL DAILY
Status: DISCONTINUED | OUTPATIENT
Start: 2022-12-08 | End: 2022-12-15 | Stop reason: HOSPADM

## 2022-12-07 RX ORDER — SODIUM CHLORIDE 0.9 % (FLUSH) 0.9 %
5-40 SYRINGE (ML) INJECTION EVERY 8 HOURS
Status: DISCONTINUED | OUTPATIENT
Start: 2022-12-07 | End: 2022-12-15 | Stop reason: HOSPADM

## 2022-12-07 RX ORDER — SODIUM CHLORIDE 9 MG/ML
10 INJECTION, SOLUTION INTRAVENOUS CONTINUOUS
Status: DISCONTINUED | OUTPATIENT
Start: 2022-12-07 | End: 2022-12-09

## 2022-12-07 RX ORDER — PROPOFOL 10 MG/ML
INJECTION, EMULSION INTRAVENOUS
Status: DISPENSED
Start: 2022-12-07 | End: 2022-12-08

## 2022-12-07 RX ORDER — AMIODARONE HYDROCHLORIDE 200 MG/1
200 TABLET ORAL 3 TIMES DAILY
Status: DISCONTINUED | OUTPATIENT
Start: 2022-12-07 | End: 2022-12-11

## 2022-12-07 RX ORDER — ALBUMIN HUMAN 50 G/1000ML
12.5 SOLUTION INTRAVENOUS
Status: COMPLETED | OUTPATIENT
Start: 2022-12-07 | End: 2022-12-07

## 2022-12-07 RX ORDER — CLOPIDOGREL BISULFATE 75 MG/1
75 TABLET ORAL DAILY
Status: DISCONTINUED | OUTPATIENT
Start: 2022-12-08 | End: 2022-12-15 | Stop reason: HOSPADM

## 2022-12-07 RX ORDER — DESMOPRESSIN ACETATE 4 UG/ML
INJECTION, SOLUTION INTRAVENOUS; SUBCUTANEOUS AS NEEDED
Status: DISCONTINUED | OUTPATIENT
Start: 2022-12-07 | End: 2022-12-07 | Stop reason: HOSPADM

## 2022-12-07 RX ORDER — METOPROLOL TARTRATE 25 MG/1
12.5 TABLET, FILM COATED ORAL EVERY 12 HOURS
Status: DISCONTINUED | OUTPATIENT
Start: 2022-12-07 | End: 2022-12-07

## 2022-12-07 RX ORDER — POLYETHYLENE GLYCOL 3350 17 G/17G
17 POWDER, FOR SOLUTION ORAL DAILY
Status: DISCONTINUED | OUTPATIENT
Start: 2022-12-08 | End: 2022-12-15

## 2022-12-07 RX ORDER — EPHEDRINE SULFATE/0.9% NACL/PF 25 MG/5 ML
SYRINGE (ML) INTRAVENOUS AS NEEDED
Status: DISCONTINUED | OUTPATIENT
Start: 2022-12-07 | End: 2022-12-07 | Stop reason: HOSPADM

## 2022-12-07 RX ORDER — SODIUM CHLORIDE 0.9 % (FLUSH) 0.9 %
5-40 SYRINGE (ML) INJECTION AS NEEDED
Status: DISCONTINUED | OUTPATIENT
Start: 2022-12-07 | End: 2022-12-15 | Stop reason: HOSPADM

## 2022-12-07 RX ORDER — HYDROMORPHONE HYDROCHLORIDE 2 MG/ML
INJECTION, SOLUTION INTRAMUSCULAR; INTRAVENOUS; SUBCUTANEOUS AS NEEDED
Status: DISCONTINUED | OUTPATIENT
Start: 2022-12-07 | End: 2022-12-07 | Stop reason: HOSPADM

## 2022-12-07 RX ORDER — BISACODYL 5 MG
10 TABLET, DELAYED RELEASE (ENTERIC COATED) ORAL DAILY
Status: DISCONTINUED | OUTPATIENT
Start: 2022-12-08 | End: 2022-12-12

## 2022-12-07 RX ORDER — MORPHINE SULFATE 2 MG/ML
2-4 INJECTION, SOLUTION INTRAMUSCULAR; INTRAVENOUS
Status: DISCONTINUED | OUTPATIENT
Start: 2022-12-07 | End: 2022-12-11

## 2022-12-07 RX ORDER — DEXTROSE MONOHYDRATE 100 MG/ML
0-250 INJECTION, SOLUTION INTRAVENOUS AS NEEDED
Status: DISCONTINUED | OUTPATIENT
Start: 2022-12-07 | End: 2022-12-08 | Stop reason: SDUPTHER

## 2022-12-07 RX ORDER — HEPARIN SODIUM 1000 [USP'U]/ML
INJECTION, SOLUTION INTRAVENOUS; SUBCUTANEOUS AS NEEDED
Status: DISCONTINUED | OUTPATIENT
Start: 2022-12-07 | End: 2022-12-07 | Stop reason: HOSPADM

## 2022-12-07 RX ORDER — NALOXONE HYDROCHLORIDE 0.4 MG/ML
0.4 INJECTION, SOLUTION INTRAMUSCULAR; INTRAVENOUS; SUBCUTANEOUS AS NEEDED
Status: DISCONTINUED | OUTPATIENT
Start: 2022-12-07 | End: 2022-12-15 | Stop reason: HOSPADM

## 2022-12-07 RX ORDER — PROTAMINE SULFATE 10 MG/ML
INJECTION, SOLUTION INTRAVENOUS
Status: DISPENSED
Start: 2022-12-07 | End: 2022-12-08

## 2022-12-07 RX ADMIN — PHENYLEPHRINE HYDROCHLORIDE 40 MCG/MIN: 10 INJECTION INTRAVENOUS at 15:44

## 2022-12-07 RX ADMIN — SODIUM CHLORIDE 10 ML/HR: 9 INJECTION, SOLUTION INTRAVENOUS at 15:29

## 2022-12-07 RX ADMIN — HEPARIN SODIUM 5000 UNITS: 5000 INJECTION INTRAVENOUS; SUBCUTANEOUS at 22:51

## 2022-12-07 RX ADMIN — SODIUM CHLORIDE, PRESERVATIVE FREE 20 ML: 5 INJECTION INTRAVENOUS at 22:40

## 2022-12-07 RX ADMIN — DESMOPRESSIN ACETATE 16 MCG: 4 INJECTION INTRAVENOUS; SUBCUTANEOUS at 13:34

## 2022-12-07 RX ADMIN — Medication 10 MG: at 13:35

## 2022-12-07 RX ADMIN — FENTANYL CITRATE 100 MCG: 50 INJECTION INTRAMUSCULAR; INTRAVENOUS at 08:21

## 2022-12-07 RX ADMIN — MIDAZOLAM HYDROCHLORIDE 2 MG: 2 INJECTION, SOLUTION INTRAMUSCULAR; INTRAVENOUS at 07:42

## 2022-12-07 RX ADMIN — PHENYLEPHRINE HYDROCHLORIDE 10 MCG/MIN: 10 INJECTION INTRAVENOUS at 07:51

## 2022-12-07 RX ADMIN — HEPARIN SODIUM 35000 UNITS: 1000 INJECTION, SOLUTION INTRAVENOUS; SUBCUTANEOUS at 09:33

## 2022-12-07 RX ADMIN — DEXTROSE MONOHYDRATE 2 MCG/MIN: 50 INJECTION, SOLUTION INTRAVENOUS at 15:15

## 2022-12-07 RX ADMIN — FENTANYL CITRATE 50 MCG: 50 INJECTION INTRAMUSCULAR; INTRAVENOUS at 13:53

## 2022-12-07 RX ADMIN — HYDROMORPHONE HYDROCHLORIDE 1 MG: 2 INJECTION, SOLUTION INTRAMUSCULAR; INTRAVENOUS; SUBCUTANEOUS at 08:34

## 2022-12-07 RX ADMIN — AMIODARONE HYDROCHLORIDE 200 MG: 200 TABLET ORAL at 22:46

## 2022-12-07 RX ADMIN — SODIUM CHLORIDE 6.6 UNITS/HR: 9 INJECTION, SOLUTION INTRAVENOUS at 20:03

## 2022-12-07 RX ADMIN — FENTANYL CITRATE 50 MCG: 50 INJECTION INTRAMUSCULAR; INTRAVENOUS at 14:14

## 2022-12-07 RX ADMIN — HYDROMORPHONE HYDROCHLORIDE 0.5 MG: 2 INJECTION, SOLUTION INTRAMUSCULAR; INTRAVENOUS; SUBCUTANEOUS at 09:51

## 2022-12-07 RX ADMIN — FAMOTIDINE 20 MG: 10 INJECTION, SOLUTION INTRAVENOUS at 15:57

## 2022-12-07 RX ADMIN — METOPROLOL TARTRATE 2.5 MG: 5 INJECTION, SOLUTION INTRAVENOUS at 06:50

## 2022-12-07 RX ADMIN — CEFAZOLIN 2 G: 1 INJECTION, POWDER, FOR SOLUTION INTRAMUSCULAR; INTRAVENOUS at 19:35

## 2022-12-07 RX ADMIN — FENTANYL CITRATE 25 MCG: 50 INJECTION, SOLUTION INTRAMUSCULAR; INTRAVENOUS at 22:45

## 2022-12-07 RX ADMIN — ROCURONIUM BROMIDE 30 MG: 50 INJECTION INTRAVENOUS at 09:17

## 2022-12-07 RX ADMIN — ROCURONIUM BROMIDE 50 MG: 50 INJECTION INTRAVENOUS at 07:43

## 2022-12-07 RX ADMIN — FENTANYL CITRATE 50 MCG: 50 INJECTION INTRAMUSCULAR; INTRAVENOUS at 11:47

## 2022-12-07 RX ADMIN — EPINEPHRINE 2 MCG/MIN: 1 INJECTION INTRAMUSCULAR; INTRAVENOUS; SUBCUTANEOUS at 15:41

## 2022-12-07 RX ADMIN — AMINOCAPROIC ACID 1 G/HR: 250 INJECTION, SOLUTION INTRAVENOUS at 08:30

## 2022-12-07 RX ADMIN — CEFAZOLIN SODIUM 3 G: 1 INJECTION, POWDER, FOR SOLUTION INTRAMUSCULAR; INTRAVENOUS at 08:29

## 2022-12-07 RX ADMIN — SODIUM CHLORIDE 500 ML: 4.5 INJECTION, SOLUTION INTRAVENOUS at 17:01

## 2022-12-07 RX ADMIN — DEXTROSE MONOHYDRATE 4 MCG/MIN: 50 INJECTION, SOLUTION INTRAVENOUS at 15:58

## 2022-12-07 RX ADMIN — LIDOCAINE HYDROCHLORIDE 100 MG: 20 INJECTION, SOLUTION EPIDURAL; INFILTRATION; INTRACAUDAL; PERINEURAL at 07:42

## 2022-12-07 RX ADMIN — AMINOCAPROIC ACID 1 G/HR: 250 INJECTION, SOLUTION INTRAVENOUS at 15:39

## 2022-12-07 RX ADMIN — PROPOFOL 25 MCG/KG/MIN: 10 INJECTION, EMULSION INTRAVENOUS at 10:07

## 2022-12-07 RX ADMIN — SODIUM CHLORIDE: 9 INJECTION, SOLUTION INTRAVENOUS at 07:42

## 2022-12-07 RX ADMIN — HYDROMORPHONE HYDROCHLORIDE 0.5 MG: 2 INJECTION, SOLUTION INTRAMUSCULAR; INTRAVENOUS; SUBCUTANEOUS at 11:23

## 2022-12-07 RX ADMIN — ROCURONIUM BROMIDE 20 MG: 50 INJECTION INTRAVENOUS at 08:00

## 2022-12-07 RX ADMIN — MIDAZOLAM HYDROCHLORIDE 2 MG: 2 INJECTION, SOLUTION INTRAMUSCULAR; INTRAVENOUS at 13:36

## 2022-12-07 RX ADMIN — ALBUMIN (HUMAN) 12.5 G: 12.5 INJECTION, SOLUTION INTRAVENOUS at 15:28

## 2022-12-07 RX ADMIN — PROTAMINE SULFATE 250 MG: 10 INJECTION, SOLUTION INTRAVENOUS at 13:18

## 2022-12-07 RX ADMIN — ALBUMIN (HUMAN) 12.5 G: 12.5 INJECTION, SOLUTION INTRAVENOUS at 16:04

## 2022-12-07 RX ADMIN — Medication 150 MCG: at 07:51

## 2022-12-07 RX ADMIN — MIDAZOLAM HYDROCHLORIDE 2 MG: 2 INJECTION, SOLUTION INTRAMUSCULAR; INTRAVENOUS at 09:47

## 2022-12-07 RX ADMIN — ETOMIDATE 26 MG: 2 INJECTION, SOLUTION INTRAVENOUS at 07:42

## 2022-12-07 RX ADMIN — CHLORHEXIDINE GLUCONATE 0.12% ORAL RINSE 10 ML: 1.2 LIQUID ORAL at 17:46

## 2022-12-07 RX ADMIN — FENTANYL CITRATE 25 MCG: 50 INJECTION, SOLUTION INTRAMUSCULAR; INTRAVENOUS at 17:44

## 2022-12-07 RX ADMIN — Medication 150 MCG: at 13:16

## 2022-12-07 RX ADMIN — ROCURONIUM BROMIDE 30 MG: 50 INJECTION INTRAVENOUS at 11:00

## 2022-12-07 RX ADMIN — FENTANYL CITRATE 50 MCG: 50 INJECTION INTRAMUSCULAR; INTRAVENOUS at 09:06

## 2022-12-07 RX ADMIN — CALCIUM CHLORIDE 5 G: 100 INJECTION INTRAVENOUS; INTRAVENTRICULAR at 14:04

## 2022-12-07 RX ADMIN — EPINEPHRINE 1 MCG/MIN: 1 INJECTION INTRAMUSCULAR; INTRAVENOUS; SUBCUTANEOUS at 08:31

## 2022-12-07 RX ADMIN — ROCURONIUM BROMIDE 20 MG: 50 INJECTION INTRAVENOUS at 13:40

## 2022-12-07 RX ADMIN — SODIUM CHLORIDE, PRESERVATIVE FREE 10 ML: 5 INJECTION INTRAVENOUS at 22:46

## 2022-12-07 RX ADMIN — PROPOFOL 25 MCG/KG/MIN: 10 INJECTION, EMULSION INTRAVENOUS at 15:45

## 2022-12-07 RX ADMIN — SODIUM CHLORIDE, PRESERVATIVE FREE 10 ML: 5 INJECTION INTRAVENOUS at 06:15

## 2022-12-07 RX ADMIN — MANNITOL: 20 INJECTION, SOLUTION INTRAVENOUS at 08:00

## 2022-12-07 RX ADMIN — Medication 3 UNITS: at 13:42

## 2022-12-07 RX ADMIN — MUPIROCIN: 20 OINTMENT TOPICAL at 17:47

## 2022-12-07 RX ADMIN — ATORVASTATIN CALCIUM 40 MG: 40 TABLET, FILM COATED ORAL at 22:47

## 2022-12-07 RX ADMIN — FENTANYL CITRATE 100 MCG: 50 INJECTION INTRAMUSCULAR; INTRAVENOUS at 09:29

## 2022-12-07 RX ADMIN — Medication 3 UNITS: at 11:34

## 2022-12-07 RX ADMIN — ALBUMIN (HUMAN) 12.5 G: 12.5 INJECTION, SOLUTION INTRAVENOUS at 16:20

## 2022-12-07 RX ADMIN — SODIUM CHLORIDE 3.5 UNITS/HR: 9 INJECTION, SOLUTION INTRAVENOUS at 15:42

## 2022-12-07 RX ADMIN — CEFAZOLIN SODIUM 3 G: 1 INJECTION, POWDER, FOR SOLUTION INTRAMUSCULAR; INTRAVENOUS at 12:29

## 2022-12-07 RX ADMIN — ROCURONIUM BROMIDE 20 MG: 50 INJECTION INTRAVENOUS at 12:45

## 2022-12-07 RX ADMIN — FENTANYL CITRATE 50 MCG: 50 INJECTION INTRAMUSCULAR; INTRAVENOUS at 12:36

## 2022-12-07 RX ADMIN — EPINEPHRINE 2 MCG/MIN: 1 INJECTION INTRAMUSCULAR; INTRAVENOUS; SUBCUTANEOUS at 08:11

## 2022-12-07 RX ADMIN — NOREPINEPHRINE BITARTRATE 2 MCG/MIN: 1 INJECTION, SOLUTION, CONCENTRATE INTRAVENOUS at 13:27

## 2022-12-07 RX ADMIN — ROCURONIUM BROMIDE 20 MG: 50 INJECTION INTRAVENOUS at 10:00

## 2022-12-07 RX ADMIN — PROTAMINE SULFATE 50 MG: 10 INJECTION, SOLUTION INTRAVENOUS at 13:49

## 2022-12-07 RX ADMIN — Medication 10 MG: at 13:20

## 2022-12-07 RX ADMIN — FAMOTIDINE 20 MG: 20 TABLET ORAL at 17:47

## 2022-12-07 RX ADMIN — ROCURONIUM BROMIDE 30 MG: 50 INJECTION INTRAVENOUS at 08:18

## 2022-12-07 RX ADMIN — MORPHINE SULFATE 4 MG: 2 INJECTION, SOLUTION INTRAMUSCULAR; INTRAVENOUS at 21:44

## 2022-12-07 RX ADMIN — Medication 10 MG: at 13:28

## 2022-12-07 NOTE — PROGRESS NOTES
[facility] DAILY PROGRESS NOTE              Subjective:       Complaint: intubated post procedure  Overnight events noted  On albumin, half normal saline drip,on franki and epinephrine drips  S/p chest tubes  Received transfusion during OR    IMPRESSION:   OSVALDO ? contrast nephropathy on CKD stage 3  S/p CABG and AVR 12/7/22  Underlying diabetic and hypertensive nephropathy  Controlled diabetes  Severe AS   Diabetes  Hypertension   PLAN:    Follow cr for now  Monitor output closely  Avoid ACE I or diuretics for now  Daily labs for now  Will follow closely for any dialysis needs.  Currently making about 75 cc per hr of urine            Current Facility-Administered Medications   Medication Dose Route Frequency    aminocaproic acid (AMICAR) 10 g in 0.9% sodium chloride 500 mL infusion  1 g/hr IntraVENous CONTINUOUS    EPINEPHrine (ADRENALIN) 4 mg in 0.9% sodium chloride 250 mL infusion  1-10 mcg/min IntraVENous TITRATE    PHENYLephrine (FRANKI-SYNEPHRINE) 30 mg in 0.9% sodium chloride 250 mL infusion   mcg/min IntraVENous TITRATE    [START ON 12/8/2022] montelukast (SINGULAIR) tablet 10 mg  10 mg Oral DAILY    0.9% sodium chloride infusion  10 mL/hr IntraVENous CONTINUOUS    sodium chloride (NS) flush 5-40 mL  5-40 mL IntraVENous Q8H    sodium chloride (NS) flush 5-40 mL  5-40 mL IntraVENous PRN    acetaminophen (TYLENOL) tablet 650 mg  650 mg Oral Q4H PRN    HYDROcodone-acetaminophen (NORCO) 5-325 mg per tablet 1-2 Tablet  1-2 Tablet Oral Q6H PRN    morphine injection 2-4 mg  2-4 mg IntraVENous Q4H PRN    naloxone (NARCAN) injection 0.4 mg  0.4 mg IntraVENous PRN    ceFAZolin (ANCEF) 2 g in sterile water (preservative free) 20 mL IV syringe  2 g IntraVENous Q8H    amiodarone (CORDARONE) tablet 200 mg  200 mg Oral TID    albuterol (PROVENTIL VENTOLIN) nebulizer solution 2.5 mg  2.5 mg Nebulization Q4H PRN    nitroGLYcerin (TRIDIL) 400 mcg/ml infusion  0-200 mcg/min IntraVENous TITRATE    EPINEPHrine (ADRENALIN) 8 mg in 0.9% sodium chloride 250 mL infusion  1-10 mcg/min IntraVENous TITRATE    NOREPINephrine (LEVOPHED) 8 mg in 5% dextrose 250mL (32 mcg/mL) infusion  2-16 mcg/min IntraVENous TITRATE    [START ON 12/8/2022] aspirin delayed-release tablet 81 mg  81 mg Oral DAILY    [START ON 12/8/2022] clopidogreL (PLAVIX) tablet 75 mg  75 mg Oral DAILY    propofol (DIPRIVAN) 10 mg/mL infusion  0-50 mcg/kg/min IntraVENous TITRATE    chlorhexidine (PERIDEX) 0.12 % mouthwash 10 mL  10 mL Oral BID    docusate sodium (COLACE) capsule 100 mg  100 mg Oral BID    ELECTROLYTE REPLACEMENT PROTOCOL - Potassium Standard Dosing  1 Each Other PRN    ELECTROLYTE REPLACEMENT PROTOCOL - Magnesium  1 Each Other PRN    heparin (porcine) injection 5,000 Units  5,000 Units SubCUTAneous Q8H    glucose chewable tablet 16 g  4 Tablet Oral PRN    glucagon (GLUCAGEN) injection 1 mg  1 mg IntraMUSCular PRN    dextrose 10% infusion 0-250 mL  0-250 mL IntraVENous PRN    [START ON 12/8/2022] bisacodyL (DULCOLAX) tablet 10 mg  10 mg Oral DAILY    famotidine (PEPCID) tablet 20 mg  20 mg Oral BID    fentaNYL citrate (PF) injection 12.5-25 mcg  12.5-25 mcg IntraVENous Q15MIN PRN    oxyCODONE IR (ROXICODONE) tablet 5-10 mg  5-10 mg Oral Q4H PRN    [START ON 12/8/2022] polyethylene glycol (MIRALAX) packet 17 g  17 g Oral DAILY    atorvastatin (LIPITOR) tablet 40 mg  40 mg Oral QHS    insulin regular (MYXREDLIN, NOVOLIN, HUMULIN) 100 unit/100 mL (1 unit/mL) premix infusion        protamine 10 mg/mL injection        vancomycin (VANCOCIN) 1,000 mg injection        propofoL (DIPRIVAN) 10 mg/mL injection        PHENYLephrine (FRANKI-SYNEPHRINE) 30 mg in 0.9% sodium chloride 250 mL infusion   mcg/min IntraVENous TITRATE    insulin regular (MYXREDLIN, NOVOLIN, HUMULIN) 100 units/100 ml NS infusion (premix)  0-50 Units/hr IntraVENous TITRATE    acetaminophen (TYLENOL) tablet 650 mg  650 mg Oral Q4H PRN    sodium chloride (OCEAN) 0.65 % nasal squeeze bottle 2 Spray  2 Spray Both Nostrils Q2H PRN    guaiFENesin ER (MUCINEX) tablet 600 mg  600 mg Oral Q8H PRN    0.9% sodium chloride infusion 250 mL  250 mL IntraVENous PRN    aminocaproic acid (AMICAR) 10 g in 0.9% sodium chloride 500 mL infusion  1 g/hr IntraVENous CONTINUOUS    EPINEPHrine (ADRENALIN) 4 mg in 0.9% sodium chloride 250 mL infusion  1-10 mcg/min IntraVENous TITRATE    PHENYLephrine (FRANKI-SYNEPHRINE) 30 mg in 0.9% sodium chloride 250 mL infusion   mcg/min IntraVENous TITRATE    mupirocin (BACTROBAN) 2 % ointment   Topical BID    ondansetron (ZOFRAN) injection 4 mg  4 mg IntraVENous Q4H PRN    glucose chewable tablet 16 g  4 Tablet Oral PRN    glucagon (GLUCAGEN) injection 1 mg  1 mg IntraMUSCular PRN    dextrose 10% infusion 0-250 mL  0-250 mL IntraVENous PRN       Review of Symptoms: comprehensive ROS negative except above.    Objective:   Patient Vitals for the past 24 hrs:   Temp Pulse Resp BP SpO2   12/07/22 1645 98.6 °F (37 °C) 92 22 -- 100 %   12/07/22 1639 -- 93 22 -- 100 %   12/07/22 1600 98.8 °F (37.1 °C) 96 19 125/63 100 %   12/07/22 1524 -- 97 19 -- 100 %   12/07/22 1500 98.3 °F (36.8 °C) 97 16 106/62 100 %   12/07/22 1458 -- 97 16 -- 100 %   12/07/22 1455 98.2 °F (36.8 °C) 94 14 111/79 97 %   12/07/22 0700 -- 76 17 106/79 98 %   12/07/22 0600 -- 81 19 (!) 108/58 96 %   12/07/22 0500 -- 90 23 116/65 91 %   12/07/22 0400 97.8 °F (36.6 °C) 81 18 127/75 92 %   12/07/22 0300 -- 80 21 132/64 96 %   12/07/22 0200 -- 88 28 116/65 91 %   12/07/22 0100 -- 82 21 (!) 113/51 96 %   12/07/22 0000 97.8 °F (36.6 °C) 79 23 (!) 125/52 95 %   12/06/22 2300 -- 87 16 (!) 118/55 91 %   12/06/22 2200 -- 89 20 122/76 95 %   12/06/22 2100 -- 90 26 134/66 94 %   12/06/22 2000 98.2 °F (36.8 °C) 89 20 125/71 93 %   12/06/22 1900 -- 87 23 (!) 119/57 92 %   12/06/22 1800 -- 86 24 (!) 114/55 90 %        Weight change:      12/05 1901 - 12/07 0700  In: 1680 [P.O.:1680]  Out: 6330 [Urine:6330]    Intake/Output Summary (Last 24 hours) at 12/7/2022 1722  Last data filed at 12/7/2022 1700  Gross per 24 hour   Intake 3422.31 ml   Output 5441 ml   Net -2018.69 ml     Physical Exam:   General: intubated  HEENT sclera anicteric, supple neck, no thyromegaly  CVS: S1S2 heard,  no rub  RS: + air entry b/l,   Abd: Soft, Non tender  Extrm: plus 2 edema, no cyanosis, clubbing   Skin: no visible  Rash  Musculoskeletal: No gross joints or bone deformities         Data Review:     LABS:   Hematology:   Recent Labs     12/07/22  1519 12/07/22  0520 12/06/22  0358   WBC 19.8* 8.6 9.7   HGB 10.5* 10.2* 9.5*   HCT 32.2* 31.8* 29.5*     Chemistry:   Recent Labs     12/07/22  1519 12/07/22  0520 12/06/22  0358 12/05/22  0645   BUN 26* 27* 30* 37*   CREA 1.43* 1.31* 1.38* 1.74*   CA 8.0* 8.7 8.4* 9.1   K 4.3 4.1 4.1 4.6    137 135* 138    104 106 106   CO2 23 27 24 27   * 125* 113* 144*            Procedures/imaging: see electronic medical records for all procedures, Xrays and details which were not copied into this note but were reviewed prior to creation of Plan          Assessment & Plan:     See above        Kalie Webber MD  12/7/2022  5:22 PM

## 2022-12-07 NOTE — PROGRESS NOTES
Problem: CABG: Pre-Op Day  Goal: Activity/Safety  Outcome: Progressing Towards Goal  Goal: Consults, if ordered  Outcome: Progressing Towards Goal  Goal: Diagnostic Test/Procedures  Outcome: Progressing Towards Goal  Goal: Nutrition/Diet  Outcome: Progressing Towards Goal  Goal: Medications  Outcome: Progressing Towards Goal  Goal: Treatments/Interventions/Procedures  Outcome: Progressing Towards Goal  Goal: Discharge Planning  Outcome: Progressing Towards Goal  Goal: Psychosocial  Outcome: Progressing Towards Goal  Goal: *Hemodynamically stable  Outcome: Progressing Towards Goal  Goal: *Consent obtained, permits and diagnostics complete  Outcome: Progressing Towards Goal

## 2022-12-07 NOTE — PROGRESS NOTES
1500: Pt arrived from [facility] OR. Intubated 24 at the lip. OGT present, connected to intermittent suction. Right IJ Cordis and triple lumen present. Right a-line present. V-wires present, connected to external pacer, pacer off. VSS. 3 chest tubes present, 2 mediastinum, 1 left pleural. All hooked to suction. Chapa present. Epi infusing at 2. Milton at 40. Levo at 20.     1530: ABG reviewed. Changes made to vent by RT per protocol. 1600: ABG reviewed. Changes made to vent by RT per protocol.     1700: Propofol turned off per MD.     1800: Pt awake, nods head to yes or no questions and squeezes both hands but is occasionally drowsy. 1830: Lactic acid drawn per MD. Measured at 3.5. MD notified. No new orders received. 1900: Bedside and Verbal shift change report given to Moses Prater RN (oncoming nurse) by Aquiles Frey RN (offgoing nurse). Report included the following information SBAR, Kardex, Procedure Summary, Intake/Output, MAR, Recent Results, and Med Rec Status.

## 2022-12-07 NOTE — PROGRESS NOTES
1900: Bedside shift change report given to this nurse (oncoming nurse) by Lisa Abraham RN (offgoing nurse). Report included the following information SBAR, Kardex, and Cardiac Rhythm NSR . Wound Prevention Checklist    Patient: Silke Rodriguez (2 y.o. female)  Date: 12/6/2022  Diagnosis: CAD (coronary artery disease) [I25.10]  Aortic stenosis [I35.0] <principal problem not specified>    Precautions:         []  Heel prevention boots placed on patient    [x]  Patient turned q2h during shift    []  Lift team ordered    [x]  Patient on [facility] bed/Specialty bed    [x]  Each Wound is documented during shift (Stage, Color, drainage, odor, measurements, and dressings)    [x]  Dual skin check done with Lisa Abraham RN  Patient resting in bed, NSR on monitor. O2 sat in mid 90's via NC. Chapa in place and draining. Patient denies pain, no distress noted. 2000: CHG bath completed  2130: Patient resting in bed, no distress noted. Reiterated to patient pre-op procedure including NPO post midnight and CHG baths. Patient verbalized understanding of instructions given. 2300: Patient resting in bed, no distress noted. 0000: Patient NPO in preparation for surgery  0200: Patient sleeping in bed, O2 sats occasionally drop into high 80's while sleeping but rebound to high 90's. Currently on 2 liters O2 via NC.   0400: CHG bath completed  0600: Patient sleeping in bed, no distress noted. 6716: Pre procedure metoprolol 2.5mg IV given  0720: Report given to MINNIE sEcobar RN  and Waynetta Lefort, RN (surgical staff).     Yahir Timmons

## 2022-12-07 NOTE — OP NOTES
Date of service: 12/7/2022  Preoperative diagnosis: Severe symptomatic aortic valve stenosis, severe left main stem coronary artery disease as well as left anterior descending artery stenosis, symptomatic with increasing shortness of breath with exertion and peripheral edema, elevated creatinine, former tobacco use, severe obesity BMI greater than 35, diabetes mellitus type 2  Postop diagnosis: Same  Procedure: Aortic valve replacement with an annulus enlargement procedure, noncoronary sinus, placement of 23 mm Fitzpatrick Inspiris bovine pericardial bioprosthesis    Coronary artery bypass graft x2. Left internal mammary artery to left anterior descending artery, saphenous vein graft to the obtuse marginal branch  Endoscopic harvesting of left greater saphenous vein    Anesthesia: General endotracheal  Anesthesiologist: Dr. Kalyan Rahman  Intraprocedural complications: None  Estimated blood loss: 500 mL  Surgeon: Andreia Mattson M.D., FACS  Assistant: Dipika Leiva PA-C  Assistant: TALIA Crook PA-C was present during the coronary artery distal anastomoses, replacement of the aortic valve and closure. Findings: Aortic valve is tricuspid. There was extremely heavily calcified and severely stenotic. Aortic valve annulus measured 18 mm on the intraoperative transapical echocardiogram.  And aortic annulus enlargement procedure was performed with placement of 23 mm Fitzpatrick Inspirs bovine pericardial bioprosthesis. Left anterior descending artery was a small caliber vessel anastomosis constructed in the distal third. Obtuse marginal branch was a large caliber vessel anastomosis constructed in the mid third. Left internal mammary artery had excellent length and torrential flow. Greater saphenous vein from left lower extremity was a medium quality vessel.   Patient tolerated procedure very well, she came off the cardiopulmonary bypass on minimal dose of inotropic support in normal sinus rhythm with excellent hemodynamics. Intraoperative transesophageal echocardiogram showed well-placed bioprosthetic valve with no evidence of periprosthetic leak and preserved left and right ventricular function. Patient was extremely losing on closure. Description:  Patient was taken the operating, placed supine position on the operating table. General endotracheal anesthesia was induced without incidence. After placement of adequate monitoring lines and catheters, patient's anterior neck anterior chest anterior abdomen bilateral groins bilateral lower extremities were prepped and draped in usual sterile fashion. Adequate timeout procedures were performed. A median sternotomy skin incision was made using #10 blade. Incision was carried down to the underlying sternum using electrocautery. Sternum was divided midline using sternal saw. Left internal mammary artery was then harvested in the usual fashion. Another surgical team harvested a single segment of greater saphenous vein from the left lower extremity using the endoscopic vein harvest technique. Patient was heparinized using full dose intravenous heparin. Pericardium was exposed and opened. Ascending aorta was cannulated as well as right atrium. I placed an antegrade cardioplegia catheter into the ascending aorta. I placed the vent through the right superior pulmonary vein into the left ventricle. Patient was placed on cardiopulmonary bypass. Heart was decompressed. I did the distal anastomosis of the coronary artery bypass graft on the beating heart without crossclamping. I first exposed the obtuse marginal branch. He was immobilized. Arteriotomy was made followed by insertion of 1.25 mm shunt. Saphenous vein graft was then brought into the field and anastomosed to the obtuse marginal arteriotomy using 7-0 Prolene in running fashion. I then exposed the left anterior descending artery. He was immobilized.   Arteriotomy and 1.25 mm shunt was placed. End of the left internal mammary artery was then anastomosed to the LAD arteriotomy using 7-0 Prolene in running fashion. I checked the flow in the left internal mammary artery and it had excellent triphasic flow with excellent diastolic augmentation. Patient was then started to be cooled to 33 degrees. A cross-clamp was applied to the distal ascending aorta and cardioplegia was delivered into the aortic root. I administered 1500 cc of [place] solution with complete electromechanical arrest and uniform cooling. I then performed a oblique ascending aortotomy and aortic valve was exposed. Aortic valve was heavily calcified with calcification extending onto the anterior leaflet of the mitral valve. Aortic valve was excised. Open tip suction catheter was used to capture debris. Aortic annulus was decalcified. Aortic annulus left ventricle and ascending aorta was copiously irrigated and suctioned. I sized the valve. I was barely able to get a 21 sizer without placement of the valve sutures. Due to the patient's body size, we decided to enlarge the aortic annulus/root with a root enlargement procedure. I then extended the ascending aortotomy to the aortic root at the mid portion of the noncoronary sinus. I dissected the left atrial wall away from the aortic root. I then extended the aortotomy through the aortic annulus into the aortic and mitral curtain. I then brought a generous sized bovine pericardial patch. The patch was sutured to the aortic annulus and edges of the ascending aortotomy using 4-0 Prolene in running fashion. I then brought in a 23 mm sizer which fit very nicely to the enlarged annulus. I then placed the aortic valve sutures into the aortic annulus. The sutures on the noncoronary cusp were passed through the patch in an outside to inside fashion. Sutures were 2-0 Ethibond, placed in a horizontal mattress manner, without any pledgets.   A total of 20 1 sutures were placed. The 23 mm bioprosthetic valve was then brought into the field. Sutures were passed through the valve sewing cuff. Valve was lowered into the position. Sutures were then secured sequentially using the core knot suture securing device. Patient was then started to be rewarmed. Ascending aortotomy was approximated by incorporating the teardrop shaped bovine pericardial patch all the way to the left corner of the aortotomy. This was done using 4-0 Prolene in a single layer. I de-aired the left ventricle by ventilating the lungs and filling the heart with blood as well as shaking the left ventricle prior to tying the aortotomy closure sutures. I then administered cardioplegia in an antegrade fashion through the root. At this time a single aortotomy was made in the ascending aorta under the same cross-clamp using #11 blade and 4.0 mm punch. Proximal end of the saphenous vein graft was anastomosed to the ascending aortotomy using 6-0 Prolene in running fashion. I also used de-airing maneuvers prior to tying the proximal anastomotic suture. Of note is that we used carbon dioxide to flood the surgical field throughout the procedure. At this time I placed 2 ventricular pacing wires and ascending aortic cross-clamp was removed. The heart return to a paced rhythm followed by normal sinus rhythm. After an adequate rewarming and reperfusion period, patient was weaned off the cardiopulmonary bypass on minimal dose of inotropic support with excellent hemodynamics. Intraoperative transesophageal echocardiogram showed well-preserved left and right ventricular function and well-seated aortic valve with no evidence of periprosthetic leak. Patient was decannulated in usual fashion and protamine was administered. Meticulous hemostasis was achieved. I applied vancomycin paste to the sternal bone marrow in the beginning of the procedure as well as at the end of the procedure for a total of 2 times.   Sternum was approximated using her sternal wires x8. Suprasternal fascia and rectus abdominis fascia was approximated using 0 Vicryl in running fashion. Subcutaneous tissues were approximated using 0 Vicryl in running fashion. Skin is approximated using 3-0 Monocryl. Patient tolerated procedure very well, she was taken to the intensive care unit in stable intubated condition. Sponge needle instrument counts were correct. There were no complications.

## 2022-12-07 NOTE — PROGRESS NOTES
Post CABG, BBS equal, placed on Ventilator SIMV   12/07/22 1458   Patient Observations   Pulse (Heart Rate) 97   Resp Rate 16   O2 Sat (%) 100 %   Airway - Endotracheal Tube 12/07/22   Placement Date/Time: 12/07/22 0727     Insertion Depth (cm) 24 cm   Line Bryson Lips   Side Secured Centered   Respiratory   Respiratory (WDL) WDL   Ventilator Initiate/Discontinue   Ventilator Initiate Yes   Vent Settings   FIO2 (%) 80 %   SpO2/FIO2 Ratio 125   SIMV Rate Set 16   Back-Up Rate 16   Vt Set (ml) 520 ml   Pressure Support (cm H2O) 10 cm H2O   PEEP/VENT (cm H2O) 10 cm H20   Insp Time (sec) 0.8 sec   Insp Rise Time % 50 %   Flow Trigger 3   Expiratory Sensitivity 25 %   Ventilator Measurements   Resp Rate Observed 16   Vt Exhaled (Machine Breath) (ml) 522 ml   Ve Observed (l/min) 8.39 l/min   PIP Observed (cm H2O) 29 cm H2O   Plateau Pressure (cm H2O) 24 cm H2O   Driving Pressure 14 [id number]   MAP (cm H2O) 14   I:E Ratio Actual 1:3.7   Static Compliance (ml/cm H20) 40 ml/cm H20   Safety & Alarms   Circuit Temperature   (HME)   Backup Mode Checked/Apnea Yes   Pressure Max 40 cm H2O   Ve Min 2   Ve Max 20   Vt Min 200 ml   RR Max 40   Ambu Bag Yes   Ambu Mask Yes   Airway Procedures   $$ Airway Procedures Intubation   Vent Method/Mode   Ventilation Method Conventional   Ventilator Mode SIMV;VC+   $$ Ventilator Initial Initial Vent Day

## 2022-12-07 NOTE — PROGRESS NOTES
CVT PRE-PROCEDURE CHECKLIST    PLEASE VERIFY THAT PATIENT HAS AN INPATIENT ADMISSION ORDER    1. Verify Allergies: Allergies   Allergen Reactions    Albuterol Sulfate Shortness of Breath    Entex [Pseudoephedrine Tannate] Rash    Terbutaline Shortness of Breath    Terramycin [Oxytetracycline] Unknown (comments)       2. Procedure consent signed by patient and physician and witnessed by nurse: Leena Frederick    3. Anesthesia consent signed by patient and anesthesiologist and witnessed by nurse: Yes    4. Blood consent signed: Yes  A. Type and cross match done (date):11/28/2022  B. Blood band number(place on right wrist): [id number]  C. Number of units available:2 units PRBC, 1 unit FFP  D. Blood transfusion history faxed to blood bank: No    5.  2 sheets of patient labels on chart: Yes    6. Patient ID bracelet and allergy bracelet on patient(place on right wrist) and readable: Yes    7. NPO since midnight: 0000 AM    8. Vital signs obtained within 1 hour of procedure (for CABG's include both arms): Not applicable: will be completed by oncoming shift    9. Clipped per orders at:NA    10. Bathed with CHG-Chlorhexidine (the night before and the morning of- DOCUMENT) at:2000 and 0400    11. All jewelry, glasses, contact lenses, dentures, hearing aides removed: Yes    12. IV access: #1right ac #2 triple lumen right IJ-must have 2 IV sites at least one must be an #18G    13. DP/ PT pulses marked bilaterally: Yes    14. Anticoagulation stopped 4 hours prior to OR start time: date NA; time NA    15. Last beta blocker dose (within 12 hours of OR start) : date12/07/2022; [id number]    16. Blood Glucose within 60 mins of OR start time (diabetics): 0710      17. UA within 30 days: YES    18. Pre-op antibiotic ordered and at bedside to hand to OR staff (do not start) Yes    19. Rapid COVID test ordered and sent- put in per protocol if not already done within last 96 hours     20.  NO IV's in Not applicable arm if radial harvest is expected.       Questions-call [phone] for the on call provider

## 2022-12-07 NOTE — ANESTHESIA PROCEDURE NOTES
Arterial Line Placement    Start time: 12/7/2022 8:29 AM  End time: 12/7/2022 8:29 AM  Performed by: Vane Castillo MD  Authorized by: Vane Castillo MD     Pre-Procedure  Indications:  Arterial pressure monitoring and blood sampling  Preanesthetic Checklist: patient identified, risks and benefits discussed, anesthesia consent, site marked, timeout performed and fire risk safety assessment completed and verbalized    Timeout Time: 08:29 EST      Procedure:   Prep:  Chlorhexidine  Seldinger Technique?: Yes    Orientation:  Right  Location:  Radial artery  Catheter size:  20 G  Number of attempts:  1  Cont Cardiac Output Sensor: No      Assessment:   Post-procedure:  Line secured and sterile dressing applied  Patient Tolerance:  Patient tolerated the procedure well with no immediate complications

## 2022-12-07 NOTE — PROGRESS NOTES
[facility]          Chief Complaint:  Follow up for CAD and AS    Interval History:  BB at 0650. NPO after mn. .      ROS:    no CP or SOB    Exam:   Visit Vitals  BP (!) 125/52 (BP 1 Location: Right upper arm, BP Patient Position: At rest)   Pulse 79   Temp 98.2 °F (36.8 °C)   Resp 23   Ht 5' 11\" (1.803 m)   Wt 130 kg (286 lb 9.6 oz)   SpO2 95%   BMI 39.97 kg/m²        Const:  alert and oriented. NAD   CV:   RRR without murmur or rub. PMI not displaced. Large BLE  edema   Respiratory:  Clear to auscultation without wheezes, rales or rhonchi. No accessory muscle use.        Assessment:  2 v CAD and severe AS    PLAN:  OK for OR today

## 2022-12-07 NOTE — PROGRESS NOTES
Titrate Peep 8/ABG r   12/07/22 1830   Patient Observations   Pulse (Heart Rate) 87   Resp Rate 22   O2 Sat (%) 100 %   Vent Settings   FIO2 (%) 40 %   SpO2/FIO2 Ratio 250   SIMV Rate Set 22   Back-Up Rate 22   Vt Set (ml) 520 ml   Pressure Support (cm H2O) 10 cm H2O   PEEP/VENT (cm H2O) 8 cm H20   Insp Time (sec) 0.8 sec   Insp Rise Time % 50 %   Flow Trigger 3   Expiratory Sensitivity 25 %   Ventilator Measurements   Resp Rate Observed 22   Vt Exhaled (Machine Breath) (ml) 551 ml   Ve Observed (l/min) 11.8 l/min   PIP Observed (cm H2O) 25 cm H2O   MAP (cm H2O) 13   I:E Ratio Actual 1:2.4   esults

## 2022-12-07 NOTE — ANESTHESIA PROCEDURE NOTES
DIDIER  Date/Time: 12/7/2022 8:30 AM      Procedure Details: probe placement, image aquisition & interpretation    Timeout performed, 08:30 EST  Risks and benefits discussed with the patient and plans are to proceed    Procedure Note    Performed by: Fred Kuhn MD  Authorized by: Fred Kuhn MD     Indications: assessment of ascending aorta, assessment of surgical repair and suspected pericardial effusion  Modalities: 2D, CF, CWD, PWD  Probe Type: multiplane  Insertion: atraumatic  Patient Status: intubated and sedated  Echocardiographic and Doppler Measurements   Aorta  Size  Diam(cm)  Dissection PlaqueThick(mm)  Plaque Mobile    Ascending normal  No 0-3 No    Arch normal  No 0-3 No    Descending normal  No 0-3 No          Valves  Annulus  Stenosis  Area/Grad  Regurg  Leaflet   Morph  Leaflet   Motion    Aortic calcified severe  0 calcified, thickened restricted    Mitral  moderate  0 calcified, thickened restricted    Tricuspid normal none  0 normal normal          Atria  Size  SEC (smoke)  Thrombus  Tumor  Device    Rt Atrium normal No No No No    Lt Atrium dilated No No No No     Interatrial Septum Morphology: lipomatous hypertrophy    Interventricular Septum Morphology: normal  Ventricle  Cavity Size  Cavity Dimension Hypertrophy  Thrombus  Gloal FXN  EF    RV normal  Yes no normal     LV normal  Yes No normal 55       Regional Function  (1 = normal, 2 = mildly hypokinetic, 3 = severely hypokinetic, 4 = akinetic, 5 = dyskinetic) LAV - [place]   ME LAV = 0  ME LAV = 90  ME LAV = 130                                     Pericardium: normal  Effusion: moderate  Post Intervention Follow-up Study  Ventricular Global Function: unchanged  Ventricular Regional Function: unchanged     Valve  Function  Regurgitation  Area    Aortic       Mitral no change      Tricuspid no change      Prosthetic normal       Complications: None

## 2022-12-07 NOTE — ANESTHESIA PREPROCEDURE EVALUATION
Relevant Problems   No relevant active problems       Anesthetic History               Review of Systems / Medical History  Patient summary reviewed and pertinent labs reviewed    Pulmonary          Undiagnosed apnea and smoker         Neuro/Psych   Within defined limits           Cardiovascular    Hypertension  Valvular problems/murmurs: mitral stenosis and aortic stenosis    CHF: NYHA Classification II, dyspnea on exertion    CAD    Exercise tolerance: <4 METS  Comments: Severe AS  Mod MS  EF 55%   GI/Hepatic/Renal         Renal disease: CRI       Endo/Other    Diabetes: type 2    Morbid obesity     Other Findings              Physical Exam    Airway  Mallampati: II  TM Distance: > 6 cm  Neck ROM: normal range of motion   Mouth opening: Normal     Cardiovascular          Murmur: Grade 4, Aortic area     Dental  No notable dental hx       Pulmonary  Breath sounds clear to auscultation               Abdominal  GI exam deferred       Other Findings            Anesthetic Plan    ASA: 4  Anesthesia type: general    Monitoring Plan: Arterial line, BIS, Continuous noninvasive hemodynamic monitoring, CVP, [place] and DIDIER    Post procedure ventilation     Anesthetic plan and risks discussed with: Family detailed exam

## 2022-12-07 NOTE — PROGRESS NOTES
Titrate FIO2 60%, increase RR 19/ABG results   12/07/22 1524   Patient Observations   Pulse (Heart Rate) 97   Resp Rate 19   O2 Sat (%) 100 %   Vent Settings   FIO2 (%) 60 %   SpO2/FIO2 Ratio 166.67   SIMV Rate Set 19   Back-Up Rate 19   Vt Set (ml) 520 ml   Pressure Support (cm H2O) 10 cm H2O   PEEP/VENT (cm H2O) 10 cm H20   Insp Time (sec) 0.8 sec   Insp Rise Time % 50 %   Flow Trigger 3   Expiratory Sensitivity 25 %   Ventilator Measurements   Resp Rate Observed 19   Vt Exhaled (Machine Breath) (ml) 542 ml   Ve Observed (l/min) 10.2 l/min   PIP Observed (cm H2O) 28 cm H2O   Plateau Pressure (cm H2O) 24 cm H2O   Driving Pressure 14 [id number]   MAP (cm H2O) 15   I:E Ratio Actual 1:3.0   Static Compliance (ml/cm H20) 43 ml/cm H20

## 2022-12-07 NOTE — PROGRESS NOTES
TRANSFER - OUT REPORT:    Verbal report given to [facility] CHYNA RN(name) on [employer]  being transferred to CVT ICU(unit) for routine post - op       Report consisted of patients Situation, Background, Assessment and   Recommendations(SBAR). Information from the following report(s) SBAR, Kardex, Procedure Summary, MAR, Procedure Verification, and Quality Measures was reviewed with the receiving nurse. Lines:   Single Lumen Venous Catheter 12/05/22 Right (Active)   Central Line Being Utilized Yes 12/07/22 0400   Criteria for Appropriate Use Hemodynamically unstable, requiring monitoring lines, vasopressors, or volume resuscitation 12/07/22 0400   Site Assessment Clean, dry, & intact 12/07/22 0400   Infiltration Assessment 0 12/07/22 0400   Affected Extremity/Extremities Color distal to insertion site pink (or appropriate for race); Pulses palpable;Range of motion performed 12/07/22 0400   Date of Last Dressing Change 12/06/22 12/07/22 0400   Dressing Status Clean, dry, & intact 12/07/22 0400   Dressing Type Disk with Chlorhexadine gluconate (CHG); Transparent 12/07/22 0400   Action Taken Open ports on tubing capped 12/07/22 0400   Hub Color/Line Status Flushed;Patent 12/07/22 0400   Positive Blood Return (Medial Site) Yes 12/07/22 0400   Alcohol Cap Used Yes 12/07/22 0400       Triple Lumen 12/05/22 Anterior;Right Neck (Active)   Central Line Being Utilized Yes 12/07/22 0400   Criteria for Appropriate Use Hemodynamically unstable, requiring monitoring lines, vasopressors, or volume resuscitation 12/07/22 0400   Site Assessment Clean, dry, & intact 12/07/22 0400   Infiltration Assessment 0 12/07/22 0400   Affected Extremity/Extremities Color distal to insertion site pink (or appropriate for race); Range of motion performed;Pulses palpable 12/07/22 0400   Date of Last Dressing Change 12/06/22 12/07/22 0400   Dressing Status Clean, dry, & intact 12/07/22 0400   Dressing Type Disk with Chlorhexadine gluconate (CHG); Transparent 12/07/22 0400   Action Taken Open ports on tubing capped 12/07/22 0400   Proximal Hub Color/Line Status Brown;Flushed 12/07/22 0400   Positive Blood Return (Medial Site) Yes 12/07/22 0400   Medial Hub Color/Line Status White;Flushed 12/07/22 0400   Positive Blood Return (Lateral Site) Yes 12/07/22 0400   Distal Hub Color/Line Status Blue;Flushed 12/07/22 0400   Positive Blood Return (Site #3) Yes 12/07/22 0400   Alcohol Cap Used Yes 12/07/22 0400       Peripheral IV 12/05/22 Right Antecubital (Active)   Site Assessment Clean, dry, & intact 12/07/22 0400   Phlebitis Assessment 0 12/07/22 0400   Infiltration Assessment 0 12/07/22 0400   Dressing Status Clean, dry, & intact 12/07/22 0400   Dressing Type Transparent 12/07/22 0400   Hub Color/Line Status Pink;Capped;Flushed;Patent 12/07/22 0400   Action Taken Open ports on tubing capped 12/07/22 0400   Alcohol Cap Used Yes 12/07/22 0400       Arterial Line 12/07/22 (Active)       Arterial Line 12/07/22 (Active)        Opportunity for questions and clarification was provided.       Patient transported with:   Monitor  Registered Nurse  Tech

## 2022-12-07 NOTE — PROGRESS NOTES
Titrate FIO2 40%, increase RR 22/ABG results   12/07/22 1639   Patient Observations   Pulse (Heart Rate) 93   Resp Rate 22   O2 Sat (%) 100 %   Vent Settings   FIO2 (%) 40 %   SpO2/FIO2 Ratio 250   SIMV Rate Set 22   Back-Up Rate 22   Vt Set (ml) 520 ml   Pressure Support (cm H2O) 10 cm H2O   PEEP/VENT (cm H2O) 10 cm H20   Insp Time (sec) 0.8 sec   Insp Rise Time % 50 %   Flow Trigger 3   Expiratory Sensitivity 25 %   Ventilator Measurements   Resp Rate Observed 22   Vt Exhaled (Machine Breath) (ml) 541 ml   Ve Observed (l/min) 11.9 l/min   PIP Observed (cm H2O) 28 cm H2O   Plateau Pressure (cm H2O) 24 cm H2O   Driving Pressure 14 [id number]   MAP (cm H2O) 16   I:E Ratio Actual 1:2.4   Static Compliance (ml/cm H20) 41 ml/cm H20   Vent Method/Mode   Ventilation Method Conventional   Ventilator Mode SIMV;VC+

## 2022-12-07 NOTE — PROGRESS NOTES
TRANSFER - IN REPORT:    Verbal report received from [address] RNN(name) on [employer]  being received from CVT ICU(unit) for ordered procedure      Report consisted of patients Situation, Background, Assessment and   Recommendations(SBAR). Information from the following report(s) SBAR, Kardex, Procedure Summary, Intake/Output, MAR, Recent Results, Pre Procedure Checklist, and Procedure Verification was reviewed with the receiving nurse. Opportunity for questions and clarification was provided. Assessment completed upon patients arrival to unit and care assumed.

## 2022-12-08 ENCOUNTER — APPOINTMENT (OUTPATIENT)
Dept: GENERAL RADIOLOGY | Age: 67
End: 2022-12-08
Attending: PHYSICIAN ASSISTANT
Payer: MEDICARE

## 2022-12-08 LAB
ACT BLD: 119 SECS (ref 79–138)
ACT BLD: 131 SECS (ref 79–138)
ACT BLD: 131 SECS (ref 79–138)
ACT BLD: 149 SECS (ref 79–138)
ACT BLD: 456 SECS (ref 79–138)
ACT BLD: 461 SECS (ref 79–138)
ACT BLD: 480 SECS (ref 79–138)
ACT BLD: 504 SECS (ref 79–138)
ACT BLD: 516 SECS (ref 79–138)
ACT BLD: 522 SECS (ref 79–138)
ACT BLD: 558 SECS (ref 79–138)
ADMINISTERED INITIALS, ADMINIT: NORMAL
ANION GAP BLD CALC-SCNC: 12 MMOL/L (ref 10–20)
ANION GAP BLD CALC-SCNC: 14 MMOL/L (ref 10–20)
ANION GAP SERPL CALC-SCNC: 10 MMOL/L (ref 3–18)
BASE DEFICIT BLD-SCNC: 1.9 MMOL/L
BASE DEFICIT BLD-SCNC: 2.9 MMOL/L
BASOPHILS # BLD: 0.1 K/UL (ref 0–0.1)
BASOPHILS # BLD: 0.1 K/UL (ref 0–0.1)
BASOPHILS NFR BLD: 0 % (ref 0–2)
BASOPHILS NFR BLD: 0 % (ref 0–2)
BDY SITE: ABNORMAL
BLD PROD TYP BPU: NORMAL
BODY TEMPERATURE: 97.8
BPU ID: NORMAL
BUN SERPL-MCNC: 31 MG/DL (ref 7–18)
BUN/CREAT SERPL: 20 (ref 12–20)
CA-I BLD-MCNC: 1.22 MMOL/L (ref 1.12–1.32)
CA-I BLD-MCNC: 1.23 MMOL/L (ref 1.12–1.32)
CALCIUM SERPL-MCNC: 8.5 MG/DL (ref 8.5–10.1)
CALLED TO:,BCALL1: NORMAL
CALLED TO:,BCALL2: NORMAL
CHLORIDE BLD-SCNC: 100 MMOL/L (ref 98–107)
CHLORIDE BLD-SCNC: 102 MMOL/L (ref 98–107)
CHLORIDE SERPL-SCNC: 104 MMOL/L (ref 100–111)
CO2 BLD-SCNC: 22 MMOL/L (ref 19–24)
CO2 BLD-SCNC: 22 MMOL/L (ref 19–24)
CO2 SERPL-SCNC: 24 MMOL/L (ref 21–32)
CREAT BLD-MCNC: 1.62 MG/DL (ref 0.6–1.3)
CREAT BLD-MCNC: 1.62 MG/DL (ref 0.6–1.3)
CREAT SERPL-MCNC: 1.55 MG/DL (ref 0.6–1.3)
D50 ADMINISTERED, D50ADM: 0 ML
D50 ORDER, D50ORD: 0 ML
DIFFERENTIAL METHOD BLD: ABNORMAL
DIFFERENTIAL METHOD BLD: ABNORMAL
EOSINOPHIL # BLD: 0 K/UL (ref 0–0.4)
EOSINOPHIL # BLD: 0 K/UL (ref 0–0.4)
EOSINOPHIL NFR BLD: 0 % (ref 0–5)
EOSINOPHIL NFR BLD: 0 % (ref 0–5)
ERYTHROCYTE [DISTWIDTH] IN BLOOD BY AUTOMATED COUNT: 15.2 % (ref 11.6–14.5)
ERYTHROCYTE [DISTWIDTH] IN BLOOD BY AUTOMATED COUNT: 19.7 % (ref 11.6–14.5)
FIO2 ON VENT: 36 %
GAS FLOW.O2 O2 DELIVERY SYS: ABNORMAL L/MIN
GLUCOSE BLD STRIP.AUTO-MCNC: 102 MG/DL (ref 70–110)
GLUCOSE BLD STRIP.AUTO-MCNC: 106 MG/DL (ref 70–110)
GLUCOSE BLD STRIP.AUTO-MCNC: 106 MG/DL (ref 70–110)
GLUCOSE BLD STRIP.AUTO-MCNC: 111 MG/DL (ref 70–110)
GLUCOSE BLD STRIP.AUTO-MCNC: 112 MG/DL (ref 70–110)
GLUCOSE BLD STRIP.AUTO-MCNC: 115 MG/DL (ref 70–110)
GLUCOSE BLD STRIP.AUTO-MCNC: 116 MG/DL (ref 70–110)
GLUCOSE BLD STRIP.AUTO-MCNC: 116 MG/DL (ref 70–110)
GLUCOSE BLD STRIP.AUTO-MCNC: 122 MG/DL (ref 70–110)
GLUCOSE BLD STRIP.AUTO-MCNC: 164 MG/DL (ref 70–110)
GLUCOSE BLD STRIP.AUTO-MCNC: 236 MG/DL (ref 70–110)
GLUCOSE BLD STRIP.AUTO-MCNC: 97 MG/DL (ref 70–110)
GLUCOSE BLD STRIP.AUTO-MCNC: 99 MG/DL (ref 70–110)
GLUCOSE BLD-MCNC: 106 MG/DL (ref 65–100)
GLUCOSE BLD-MCNC: 123 MG/DL (ref 65–100)
GLUCOSE SERPL-MCNC: 97 MG/DL (ref 74–99)
GLUCOSE, GLC: 102 MG/DL
GLUCOSE, GLC: 106 MG/DL
GLUCOSE, GLC: 106 MG/DL
GLUCOSE, GLC: 111 MG/DL
GLUCOSE, GLC: 112 MG/DL
GLUCOSE, GLC: 115 MG/DL
GLUCOSE, GLC: 116 MG/DL
GLUCOSE, GLC: 116 MG/DL
GLUCOSE, GLC: 122 MG/DL
GLUCOSE, GLC: 123 MG/DL
GLUCOSE, GLC: 164 MG/DL
GLUCOSE, GLC: 97 MG/DL
GLUCOSE, GLC: 99 MG/DL
HCO3 BLD-SCNC: 23 MMOL/L (ref 22–26)
HCO3 BLD-SCNC: 23.2 MMOL/L (ref 22–26)
HCT VFR BLD AUTO: 23.3 % (ref 35–45)
HCT VFR BLD AUTO: 24.3 % (ref 35–45)
HGB BLD-MCNC: 7.6 G/DL (ref 12–16)
HGB BLD-MCNC: 7.7 G/DL (ref 12–16)
HIGH TARGET, HITG: 150 MG/DL
HISTORY CHECKED?,CKHIST: NORMAL
IMM GRANULOCYTES # BLD AUTO: 0.1 K/UL (ref 0–0.04)
IMM GRANULOCYTES # BLD AUTO: 0.1 K/UL (ref 0–0.04)
IMM GRANULOCYTES NFR BLD AUTO: 1 % (ref 0–0.5)
IMM GRANULOCYTES NFR BLD AUTO: 1 % (ref 0–0.5)
INSULIN ADMINSTERED, INSADM: 3 UNITS/HOUR
INSULIN ADMINSTERED, INSADM: 3.1 UNITS/HOUR
INSULIN ADMINSTERED, INSADM: 3.4 UNITS/HOUR
INSULIN ADMINSTERED, INSADM: 3.7 UNITS/HOUR
INSULIN ADMINSTERED, INSADM: 3.7 UNITS/HOUR
INSULIN ADMINSTERED, INSADM: 4.1 UNITS/HOUR
INSULIN ADMINSTERED, INSADM: 4.2 UNITS/HOUR
INSULIN ADMINSTERED, INSADM: 4.4 UNITS/HOUR
INSULIN ADMINSTERED, INSADM: 4.5 UNITS/HOUR
INSULIN ADMINSTERED, INSADM: 4.5 UNITS/HOUR
INSULIN ADMINSTERED, INSADM: 5 UNITS/HOUR
INSULIN ADMINSTERED, INSADM: 5 UNITS/HOUR
INSULIN ADMINSTERED, INSADM: 8.3 UNITS/HOUR
INSULIN ORDER, INSORD: 3 UNITS/HOUR
INSULIN ORDER, INSORD: 3.1 UNITS/HOUR
INSULIN ORDER, INSORD: 3.4 UNITS/HOUR
INSULIN ORDER, INSORD: 3.7 UNITS/HOUR
INSULIN ORDER, INSORD: 3.7 UNITS/HOUR
INSULIN ORDER, INSORD: 4.1 UNITS/HOUR
INSULIN ORDER, INSORD: 4.2 UNITS/HOUR
INSULIN ORDER, INSORD: 4.4 UNITS/HOUR
INSULIN ORDER, INSORD: 4.5 UNITS/HOUR
INSULIN ORDER, INSORD: 4.5 UNITS/HOUR
INSULIN ORDER, INSORD: 5 UNITS/HOUR
INSULIN ORDER, INSORD: 5 UNITS/HOUR
INSULIN ORDER, INSORD: 8.3 UNITS/HOUR
LACTATE BLD-SCNC: 0.79 MMOL/L (ref 0.4–2)
LACTATE BLD-SCNC: 1.14 MMOL/L (ref 0.4–2)
LOW TARGET, LOT: 80 MG/DL
LYMPHOCYTES # BLD: 2 K/UL (ref 0.9–3.6)
LYMPHOCYTES # BLD: 2.5 K/UL (ref 0.9–3.6)
LYMPHOCYTES NFR BLD: 13 % (ref 21–52)
LYMPHOCYTES NFR BLD: 16 % (ref 21–52)
MCH RBC QN AUTO: 29.1 PG (ref 24–34)
MCH RBC QN AUTO: 30.3 PG (ref 24–34)
MCHC RBC AUTO-ENTMCNC: 31.7 G/DL (ref 31–37)
MCHC RBC AUTO-ENTMCNC: 32.6 G/DL (ref 31–37)
MCV RBC AUTO: 89.3 FL (ref 78–100)
MCV RBC AUTO: 95.7 FL (ref 78–100)
MINUTES UNTIL NEXT BG, NBG: 120 MIN
MINUTES UNTIL NEXT BG, NBG: 60 MIN
MONOCYTES # BLD: 1.8 K/UL (ref 0.05–1.2)
MONOCYTES # BLD: 1.9 K/UL (ref 0.05–1.2)
MONOCYTES NFR BLD: 12 % (ref 3–10)
MONOCYTES NFR BLD: 13 % (ref 3–10)
MULTIPLIER, MUL: 0.08
NEUTS SEG # BLD: 10.8 K/UL (ref 1.8–8)
NEUTS SEG # BLD: 11.1 K/UL (ref 1.8–8)
NEUTS SEG NFR BLD: 70 % (ref 40–73)
NEUTS SEG NFR BLD: 74 % (ref 40–73)
NRBC # BLD: 0 K/UL (ref 0–0.01)
NRBC # BLD: 0 K/UL (ref 0–0.01)
NRBC BLD-RTO: 0 PER 100 WBC
NRBC BLD-RTO: 0 PER 100 WBC
ORDER INITIALS, ORDINIT: NORMAL
PCO2 BLD: 39.6 MMHG (ref 35–45)
PCO2 BLD: 43.6 MMHG (ref 35–45)
PH BLD: 7.33 [PH] (ref 7.35–7.45)
PH BLD: 7.38 [PH] (ref 7.35–7.45)
PLATELET # BLD AUTO: 158 K/UL (ref 135–420)
PLATELET # BLD AUTO: 194 K/UL (ref 135–420)
PMV BLD AUTO: 10.6 FL (ref 9.2–11.8)
PMV BLD AUTO: 10.7 FL (ref 9.2–11.8)
PO2 BLD: 89 MMHG (ref 80–100)
PO2 BLD: 92 MMHG (ref 80–100)
POTASSIUM BLD-SCNC: 4.2 MMOL/L (ref 3.5–5.1)
POTASSIUM BLD-SCNC: 4.3 MMOL/L (ref 3.5–5.1)
POTASSIUM SERPL-SCNC: 4.3 MMOL/L (ref 3.5–5.5)
RBC # BLD AUTO: 2.54 M/UL (ref 4.2–5.3)
RBC # BLD AUTO: 2.61 M/UL (ref 4.2–5.3)
SAO2 % BLD: 97 %
SAO2 % BLD: 97 %
SERVICE CMNT-IMP: ABNORMAL
SERVICE CMNT-IMP: ABNORMAL
SODIUM BLD-SCNC: 133 MMOL/L (ref 136–145)
SODIUM BLD-SCNC: 137 MMOL/L (ref 136–145)
SODIUM SERPL-SCNC: 138 MMOL/L (ref 136–145)
SPECIMEN SITE: ABNORMAL
SPECIMEN SITE: ABNORMAL
STATUS OF UNIT,%ST: NORMAL
UNIT DIVISION, %UDIV: 0
WBC # BLD AUTO: 15 K/UL (ref 4.6–13.2)
WBC # BLD AUTO: 15.4 K/UL (ref 4.6–13.2)

## 2022-12-08 PROCEDURE — 36430 TRANSFUSION BLD/BLD COMPNT: CPT

## 2022-12-08 PROCEDURE — 97166 OT EVAL MOD COMPLEX 45 MIN: CPT

## 2022-12-08 PROCEDURE — 74011250636 HC RX REV CODE- 250/636: Performed by: PHYSICIAN ASSISTANT

## 2022-12-08 PROCEDURE — P9016 RBC LEUKOCYTES REDUCED: HCPCS

## 2022-12-08 PROCEDURE — 74011250637 HC RX REV CODE- 250/637: Performed by: PHYSICIAN ASSISTANT

## 2022-12-08 PROCEDURE — 93005 ELECTROCARDIOGRAM TRACING: CPT

## 2022-12-08 PROCEDURE — 99232 SBSQ HOSP IP/OBS MODERATE 35: CPT | Performed by: INTERNAL MEDICINE

## 2022-12-08 PROCEDURE — 85025 COMPLETE CBC W/AUTO DIFF WBC: CPT

## 2022-12-08 PROCEDURE — 74011250636 HC RX REV CODE- 250/636: Performed by: ANESTHESIOLOGY

## 2022-12-08 PROCEDURE — 82962 GLUCOSE BLOOD TEST: CPT

## 2022-12-08 PROCEDURE — 74011000250 HC RX REV CODE- 250: Performed by: PHYSICIAN ASSISTANT

## 2022-12-08 PROCEDURE — 80048 BASIC METABOLIC PNL TOTAL CA: CPT

## 2022-12-08 PROCEDURE — 97535 SELF CARE MNGMENT TRAINING: CPT

## 2022-12-08 PROCEDURE — 74011636637 HC RX REV CODE- 636/637: Performed by: PHYSICIAN ASSISTANT

## 2022-12-08 PROCEDURE — 94762 N-INVAS EAR/PLS OXIMTRY CONT: CPT

## 2022-12-08 PROCEDURE — P9045 ALBUMIN (HUMAN), 5%, 250 ML: HCPCS | Performed by: PHYSICIAN ASSISTANT

## 2022-12-08 PROCEDURE — 71045 X-RAY EXAM CHEST 1 VIEW: CPT

## 2022-12-08 PROCEDURE — 74011000258 HC RX REV CODE- 258: Performed by: PHYSICIAN ASSISTANT

## 2022-12-08 PROCEDURE — 99024 POSTOP FOLLOW-UP VISIT: CPT | Performed by: PHYSICIAN ASSISTANT

## 2022-12-08 PROCEDURE — 2709999900 HC NON-CHARGEABLE SUPPLY

## 2022-12-08 PROCEDURE — 97530 THERAPEUTIC ACTIVITIES: CPT

## 2022-12-08 PROCEDURE — 94660 CPAP INITIATION&MGMT: CPT

## 2022-12-08 PROCEDURE — 97161 PT EVAL LOW COMPLEX 20 MIN: CPT

## 2022-12-08 PROCEDURE — 65620000000 HC RM CCU GENERAL

## 2022-12-08 PROCEDURE — 82947 ASSAY GLUCOSE BLOOD QUANT: CPT

## 2022-12-08 PROCEDURE — APPNB60 APP NON BILLABLE TIME 46-60 MINS: Performed by: PHYSICIAN ASSISTANT

## 2022-12-08 RX ORDER — FOLIC ACID 5 MG/ML
1 INJECTION, SOLUTION INTRAMUSCULAR; INTRAVENOUS; SUBCUTANEOUS DAILY
Status: DISCONTINUED | OUTPATIENT
Start: 2022-12-08 | End: 2022-12-08 | Stop reason: CLARIF

## 2022-12-08 RX ORDER — DEXTROSE MONOHYDRATE 100 MG/ML
0-250 INJECTION, SOLUTION INTRAVENOUS AS NEEDED
Status: DISCONTINUED | OUTPATIENT
Start: 2022-12-08 | End: 2022-12-15 | Stop reason: HOSPADM

## 2022-12-08 RX ORDER — INSULIN GLARGINE 100 [IU]/ML
20 INJECTION, SOLUTION SUBCUTANEOUS DAILY
Status: DISCONTINUED | OUTPATIENT
Start: 2022-12-08 | End: 2022-12-10

## 2022-12-08 RX ORDER — INSULIN LISPRO 100 [IU]/ML
INJECTION, SOLUTION INTRAVENOUS; SUBCUTANEOUS
Status: DISCONTINUED | OUTPATIENT
Start: 2022-12-08 | End: 2022-12-15 | Stop reason: HOSPADM

## 2022-12-08 RX ORDER — SODIUM CHLORIDE 9 MG/ML
250 INJECTION, SOLUTION INTRAVENOUS AS NEEDED
Status: DISCONTINUED | OUTPATIENT
Start: 2022-12-08 | End: 2022-12-09

## 2022-12-08 RX ORDER — METOCLOPRAMIDE HYDROCHLORIDE 5 MG/ML
10 INJECTION INTRAMUSCULAR; INTRAVENOUS EVERY 6 HOURS
Status: COMPLETED | OUTPATIENT
Start: 2022-12-08 | End: 2022-12-09

## 2022-12-08 RX ORDER — ALBUMIN HUMAN 50 G/1000ML
12.5 SOLUTION INTRAVENOUS
Status: COMPLETED | OUTPATIENT
Start: 2022-12-08 | End: 2022-12-08

## 2022-12-08 RX ORDER — ACETAMINOPHEN 500 MG
1000 TABLET ORAL EVERY 6 HOURS
Status: DISCONTINUED | OUTPATIENT
Start: 2022-12-08 | End: 2022-12-11

## 2022-12-08 RX ORDER — OXYCODONE HYDROCHLORIDE 5 MG/1
5-10 TABLET ORAL
Status: DISCONTINUED | OUTPATIENT
Start: 2022-12-08 | End: 2022-12-11

## 2022-12-08 RX ORDER — LANOLIN ALCOHOL/MO/W.PET/CERES
1000 CREAM (GRAM) TOPICAL DAILY
Status: DISCONTINUED | OUTPATIENT
Start: 2022-12-08 | End: 2022-12-15 | Stop reason: HOSPADM

## 2022-12-08 RX ORDER — FOLIC ACID 1 MG/1
1 TABLET ORAL DAILY
Status: DISCONTINUED | OUTPATIENT
Start: 2022-12-08 | End: 2022-12-15 | Stop reason: HOSPADM

## 2022-12-08 RX ORDER — MAGNESIUM SULFATE 100 %
4 CRYSTALS MISCELLANEOUS AS NEEDED
Status: DISCONTINUED | OUTPATIENT
Start: 2022-12-08 | End: 2022-12-15 | Stop reason: HOSPADM

## 2022-12-08 RX ADMIN — Medication 6 UNITS: at 21:21

## 2022-12-08 RX ADMIN — Medication 1 TABLET: at 08:42

## 2022-12-08 RX ADMIN — ACETAMINOPHEN 1000 MG: 500 TABLET ORAL at 18:01

## 2022-12-08 RX ADMIN — HEPARIN SODIUM 5000 UNITS: 5000 INJECTION INTRAVENOUS; SUBCUTANEOUS at 23:30

## 2022-12-08 RX ADMIN — CLOPIDOGREL BISULFATE 75 MG: 75 TABLET ORAL at 08:41

## 2022-12-08 RX ADMIN — ASPIRIN 81 MG: 81 TABLET, COATED ORAL at 08:41

## 2022-12-08 RX ADMIN — SODIUM CHLORIDE, PRESERVATIVE FREE 10 ML: 5 INJECTION INTRAVENOUS at 07:06

## 2022-12-08 RX ADMIN — PHENYLEPHRINE HYDROCHLORIDE 50 MCG/MIN: 10 INJECTION INTRAVENOUS at 12:13

## 2022-12-08 RX ADMIN — FAMOTIDINE 20 MG: 20 TABLET ORAL at 08:42

## 2022-12-08 RX ADMIN — MUPIROCIN: 20 OINTMENT TOPICAL at 18:01

## 2022-12-08 RX ADMIN — SODIUM CHLORIDE, PRESERVATIVE FREE 10 ML: 5 INJECTION INTRAVENOUS at 14:27

## 2022-12-08 RX ADMIN — METOCLOPRAMIDE 10 MG: 5 INJECTION, SOLUTION INTRAMUSCULAR; INTRAVENOUS at 08:39

## 2022-12-08 RX ADMIN — MUPIROCIN: 20 OINTMENT TOPICAL at 10:58

## 2022-12-08 RX ADMIN — METOCLOPRAMIDE 10 MG: 5 INJECTION, SOLUTION INTRAMUSCULAR; INTRAVENOUS at 12:13

## 2022-12-08 RX ADMIN — MORPHINE SULFATE 4 MG: 2 INJECTION, SOLUTION INTRAMUSCULAR; INTRAVENOUS at 03:34

## 2022-12-08 RX ADMIN — OXYCODONE HYDROCHLORIDE 10 MG: 5 TABLET ORAL at 08:40

## 2022-12-08 RX ADMIN — Medication 20 UNITS: at 08:39

## 2022-12-08 RX ADMIN — AMIODARONE HYDROCHLORIDE 200 MG: 200 TABLET ORAL at 21:09

## 2022-12-08 RX ADMIN — CYANOCOBALAMIN TAB 1000 MCG 1000 MCG: 1000 TAB at 08:42

## 2022-12-08 RX ADMIN — SODIUM CHLORIDE, PRESERVATIVE FREE 10 ML: 5 INJECTION INTRAVENOUS at 21:13

## 2022-12-08 RX ADMIN — CHLORHEXIDINE GLUCONATE 0.12% ORAL RINSE 10 ML: 1.2 LIQUID ORAL at 08:38

## 2022-12-08 RX ADMIN — CEFAZOLIN 2 G: 1 INJECTION, POWDER, FOR SOLUTION INTRAMUSCULAR; INTRAVENOUS at 13:25

## 2022-12-08 RX ADMIN — DOCUSATE SODIUM 100 MG: 100 CAPSULE, LIQUID FILLED ORAL at 18:00

## 2022-12-08 RX ADMIN — POLYETHYLENE GLYCOL 3350 17 G: 17 POWDER, FOR SOLUTION ORAL at 08:43

## 2022-12-08 RX ADMIN — PHENYLEPHRINE HYDROCHLORIDE 30 MCG/MIN: 10 INJECTION INTRAVENOUS at 06:44

## 2022-12-08 RX ADMIN — FOLIC ACID 1 MG: 1 TABLET ORAL at 08:42

## 2022-12-08 RX ADMIN — FAMOTIDINE 20 MG: 20 TABLET ORAL at 18:01

## 2022-12-08 RX ADMIN — HEPARIN SODIUM 5000 UNITS: 5000 INJECTION INTRAVENOUS; SUBCUTANEOUS at 16:14

## 2022-12-08 RX ADMIN — METOCLOPRAMIDE 10 MG: 5 INJECTION, SOLUTION INTRAMUSCULAR; INTRAVENOUS at 18:00

## 2022-12-08 RX ADMIN — CHLORHEXIDINE GLUCONATE 0.12% ORAL RINSE 10 ML: 1.2 LIQUID ORAL at 18:01

## 2022-12-08 RX ADMIN — HEPARIN SODIUM 5000 UNITS: 5000 INJECTION INTRAVENOUS; SUBCUTANEOUS at 08:39

## 2022-12-08 RX ADMIN — SODIUM CHLORIDE 4.2 UNITS/HR: 9 INJECTION, SOLUTION INTRAVENOUS at 03:15

## 2022-12-08 RX ADMIN — BISACODYL 10 MG: 5 TABLET, COATED ORAL at 08:42

## 2022-12-08 RX ADMIN — SODIUM CHLORIDE, PRESERVATIVE FREE 10 ML: 5 INJECTION INTRAVENOUS at 02:54

## 2022-12-08 RX ADMIN — ATORVASTATIN CALCIUM 40 MG: 40 TABLET, FILM COATED ORAL at 21:09

## 2022-12-08 RX ADMIN — HYDROCODONE BITARTRATE AND ACETAMINOPHEN 2 TABLET: 5; 325 TABLET ORAL at 05:28

## 2022-12-08 RX ADMIN — AMIODARONE HYDROCHLORIDE 200 MG: 200 TABLET ORAL at 09:05

## 2022-12-08 RX ADMIN — OXYCODONE HYDROCHLORIDE 10 MG: 5 TABLET ORAL at 21:09

## 2022-12-08 RX ADMIN — CEFAZOLIN 2 G: 1 INJECTION, POWDER, FOR SOLUTION INTRAMUSCULAR; INTRAVENOUS at 21:09

## 2022-12-08 RX ADMIN — MONTELUKAST 10 MG: 10 TABLET, FILM COATED ORAL at 08:42

## 2022-12-08 RX ADMIN — ALBUMIN (HUMAN) 12.5 G: 12.5 INJECTION, SOLUTION INTRAVENOUS at 09:05

## 2022-12-08 RX ADMIN — EPINEPHRINE 2 MCG/MIN: 1 INJECTION INTRAMUSCULAR; INTRAVENOUS; SUBCUTANEOUS at 20:26

## 2022-12-08 RX ADMIN — AMIODARONE HYDROCHLORIDE 200 MG: 200 TABLET ORAL at 16:15

## 2022-12-08 RX ADMIN — DOCUSATE SODIUM 100 MG: 100 CAPSULE, LIQUID FILLED ORAL at 08:42

## 2022-12-08 RX ADMIN — OXYCODONE HYDROCHLORIDE 10 MG: 5 TABLET ORAL at 13:25

## 2022-12-08 RX ADMIN — MORPHINE SULFATE 2 MG: 2 INJECTION, SOLUTION INTRAMUSCULAR; INTRAVENOUS at 11:00

## 2022-12-08 RX ADMIN — OXYCODONE HYDROCHLORIDE 10 MG: 5 TABLET ORAL at 18:01

## 2022-12-08 RX ADMIN — CEFAZOLIN 2 G: 1 INJECTION, POWDER, FOR SOLUTION INTRAMUSCULAR; INTRAVENOUS at 06:28

## 2022-12-08 NOTE — ANESTHESIA POSTPROCEDURE EVALUATION
Procedure(s):  CORONARY ARTERY BYPASS GRAFT TIMES CORONARY ARTERY BYPASS GRAFT TIMES TWO; AORTIC VALVE REPAIR/REPLACEMENT (INSPIRIS)/ ANNULAR ENLARGEMENT PROCEDURE/ NON CORONARY SINUS. general    Anesthesia Post Evaluation      Multimodal analgesia: multimodal analgesia used between 6 hours prior to anesthesia start to PACU discharge  Patient location during evaluation: ICU  Patient participation: complete - patient participated  Level of consciousness: awake  Pain score: 5  Airway patency: patent  Anesthetic complications: no  Cardiovascular status: acceptable  Respiratory status: acceptable  Hydration status: acceptable  Post anesthesia nausea and vomiting:  none  Final Post Anesthesia Temperature Assessment:  Normothermia (36.0-37.5 degrees C)      INITIAL Post-op Vital signs:   Vitals Value Taken Time   /56 12/08/22 0700   Temp 38 °C (100.4 °F) 12/08/22 0540   Pulse 93 12/08/22 0717   Resp 25 12/08/22 0717   SpO2 97 % 12/08/22 0717   Vitals shown include unvalidated device data.

## 2022-12-08 NOTE — PROGRESS NOTES
12/07/22 1949   Vent Settings   FIO2 (%) 30 %   SpO2/FIO2 Ratio 326.67   SIMV Rate Set 22   Back-Up Rate 22   Vt Set (ml) 520 ml   PEEP/VENT (cm H2O) 6 cm H20   Insp Time (sec) 0.8 sec   Insp Rise Time % 50 %   Flow Trigger 3   Expiratory Sensitivity 25 %   Ventilator Measurements   Resp Rate Observed 25   Vt Exhaled (Machine Breath) (ml) 606 ml   Ve Observed (l/min) 14.2 l/min   PIP Observed (cm H2O) 21 cm H2O   Plateau Pressure (cm H2O) 24 cm H2O   Driving Pressure 18 [id number]   MAP (cm H2O) 11   I:E Ratio Actual 1:1.9   Static Compliance (ml/cm H20) 34 ml/cm H20   Vent Method/Mode   Ventilation Method Conventional   Ventilator Mode VC+;SIMV   Ventilator Mode ID [id number]   PS wean to 8, PEEP 6 at 30%. RT will continue to monitor respiratory status.

## 2022-12-08 NOTE — PROGRESS NOTES
12/07/22 2140   Oxygen Therapy   O2 Sat (%) 95 %   Pulse via Oximetry 100 beats per minute   O2 Device Nasal cannula   O2 Flow Rate (L/min) 4 l/min   FIO2 (%) 36 %   Received an order for extubation. Placed on 4L nasal cannula. Pt is alert and oriented. No respiratory issue at this time. RN at the bedside. RT will continue to monitor respiratory status.

## 2022-12-08 NOTE — PROGRESS NOTES
conducted a Follow up consultation and Spiritual Assessment for Sissy Watkins, who is a 79 y.o.,female. The  provided the following Interventions:  Continued the relationship of care and support to a [employer] patient to offer holy communion if desired. However, patient immediately stated, \"Oh no thanks! \"  The following outcomes were achieved:  Patient declined 's visit at this time. Assessment:  There are no further spiritual or Temple issues which require Spiritual Care Services interventions at this time. Plan:  Chaplains will continue to follow and will provide pastoral care on an as needed/requested basis.  recommends bedside caregivers page  on duty if patient shows signs of acute spiritual or emotional distress.      Alfreda Duty, Yann 5 [phone]

## 2022-12-08 NOTE — PROGRESS NOTES
Physician Progress Note      Tiffanie Adhikari  [facility] #:                  [id number]  :                       1955  ADMIT DATE:       2022 5:51 AM  DISCH DATE:  RESPONDING  PROVIDER #:        Delfina Stuart MD          QUERY TEXT:    Pt admitted for a TAVR which changed to AVR w/ Cabgx2 . Pt noted to have severe aortic stenosis symptomatic with SOB on exertion, severe orthopnea and paroxysmal nocturnal dyspnea as well as increasing peripheral edema on diuretics. If possible, please document in progress notes and discharge summary if you are evaluating and/or treating any of the following: The medical record reflects the following:         The medical record reflects the following:    Risk Factors: Severe symptomatic aortic valve stenosis, severe left main stem coronary artery disease as well as left anterior descending artery stenosis,    Clinical Indicators: OP note -symptomatic with increasing shortness of breath with exertion and peripheral edema, elevated creatinine, former tobacco use, severe obesity BMI greater than 35, diabetes mellitus type 2    H&P:   66-year-old patient with documented severe aortic valve stenosis symptomatic with increasing shortness of breath with exertion, severe orthopnea and paroxysmal nocturnal dyspnea as well as increasing peripheral edema on diuretics   (pre-op) Cardiology PN:   Large BLE edema    Treatment:  Aortic valve replacement    Thank you  Brianna Richardson RN CRCR BANG  , [facility] [email or web address]  Options provided:  -- Sub-acute on Chronic Diastolic CHF/HFpEF as evidenced by severe aortic stenosis symptomatic with SOB on exertion, severe orthopnea and paroxysmal nocturnal dyspnea as well as increasing peripheral edema on diuretics requiring Aortic valve replacement  -- Chronic Diastolic CHF/HFpEF  -- Other - I will add my own diagnosis  -- Disagree - Not applicable / Not valid  -- Disagree - Clinically unable to determine / Unknown  -- Refer to Clinical Documentation Reviewer    PROVIDER RESPONSE TEXT:    This patient has Sub-acute CHF/HFpEF as evidenced by severe aortic stenosis symptomatic with SOB on exertion, severe orthopnea and paroxysmal nocturnal dyspnea as well as increasing peripheral edema on diuretics requiring Aortic valve replacement . Query created by:  Lindsay Monday on 12/8/2022 9:37 AM      Electronically signed by:  Sonal Holland MD 12/8/2022 4:24 PM

## 2022-12-08 NOTE — PROGRESS NOTES
Problem: Mobility Impaired (Adult and Pediatric)  Goal: *Acute Goals and Plan of Care (Insert Text)  Description: Physical Therapy Goals  Initiated 12/8/2022 and to be accomplished within 7 day(s)  1. Patient will move from supine to sit and sit to supine  in bed with modified independence. 2.  Patient will transfer from bed to chair and chair to bed with minimal assistance/contact guard assist using the least restrictive device. 3.  Patient will perform sit to stand with minimal assistance/contact guard assist.  4.  Patient will ambulate with minimal assistance/contact guard assist for 50 feet with the least restrictive device. 5.  Patient will ascend/descend 4 stairs with B handrail(s) with minimal assistance/contact guard assist.    PLOF: Pt lives alone in a [facility] [place] with 4 OLU. She was [place] with either SPC or RW PTA. Outcome: Progressing Towards Goal     PHYSICAL THERAPY EVALUATION    Patient: [facility] (37 y.o. female)  Date: 12/8/2022  Primary Diagnosis: CAD (coronary artery disease) [I25.10]  Aortic stenosis [I35.0]  Procedure(s) (LRB):  CORONARY ARTERY BYPASS GRAFT TIMES CORONARY ARTERY BYPASS GRAFT TIMES TWO; AORTIC VALVE REPAIR/REPLACEMENT (INSPIRIS)/ ANNULAR ENLARGEMENT PROCEDURE/ NON CORONARY SINUS (N/A) 1 Day Post-Op   Precautions:  Fall, Skin, Sternal      ASSESSMENT :  Patient is 78 yo female admitted to hospital for CABGx2 and presents today POD 1 and not agreeable to therapy and was sitting up in chair upon arrival. RN in room to give pain medications and motivate pt. Pt adamantly against getting up 2/2 pain despite max education on mobility benefits. Patient was educated on role of therapy and sternal precautions, though seemed apathetic towards therapists education. Precautions written on white board. Objective assessment performed, grossly 3+/5 LLE from pain, 4/5 RLE. Patient educated not to get up without assist and oriented to call bell; patient demonstrated understanding. Patient demonstrates decreased strength, mobility, and endurance. Patient will benefit from skilled intervention to address the above impairments. Patient's rehabilitation potential is considered to be Fair  Factors which may influence rehabilitation potential include:   []         None noted  []         Mental ability/status  [x]         Medical condition  [x]         Home/family situation and support systems  [x]         Safety awareness  [x]         Pain tolerance/management  []         Other:      PLAN :  Recommendations and Planned Interventions:   [x]           Bed Mobility Training             [x]    Neuromuscular Re-Education  [x]           Transfer Training                   []    Orthotic/Prosthetic Training  [x]           Gait Training                          []    Modalities  [x]           Therapeutic Exercises           []    Edema Management/Control  [x]           Therapeutic Activities            [x]    Family Training/Education  [x]           Patient Education  []           Other (comment):    Frequency/Duration: Patient will be followed by physical therapy 1-2 times per day/4-7 days per week to address goals. Further Equipment Recommendations for Discharge: N/A    AMPAC: Current research shows that an AM-PAC score of 17 (13 without stairs) or less is not associated with a discharge to the patient's home setting. Based on an AM-PAC score of 16/24 (**/20 if omitting stairs) and their current functional mobility deficits, it is recommended that the patient have 3-5 sessions per week of Physical Therapy at d/c to increase the patient's independence. This AMPAC score should be considered in conjunction with interdisciplinary team recommendations to determine the most appropriate discharge setting. Patient's social support, diagnosis, medical stability, and prior level of function should also be taken into consideration. SUBJECTIVE:   Patient stated Oh Please.  I will tomorrow.     OBJECTIVE DATA SUMMARY:     Past Medical History:   Diagnosis Date    Aortic stenosis 11/28/2022    Coronary artery disease involving native coronary artery of native heart 11/28/2022    Primary hypertension 11/28/2022    Type 2 diabetes mellitus, without long-term current use of insulin ([facility] [address] 11/28/2022     Past Surgical History:   Procedure Laterality Date    HX HYSTERECTOMY       Barriers to Learning/Limitations: None  Compensate with: N/A  Home Situation:  Home Situation  Home Environment: Private residence  # Steps to Enter: 4  One/Two Story Residence: One story  Living Alone: Yes  Support Systems: Other Family Member(s), Friend/Neighbor  Current DME Used/Available at Home: None  Tub or Shower Type: Tub/Shower combination  Critical Behavior:  Neurologic State: Alert  Orientation Level: Oriented X4  Cognition: Decreased command following;Decreased attention/concentration  Safety/Judgement: Fall prevention  Psychosocial  Patient Behaviors: Agitated; Uncooperative; Withdrawn  Needs Expressed: Educational  Purposeful Interaction: Yes  Pt Identified Daily Priority: Clinical issues (comment)  Caritas Process: Nurture loving kindness;Enable mohan/hope;Establish trust;Nurture spiritual self;Teaching/learning; Attend basic human needs;Create healing environment;Supportive expression  Caring Interventions: Reassure; Therapeutic modalities  Reassure: Therapeutic listening; Informing; Acceptance; Instilling mohan and hope;Caring rounds  Therapeutic Modalities: Humor; Intentional therapeutic touch  Other Caring Modalities: hourly rounds       Strength:    Strength: Generally decreased, functional (B ; B hand swelling observed)       Tone & Sensation:   Tone: Normal    Sensation: Intact        Range Of Motion:  AROM: Generally decreased, functional    Transfers:  Sit to Stand:  (pt refused, stating she will tomorrow)       Therapeutic Exercises:   Edu pt on frequent ankle pumps to prevent clots  Pain:  Pain level pre-treatment: 8/10 -chest and back  Pain level post-treatment: 8/10   Pain Intervention(s) : Medication (see MAR); Rest,  Repositioning  Response to intervention: Nurse notified, See doc flow    Activity Tolerance:   Pt unwilling to work with therapists  Please refer to the flowsheet for vital signs taken during this treatment. After treatment:   [x]         Patient left in no apparent distress sitting up in chair  []         Patient left in no apparent distress in bed  [x]         Call bell left within reach  [x]         Nursing notified  []         Caregiver present  []         Bed alarm activated  []         SCDs applied    COMMUNICATION/EDUCATION:   [x]         Role of Physical Therapy in the acute care setting. [x]         Fall prevention education was provided and the patient/caregiver indicated understanding. [x]         Patient/family have participated as able in goal setting and plan of care. []         Patient/family agree to work toward stated goals and plan of care. []         Patient understands intent and goals of therapy, but is neutral about his/her participation. []         Patient is unable to participate in goal setting/plan of care: ongoing with therapy staff.  []         Other:     Thank you for this referral.  Abril Morejon   Time Calculation: 17 mins      Eval Complexity: History: LOW Complexity : Zero comorbidities / personal factors that will impact the outcome / POCExam:LOW Complexity : 1-2 Standardized tests and measures addressing body structure, function, activity limitation and / or participation in recreation  Presentation: LOW Complexity : Stable, uncomplicated  Clinical Decision Making:Low Complexity    Overall Complexity:LOW     [facility] AM-PAC® Basic Mobility Inpatient Short Form (6-Clicks) Version 2    How much HELP from another person does the patient currently need    (If the patient hasn't done an activity recently, how much help from another person do you think he/she would need if he/she tried?)   Total (Total A or Dep)   A Lot  (Mod to Max A)   A Little (Sup or Min A)   None (Mod I to I)   Turning from your back to your side while in a flat bed without using bedrails? [] 1 [] 2 [x] 3 [] 4   2. Moving from lying on your back to sitting on the side of a flat bed without using bedrails? [] 1 [] 2 [x] 3 [] 4   3. Moving to and from a bed to a chair (including a wheelchair)? [] 1 [] 2 [x] 3 [] 4   4. Standing up from a chair using your arms (e.g., wheelchair, or bedside chair)? [] 1 [] 2 [x] 3 [] 4   5. Walking in hospital room? [] 1 [x] 2 [] 3 [] 4   6. Climbing 3-5 steps with a railing?+   [] 1 [x] 2 [] 3 [] 4   +If stair climbing cannot be assessed, skip item #6. Sum responses from items 1-5. Current research shows that an AM-PAC score of 17 (13 without stairs) or less is not associated with a discharge to the patient's home setting. Based on an AM-PAC score of 16/24 (**/20 if omitting stairs) and their current functional mobility deficits, it is recommended that the patient have 3-5 sessions per week of Physical Therapy at d/c to increase the patient's independence.

## 2022-12-08 NOTE — PROGRESS NOTES
[facility] DAILY PROGRESS NOTE              Subjective:       Complaint: extubated. Receiving transfusion  Overnight events noted  On pressors  S/p chest tubes  UO is maintained    IMPRESSION:   OSVALDO ? contrast nephropathy on CKD stage 3 ? ATN related to CABG procedure  S/p CABG and AVR 12/7/22  Underlying diabetic and hypertensive nephropathy  Controlled diabetes  Severe AS   Diabetes  Hypertension   PLAN:    Follow cr for now  Monitor output closely  Avoid ACE I or diuretics for now  Daily labs for now  Will follow closely for any dialysis needs.  Currently no indication  She will need diuresis going forward at some point            Current Facility-Administered Medications   Medication Dose Route Frequency    metoclopramide HCl (REGLAN) injection 10 mg  10 mg IntraVENous Q6H    insulin glargine (LANTUS) injection 20 Units  20 Units SubCUTAneous DAILY    insulin lispro (HUMALOG) injection   SubCUTAneous AC&HS    glucose chewable tablet 16 g  4 Tablet Oral PRN    glucagon (GLUCAGEN) injection 1 mg  1 mg IntraMUSCular PRN    dextrose 10% infusion 0-250 mL  0-250 mL IntraVENous PRN    0.9% sodium chloride infusion 250 mL  250 mL IntraVENous PRN    cyanocobalamin tablet 1,000 mcg  1,000 mcg Oral DAILY    multivitamin, tx-iron-ca-min (THERA-M w/ IRON) tablet 1 Tablet  1 Tablet Oral DAILY    folic acid (FOLVITE) tablet 1 mg  1 mg Oral DAILY    EPINEPHrine (ADRENALIN) 4 mg in 0.9% sodium chloride 250 mL infusion  1-10 mcg/min IntraVENous TITRATE    PHENYLephrine (FRANKI-SYNEPHRINE) 30 mg in 0.9% sodium chloride 250 mL infusion   mcg/min IntraVENous TITRATE    montelukast (SINGULAIR) tablet 10 mg  10 mg Oral DAILY    0.9% sodium chloride infusion  10 mL/hr IntraVENous CONTINUOUS    sodium chloride (NS) flush 5-40 mL  5-40 mL IntraVENous Q8H    sodium chloride (NS) flush 5-40 mL  5-40 mL IntraVENous PRN    HYDROcodone-acetaminophen (NORCO) 5-325 mg per tablet 1-2 Tablet  1-2 Tablet Oral Q6H PRN    morphine injection 2-4 mg  2-4 mg IntraVENous Q4H PRN    naloxone (NARCAN) injection 0.4 mg  0.4 mg IntraVENous PRN    ceFAZolin (ANCEF) 2 g in sterile water (preservative free) 20 mL IV syringe  2 g IntraVENous Q8H    amiodarone (CORDARONE) tablet 200 mg  200 mg Oral TID    albuterol (PROVENTIL VENTOLIN) nebulizer solution 2.5 mg  2.5 mg Nebulization Q4H PRN    aspirin delayed-release tablet 81 mg  81 mg Oral DAILY    clopidogreL (PLAVIX) tablet 75 mg  75 mg Oral DAILY    chlorhexidine (PERIDEX) 0.12 % mouthwash 10 mL  10 mL Oral BID    docusate sodium (COLACE) capsule 100 mg  100 mg Oral BID    ELECTROLYTE REPLACEMENT PROTOCOL - Potassium Standard Dosing  1 Each Other PRN    ELECTROLYTE REPLACEMENT PROTOCOL - Magnesium  1 Each Other PRN    heparin (porcine) injection 5,000 Units  5,000 Units SubCUTAneous Q8H    bisacodyL (DULCOLAX) tablet 10 mg  10 mg Oral DAILY    famotidine (PEPCID) tablet 20 mg  20 mg Oral BID    fentaNYL citrate (PF) injection 12.5-25 mcg  12.5-25 mcg IntraVENous Q15MIN PRN    oxyCODONE IR (ROXICODONE) tablet 5-10 mg  5-10 mg Oral Q4H PRN    polyethylene glycol (MIRALAX) packet 17 g  17 g Oral DAILY    atorvastatin (LIPITOR) tablet 40 mg  40 mg Oral QHS    insulin regular (MYXREDLIN, NOVOLIN, HUMULIN) 100 units/100 ml NS infusion (premix)  0-50 Units/hr IntraVENous TITRATE    acetaminophen (TYLENOL) tablet 650 mg  650 mg Oral Q4H PRN    sodium chloride (OCEAN) 0.65 % nasal squeeze bottle 2 Spray  2 Spray Both Nostrils Q2H PRN    guaiFENesin ER (MUCINEX) tablet 600 mg  600 mg Oral Q8H PRN    PHENYLephrine (FRANKI-SYNEPHRINE) 30 mg in 0.9% sodium chloride 250 mL infusion   mcg/min IntraVENous TITRATE    mupirocin (BACTROBAN) 2 % ointment   Topical BID    ondansetron (ZOFRAN) injection 4 mg  4 mg IntraVENous Q4H PRN       Review of Symptoms: comprehensive ROS negative except above.    Objective:   Patient Vitals for the past 24 hrs:   Temp Pulse Resp BP SpO2   12/08/22 1000 99.5 °F (37.5 °C) 89 24 (!) 118/58 99 % 12/08/22 0943 97.8 °F (36.6 °C) 87 19 (!) 126/53 100 %   12/08/22 0900 -- 85 28 (!) 124/54 100 %   12/08/22 0800 97.9 °F (36.6 °C) 91 30 (!) 125/59 99 %   12/08/22 0700 -- 93 20 (!) 127/56 98 %   12/08/22 0600 -- 94 21 (!) 113/52 94 %   12/08/22 0500 -- 99 28 (!) 107/38 98 %   12/08/22 0400 97.6 °F (36.4 °C) 99 23 (!) 111/58 98 %   12/08/22 0341 -- -- -- -- 97 %   12/08/22 0300 -- 100 26 -- 98 %   12/08/22 0200 97.7 °F (36.5 °C) 100 27 -- 99 %   12/08/22 0000 97.7 °F (36.5 °C) (!) 101 22 (!) 109/52 --   12/07/22 2300 97.7 °F (36.5 °C) 98 23 -- 99 %   12/07/22 2248 -- -- -- -- 98 %   12/07/22 2200 100.4 °F (38 °C) 96 28 (!) 130/53 99 %   12/07/22 2140 -- 100 -- -- 95 %   12/07/22 2125 -- 96 25 -- 99 %   12/07/22 2100 100 °F (37.8 °C) 97 30 116/70 98 %   12/07/22 2049 -- 91 24 -- 98 %   12/07/22 2000 99.5 °F (37.5 °C) 92 22 (!) 111/48 99 %   12/07/22 1949 -- 93 25 -- 98 %   12/07/22 1832 -- 87 22 -- 100 %   12/07/22 1830 -- 87 22 -- 100 %   12/07/22 1800 98.6 °F (37 °C) 88 (!) 0 (!) 107/52 100 %   12/07/22 1700 98.6 °F (37 °C) 91 16 (!) 119/57 100 %   12/07/22 1645 98.6 °F (37 °C) 92 22 -- 100 %   12/07/22 1639 -- 93 22 -- 100 %   12/07/22 1600 98.8 °F (37.1 °C) 96 19 125/63 100 %   12/07/22 1524 -- 97 19 -- 100 %   12/07/22 1500 98.3 °F (36.8 °C) 97 16 106/62 100 %   12/07/22 1458 -- 97 16 -- 100 %   12/07/22 1455 98.2 °F (36.8 °C) 94 14 111/79 97 %          Weight change:      12/06 1901 - 12/08 0700  In: 4044.3 [P.O.:1702;  I.V.:1617.3]  Out: 3847 [YFSWA:5666; Drains:688]    Intake/Output Summary (Last 24 hours) at 12/8/2022 1058  Last data filed at 12/8/2022 1000  Gross per 24 hour   Intake 2804.31 ml   Output 2435 ml   Net 369.31 ml       Physical Exam:   General: intubated  HEENT sclera anicteric, supple neck, no thyromegaly  CVS: S1S2 heard,  no rub  RS: + air entry b/l,   Abd: Soft, Non tender  Extrm: plus 2 edema, no cyanosis, clubbing   Skin: no visible  Rash  Musculoskeletal: No gross joints or bone deformities         Data Review:     LABS:   Hematology:   Recent Labs     12/08/22  0400 12/07/22  1519 12/07/22  0520 12/06/22  0358   WBC 15.0* 19.8* 8.6 9.7   HGB 7.7* 10.5* 10.2* 9.5*   HCT 24.3* 32.2* 31.8* 29.5*       Chemistry:   Recent Labs     12/08/22  0400 12/07/22  1519 12/07/22  0520 12/06/22  0358   BUN 31* 26* 27* 30*   CREA 1.55* 1.43* 1.31* 1.38*   CA 8.5 8.0* 8.7 8.4*   K 4.3 4.3 4.1 4.1    139 137 135*    106 104 106   CO2 24 23 27 24   GLU 97 240* 125* 113*              Procedures/imaging: see electronic medical records for all procedures, Xrays and details which were not copied into this note but were reviewed prior to creation of Plan          Assessment & Plan:     See above        Tao Muir MD  12/8/2022

## 2022-12-08 NOTE — DIABETES MGMT
Diabetes/ Glycemic Control Plan of Care  Recommendations:   Diabetes consult noted related to GlucoStabilizer insulin gtt  Blood glucose currently controlled. Noted pt transitioning to subcutaneous insulin  Will continue inpatient monitoring. Assessment:   DX:   1. Aortic valve stenosis, etiology of cardiac valve disease unspecified [I35.0 (ICD-10-CM)]        2. Coronary artery disease due to calcified coronary lesion [I25.10, I25.84 (ICD-10-CM)]           Fasting/ Morning blood glucose:   Lab Results   Component Value Date/Time    Glucose 97 12/08/2022 04:00 AM    Glucose (POC) 123 (H) 12/08/2022 09:59 AM    Glucose (POC) 102 12/08/2022 07:51 AM    Glucose,  (H) 12/07/2022 06:08 PM     IV Fluids containing dextrose: none  Steroids:  none     Blood glucose values:        Latest Reference Range & Units 12/8/22 03:53 12/8/22 05:16 12/8/22 06:17 12/8/22 07:51 12/8/22 09:59   GLUCOSE,FAST - POC 65 - 100 mg/dL 116 (H) 97 99 102 123 (H)   (H): Data is abnormally high  Within target range (70-180mg/dL):  yes  Current insulin orders:   GlucoStabilizer insulin gtt    Total Daily Dose previous 24 hours = approximately 60 units on 12/7/2022  Current A1c:   Lab Results   Component Value Date/Time    Hemoglobin A1c 5.7 (H) 11/28/2022 07:40 AM      Equivalent to an ave Blood Glucose of 117 mg/dl for 2-3 months prior to admission  Adequate glycemic control PTA: yes  Nutrition/Diet:   Active Orders   Diet    ADULT DIET Regular; 4 carb choices (60 gm/meal); no concentrated sweets. no red meat. Meal Intake:  Patient Vitals for the past 168 hrs:   % Diet Eaten   12/08/22 0920 1 - 25%   12/06/22 1700 76 - 100%   12/06/22 0955 76 - 100%     Supplement Intake:  No data found. Home diabetes medications:   Key Antihyperglycemic Medications               empagliflozin (JARDIANCE) 25 mg tablet (Taking) Take  by mouth daily.     metFORMIN (GLUCOPHAGE) 500 mg tablet (Taking) Take 500 mg by mouth two (2) times daily (with meals). dulaglutide (Trulicity) 3 DL/3.3 mL pnij (Taking) by SubCUTAneous route. Plan/Goals:   Blood glucose will be within target of 70 - 180 mg/dl within 72 hours  Reinforce dietary and medication compliance at home.        Education:  [] Refer to Diabetes Education Record                       [x] Education not indicated at this time     Coleridge Pallas, RN [facility]  Ext [phone]

## 2022-12-08 NOTE — PROGRESS NOTES
CARDIAC SURGERY PROGRESS NOTE    2022  7:18 AM     CC:  Post Operative Day # 1     Chart, images and labs reviewed. Discussed with available staff. Interval History/Events of Past 24 hours:   Extubated last evening to NC, placed on BiPAP for \"snoring\". CO2 44. Stable hemodynamics on epi 2 rachel 60, lactate normal, creat up to 1.5 with adequate UO. CT 30-40/hour. HCT 24%    Subjective:  Patient seen and examined on rounds today. Sitting in chair with BiPAP. NAD  Pain level: moderate  Ambulating: well     Objective:  Vital signs:   Visit Vitals  BP (!) 113/52   Pulse 94   Temp 97.6 °F (36.4 °C)   Resp 21   Ht 5' 11\" (1.803 m)   Wt 127.6 kg (281 lb 6.4 oz)   SpO2 94% Comment: 4 LPM   BMI 39.25 kg/m²     Temp (24hrs), Av.6 °F (37 °C), Min:97.6 °F (36.4 °C), Max:100.4 °F (38 °C)    Admission Weight: Last Weight   Weight: 130 kg (286 lb 9.6 oz) Weight: 127.6 kg (281 lb 6.4 oz)     Telemetry: SR 85    Physical Examination:     General:  Alert, oriented   Lungs: Clear to auscultation without rales, wheezes or rhonchi. Chest: Dressings clean and dry. Heart: regular rate and rhythm, No murmur. Abdomen: Soft and non-tender without masses. Bowel sounds not present. Extremities: Warm and well perfused. Edema large. Incisions: .left ACE clean soff, clean and dry  Neuro: No deficit. Beta-blocker: No  ASA: Yes   Statin: Yes  ACE-I: No  Diuretic: No     DVT Prophylaxis:   pneumatic compression boots: Yes  Compression stockings:  Yes  Heparin:  No    Chest tubes:  Present.   Air Leak:  No    Pacing wires:  Present            Labs:  Lab Results   Component Value Date/Time    WBC 15.0 (H) 2022 04:00 AM    HCT 24.3 (L) 2022 04:00 AM     2022 04:00 AM      Lab Results   Component Value Date/Time     2022 04:00 AM    K 4.3 2022 04:00 AM     2022 04:00 AM    CO2 24 2022 04:00 AM    GLU 97 2022 04:00 AM    BUN 31 (H) 2022 04:00 AM    CREA 1.55 (H) 12/08/2022 04:00 AM    CREA 1.43 (H) 12/07/2022 03:19 PM    CREA 1.31 (H) 12/07/2022 05:20 AM       CXR: no ptx. No effusion or infiltrate. Gastric dil     Assessment:  S/P  CABG x 2, Ao enlargement,AVR, 23 mm tissue  Respiratory parameters stable  Cardiac status stable     Plans:  1. Vasoactive drip titration per Dr. Med Quiñonez. 2.  Likely transition to HFNC, add bronch hygine Rx  3. Reglan  4. Lantus and transition to [place]   5. Transfuse one unit blood    Heart Hugger use is encouraged to mitigate pain and will also assist with deep breathing and incentive spirometry goals. Ronaldo Larose PA-C    PLEASE NOTE:  This document has been produced using voice recognition software. Unrecognized errors in transcription may be present. NOTE TO PATIENT:  The purpose of this note is to communicate optimally with the other providers involved in your care. It is written using standard medical terminology. If you have questions regarding details of the note please call my office at [phone] and make an appointment to discuss your concerns.

## 2022-12-08 NOTE — PROGRESS NOTES
1930-Bedside and Verbal shift change report given to This Writer, RN by MINNIE Escobar, RN. Report included the following information SBAR, Kardex, Procedure Summary, Intake/Output, MAR, Recent Results, Cardiac Rhythm  , Alarm Parameters , and Quality Measures. Pt  drowsy but nodding appropriately and following commands. No apparent distress. Care assumed. 1955-R.T, Kaylynn, and Pressure support decreased from 10 to 8. SIMV 22 with Continue to monitor and awaken. ABGs after 30 min. R.T at bedside and vent changes done:PS 8, PEEP 6 at 30%. Spo2 98, HR 91, RR 23. RT/RN at the bedside monitoring pt. Awake and alert following commands. No apparent distress. 2130-Dr. Lit Serna made aware of ABG's on spontaneous respirations on vent and Extubation orders obtained. 2140-Extubated on 4 LPM Humidified O2 via NC and R.T to provide BiPAP. ABG by POC done after 30 min. 2250-Placed on 12 IPAP /8 EPAP per R.T. Noted snoring. ABG's POC as follows: pH 7.32, pCO2 44.4 pO2 86.4 HCO3 23.0 Base -3.0 95.7 cSO2. Results called to Dr. Lit Serna. No new orders. Continue BiPap. M.D made aware of Lactic Acid POC results. 0400-AM labs, 12 Lead EKG and portable CXR completed after patient assisted x 2 OOB to recliner chair at bedside. Stands for standing weight but with generalized muscle weakness of BLE's.    0715-Dr. Lit Serna  and GUY Silva [place] in to see patient. 0730 Bedside and Verbal shift change report given to  MERRY Quiñonez, RN by This Writer, RN  Report included the following information SBAR, OR Summary, Procedure Summary, Intake/Output, Recent Results, Cardiac Rhythm  , Alarm Parameters , and Quality Measures. Pt sitting up in chair at bedside in no apparent distress on [place]-synephrine and Epinephrine drips. Nasal mucosa clear.  Mouth with normal mucosa  Throat has no vesicles, no oropharyngeal exudates and uvula is midline.

## 2022-12-08 NOTE — PROGRESS NOTES
Attempted to see pt for PT evaluation. RN starting blood infusion at 09:45. Will attempt to see pt later this date. Thank you.     Quintin Porter, PT, DPT

## 2022-12-08 NOTE — PROGRESS NOTES
12/07/22 2248   Oxygen Therapy   O2 Sat (%) 98 %   Pulse via Oximetry 98 beats per minute   O2 Device BIPAP   FIO2 (%) 28 %   Respiratory   Respiratory (WDL) X   Respiratory Pattern Regular   Chest/Tracheal Assessment Chest expansion, symmetrical   CPAP/BIPAP   CPAP/BIPAP Start/Stop On   Device Mode S/T   $$ Bipap Daily Yes   [employer] ID # [id number]   Mask Type and Size Full face; Medium   Skin Condition intact   PIP Observed 14 cm H20   IPAP (cm H2O) 12 cm H2O   EPAP (cm H2O) 8 cm H2O   Inspiratory Time (sec) 1 seconds   Vt Spont (ml) 344 ml   Ve Observed (l/min) 8.3 l/min   Backup Rate 6   Total RR (Spontaneous) 23 breaths per minute   Insp Rise Time (sec) 3   Leak (Estimated) 34 L/min   Pt's Home Machine No   Biomedical Check Performed Yes   Settings Verified Yes   Alarm Settings   High Pressure 40   Low Pressure 8   Apnea 20   Low Ve 3   High Rate 40   Low Rate 6   Placed on Bipap QHS per MD.

## 2022-12-08 NOTE — PROGRESS NOTES
OT order received and chart reviewed. Attempted to see patient at 46, RN requesting therapy come back as pt receiving blood at this time. Will continue to follow and see patient when available/as appropriate.           Thank you for this referral,   Natali Tam MS, OTR/L

## 2022-12-08 NOTE — PROGRESS NOTES
Problem: Self Care Deficits Care Plan (Adult)  Goal: *Acute Goals and Plan of Care (Insert Text)  Description: Occupational Therapy Goals  Initiated 12/8/2022 within 7 day(s). 1.  Patient will perform grooming with supervision/set-up standing > 3 minutes, F+ balance. 2.  Patient will perform upper body dressing with supervision/set-up maintaining sternal precautions. 3.  Patient will perform lower body dressing with supervision/set-up using AE prn.  4.  Patient will perform toilet transfers with minimal assistance/contact guard assist.  5.  Patient will perform all aspects of toileting with supervision/set-up. 6.  Patient will participate in upper extremity therapeutic exercise/activities with supervision/set-up for 8 minutes to increase ROM/strength for ADLs. 7.  Patient will utilize energy conservation techniques during functional activities with verbal cues. 8.  Patient will recall all sternal precautions prior to ADLs with 100% accuracy. Prior Level of Function: Patient lives alone and was independent with self-care and functional mobility PTA. Outcome: Progressing Towards Goal       OCCUPATIONAL THERAPY EVALUATION    Patient: Barrie Castillo (76 y.o. female)  Date: 12/8/2022  Primary Diagnosis: CAD (coronary artery disease) [I25.10]  Aortic stenosis [I35.0]  Procedure(s) (LRB):  CORONARY ARTERY BYPASS GRAFT TIMES CORONARY ARTERY BYPASS GRAFT TIMES TWO; AORTIC VALVE REPAIR/REPLACEMENT (INSPIRIS)/ ANNULAR ENLARGEMENT PROCEDURE/ NON CORONARY SINUS (N/A) 1 Day Post-Op   Precautions: Fall, Skin, Sternal      ASSESSMENT :  Patient cleared to participate in OT evaluation by RN. Upon entering the room, the patient was seated in chair, alert, and agreeable to participate in OT evaluation with maximal encouragement from therapists and nurse Harvey Gaitan. Patient was seen with PT to maximize pt safety and participation.  Patient educated on sternal precautions, heart hugger, importance of getting up and moving at least 3x/day per protocol, and safety during this hospital admission. Patient with increased pain (previously medicated), not receptive to much education on importance of activity following procedure. Patient will benefit from sternal precaution handout next session as precautions were written on the whiteboard this date. Based on the objective data described below, the patient presents with decreased strength, decreased independence, decreased activity tolerance, increased pain and increased swelling in B hands which impedes pt performance in basic self-care/ADL tasks. Patient would benefit from skilled OT during admission to restore PLOF and maximize function. Patient will benefit from skilled intervention to address the above impairments. Patient's rehabilitation potential is considered to be Fair  Factors which may influence rehabilitation potential include:   []             None noted  [x]             Mental ability/status  [x]             Medical condition  [x]             Home/family situation and support systems  [x]             Safety awareness  [x]             Pain tolerance/management  []             Other:      PLAN :  Recommendations and Planned Interventions:   [x]               Self Care Training                  [x]      Therapeutic Activities  [x]               Functional Mobility Training   []      Cognitive Retraining  [x]               Therapeutic Exercises           [x]      Endurance Activities  [x]               Balance Training                    [x]      Neuromuscular Re-Education  []               Visual/Perceptual Training     [x]      Home Safety Training  [x]               Patient Education                   [x]      Family Training/Education  []               Other (comment):    Frequency/Duration: Patient will be followed by occupational therapy 3 - 5 times a week to address goals.     Further Equipment Recommendations for Discharge: TBD pending progress with therapy    AMPAC: Current research shows that an AM-PAC score of 17 or less is not associated with a discharge to the patient's home setting. Based on an AM-PAC score of 14/24 and their current ADL deficits; it is recommended that the patient have 3-5 sessions per week of Occupational Therapy at d/c to increase the patient's independence. This AMPAC score should be considered in conjunction with interdisciplinary team recommendations to determine the most appropriate discharge setting. Patient's social support, diagnosis, medical stability, and prior level of function should also be taken into consideration. SUBJECTIVE:   Patient stated I SAID I'LL DO IT TOMORROW! unpleasantly    OBJECTIVE DATA SUMMARY:     Past Medical History:   Diagnosis Date    Aortic stenosis 11/28/2022    Coronary artery disease involving native coronary artery of native heart 11/28/2022    Primary hypertension 11/28/2022    Type 2 diabetes mellitus, without long-term current use of insulin ([facility] [address] 11/28/2022     Past Surgical History:   Procedure Laterality Date    HX HYSTERECTOMY       Barriers to Learning/Limitations: None  Compensate with: visual, verbal, tactile, kinesthetic cues/model    Home Situation:   Home Situation  Home Environment: Private residence  # Steps to Enter: 4  One/Two Story Residence: One story  Living Alone: Yes  Support Systems: Other Family Member(s), Friend/Neighbor  Current DME Used/Available at Home: None  Tub or Shower Type: Tub/Shower combination  [x]  Right hand dominant   []  Left hand dominant    Cognitive/Behavioral Status:  Neurologic State: Alert  Orientation Level: Oriented X4  Cognition: Decreased command following;Decreased attention/concentration  Safety/Judgement: Fall prevention    Skin: Intact  Edema: None noted    Vision/Perceptual:    Tracking: Able to track stimulus in all quadrants w/o difficulty      Coordination: BUE  Fine Motor Skills-Upper: Left Intact; Right Intact    Gross Motor Skills-Upper: Left Intact; Right Intact    Strength: BUE  Strength: Generally decreased, functional (B ; B hand swelling observed)    Tone & Sensation: BUE  Tone: Normal  Sensation: Intact    Range of Motion: BUE  AROM: Generally decreased, functional      ADL Assessment:   Feeding: Setup    Oral Facial Hygiene/Grooming: Setup;Stand-by assistance    Bathing: Maximum assistance    Upper Body Dressing: Moderate assistance    Lower Body Dressing: Maximum assistance; Total assistance    Toileting: Maximum assistance    ADL Intervention:  Feeding  Feeding Assistance: Set-up; Stand-by assistance  Food to Mouth: Stand-by assistance (simulation with BUE)    Grooming  Grooming Assistance: Stand-by assistance  Brushing/Combing Hair: Stand-by assistance (simulation)    Lower Body Dressing Assistance  Dressing Assistance:  (increased difficulty lifting LLE for MMT)      Cognitive Retraining  Safety/Judgement: Fall prevention    Therapeutic Exercise:  Patient educated on the importance of shoulder flex/ext, elbow flex/ext, wrist flex/ext and ulnar and radial deviation, as well as active finger and hand movement to prevent edema and maintain function. Patient able to demonstrate AROM of BUE to perform exercises x 1 rep. Pain:  Pain level pre-treatment: 8/10   Pain level post-treatment: 8/10   Pain Intervention(s): Medication (see MAR); Rest, Ice, Repositioning   Response to intervention: Nurse notified, See doc flow    Activity Tolerance:   Patient demonstrated decreased activity tolerance during OT session. Please refer to the flowsheet for vital signs taken during this treatment.   After treatment:   [x] Patient left in no apparent distress sitting up in chair  [] Patient left in no apparent distress in bed  [x] Call bell left within reach  [x] Nursing notified  [] Caregiver present  [] Bed alarm activated    COMMUNICATION/EDUCATION:   [x] Role of Occupational Therapy in the acute care setting  [x] Home safety education was provided and the patient/caregiver indicated understanding. [x] Patient/family have participated as able in goal setting and plan of care. [x] Patient/family agree to work toward stated goals and plan of care. [] Patient understands intent and goals of therapy, but is neutral about his/her participation. [] Patient is unable to participate in goal setting and plan of care. Thank you for this referral.  Ashtyn Castillo OTR/L  Time Calculation: 16 mins    Eval Complexity: History: MEDIUM Complexity : Expanded review of history including physical, cognitive and psychosocial  history ; Examination: MEDIUM Complexity : 3-5 performance deficits relating to physical, cognitive , or psychosocial skils that result in activity limitations and / or participation restrictions; Decision Making:MEDIUM Complexity : Patient may present with comorbidities that affect occupational performnce. Miniml to moderate modification of tasks or assistance (eg, physical or verbal ) with assesment(s) is necessary to enable patient to complete evaluation     Stanley Aldrich AM-PAC® Daily Activity Inpatient Short Form (6-Clicks)*    How much HELP from another person does the patient currently need    (If the patient hasn't done an activity recently, how much help from another person do you think he/she would need if he/she tried?)   Total (Total A or Dep)   A Lot  (Mod to Max A)   A Little (Sup or Min A)   None (Mod I to I)   Putting on and taking off regular lower body clothing? [] 1 [x] 2 [] 3 [] 4   2. Bathing (including washing, rinsing,      drying)? [] 1 [x] 2 [] 3 [] 4   3. Toileting, which includes using toilet, bedpan or urinal?   [] 1 [x] 2 [] 3 [] 4   4. Putting on and taking off regular upper body clothing? [] 1 [x] 2 [] 3 [] 4   5. Taking care of personal grooming such as brushing teeth? [] 1 [] 2 [x] 3 [] 4   6. Eating meals?    [] 1 [] 2 [x] 3 [] 4

## 2022-12-08 NOTE — CONSULTS
Comprehensive Nutrition Assessment    Type and Reason for Visit: Initial, Consult    Nutrition Recommendations/Plan:   Continue current diet as tolerated. Updated food preferences. Order Ensure Enlive (each provides 350 kcal, 20g protein) BID - chocolate. Monitor PO intake, compliance of oral supplement, weight, labs and plan of care during admission. Malnutrition Assessment:  Malnutrition Status: At risk for malnutrition (specify) (s/p CABG) (12/08/22 1236)    Context:  Acute illness     Nutrition History and Allergies: PMHx: CAD, DM, HTN. Wt hx: 281 lb (8/2/22). Photo ID [id number]". Limited conversation w/ pt, pt s/p procedure, w/ RN. NKFA. Nutrition Assessment:    Admitted for AS/CAD, s/p CABG. Extubated last night, on BIPAP currently. Consulted for general nutrition management: s/p CABG. Not appropriate at this time, s/p procedure, will attempt on follow up. Per flowsheet, pt ate 1-25% of meal this AM. Pt drowsy, but awoke w/ RD presence. Pt with RN at time of visit. Pt/RN states pt prefers tea, fruit, jello, agreeable to try Ensure - chocolate. Will update food preferences. Nutrition Related Findings:    Pertinent Meds: lipitor, bowel regimen, pepcid, folic acid, lantus, humalog, reglan, theragran M, NS @ 10 ml/hr, insulin drip, rachel @ 2 mcg/min   Pertinent Labs: BUN/Cr 31/1.55, GFR 36 Wound Type: Surgical incision    Current Nutrition Intake & Therapies:        ADULT DIET Regular; 4 carb choices (60 gm/meal); no concentrated sweets. no red meat. Anthropometric Measures:  Height: 6' (182.9 cm)  Ideal Body Weight (IBW): 160 lbs (73 kg)  Admission Body Weight: 281 lb 4.9 oz (127.6 kg)  Current Body Wt:  127.6 kg (281 lb 4.9 oz), 175.8 % IBW.     Current BMI (kg/m2): 38.1    Estimated Daily Nutrient Needs:  Energy Requirements Based On: Formula  Weight Used for Energy Requirements: Admission  Energy (kcal/day): 5354-8023 (MSJ 1-1.1)  Weight Used for Protein Requirements: Ideal  Protein (g/day): 109-145 (1.5-2 g/day)  Method Used for Fluid Requirements: 1 ml/kcal  Fluid (ml/day): 2253-9813    Nutrition Diagnosis:   Inadequate oral intake related to acute injury/trauma, early satiety as evidenced by intake 0-25%    Nutrition Interventions:   Food and/or Nutrient Delivery: Continue current diet, Start oral nutrition supplement  Nutrition Education/Counseling: Education not appropriate  Coordination of Nutrition Care: Continue to monitor while inpatient  Plan of Care discussed with: pt, RN    Goals:     Goals: Meet at least 75% of estimated needs, by next RD assessment       Nutrition Monitoring and Evaluation:      Food/Nutrient Intake Outcomes: Food and nutrient intake, Supplement intake  Physical Signs/Symptoms Outcomes: Meal time behavior, Biochemical data, GI status, Weight, Fluid status or edema    Discharge Planning:    Continue current diet    Jackelyn Goldberg [address], [address]   Contact: [phone]

## 2022-12-08 NOTE — CONSULTS
[facility] -Progress Note        Date of  Admission: 12/5/2022  5:51 AM   Primary Care Physician:  Daryn Grove MD  Attending Physician: Casey Nguyen MD         Assessment:     -Coronary artery disease with left main disease, EF 55% November 2022. Heart catheterization November 28, 2022 with left main 70%, LAD 40%, diagonal 80%  -Severe aortic stenosis, mean gradient 57 mmHg by echocardiogram November 28, 2022  -PAD with moderate left THOMAS 50-69% by ultrasound 11/2022  -Diabetes  -Hypertension  -Chronic kidney disease, stage III  -Chronic anemia  -Hemoglobin 7.7 today. Primary cardiologist: Dr. Tiffani Santillan:     Patient is S/P CABG X 2 with aortic valve replacement, ([employer]'s bovine pericardial bioprosthesis ) (12/7/2022)   Progressing  No significant arrhythmia. Appreciate nephrology assistance. History of Present Illness: This is a 79 y.o. female admitted for CAD (coronary artery disease) [I25.10]; Aortic stenosis [I35.0]. Patient complains of:    Patient was scheduled for CABG and aortic valve surgery yesterday. Procedure was aborted due to equipment issues. Tentative plan is for CABG and AVR tomorrow. Initial attempt November 28 was to stent the LAD however upon further evaluation was found to have significant left main disease and therefore planning CABG. Patient has chronic chest congestion without any current shortness of breath or chest pain. Initially presented with shortness of breath triggering work-up this spring by her immunologist.    Patient has a history of hypertension as well as diabetes and kidney disease. Found to have ostial LAD disease not amenable to stenting as well as significant aortic stenosis.     Review of Symptoms:  Except as stated above include:  Constitutional:  Negative  Ears, nose, throat:  Negative  Respiratory: Chronic dyspnea  Cardiovascular:  negative  Gastrointestinal: negative  Genitourinary:  negative  Musculoskeletal: Negative  Neurological:  Negative  Dermatological:  Negative  Hematological: Negative  Endocrinological: Negative  Allergy: Negative  Psychological:  Negative       Past Medical History:     Past Medical History:   Diagnosis Date    Aortic stenosis 11/28/2022    Coronary artery disease involving native coronary artery of native heart 11/28/2022    Primary hypertension 11/28/2022    Type 2 diabetes mellitus, without long-term current use of insulin ([facility] [address] 11/28/2022         Social History:     Social History     Socioeconomic History    Marital status:    Tobacco Use    Smoking status: Heavy Smoker     Packs/day: 1.50     Types: Cigarettes   Substance and Sexual Activity    Alcohol use: Yes     Comment: 1    Drug use: No        Family History:   History reviewed. No pertinent family history. Medications:      Allergies   Allergen Reactions    Albuterol Sulfate Shortness of Breath    Entex [Pseudoephedrine Tannate] Rash    Terbutaline Shortness of Breath    Terramycin [Oxytetracycline] Unknown (comments)        Current Facility-Administered Medications   Medication Dose Route Frequency    metoclopramide HCl (REGLAN) injection 10 mg  10 mg IntraVENous Q6H    insulin glargine (LANTUS) injection 20 Units  20 Units SubCUTAneous DAILY    insulin lispro (HUMALOG) injection   SubCUTAneous AC&HS    glucose chewable tablet 16 g  4 Tablet Oral PRN    glucagon (GLUCAGEN) injection 1 mg  1 mg IntraMUSCular PRN    dextrose 10% infusion 0-250 mL  0-250 mL IntraVENous PRN    0.9% sodium chloride infusion 250 mL  250 mL IntraVENous PRN    cyanocobalamin tablet 1,000 mcg  1,000 mcg Oral DAILY    multivitamin, tx-iron-ca-min (THERA-M w/ IRON) tablet 1 Tablet  1 Tablet Oral DAILY    folic acid (FOLVITE) tablet 1 mg  1 mg Oral DAILY    EPINEPHrine (ADRENALIN) 4 mg in 0.9% sodium chloride 250 mL infusion  1-10 mcg/min IntraVENous TITRATE    PHENYLephrine (FRANKI-SYNEPHRINE) 30 mg in 0.9% sodium chloride 250 mL infusion   mcg/min IntraVENous TITRATE    montelukast (SINGULAIR) tablet 10 mg  10 mg Oral DAILY    0.9% sodium chloride infusion  10 mL/hr IntraVENous CONTINUOUS    sodium chloride (NS) flush 5-40 mL  5-40 mL IntraVENous Q8H    sodium chloride (NS) flush 5-40 mL  5-40 mL IntraVENous PRN    HYDROcodone-acetaminophen (NORCO) 5-325 mg per tablet 1-2 Tablet  1-2 Tablet Oral Q6H PRN    morphine injection 2-4 mg  2-4 mg IntraVENous Q4H PRN    naloxone (NARCAN) injection 0.4 mg  0.4 mg IntraVENous PRN    ceFAZolin (ANCEF) 2 g in sterile water (preservative free) 20 mL IV syringe  2 g IntraVENous Q8H    amiodarone (CORDARONE) tablet 200 mg  200 mg Oral TID    albuterol (PROVENTIL VENTOLIN) nebulizer solution 2.5 mg  2.5 mg Nebulization Q4H PRN    aspirin delayed-release tablet 81 mg  81 mg Oral DAILY    clopidogreL (PLAVIX) tablet 75 mg  75 mg Oral DAILY    chlorhexidine (PERIDEX) 0.12 % mouthwash 10 mL  10 mL Oral BID    docusate sodium (COLACE) capsule 100 mg  100 mg Oral BID    ELECTROLYTE REPLACEMENT PROTOCOL - Potassium Standard Dosing  1 Each Other PRN    ELECTROLYTE REPLACEMENT PROTOCOL - Magnesium  1 Each Other PRN    heparin (porcine) injection 5,000 Units  5,000 Units SubCUTAneous Q8H    bisacodyL (DULCOLAX) tablet 10 mg  10 mg Oral DAILY    famotidine (PEPCID) tablet 20 mg  20 mg Oral BID    fentaNYL citrate (PF) injection 12.5-25 mcg  12.5-25 mcg IntraVENous Q15MIN PRN    oxyCODONE IR (ROXICODONE) tablet 5-10 mg  5-10 mg Oral Q4H PRN    polyethylene glycol (MIRALAX) packet 17 g  17 g Oral DAILY    atorvastatin (LIPITOR) tablet 40 mg  40 mg Oral QHS    insulin regular (MYXREDLIN, NOVOLIN, HUMULIN) 100 units/100 ml NS infusion (premix)  0-50 Units/hr IntraVENous TITRATE    acetaminophen (TYLENOL) tablet 650 mg  650 mg Oral Q4H PRN    sodium chloride (OCEAN) 0.65 % nasal squeeze bottle 2 Spray  2 Spray Both Nostrils Q2H PRN    guaiFENesin ER (MUCINEX) tablet 600 mg  600 mg Oral Q8H PRN    PHENYLephrine (FRANKI-SYNEPHRINE) 30 mg in 0.9% sodium chloride 250 mL infusion   mcg/min IntraVENous TITRATE    mupirocin (BACTROBAN) 2 % ointment   Topical BID    ondansetron (ZOFRAN) injection 4 mg  4 mg IntraVENous Q4H PRN         Physical Exam:   Visit Vitals  BP (!) 136/59   Pulse 88   Temp 97.9 °F (36.6 °C)   Resp 20   Ht 6' (1.829 m)   Wt 127.6 kg (281 lb 6.4 oz)   SpO2 100%   BMI 38.16 kg/m²     BP Readings from Last 3 Encounters:   12/08/22 (!) 136/59   11/28/22 119/73   08/02/22 (!) 156/58     Pulse Readings from Last 3 Encounters:   12/08/22 88   11/28/22 86   08/02/22 77     Wt Readings from Last 3 Encounters:   12/08/22 127.6 kg (281 lb 6.4 oz)   11/28/22 127.5 kg (281 lb)   08/02/22 127.5 kg (281 lb 1.4 oz)       General:  alert, cooperative, sitting in chair  Neck:  nontender  Lungs: Decreased breath sounds at bases  Heart:  regular rate and rhythm, FRANSISCA  Abdomen:  abdomen is soft without significant tenderness, masses, organomegaly or guarding  Extremities:  extremities normal, atraumatic, no cyanosis or edema  Skin: Warm and dry.  no hyperpigmentation, vitiligo, or suspicious lesions  Neuro: alert, oriented x3, affect appropriate, no focal neurological deficits, moves all extremities well, no involuntary movements, reflexes at knee and ankle intact  Psych: non focal     Data Review:     Recent Labs     12/08/22  0400 12/07/22  1519 12/07/22  0520   WBC 15.0* 19.8* 8.6   HGB 7.7* 10.5* 10.2*   HCT 24.3* 32.2* 31.8*    226 242       Recent Labs     12/08/22  0400 12/07/22  1519 12/07/22  0520    139 137   K 4.3 4.3 4.1    106 104   CO2 24 23 27   GLU 97 240* 125*   BUN 31* 26* 27*   CREA 1.55* 1.43* 1.31*   CA 8.5 8.0* 8.7   MG  --  2.7*  --    INR  --  1.4*  --          Results for orders placed or performed during the hospital encounter of 12/05/22   EKG, 12 LEAD, INITIAL   Result Value Ref Range    Ventricular Rate 101 BPM    Atrial Rate 101 BPM    P-R Interval 144 ms    QRS Duration 92 ms    Q-T Interval 368 ms QTC Calculation (Bezet) 477 ms    Calculated P Axis 58 degrees    Calculated R Axis 76 degrees    Calculated T Axis 63 degrees    Diagnosis       Sinus tachycardia  Low voltage QRS  Borderline ECG  When compared with ECG of 07-DEC-2022 15:08,  QRS duration has increased  Criteria for Septal infarct are no longer present  Non-specific change in ST segment in Anterior leads         All Cardiac Markers in the last 24 hours:  No results found for: CPK, CK, CKMMB, CKMB, RCK3, CKMBT, CKNDX, CKND1, RHODA, TROPT, TROIQ, KASH, TROPT, TNIPOC, BNP, BNPP    Last Lipid:  No results found for: CHOL, CHOLX, CHLST, CHOLV, HDL, HDLP, LDL, LDLC, DLDLP, TGLX, TRIGL, TRIGP, CHHD, CHHDX    Signed By: Morris Vidal MD     December 8, 2022

## 2022-12-09 ENCOUNTER — APPOINTMENT (OUTPATIENT)
Dept: GENERAL RADIOLOGY | Age: 67
End: 2022-12-09
Attending: PHYSICIAN ASSISTANT
Payer: MEDICARE

## 2022-12-09 PROBLEM — Z95.2 S/P AVR (AORTIC VALVE REPLACEMENT) AND AORTOPLASTY: Status: ACTIVE | Noted: 2022-12-07

## 2022-12-09 PROBLEM — Z95.1 S/P CABG X 2: Status: ACTIVE | Noted: 2022-12-07

## 2022-12-09 LAB
ANION GAP SERPL CALC-SCNC: 9 MMOL/L (ref 3–18)
ATRIAL RATE: 101 BPM
BASOPHILS # BLD: 0.1 K/UL (ref 0–0.1)
BASOPHILS NFR BLD: 1 % (ref 0–2)
BUN SERPL-MCNC: 32 MG/DL (ref 7–18)
BUN/CREAT SERPL: 20 (ref 12–20)
CALCIUM SERPL-MCNC: 8.4 MG/DL (ref 8.5–10.1)
CALCULATED P AXIS, ECG09: 58 DEGREES
CALCULATED R AXIS, ECG10: 76 DEGREES
CALCULATED T AXIS, ECG11: 63 DEGREES
CHLORIDE SERPL-SCNC: 102 MMOL/L (ref 100–111)
CO2 SERPL-SCNC: 23 MMOL/L (ref 21–32)
CREAT SERPL-MCNC: 1.58 MG/DL (ref 0.6–1.3)
DIAGNOSIS, 93000: NORMAL
DIFFERENTIAL METHOD BLD: ABNORMAL
EOSINOPHIL # BLD: 0.1 K/UL (ref 0–0.4)
EOSINOPHIL NFR BLD: 1 % (ref 0–5)
ERYTHROCYTE [DISTWIDTH] IN BLOOD BY AUTOMATED COUNT: 19.3 % (ref 11.6–14.5)
GLUCOSE BLD STRIP.AUTO-MCNC: 185 MG/DL (ref 70–110)
GLUCOSE BLD STRIP.AUTO-MCNC: 198 MG/DL (ref 70–110)
GLUCOSE BLD STRIP.AUTO-MCNC: 200 MG/DL (ref 70–110)
GLUCOSE BLD STRIP.AUTO-MCNC: 210 MG/DL (ref 70–110)
GLUCOSE SERPL-MCNC: 206 MG/DL (ref 74–99)
HCT VFR BLD AUTO: 22.9 % (ref 35–45)
HGB BLD-MCNC: 7.4 G/DL (ref 12–16)
IMM GRANULOCYTES # BLD AUTO: 0.1 K/UL (ref 0–0.04)
IMM GRANULOCYTES NFR BLD AUTO: 1 % (ref 0–0.5)
LYMPHOCYTES # BLD: 1.6 K/UL (ref 0.9–3.6)
LYMPHOCYTES NFR BLD: 11 % (ref 21–52)
MCH RBC QN AUTO: 29.5 PG (ref 24–34)
MCHC RBC AUTO-ENTMCNC: 32.3 G/DL (ref 31–37)
MCV RBC AUTO: 91.2 FL (ref 78–100)
MONOCYTES # BLD: 1.6 K/UL (ref 0.05–1.2)
MONOCYTES NFR BLD: 11 % (ref 3–10)
NEUTS SEG # BLD: 11.2 K/UL (ref 1.8–8)
NEUTS SEG NFR BLD: 77 % (ref 40–73)
NRBC # BLD: 0 K/UL (ref 0–0.01)
NRBC BLD-RTO: 0 PER 100 WBC
P-R INTERVAL, ECG05: 144 MS
PLATELET # BLD AUTO: 166 K/UL (ref 135–420)
PMV BLD AUTO: 10.5 FL (ref 9.2–11.8)
POTASSIUM SERPL-SCNC: 4.3 MMOL/L (ref 3.5–5.5)
Q-T INTERVAL, ECG07: 368 MS
QRS DURATION, ECG06: 92 MS
QTC CALCULATION (BEZET), ECG08: 477 MS
RBC # BLD AUTO: 2.51 M/UL (ref 4.2–5.3)
SODIUM SERPL-SCNC: 134 MMOL/L (ref 136–145)
VENTRICULAR RATE, ECG03: 101 BPM
WBC # BLD AUTO: 14.6 K/UL (ref 4.6–13.2)

## 2022-12-09 PROCEDURE — 74011636637 HC RX REV CODE- 636/637: Performed by: PHYSICIAN ASSISTANT

## 2022-12-09 PROCEDURE — 99024 POSTOP FOLLOW-UP VISIT: CPT | Performed by: PHYSICIAN ASSISTANT

## 2022-12-09 PROCEDURE — 71045 X-RAY EXAM CHEST 1 VIEW: CPT

## 2022-12-09 PROCEDURE — 74011250636 HC RX REV CODE- 250/636: Performed by: PHYSICIAN ASSISTANT

## 2022-12-09 PROCEDURE — 77010033678 HC OXYGEN DAILY

## 2022-12-09 PROCEDURE — 2709999900 HC NON-CHARGEABLE SUPPLY

## 2022-12-09 PROCEDURE — 65620000000 HC RM CCU GENERAL

## 2022-12-09 PROCEDURE — 74011250637 HC RX REV CODE- 250/637: Performed by: PHYSICIAN ASSISTANT

## 2022-12-09 PROCEDURE — 80048 BASIC METABOLIC PNL TOTAL CA: CPT

## 2022-12-09 PROCEDURE — APPNB45 APP NON BILLABLE 31-45 MINUTES: Performed by: PHYSICIAN ASSISTANT

## 2022-12-09 PROCEDURE — 94762 N-INVAS EAR/PLS OXIMTRY CONT: CPT

## 2022-12-09 PROCEDURE — 74011250636 HC RX REV CODE- 250/636: Performed by: ANESTHESIOLOGY

## 2022-12-09 PROCEDURE — 74011000250 HC RX REV CODE- 250: Performed by: PHYSICIAN ASSISTANT

## 2022-12-09 PROCEDURE — 97535 SELF CARE MNGMENT TRAINING: CPT

## 2022-12-09 PROCEDURE — C9399 UNCLASSIFIED DRUGS OR BIOLOG: HCPCS | Performed by: PHYSICIAN ASSISTANT

## 2022-12-09 PROCEDURE — 99232 SBSQ HOSP IP/OBS MODERATE 35: CPT | Performed by: INTERNAL MEDICINE

## 2022-12-09 PROCEDURE — 82962 GLUCOSE BLOOD TEST: CPT

## 2022-12-09 PROCEDURE — 85025 COMPLETE CBC W/AUTO DIFF WBC: CPT

## 2022-12-09 PROCEDURE — 74011250636 HC RX REV CODE- 250/636: Performed by: THORACIC SURGERY (CARDIOTHORACIC VASCULAR SURGERY)

## 2022-12-09 PROCEDURE — 94660 CPAP INITIATION&MGMT: CPT

## 2022-12-09 PROCEDURE — 36415 COLL VENOUS BLD VENIPUNCTURE: CPT

## 2022-12-09 RX ORDER — FUROSEMIDE 10 MG/ML
20 INJECTION INTRAMUSCULAR; INTRAVENOUS ONCE
Status: COMPLETED | OUTPATIENT
Start: 2022-12-09 | End: 2022-12-09

## 2022-12-09 RX ORDER — MIDODRINE HYDROCHLORIDE 5 MG/1
5 TABLET ORAL
Status: DISCONTINUED | OUTPATIENT
Start: 2022-12-09 | End: 2022-12-09

## 2022-12-09 RX ORDER — MIDODRINE HYDROCHLORIDE 5 MG/1
10 TABLET ORAL
Status: DISCONTINUED | OUTPATIENT
Start: 2022-12-10 | End: 2022-12-15

## 2022-12-09 RX ORDER — GABAPENTIN 100 MG/1
200 CAPSULE ORAL
Status: DISCONTINUED | OUTPATIENT
Start: 2022-12-09 | End: 2022-12-15 | Stop reason: HOSPADM

## 2022-12-09 RX ORDER — ALBUMIN HUMAN 250 G/1000ML
25 SOLUTION INTRAVENOUS ONCE
Status: DISCONTINUED | OUTPATIENT
Start: 2022-12-09 | End: 2022-12-09 | Stop reason: SDUPTHER

## 2022-12-09 RX ADMIN — METOCLOPRAMIDE 10 MG: 5 INJECTION, SOLUTION INTRAMUSCULAR; INTRAVENOUS at 00:16

## 2022-12-09 RX ADMIN — ACETAMINOPHEN 1000 MG: 500 TABLET ORAL at 23:07

## 2022-12-09 RX ADMIN — IRON SUCROSE 200 MG: 20 INJECTION, SOLUTION INTRAVENOUS at 09:37

## 2022-12-09 RX ADMIN — OXYCODONE HYDROCHLORIDE 5 MG: 5 TABLET ORAL at 10:15

## 2022-12-09 RX ADMIN — FUROSEMIDE 20 MG: 10 INJECTION, SOLUTION INTRAMUSCULAR; INTRAVENOUS at 09:39

## 2022-12-09 RX ADMIN — ACETAMINOPHEN 1000 MG: 500 TABLET ORAL at 05:17

## 2022-12-09 RX ADMIN — METOCLOPRAMIDE 10 MG: 5 INJECTION, SOLUTION INTRAMUSCULAR; INTRAVENOUS at 23:07

## 2022-12-09 RX ADMIN — MIDODRINE HYDROCHLORIDE 5 MG: 5 TABLET ORAL at 09:35

## 2022-12-09 RX ADMIN — BISACODYL 10 MG: 5 TABLET, COATED ORAL at 09:36

## 2022-12-09 RX ADMIN — CYANOCOBALAMIN TAB 1000 MCG 1000 MCG: 1000 TAB at 09:36

## 2022-12-09 RX ADMIN — DOCUSATE SODIUM 100 MG: 100 CAPSULE, LIQUID FILLED ORAL at 17:23

## 2022-12-09 RX ADMIN — SODIUM CHLORIDE, PRESERVATIVE FREE 10 ML: 5 INJECTION INTRAVENOUS at 13:36

## 2022-12-09 RX ADMIN — ATORVASTATIN CALCIUM 40 MG: 40 TABLET, FILM COATED ORAL at 22:29

## 2022-12-09 RX ADMIN — MONTELUKAST 10 MG: 10 TABLET, FILM COATED ORAL at 09:37

## 2022-12-09 RX ADMIN — CLOPIDOGREL BISULFATE 75 MG: 75 TABLET ORAL at 09:37

## 2022-12-09 RX ADMIN — ACETAMINOPHEN 1000 MG: 500 TABLET ORAL at 17:22

## 2022-12-09 RX ADMIN — ACETAMINOPHEN 1000 MG: 500 TABLET ORAL at 00:00

## 2022-12-09 RX ADMIN — CEFAZOLIN 2 G: 1 INJECTION, POWDER, FOR SOLUTION INTRAMUSCULAR; INTRAVENOUS at 05:17

## 2022-12-09 RX ADMIN — SODIUM CHLORIDE, PRESERVATIVE FREE 10 ML: 5 INJECTION INTRAVENOUS at 05:18

## 2022-12-09 RX ADMIN — HEPARIN SODIUM 5000 UNITS: 5000 INJECTION INTRAVENOUS; SUBCUTANEOUS at 16:10

## 2022-12-09 RX ADMIN — FUROSEMIDE 20 MG: 10 INJECTION, SOLUTION INTRAMUSCULAR; INTRAVENOUS at 22:28

## 2022-12-09 RX ADMIN — HEPARIN SODIUM 5000 UNITS: 5000 INJECTION INTRAVENOUS; SUBCUTANEOUS at 22:43

## 2022-12-09 RX ADMIN — CHLORHEXIDINE GLUCONATE 0.12% ORAL RINSE 10 ML: 1.2 LIQUID ORAL at 17:24

## 2022-12-09 RX ADMIN — PHENYLEPHRINE HYDROCHLORIDE 50 MCG/MIN: 10 INJECTION INTRAVENOUS at 00:12

## 2022-12-09 RX ADMIN — ACETAMINOPHEN 1000 MG: 500 TABLET ORAL at 12:44

## 2022-12-09 RX ADMIN — PHENYLEPHRINE HYDROCHLORIDE 40 MCG/MIN: 10 INJECTION INTRAVENOUS at 12:49

## 2022-12-09 RX ADMIN — Medication 20 UNITS: at 09:35

## 2022-12-09 RX ADMIN — FAMOTIDINE 20 MG: 20 TABLET ORAL at 17:23

## 2022-12-09 RX ADMIN — GABAPENTIN 200 MG: 100 CAPSULE ORAL at 10:15

## 2022-12-09 RX ADMIN — FAMOTIDINE 20 MG: 20 TABLET ORAL at 09:36

## 2022-12-09 RX ADMIN — METOCLOPRAMIDE 10 MG: 5 INJECTION, SOLUTION INTRAMUSCULAR; INTRAVENOUS at 17:23

## 2022-12-09 RX ADMIN — MORPHINE SULFATE 2 MG: 2 INJECTION, SOLUTION INTRAMUSCULAR; INTRAVENOUS at 00:12

## 2022-12-09 RX ADMIN — ALBUMIN HUMAN 25 G: 0.25 SOLUTION INTRAVENOUS at 20:42

## 2022-12-09 RX ADMIN — MORPHINE SULFATE 4 MG: 2 INJECTION, SOLUTION INTRAMUSCULAR; INTRAVENOUS at 22:27

## 2022-12-09 RX ADMIN — Medication 1 TABLET: at 09:35

## 2022-12-09 RX ADMIN — AMIODARONE HYDROCHLORIDE 200 MG: 200 TABLET ORAL at 22:28

## 2022-12-09 RX ADMIN — DOCUSATE SODIUM 100 MG: 100 CAPSULE, LIQUID FILLED ORAL at 09:36

## 2022-12-09 RX ADMIN — AMIODARONE HYDROCHLORIDE 200 MG: 200 TABLET ORAL at 16:10

## 2022-12-09 RX ADMIN — METOCLOPRAMIDE 10 MG: 5 INJECTION, SOLUTION INTRAMUSCULAR; INTRAVENOUS at 11:47

## 2022-12-09 RX ADMIN — CHLORHEXIDINE GLUCONATE 0.12% ORAL RINSE 10 ML: 1.2 LIQUID ORAL at 09:38

## 2022-12-09 RX ADMIN — Medication 3 UNITS: at 12:44

## 2022-12-09 RX ADMIN — AMIODARONE HYDROCHLORIDE 200 MG: 200 TABLET ORAL at 09:36

## 2022-12-09 RX ADMIN — POLYETHYLENE GLYCOL 3350 17 G: 17 POWDER, FOR SOLUTION ORAL at 09:35

## 2022-12-09 RX ADMIN — MUPIROCIN: 20 OINTMENT TOPICAL at 17:24

## 2022-12-09 RX ADMIN — METOCLOPRAMIDE 10 MG: 5 INJECTION, SOLUTION INTRAMUSCULAR; INTRAVENOUS at 05:17

## 2022-12-09 RX ADMIN — MIDODRINE HYDROCHLORIDE 5 MG: 5 TABLET ORAL at 12:44

## 2022-12-09 RX ADMIN — HEPARIN SODIUM 5000 UNITS: 5000 INJECTION INTRAVENOUS; SUBCUTANEOUS at 09:38

## 2022-12-09 RX ADMIN — MUPIROCIN: 20 OINTMENT TOPICAL at 09:34

## 2022-12-09 RX ADMIN — ASPIRIN 81 MG: 81 TABLET, COATED ORAL at 09:37

## 2022-12-09 RX ADMIN — MIDODRINE HYDROCHLORIDE 5 MG: 5 TABLET ORAL at 16:10

## 2022-12-09 RX ADMIN — OXYCODONE HYDROCHLORIDE 10 MG: 5 TABLET ORAL at 02:03

## 2022-12-09 RX ADMIN — FOLIC ACID 1 MG: 1 TABLET ORAL at 09:36

## 2022-12-09 RX ADMIN — Medication 6 UNITS: at 09:36

## 2022-12-09 RX ADMIN — Medication 3 UNITS: at 17:22

## 2022-12-09 RX ADMIN — MORPHINE SULFATE 4 MG: 2 INJECTION, SOLUTION INTRAMUSCULAR; INTRAVENOUS at 05:17

## 2022-12-09 RX ADMIN — SODIUM CHLORIDE, PRESERVATIVE FREE 10 ML: 5 INJECTION INTRAVENOUS at 22:35

## 2022-12-09 RX ADMIN — Medication 6 UNITS: at 22:43

## 2022-12-09 NOTE — PROGRESS NOTES
CARDIAC SURGERY PROGRESS NOTE    2022     Post Operative Day # 2     Chart reviewed. Interval History/Events of Past 24 hours:   Up to chair with max assist on Epi and Milton    Assessment:  CAD, AS S/P  CABG x 2, AVR - ASA, statin  Respiratory parameters stable  Cardiac status stable     Plans:  Start Midodrine  Lasix 20 mg x 1  Add PRN gabapentin for Multimodal analgesia  Wean Milton then Epi as ankit  Start BB when off pressors and BP allows  wean oxygen      Heart Hugger use encouraged to mitigate pain and will also assist with deep breathing and incentive spirometry goals. Possible discharge 3 days. Hamzah Morley PA-C  [facility]  [phone]  _____________________________________________________________________________________________________________________________________________  Subjective:  Patient seen and examined on rounds today. Pain level: poorly controlled   Ambulating: not well  Sleeping: not well  Eating:  not Well   Sternal pain: YES     BM: No    Objective:  Vital signs:   Visit Vitals  /66   Pulse 89   Temp 98 °F (36.7 °C)   Resp (!) 36   Ht 6' (1.829 m)   Wt 127.6 kg (281 lb 6.4 oz)   SpO2 99%   BMI 38.16 kg/m²     Temp (24hrs), Av.2 °F (36.8 °C), Min:97.8 °F (36.6 °C), Max:99.5 °F (37.5 °C)    Admission Weight: Last Weight   Weight: 130 kg (286 lb 9.6 oz) Weight: 127.6 kg (281 lb 6.4 oz)     Last 3 Recorded Weights in this Encounter    22 [id number] 22 0600   Weight: 130 kg (286 lb 9.6 oz) 127.6 kg (281 lb 6.4 oz)       Telemetry: NSR    Physical Examination:     General:  Alert, oriented  Lungs: Clear to ascultation without rales, wheezes or rhonchi. Chest: Dressings clean and dry. Midline incision healing well. Sternum stable. Heart: regular rate and rhythm, No murmur. Abdomen: Soft and non-tender without masses. Bowel sounds present. Extremities: Warm and well perfused. Edema moderate.   Incisions: clean and dry  Neuro: No deficit. SUP Prophylaxis: Pepcid  DVT Prophylaxis:   pneumatic compression boots: Yes  Compression stockings:  Yes  DVT ppx: Yes SC Heparin    Chest tubes:  present    Pacing wires:  present    DME needs:  tbd          Labs:  Lab Results   Component Value Date/Time    WBC 14.6 (H) 12/09/2022 05:34 AM    HCT 22.9 (L) 12/09/2022 05:34 AM     12/09/2022 05:34 AM      Lab Results   Component Value Date/Time     (L) 12/09/2022 05:34 AM    K 4.3 12/09/2022 05:34 AM     12/09/2022 05:34 AM    CO2 23 12/09/2022 05:34 AM     (H) 12/09/2022 05:34 AM    BUN 32 (H) 12/09/2022 05:34 AM    CREA 1.58 (H) 12/09/2022 05:34 AM    CREA 1.55 (H) 12/08/2022 04:00 AM    CREA 1.43 (H) 12/07/2022 03:19 PM     Lab Results   Component Value Date/Time    HBA1C 5.7 (H) 11/28/2022 07:40 AM         EKG: NSR     CXR: t/l ok, no space issues          PLEASE NOTE:  This document may have been produced using voice recognition software. Unrecognized errors in transcription may be present. Please call with any questions. NOTE TO PATIENT:  The purpose of this note is to communicate optimally with the other providers involved in your care. It is written using standard medical terminology. If you have questions regarding details of the note please call my office at [phone] and make an appointment to discuss your concerns.

## 2022-12-09 NOTE — PROGRESS NOTES
[facility] -Progress Note        Date of  Admission: 12/5/2022  5:51 AM   Primary Care Physician:  Dennis Mercedes MD  Attending Physician: Danny Rudolph MD         Assessment:     -Coronary artery disease with left main disease, EF 55% November 2022. Heart catheterization November 28, 2022 with left main 70%, LAD 40%, diagonal 80%  -Severe aortic stenosis, mean gradient 57 mmHg by echocardiogram November 28, 2022  -PAD with moderate left THOMAS 50-69% by ultrasound 11/2022  -Diabetes  -Hypertension  -Chronic kidney disease, stage III  -Chronic anemia  -Hemoglobin 7.7 today. Primary cardiologist: Dr. Pérez :     Patient is S/P CABG X 2 with aortic valve replacement, ([facility]'s bovine pericardial bioprosthesis ) (12/7/2022), slow progress  BP supported. No significant arrhythmia. Appreciate nephrology assistance. Note start of midodrine     History of Present Illness: This is a 79 y.o. female admitted for CAD (coronary artery disease) [I25.10]; Aortic stenosis [I35.0]. Patient complains of:    Patient was scheduled for CABG and aortic valve surgery yesterday. Procedure was aborted due to equipment issues. Tentative plan is for CABG and AVR tomorrow. Initial attempt November 28 was to stent the LAD however upon further evaluation was found to have significant left main disease and therefore planning CABG. Patient has chronic chest congestion without any current shortness of breath or chest pain. Initially presented with shortness of breath triggering work-up this spring by her immunologist.    Patient has a history of hypertension as well as diabetes and kidney disease. Found to have ostial LAD disease not amenable to stenting as well as significant aortic stenosis.     Review of Symptoms:  Except as stated above include:  Constitutional:  Negative  Ears, nose, throat:  Negative  Respiratory: Chronic dyspnea  Cardiovascular:  negative  Gastrointestinal: negative  Genitourinary:  negative  Musculoskeletal:  Negative  Neurological:  Negative  Dermatological:  Negative  Hematological: Negative  Endocrinological: Negative  Allergy: Negative  Psychological:  Negative       Past Medical History:     Past Medical History:   Diagnosis Date    Aortic stenosis 11/28/2022    Coronary artery disease involving native coronary artery of native heart 11/28/2022    Primary hypertension 11/28/2022    S/P AVR (aortic valve replacement) and aortoplasty 12/7/2022    S/p CABG x2 (LIMA-LAD, rSVG-OM), AVR 23 mm Inspiris with aortic root enlargement - Dr. Amisha Choi    S/P CABG x 2 12/7/2022    S/p CABG x2 (LIMA-LAD, rSVG-OM), AVR 23 mm Inspiris with aortic root enlargement - Dr. Amisha Choi    Type 2 diabetes mellitus, without long-term current use of insulin [facility] 11/28/2022         Social History:     Social History     Socioeconomic History    Marital status:    Tobacco Use    Smoking status: Heavy Smoker     Packs/day: 1.50     Types: Cigarettes   Substance and Sexual Activity    Alcohol use: Yes     Comment: 1    Drug use: No        Family History:   History reviewed. No pertinent family history. Medications:      Allergies   Allergen Reactions    Albuterol Sulfate Shortness of Breath    Entex [Pseudoephedrine Tannate] Rash    Terbutaline Shortness of Breath    Terramycin [Oxytetracycline] Unknown (comments)        Current Facility-Administered Medications   Medication Dose Route Frequency    midodrine (PROAMATINE) tablet 5 mg  5 mg Oral TID WITH MEALS    iron sucrose (VENOFER) injection 200 mg  200 mg IntraVENous DAILY    gabapentin (NEURONTIN) capsule 200 mg  200 mg Oral TID PRN    metoclopramide HCl (REGLAN) injection 10 mg  10 mg IntraVENous Q6H    insulin glargine (LANTUS) injection 20 Units  20 Units SubCUTAneous DAILY    insulin lispro (HUMALOG) injection   SubCUTAneous AC&HS    glucose chewable tablet 16 g  4 Tablet Oral PRN    glucagon (GLUCAGEN) injection 1 mg  1 mg IntraMUSCular PRN    dextrose 10% infusion 0-250 mL  0-250 mL IntraVENous PRN    cyanocobalamin tablet 1,000 mcg  1,000 mcg Oral DAILY    multivitamin, tx-iron-ca-min (THERA-M w/ IRON) tablet 1 Tablet  1 Tablet Oral DAILY    folic acid (FOLVITE) tablet 1 mg  1 mg Oral DAILY    oxyCODONE IR (ROXICODONE) tablet 5-10 mg  5-10 mg Oral Q4H PRN    acetaminophen (TYLENOL) tablet 1,000 mg  1,000 mg Oral Q6H    EPINEPHrine (ADRENALIN) 4 mg in 0.9% sodium chloride 250 mL infusion  1-10 mcg/min IntraVENous TITRATE    PHENYLephrine (FRANKI-SYNEPHRINE) 30 mg in 0.9% sodium chloride 250 mL infusion   mcg/min IntraVENous TITRATE    montelukast (SINGULAIR) tablet 10 mg  10 mg Oral DAILY    sodium chloride (NS) flush 5-40 mL  5-40 mL IntraVENous Q8H    sodium chloride (NS) flush 5-40 mL  5-40 mL IntraVENous PRN    morphine injection 2-4 mg  2-4 mg IntraVENous Q4H PRN    naloxone (NARCAN) injection 0.4 mg  0.4 mg IntraVENous PRN    amiodarone (CORDARONE) tablet 200 mg  200 mg Oral TID    albuterol (PROVENTIL VENTOLIN) nebulizer solution 2.5 mg  2.5 mg Nebulization Q4H PRN    aspirin delayed-release tablet 81 mg  81 mg Oral DAILY    clopidogreL (PLAVIX) tablet 75 mg  75 mg Oral DAILY    chlorhexidine (PERIDEX) 0.12 % mouthwash 10 mL  10 mL Oral BID    docusate sodium (COLACE) capsule 100 mg  100 mg Oral BID    ELECTROLYTE REPLACEMENT PROTOCOL - Potassium Standard Dosing  1 Each Other PRN    ELECTROLYTE REPLACEMENT PROTOCOL - Magnesium  1 Each Other PRN    heparin (porcine) injection 5,000 Units  5,000 Units SubCUTAneous Q8H    bisacodyL (DULCOLAX) tablet 10 mg  10 mg Oral DAILY    famotidine (PEPCID) tablet 20 mg  20 mg Oral BID    polyethylene glycol (MIRALAX) packet 17 g  17 g Oral DAILY    atorvastatin (LIPITOR) tablet 40 mg  40 mg Oral QHS    sodium chloride (OCEAN) 0.65 % nasal squeeze bottle 2 Spray  2 Spray Both Nostrils Q2H PRN    guaiFENesin ER (MUCINEX) tablet 600 mg  600 mg Oral Q8H PRN    mupirocin (BACTROBAN) 2 % ointment   Topical BID    ondansetron (ZOFRAN) injection 4 mg  4 mg IntraVENous Q4H PRN         Physical Exam:   Visit Vitals  /61   Pulse 90   Temp 98.7 °F (37.1 °C)   Resp 16   Ht 6' (1.829 m)   Wt 127.6 kg (281 lb 6.4 oz)   SpO2 97%   BMI 38.16 kg/m²     BP Readings from Last 3 Encounters:   12/09/22 122/61   11/28/22 119/73   08/02/22 (!) 156/58     Pulse Readings from Last 3 Encounters:   12/09/22 90   11/28/22 86   08/02/22 77     Wt Readings from Last 3 Encounters:   12/08/22 127.6 kg (281 lb 6.4 oz)   11/28/22 127.5 kg (281 lb)   08/02/22 127.5 kg (281 lb 1.4 oz)       General:  alert, cooperative, sitting in chair  Neck:  nontender  Lungs: Decreased breath sounds at bases  Heart:  regular rate and rhythm, FRANSISCA  Abdomen:  abdomen is soft without significant tenderness, masses, organomegaly or guarding  Extremities:  extremities normal, atraumatic, no cyanosis or edema  Skin: Warm and dry.  no hyperpigmentation, vitiligo, or suspicious lesions  Neuro: alert, oriented x3, affect appropriate, no focal neurological deficits, moves all extremities well, no involuntary movements, reflexes at knee and ankle intact  Psych: non focal     Data Review:     Recent Labs     12/09/22  0534 12/08/22  1625 12/08/22  0400   WBC 14.6* 15.4* 15.0*   HGB 7.4* 7.6* 7.7*   HCT 22.9* 23.3* 24.3*    158 194       Recent Labs     12/09/22  0534 12/08/22  0400 12/07/22  1519   * 138 139   K 4.3 4.3 4.3    104 106   CO2 23 24 23   * 97 240*   BUN 32* 31* 26*   CREA 1.58* 1.55* 1.43*   CA 8.4* 8.5 8.0*   MG  --   --  2.7*   INR  --   --  1.4*         Results for orders placed or performed during the hospital encounter of 12/05/22   EKG, 12 LEAD, INITIAL   Result Value Ref Range    Ventricular Rate 101 BPM    Atrial Rate 101 BPM    P-R Interval 144 ms    QRS Duration 92 ms    Q-T Interval 368 ms    QTC Calculation (Bezet) 477 ms    Calculated P Axis 58 degrees    Calculated R Axis 76 degrees    Calculated T Axis 63 degrees    Diagnosis       Sinus tachycardia  Low voltage QRS  Borderline ECG  When compared with ECG of 07-DEC-2022 15:08,    Criteria for Septal infarct are no longer present  Non-specific change in ST segment in Anterior leads  Confirmed by Tori Parish ([id number]) on 12/9/2022 11:40:39 AM         All Cardiac Markers in the last 24 hours:  No results found for: CPK, CK, CKMMB, CKMB, RCK3, CKMBT, CKNDX, CKND1, RHODA, TROPT, TROIQ, KASH, TROPT, TNIPOC, BNP, BNPP    Last Lipid:  No results found for: CHOL, CHOLX, CHLST, CHOLV, HDL, HDLP, LDL, LDLC, DLDLP, TGLX, TRIGL, TRIGP, CHHD, CHHDX    Signed By: Titus Chan MD     December 9, 2022

## 2022-12-09 NOTE — PROGRESS NOTES
07:15  Received pt up in chair. Pt A&O x 4. RIVERA, legs weak. 3 chest tubes to 2 atriums to -20 cm constant wall suction. No air leak noted. Chest-tubes draining serosanguinous fluid. Pt with epinephrine infusing at 2 mcg/min. Neosynephrine infusing at 60 mcg/min. VS: ART line 127/56 (map 77);  /48 (map 70); HR 92 - sinus rhythm; RR 22 with Sp02 100% on bipap 12/8; CVP 4. Pt with 2 ventricular pacing wires to pulse generator - off. Wound Prevention Checklist    Patient: [employer] (49 y.o. female)  Date: 12/8/2022  Diagnosis: CAD (coronary artery disease) [I25.10]  Aortic stenosis [I35.0] <principal problem not specified>    Precautions: Fall, Skin, Sternal       []  Heel prevention boots placed on patient    []  Patient turned q2h during shift    []  Lift team ordered    []  Patient on [place] bed/Specialty bed    []  Each Wound is documented during shift (Stage, Color, drainage, odor, measurements, and dressings)    [x]  Dual skin check done with JAVY Goldsmith RN   08:00  Dr. Roxanne Lucio and KO Uribe at bedside. Orders to place pt on nasal cannula and titrate accordingly. Orders for 250 albumin and 1 unit PRBC for h/h 7.2 and 21%. Pt stood up from recliner chair. Re-positioned in chair. 08:40  Pt medicated with 10 mg roxicodone. 09:05  Albumin infusing  09:45  1 unit of PRBC infusing. Pt drinking fluids well. Poor appetite for food. Pt refusing to use IS. States too painful to take deep breath even after medicated with roxicodone. 11:00  Pt medicated with 2 mg morphine. 11:15  Pt states good pain relief from morphine. Pt dozing in chair. 12:00  Blood completed. Pt stood from chair and repositioned. No skin breakdown noted to sacral area. 13:25  Pt medicated with 10 mg oxicodone  14:00  Pt refused to work with PT/OT. Was not interested in instructions. Had eyes closed. RN encouraged pt to engage in instruction.     16:25  CBC sent to yvonne post blood transfusion. 17:20  h/h post transfusion 7.6/23.3  18:01  Pt medicated with 10 mg oxicodone. 19:00  Pt 3 person assist to bed. Pt with weak legs. Shuffling gait. Pt fearful during ambulation to bed requiring coaxing and reassurance. 19:30  Bedside and Verbal shift change report given to Puma Carter RN (oncoming nurse) by Evelyn Latham RN (offgoing nurse). Report included the following information SBAR, Kardex, OR Summary, Procedure Summary, Intake/Output, MAR, Recent Results, and Cardiac Rhythm Sinus rhythm .

## 2022-12-09 NOTE — PROGRESS NOTES
Problem: Self Care Deficits Care Plan (Adult)  Goal: *Acute Goals and Plan of Care (Insert Text)  Description: Occupational Therapy Goals  Initiated 12/8/2022 within 7 day(s). 1.  Patient will perform grooming with supervision/set-up standing > 3 minutes, F+ balance. 2.  Patient will perform upper body dressing with supervision/set-up maintaining sternal precautions. 3.  Patient will perform lower body dressing with supervision/set-up using AE prn.  4.  Patient will perform toilet transfers with minimal assistance/contact guard assist.  5.  Patient will perform all aspects of toileting with supervision/set-up. 6.  Patient will participate in upper extremity therapeutic exercise/activities with supervision/set-up for 8 minutes to increase ROM/strength for ADLs. 7.  Patient will utilize energy conservation techniques during functional activities with verbal cues. 8.  Patient will recall all sternal precautions prior to ADLs with 100% accuracy. Prior Level of Function: Patient lives alone and was independent with self-care and functional mobility PTA. Outcome: Progressing Towards Goal   OCCUPATIONAL THERAPY TREATMENT    Patient: Zena Kat (69 y.o. female)  Date: 12/9/2022  Diagnosis: CAD (coronary artery disease) [I25.10]  Aortic stenosis [I35.0] <principal problem not specified>  Procedure(s) (LRB):  CORONARY ARTERY BYPASS GRAFT TIMES CORONARY ARTERY BYPASS GRAFT TIMES TWO; AORTIC VALVE REPAIR/REPLACEMENT (INSPIRIS)/ ANNULAR ENLARGEMENT PROCEDURE/ NON CORONARY SINUS (N/A) 2 Days Post-Op  Precautions: Fall, Skin, Sternal    Chart, occupational therapy assessment, plan of care, and goals were reviewed. ASSESSMENT:  Pt OOB seated in chair upon entry. Flat affect; c/o nausea and back pain. Pt requires max encouragement for minimal participation. Pt recall 0/3 sternal precautions. Reviewed sternal precautions and importance of maintaining w/ADLs and functional transfers.  Educated on energy conservation techniques w/ADLs. Pt refusing oral care task at chair level; perseverating on allergy to fluoride rinse. Pt educated on hand placement w/functional transfers and rocking technique in prep for STS. Pt requires 2 person assist w/3 attempts and unable to de-weight buttocks. Pt request return to supine, however, unable to stand. Educated on importance of UE Therex and encouraged to perform throughout the day. Progression toward goals:  []          Improving appropriately and progressing toward goals  [x]          Improving slowly and progressing toward goals  []          Not making progress toward goals and plan of care will be adjusted     PLAN:  Patient continues to benefit from skilled intervention to address the above impairments. Continue treatment per established plan of care. Further Equipment Recommendations for Discharge:  shower chair and rolling walker    AMPAC:   Current research shows that an AM-PAC score of 17 or less is not associated with a discharge to the patient's home setting. Based on an AM-PAC score of 14/24 and their current ADL deficits; it is recommended that the patient have 3-5 sessions per week of Occupational Therapy at d/c to increase the patient's independence. This AMPAC score should be considered in conjunction with interdisciplinary team recommendations to determine the most appropriate discharge setting. Patient's social support, diagnosis, medical stability, and prior level of function should also be taken into consideration. SUBJECTIVE:   Patient stated I just want to get back in bed.     OBJECTIVE DATA SUMMARY:   Cognitive/Behavioral Status:  Neurologic State: Alert  Orientation Level: Oriented X4  Cognition: Follows commands  Safety/Judgement: Fall prevention    Balance:  Sitting: Intact; With support    UE Therapeutic Exercises:   AROM BUE elbow flexion/extension    Pain:  Pain level pre-treatment: 0/10   Pain level post-treatment: 0/10  Pt c/o generalized and back pain, although not rated. Activity Tolerance:    Poor    Please refer to the flowsheet for vital signs taken during this treatment. After treatment:   [x]  Patient left in no apparent distress sitting up in chair  []  Patient left in no apparent distress in bed  [x]  Call bell left within reach  [x]  Nursing notified  []  Caregiver present  []  Bed alarm activated    COMMUNICATION/EDUCATION:   [] Role of Occupational Therapy in the acute care setting  [] Home safety education was provided and the patient/caregiver indicated understanding. [] Patient/family have participated as able in working towards goals and plan of care. [x] Patient/family agree to work toward stated goals and plan of care. [] Patient understands intent and goals of therapy, but is neutral about his/her participation. [] Patient is unable to participate in goal setting and plan of care. Thank you for this referral.  SOSA Silveira  Time Calculation: 26 mins    [employer] AM-PAC® Daily Activity Inpatient Short Form (6-Clicks)*    How much HELP from another person does the patient currently need    (If the patient hasn't done an activity recently, how much help from another person do you think he/she would need if he/she tried?)   Total (Total A or Dep)   A Lot  (Mod to Max A)   A Little (Sup or Min A)   None (Mod I to I)   Putting on and taking off regular lower body clothing? [] 1 [x] 2 [] 3 [] 4   2. Bathing (including washing, rinsing,      drying)? [] 1 [x] 2 [] 3 [] 4   3. Toileting, which includes using toilet, bedpan or urinal?   [] 1 [x] 2 [] 3 [] 4   4. Putting on and taking off regular upper body clothing? [] 1 [x] 2 [] 3 [] 4   5. Taking care of personal grooming such as brushing teeth? [] 1 [] 2 [x] 3 [] 4   6. Eating meals?    [] 1 [] 2 [x] 3 [] 4

## 2022-12-09 NOTE — PROGRESS NOTES
[facility] DAILY PROGRESS NOTE              Subjective:       Complaint:   Overnight events noted  On pressors  S/p chest tubes  UO is maintained    IMPRESSION:   OSVALDO ? contrast nephropathy on CKD stage 3 ? ATN related to CABG /AVR procedure  S/p CABG and AVR 12/7/22  Underlying diabetic and hypertensive nephropathy  Controlled diabetes  Severe AS   Diabetes  Hypertension   PLAN:    Follow cr for now  Agree with small dose of lasix today. Weaning pressors.  Starting midodrine per CTS  Monitor output closely  Avoid ACE I or diuretics for now  Daily labs for now  D/w CTS              Current Facility-Administered Medications   Medication Dose Route Frequency    midodrine (PROAMATINE) tablet 5 mg  5 mg Oral TID WITH MEALS    iron sucrose (VENOFER) injection 200 mg  200 mg IntraVENous DAILY    gabapentin (NEURONTIN) capsule 200 mg  200 mg Oral TID PRN    metoclopramide HCl (REGLAN) injection 10 mg  10 mg IntraVENous Q6H    insulin glargine (LANTUS) injection 20 Units  20 Units SubCUTAneous DAILY    insulin lispro (HUMALOG) injection   SubCUTAneous AC&HS    glucose chewable tablet 16 g  4 Tablet Oral PRN    glucagon (GLUCAGEN) injection 1 mg  1 mg IntraMUSCular PRN    dextrose 10% infusion 0-250 mL  0-250 mL IntraVENous PRN    0.9% sodium chloride infusion 250 mL  250 mL IntraVENous PRN    cyanocobalamin tablet 1,000 mcg  1,000 mcg Oral DAILY    multivitamin, tx-iron-ca-min (THERA-M w/ IRON) tablet 1 Tablet  1 Tablet Oral DAILY    folic acid (FOLVITE) tablet 1 mg  1 mg Oral DAILY    oxyCODONE IR (ROXICODONE) tablet 5-10 mg  5-10 mg Oral Q4H PRN    acetaminophen (TYLENOL) tablet 1,000 mg  1,000 mg Oral Q6H    EPINEPHrine (ADRENALIN) 4 mg in 0.9% sodium chloride 250 mL infusion  1-10 mcg/min IntraVENous TITRATE    PHENYLephrine (FRANKI-SYNEPHRINE) 30 mg in 0.9% sodium chloride 250 mL infusion   mcg/min IntraVENous TITRATE    montelukast (SINGULAIR) tablet 10 mg  10 mg Oral DAILY    0.9% sodium chloride infusion  10 mL/hr IntraVENous CONTINUOUS    sodium chloride (NS) flush 5-40 mL  5-40 mL IntraVENous Q8H    sodium chloride (NS) flush 5-40 mL  5-40 mL IntraVENous PRN    morphine injection 2-4 mg  2-4 mg IntraVENous Q4H PRN    naloxone (NARCAN) injection 0.4 mg  0.4 mg IntraVENous PRN    amiodarone (CORDARONE) tablet 200 mg  200 mg Oral TID    albuterol (PROVENTIL VENTOLIN) nebulizer solution 2.5 mg  2.5 mg Nebulization Q4H PRN    aspirin delayed-release tablet 81 mg  81 mg Oral DAILY    clopidogreL (PLAVIX) tablet 75 mg  75 mg Oral DAILY    chlorhexidine (PERIDEX) 0.12 % mouthwash 10 mL  10 mL Oral BID    docusate sodium (COLACE) capsule 100 mg  100 mg Oral BID    ELECTROLYTE REPLACEMENT PROTOCOL - Potassium Standard Dosing  1 Each Other PRN    ELECTROLYTE REPLACEMENT PROTOCOL - Magnesium  1 Each Other PRN    heparin (porcine) injection 5,000 Units  5,000 Units SubCUTAneous Q8H    bisacodyL (DULCOLAX) tablet 10 mg  10 mg Oral DAILY    famotidine (PEPCID) tablet 20 mg  20 mg Oral BID    polyethylene glycol (MIRALAX) packet 17 g  17 g Oral DAILY    atorvastatin (LIPITOR) tablet 40 mg  40 mg Oral QHS    sodium chloride (OCEAN) 0.65 % nasal squeeze bottle 2 Spray  2 Spray Both Nostrils Q2H PRN    guaiFENesin ER (MUCINEX) tablet 600 mg  600 mg Oral Q8H PRN    mupirocin (BACTROBAN) 2 % ointment   Topical BID    ondansetron (ZOFRAN) injection 4 mg  4 mg IntraVENous Q4H PRN       Review of Symptoms: comprehensive ROS negative except above.    Objective:   Patient Vitals for the past 24 hrs:   Temp Pulse Resp BP SpO2   12/09/22 0900 -- 83 22 (!) 127/52 100 %   12/09/22 0800 -- 85 21 (!) 121/55 99 %   12/09/22 0749 98 °F (36.7 °C) -- -- -- --   12/09/22 0700 -- 89 20 (!) 106/51 97 %   12/09/22 0600 -- 89 (!) 36 137/66 --   12/09/22 0500 -- 87 22 (!) 130/52 99 %   12/09/22 0400 98.3 °F (36.8 °C) 85 21 132/64 99 %   12/09/22 0300 -- 84 20 125/62 98 %   12/09/22 0200 -- 85 26 127/68 100 %   12/09/22 0100 -- 87 23 122/69 99 %   12/09/22 0010 -- -- -- -- 99 %   12/09/22 0000 98.3 °F (36.8 °C) 84 24 (!) 131/56 100 %   12/08/22 2300 -- 85 24 (!) 130/53 99 %   12/08/22 2200 -- 83 20 (!) 121/53 99 %   12/08/22 2100 -- 86 21 (!) 102/58 100 %   12/08/22 2000 98.2 °F (36.8 °C) 88 23 (!) 116/49 100 %   12/08/22 1917 -- 82 27 (!) 100/42 98 %   12/08/22 1800 -- 89 18 (!) 115/49 100 %   12/08/22 1700 -- 90 23 126/62 100 %   12/08/22 1600 97.9 °F (36.6 °C) 91 24 (!) 134/58 100 %   12/08/22 1500 -- 93 24 (!) 144/63 100 %   12/08/22 1458 -- 93 26 -- 100 %   12/08/22 1426 -- 88 20 (!) 136/59 100 %   12/08/22 1400 -- 89 22 (!) 110/48 100 %   12/08/22 1300 -- 95 15 (!) 132/58 100 %   12/08/22 1200 97.9 °F (36.6 °C) 89 21 135/63 100 %          Weight change:      12/07 1901 - 12/09 0700  In: 3245.9 [P.O.:1327;  I.V.:1608.9]  Out: 2913 [Urine:2093; Drains:820]    Intake/Output Summary (Last 24 hours) at 12/9/2022 1009  Last data filed at 12/9/2022 1000  Gross per 24 hour   Intake 1198.47 ml   Output 1824 ml   Net -625.53 ml       Physical Exam:   General: intubated  HEENT sclera anicteric, supple neck, no thyromegaly  CVS: S1S2 heard,  no rub  RS: + air entry b/l,   Abd: Soft, Non tender  Extrm: plus 2 edema, no cyanosis, clubbing   Skin: no visible  Rash  Musculoskeletal: No gross joints or bone deformities         Data Review:     LABS:   Hematology:   Recent Labs     12/09/22  0534 12/08/22  1625 12/08/22  0400 12/07/22  1519 12/07/22  0520   WBC 14.6* 15.4* 15.0* 19.8* 8.6   HGB 7.4* 7.6* 7.7* 10.5* 10.2*   HCT 22.9* 23.3* 24.3* 32.2* 31.8*       Chemistry:   Recent Labs     12/09/22  0534 12/08/22  0400 12/07/22  1519 12/07/22  0520   BUN 32* 31* 26* 27*   CREA 1.58* 1.55* 1.43* 1.31*   CA 8.4* 8.5 8.0* 8.7   K 4.3 4.3 4.3 4.1   * 138 139 137    104 106 104   CO2 23 24 23 27   * 97 240* 125*              Procedures/imaging: see electronic medical records for all procedures, Xrays and details which were not copied into this note but were reviewed prior to creation of Plan          Assessment & Plan:     See above        Rodney Souza MD  12/9/2022

## 2022-12-09 NOTE — PROGRESS NOTES
07:15  Received pt up in chair with Laina Mary RN. Pt A&O x 4. Rates pain 3-4/10.  3 chest tubes to 2 atriums to constant -20cm wall suction. No air leak noted. Draining serousanguinous fluid. Chapa draining clear yellow urine. Pt receiving epi at 2 mcg/min; Milton infusing at 50 mcg/min. VSS: ART line 140/56 map 79; /51 map 68; Hr 85 bpm and sinus rhythm. RR 23 with sp02 99% on 2 liters 02 via n/c.    07:30  Pt 2 person max-assist to stand to reposition in chair. Pt complaining about difficulty. Assured pt of need to move and ease will come with repetition. Wound Prevention Checklist    Patient: Escobar Espinoza (63 y.o. female)  Date: 12/9/2022  Diagnosis: CAD (coronary artery disease) [I25.10]  Aortic stenosis [I35.0] <principal problem not specified>    Precautions: Fall, Skin, Sternal       []  Heel prevention boots placed on patient    []  Patient turned q2h during shift    []  Lift team ordered    []  Patient on [place] bed/Specialty bed    []  Each Wound is documented during shift (Stage, Color, drainage, odor, measurements, and dressings)    [x]  Dual skin check done with JAVY Coates RN   08:00  A. KO Gutierrez and Dr. Patricia Claire at bedside. Orders received to remove ART line and also administer 20 mg lasix. 11:30  PT at bedside with RN. Pt attempting to forgo standing and ambulation. Wants to go back to bed. Requesting Laina Mary RN from night shift to assist.  Then stated she was nauseated. Pt medicated with scheduled reglan. FWW obtained but pt made futile attempts to stand. Requested to go back to bed. When pt learned that going back to bed requires standing and that when dinner came, she would then return to recliner, pt changed her mind. Pt failed standing x 3 attempts with PT and this RN. Pt quite forward in chair and was able to stand enough to get bottom back in chair. Will try again after lunch.     17:00  Pt stood after 3 attempts but knees bent and pt unable to maintain position except for a few minutes. 18:20  Valdemar Alan phoned regarding pt urine output trending down to 30 ml/hr. Orders received for 25% albumin 100 cc.    19:00  Pt 3 person assist back to bed. Pt able to stand and shuffle-pivot from recliner to chair. Bedside and Verbal shift change report given to Yahir Timmons RN (oncoming nurse) by Israel Beltre RN (offgoing nurse). Report included the following information SBAR, Kardex, Procedure Summary, Intake/Output, MAR, Recent Results, Med Rec Status, and Cardiac Rhythm Sinus rhythm .

## 2022-12-09 NOTE — PROGRESS NOTES
Pt experienced a lot of pain and discomfort overnight. She does experience a moderate amount of relief with scheduled medications but she did not sleep much at all. She may need something for the future to help her sleep. Her chest tube output was moderate, BP maintained on current drips without any titrations overnight, urine output maintained above 30mL, bathed with CHG, midsternal dressing and leg dressings changed. Incisions look to be healing nicely, no drainage. Pt was very weak getting up to chair and was unsteady on the standing scale. Standing scale showed weight was 301lbs but may be inaccurate due to her inability to stand stable and unassisted. Temp:  [97.5 °F (36.4 °C)-100.4 °F (38 °C)]   Pulse (Heart Rate):  []   BP: (100-144)/(38-79)   Resp Rate:  [0-36]   O2 Sat (%):  [90 %-100 %]   Weight:  [127.6 kg (281 lb 6.4 oz)]   No intake/output data recorded. 12/07 1901 - 12/09 0700  In: 3245.9 [P.O.:1327; I.V.:1608.9]  Out: 6517 [Urine:2093; Drains:820]      Objective:  Vital signs: (most recent): Blood pressure 137/66, pulse 89, temperature 98.3 °F (36.8 °C), resp. rate (!) 36, height 6' (1.829 m), weight 127.6 kg (281 lb 6.4 oz), SpO2 99 %. Active Problems:     Aortic stenosis (11/28/2022)      CAD (coronary artery disease) (12/5/2022)

## 2022-12-10 ENCOUNTER — APPOINTMENT (OUTPATIENT)
Dept: GENERAL RADIOLOGY | Age: 67
End: 2022-12-10
Attending: PHYSICIAN ASSISTANT
Payer: MEDICARE

## 2022-12-10 LAB
ANION GAP SERPL CALC-SCNC: 5 MMOL/L (ref 3–18)
BASOPHILS # BLD: 0.1 K/UL (ref 0–0.1)
BASOPHILS NFR BLD: 1 % (ref 0–2)
BUN SERPL-MCNC: 39 MG/DL (ref 7–18)
BUN/CREAT SERPL: 26 (ref 12–20)
CALCIUM SERPL-MCNC: 8.2 MG/DL (ref 8.5–10.1)
CHLORIDE SERPL-SCNC: 101 MMOL/L (ref 100–111)
CO2 SERPL-SCNC: 26 MMOL/L (ref 21–32)
CREAT SERPL-MCNC: 1.52 MG/DL (ref 0.6–1.3)
DIFFERENTIAL METHOD BLD: ABNORMAL
EOSINOPHIL # BLD: 0.2 K/UL (ref 0–0.4)
EOSINOPHIL NFR BLD: 2 % (ref 0–5)
ERYTHROCYTE [DISTWIDTH] IN BLOOD BY AUTOMATED COUNT: 18.7 % (ref 11.6–14.5)
GLUCOSE BLD STRIP.AUTO-MCNC: 153 MG/DL (ref 70–110)
GLUCOSE BLD STRIP.AUTO-MCNC: 155 MG/DL (ref 70–110)
GLUCOSE BLD STRIP.AUTO-MCNC: 160 MG/DL (ref 70–110)
GLUCOSE BLD STRIP.AUTO-MCNC: 203 MG/DL (ref 70–110)
GLUCOSE SERPL-MCNC: 132 MG/DL (ref 74–99)
HCT VFR BLD AUTO: 20.9 % (ref 35–45)
HCT VFR BLD AUTO: 27.4 % (ref 35–45)
HGB BLD-MCNC: 6.7 G/DL (ref 12–16)
HGB BLD-MCNC: 9.1 G/DL (ref 12–16)
HISTORY CHECKED?,CKHIST: NORMAL
HISTORY CHECKED?,CKHIST: NORMAL
IMM GRANULOCYTES # BLD AUTO: 0.1 K/UL (ref 0–0.04)
IMM GRANULOCYTES NFR BLD AUTO: 1 % (ref 0–0.5)
LYMPHOCYTES # BLD: 1.7 K/UL (ref 0.9–3.6)
LYMPHOCYTES NFR BLD: 13 % (ref 21–52)
MCH RBC QN AUTO: 29.1 PG (ref 24–34)
MCHC RBC AUTO-ENTMCNC: 32.1 G/DL (ref 31–37)
MCV RBC AUTO: 90.9 FL (ref 78–100)
MONOCYTES # BLD: 1.2 K/UL (ref 0.05–1.2)
MONOCYTES NFR BLD: 9 % (ref 3–10)
NEUTS SEG # BLD: 10.4 K/UL (ref 1.8–8)
NEUTS SEG NFR BLD: 75 % (ref 40–73)
NRBC # BLD: 0.02 K/UL (ref 0–0.01)
NRBC BLD-RTO: 0.1 PER 100 WBC
PLATELET # BLD AUTO: 169 K/UL (ref 135–420)
PMV BLD AUTO: 10 FL (ref 9.2–11.8)
POTASSIUM SERPL-SCNC: 4.1 MMOL/L (ref 3.5–5.5)
RBC # BLD AUTO: 2.3 M/UL (ref 4.2–5.3)
SODIUM SERPL-SCNC: 132 MMOL/L (ref 136–145)
WBC # BLD AUTO: 13.8 K/UL (ref 4.6–13.2)

## 2022-12-10 PROCEDURE — 2709999900 HC NON-CHARGEABLE SUPPLY

## 2022-12-10 PROCEDURE — 99232 SBSQ HOSP IP/OBS MODERATE 35: CPT | Performed by: INTERNAL MEDICINE

## 2022-12-10 PROCEDURE — 74011250636 HC RX REV CODE- 250/636: Performed by: PHYSICIAN ASSISTANT

## 2022-12-10 PROCEDURE — 65620000000 HC RM CCU GENERAL

## 2022-12-10 PROCEDURE — 74011250636 HC RX REV CODE- 250/636: Performed by: THORACIC SURGERY (CARDIOTHORACIC VASCULAR SURGERY)

## 2022-12-10 PROCEDURE — 94762 N-INVAS EAR/PLS OXIMTRY CONT: CPT

## 2022-12-10 PROCEDURE — APPNB60 APP NON BILLABLE TIME 46-60 MINS: Performed by: PHYSICIAN ASSISTANT

## 2022-12-10 PROCEDURE — 77010033678 HC OXYGEN DAILY

## 2022-12-10 PROCEDURE — 71045 X-RAY EXAM CHEST 1 VIEW: CPT

## 2022-12-10 PROCEDURE — P9016 RBC LEUKOCYTES REDUCED: HCPCS

## 2022-12-10 PROCEDURE — 74011250636 HC RX REV CODE- 250/636: Performed by: INTERNAL MEDICINE

## 2022-12-10 PROCEDURE — 86923 COMPATIBILITY TEST ELECTRIC: CPT

## 2022-12-10 PROCEDURE — 85025 COMPLETE CBC W/AUTO DIFF WBC: CPT

## 2022-12-10 PROCEDURE — 99024 POSTOP FOLLOW-UP VISIT: CPT | Performed by: PHYSICIAN ASSISTANT

## 2022-12-10 PROCEDURE — 74011250637 HC RX REV CODE- 250/637: Performed by: PHYSICIAN ASSISTANT

## 2022-12-10 PROCEDURE — 86900 BLOOD TYPING SEROLOGIC ABO: CPT

## 2022-12-10 PROCEDURE — APPNB45 APP NON BILLABLE 31-45 MINUTES: Performed by: PHYSICIAN ASSISTANT

## 2022-12-10 PROCEDURE — 85018 HEMOGLOBIN: CPT

## 2022-12-10 PROCEDURE — 82962 GLUCOSE BLOOD TEST: CPT

## 2022-12-10 PROCEDURE — 80048 BASIC METABOLIC PNL TOTAL CA: CPT

## 2022-12-10 PROCEDURE — 36430 TRANSFUSION BLD/BLD COMPNT: CPT

## 2022-12-10 PROCEDURE — 74011000250 HC RX REV CODE- 250: Performed by: PHYSICIAN ASSISTANT

## 2022-12-10 PROCEDURE — 74011636637 HC RX REV CODE- 636/637: Performed by: PHYSICIAN ASSISTANT

## 2022-12-10 RX ORDER — CALCIUM GLUCONATE 20 MG/ML
1 INJECTION, SOLUTION INTRAVENOUS ONCE
Status: COMPLETED | OUTPATIENT
Start: 2022-12-10 | End: 2022-12-10

## 2022-12-10 RX ORDER — CALCIUM GLUCONATE 20 MG/ML
2 INJECTION, SOLUTION INTRAVENOUS ONCE
Status: DISCONTINUED | OUTPATIENT
Start: 2022-12-10 | End: 2022-12-10

## 2022-12-10 RX ORDER — INSULIN GLARGINE 100 [IU]/ML
30 INJECTION, SOLUTION SUBCUTANEOUS DAILY
Status: DISCONTINUED | OUTPATIENT
Start: 2022-12-11 | End: 2022-12-15 | Stop reason: HOSPADM

## 2022-12-10 RX ORDER — FUROSEMIDE 10 MG/ML
20 INJECTION INTRAMUSCULAR; INTRAVENOUS ONCE
Status: COMPLETED | OUTPATIENT
Start: 2022-12-10 | End: 2022-12-10

## 2022-12-10 RX ORDER — SODIUM CHLORIDE 9 MG/ML
250 INJECTION, SOLUTION INTRAVENOUS AS NEEDED
Status: DISCONTINUED | OUTPATIENT
Start: 2022-12-10 | End: 2022-12-12

## 2022-12-10 RX ADMIN — AMIODARONE HYDROCHLORIDE 200 MG: 200 TABLET ORAL at 16:54

## 2022-12-10 RX ADMIN — HEPARIN SODIUM 5000 UNITS: 5000 INJECTION INTRAVENOUS; SUBCUTANEOUS at 13:28

## 2022-12-10 RX ADMIN — MIDODRINE HYDROCHLORIDE 10 MG: 5 TABLET ORAL at 13:14

## 2022-12-10 RX ADMIN — FOLIC ACID 1 MG: 1 TABLET ORAL at 08:52

## 2022-12-10 RX ADMIN — IRON SUCROSE 200 MG: 20 INJECTION, SOLUTION INTRAVENOUS at 08:52

## 2022-12-10 RX ADMIN — Medication 3 UNITS: at 17:33

## 2022-12-10 RX ADMIN — HEPARIN SODIUM 5000 UNITS: 5000 INJECTION INTRAVENOUS; SUBCUTANEOUS at 22:22

## 2022-12-10 RX ADMIN — CHLORHEXIDINE GLUCONATE 0.12% ORAL RINSE 10 ML: 1.2 LIQUID ORAL at 18:04

## 2022-12-10 RX ADMIN — CHLORHEXIDINE GLUCONATE 0.12% ORAL RINSE 10 ML: 1.2 LIQUID ORAL at 08:52

## 2022-12-10 RX ADMIN — ONDANSETRON 4 MG: 2 INJECTION INTRAMUSCULAR; INTRAVENOUS at 21:16

## 2022-12-10 RX ADMIN — CALCIUM GLUCONATE 1000 MG: 20 INJECTION, SOLUTION INTRAVENOUS at 11:44

## 2022-12-10 RX ADMIN — AMIODARONE HYDROCHLORIDE 200 MG: 200 TABLET ORAL at 08:52

## 2022-12-10 RX ADMIN — CALCIUM GLUCONATE 1000 MG: 20 INJECTION, SOLUTION INTRAVENOUS at 10:44

## 2022-12-10 RX ADMIN — FUROSEMIDE 20 MG: 10 INJECTION, SOLUTION INTRAMUSCULAR; INTRAVENOUS at 16:54

## 2022-12-10 RX ADMIN — SODIUM CHLORIDE, PRESERVATIVE FREE 10 ML: 5 INJECTION INTRAVENOUS at 22:21

## 2022-12-10 RX ADMIN — SODIUM CHLORIDE, PRESERVATIVE FREE 10 ML: 5 INJECTION INTRAVENOUS at 06:20

## 2022-12-10 RX ADMIN — MIDODRINE HYDROCHLORIDE 10 MG: 5 TABLET ORAL at 16:54

## 2022-12-10 RX ADMIN — DOCUSATE SODIUM 100 MG: 100 CAPSULE, LIQUID FILLED ORAL at 08:52

## 2022-12-10 RX ADMIN — CYANOCOBALAMIN TAB 1000 MCG 1000 MCG: 1000 TAB at 08:52

## 2022-12-10 RX ADMIN — BISACODYL 10 MG: 5 TABLET, COATED ORAL at 08:52

## 2022-12-10 RX ADMIN — FUROSEMIDE 20 MG: 10 INJECTION, SOLUTION INTRAMUSCULAR; INTRAVENOUS at 12:33

## 2022-12-10 RX ADMIN — HEPARIN SODIUM 5000 UNITS: 5000 INJECTION INTRAVENOUS; SUBCUTANEOUS at 06:18

## 2022-12-10 RX ADMIN — Medication 3 UNITS: at 22:22

## 2022-12-10 RX ADMIN — ONDANSETRON 4 MG: 2 INJECTION INTRAMUSCULAR; INTRAVENOUS at 17:29

## 2022-12-10 RX ADMIN — Medication 6 UNITS: at 13:28

## 2022-12-10 RX ADMIN — FAMOTIDINE 20 MG: 20 TABLET ORAL at 08:52

## 2022-12-10 RX ADMIN — ACETAMINOPHEN 1000 MG: 500 TABLET ORAL at 13:14

## 2022-12-10 RX ADMIN — Medication 1 TABLET: at 08:52

## 2022-12-10 RX ADMIN — Medication 20 UNITS: at 08:53

## 2022-12-10 RX ADMIN — POLYETHYLENE GLYCOL 3350 17 G: 17 POWDER, FOR SOLUTION ORAL at 08:51

## 2022-12-10 RX ADMIN — CLOPIDOGREL BISULFATE 75 MG: 75 TABLET ORAL at 08:52

## 2022-12-10 RX ADMIN — OXYCODONE HYDROCHLORIDE 5 MG: 5 TABLET ORAL at 06:18

## 2022-12-10 RX ADMIN — FAMOTIDINE 20 MG: 20 TABLET ORAL at 18:03

## 2022-12-10 RX ADMIN — MIDODRINE HYDROCHLORIDE 10 MG: 5 TABLET ORAL at 08:51

## 2022-12-10 RX ADMIN — MONTELUKAST 10 MG: 10 TABLET, FILM COATED ORAL at 08:52

## 2022-12-10 RX ADMIN — ACETAMINOPHEN 1000 MG: 500 TABLET ORAL at 06:18

## 2022-12-10 RX ADMIN — ATORVASTATIN CALCIUM 40 MG: 40 TABLET, FILM COATED ORAL at 22:45

## 2022-12-10 RX ADMIN — ACETAMINOPHEN 1000 MG: 500 TABLET ORAL at 18:03

## 2022-12-10 RX ADMIN — Medication 3 UNITS: at 08:52

## 2022-12-10 RX ADMIN — AMIODARONE HYDROCHLORIDE 200 MG: 200 TABLET ORAL at 22:22

## 2022-12-10 RX ADMIN — DOCUSATE SODIUM 100 MG: 100 CAPSULE, LIQUID FILLED ORAL at 18:04

## 2022-12-10 RX ADMIN — SODIUM CHLORIDE, PRESERVATIVE FREE 10 ML: 5 INJECTION INTRAVENOUS at 13:29

## 2022-12-10 RX ADMIN — ONDANSETRON 4 MG: 2 INJECTION INTRAMUSCULAR; INTRAVENOUS at 12:42

## 2022-12-10 RX ADMIN — ASPIRIN 81 MG: 81 TABLET, COATED ORAL at 08:52

## 2022-12-10 NOTE — PROGRESS NOTES
PM CXR with L PTX similar to am.   Pt HD stable on 2LNC with mediastinal and LEFT pleural tube in place. S/w Elmer, RN to increase suction on LEFT pleural tube to -40 cm.   Repeat CXR in am    Brenda Lopez PA-C  [facility]  [phone]

## 2022-12-10 NOTE — PROGRESS NOTES
CARDIAC SURGERY PROGRESS NOTE    12/10/2022     Post Operative Day # 3     Chart reviewed. Interval History/Events of Past 24 hours:   Up to chair with max assist on Epi and Milton. Small LEFT PTX on CXR    Assessment:  CAD, AS S/P  CABG x 2, AVR - ASA, statin, Plavix  Respiratory parameters stable  Cardiac status stable   Hypotension on Midodrine    Plans:  Transfuse PRBC x2  Lasix in between  Wean Milton  Cont Epi  D/c PW - done without difficulty  Repeat CXR at 2pm  PT/OT    Heart Hugger use encouraged to mitigate pain and will also assist with deep breathing and incentive spirometry goals. Possible discharge 3 days. Mauricio Brannon PA-C  [facility]  [phone]  _____________________________________________________________________________________________________________________________________________  Subjective:  Patient seen and examined on rounds today. Pain level: poorly controlled   Ambulating: no  Sleeping: not well  Eating:  not Well   Sternal pain: YES     BM: No    Objective:  Vital signs:   Visit Vitals  BP (!) 112/50   Pulse 81   Temp 98.2 °F (36.8 °C)   Resp 21   Ht 6' (1.829 m)   Wt 134.2 kg (295 lb 12.8 oz)   SpO2 95%   BMI 40.12 kg/m²     Temp (24hrs), Av.2 °F (36.8 °C), Min:98 °F (36.7 °C), Max:98.7 °F (37.1 °C)    Admission Weight: Last Weight   Weight: 130 kg (286 lb 9.6 oz) Weight: 134.2 kg (295 lb 12.8 oz)     Last 3 Recorded Weights in this Encounter    22 0706 22 0600 12/10/22 0645   Weight: 130 kg (286 lb 9.6 oz) 127.6 kg (281 lb 6.4 oz) 134.2 kg (295 lb 12.8 oz)       Telemetry: NSR    Physical Examination:     General:  Alert, oriented  Lungs: Clear to ascultation without rales, wheezes or rhonchi. Chest: Dressings clean and dry. Midline incision healing well. Sternum stable. Heart: regular rate and rhythm, No murmur. Abdomen: Soft and non-tender without masses. Bowel sounds present. Extremities: Warm and well perfused. Edema moderate. Incisions: clean and dry  Neuro: No deficit. SUP Prophylaxis: Pepcid  DVT Prophylaxis:   pneumatic compression boots: Yes  Compression stockings:  Yes  DVT ppx: Yes SC Heparin    Chest tubes:  present    Pacing wires:  present    DME needs:  tbd          Labs:  Lab Results   Component Value Date/Time    WBC 13.8 (H) 12/10/2022 04:05 AM    HCT 20.9 (L) 12/10/2022 04:05 AM     12/10/2022 04:05 AM      Lab Results   Component Value Date/Time     (L) 12/10/2022 04:05 AM    K 4.1 12/10/2022 04:05 AM     12/10/2022 04:05 AM    CO2 26 12/10/2022 04:05 AM     (H) 12/10/2022 04:05 AM    BUN 39 (H) 12/10/2022 04:05 AM    CREA 1.52 (H) 12/10/2022 04:05 AM    CREA 1.58 (H) 12/09/2022 05:34 AM    CREA 1.55 (H) 12/08/2022 04:00 AM     Lab Results   Component Value Date/Time    HBA1C 5.7 (H) 11/28/2022 07:40 AM         EKG: NSR     CXR: t/l ok, no space issues          PLEASE NOTE:  This document may have been produced using voice recognition software. Unrecognized errors in transcription may be present. Please call with any questions. NOTE TO PATIENT:  The purpose of this note is to communicate optimally with the other providers involved in your care. It is written using standard medical terminology. If you have questions regarding details of the note please call my office at [phone] and make an appointment to discuss your concerns.

## 2022-12-10 NOTE — PROGRESS NOTES
0700- Change of shift. Report received from iMmi Soni RN. Will assume care of the patient. 5917- Made aware by blood bank that new type and screen is needed. 0825- Type and screen sent. 1030- 1 unit pRBC started. 1330- transfusion complete. 1400- Milton off.   1435- Second unit pRBC started. 1600- Left chest tube suction increased to 40.  1700- Second transfusion complete. Additional dose of lasix given per Dr Shyann Berg. 1800- Patient back to bed. 2 x assist but somewhat stronger than this morning. 1830- Post-transfusion Hgb/Hct sent. 1900- Change of shift. Report given to Mimi Soni RN.

## 2022-12-10 NOTE — PROGRESS NOTES
Problem: Pressure Injury - Risk of  Goal: *Prevention of pressure injury  Description: Document Nas Scale and appropriate interventions in the flowsheet. Outcome: Progressing Towards Goal  Note: Pressure Injury Interventions:  Sensory Interventions: Assess changes in LOC         Activity Interventions: Pressure redistribution bed/mattress(bed type)    Mobility Interventions: Pressure redistribution bed/mattress (bed type)    Nutrition Interventions: Document food/fluid/supplement intake    Friction and Shear Interventions: Minimize layers                Problem: Falls - Risk of  Goal: *Absence of Falls  Description: Document Yanet Fall Risk and appropriate interventions in the flowsheet.   Outcome: Progressing Towards Goal  Note: Fall Risk Interventions:  Mobility Interventions: Bed/chair exit alarm    Mentation Interventions: Adequate sleep, hydration, pain control, Bed/chair exit alarm, Door open when patient unattended, Evaluate medications/consider consulting pharmacy, Room close to nurse's station    Medication Interventions: Bed/chair exit alarm    Elimination Interventions: Call light in reach, Bed/chair exit alarm    History of Falls Interventions: Bed/chair exit alarm         Problem: CABG: Post-Op Day 2/Transfer Day  Goal: Activity/Safety  Outcome: Progressing Towards Goal  Goal: Consults, if ordered  Outcome: Progressing Towards Goal  Goal: Diagnostic Test/Procedures  Outcome: Progressing Towards Goal  Goal: Nutrition/Diet  Outcome: Progressing Towards Goal  Goal: Medications  Outcome: Progressing Towards Goal  Goal: Discharge Planning  Outcome: Progressing Towards Goal  Goal: Respiratory  Outcome: Progressing Towards Goal  Goal: Treatments/Interventions/Procedures  Outcome: Progressing Towards Goal  Goal: Psychosocial  Outcome: Progressing Towards Goal  Goal: *Hemodynamically stable without vasoactive medications  Outcome: Progressing Towards Goal  Goal: *Lungs clear or at baseline  Outcome: Progressing Towards Goal  Goal: *Optimal pain control at patient's stated goal  Outcome: Progressing Towards Goal  Goal: *Demonstrates progressive activity  Outcome: Progressing Towards Goal  Goal: *Tolerating diet  Outcome: Progressing Towards Goal

## 2022-12-10 NOTE — PROGRESS NOTES
Problem: Pressure Injury - Risk of  Goal: *Prevention of pressure injury  Description: Document Nas Scale and appropriate interventions in the flowsheet. Outcome: Progressing Towards Goal  Note: Pressure Injury Interventions:  Sensory Interventions: Assess changes in LOC, Avoid rigorous massage over bony prominences, Check visual cues for pain, Float heels, Discuss PT/OT consult with provider, Turn and reposition approx. every two hours (pillows and wedges if needed)         Activity Interventions: Assess need for specialty bed, Chair cushion, Increase time out of bed, Pressure redistribution bed/mattress(bed type), PT/OT evaluation    Mobility Interventions: Assess need for specialty bed, Chair cushion, HOB 30 degrees or less, Pressure redistribution bed/mattress (bed type), Turn and reposition approx. every two hours(pillow and wedges)    Nutrition Interventions: Document food/fluid/supplement intake, Discuss nutritional consult with provider    Friction and Shear Interventions: Apply protective barrier, creams and emollients, Foam dressings/transparent film/skin sealants, Transfer aides:transfer board/Roya lift/ceiling lift                Problem: Falls - Risk of  Goal: *Absence of Falls  Description: Document Yanet Fall Risk and appropriate interventions in the flowsheet.   Outcome: Progressing Towards Goal  Note: Fall Risk Interventions:  Mobility Interventions: Assess mobility with egress test, Bed/chair exit alarm, Communicate number of staff needed for ambulation/transfer, OT consult for ADLs, Patient to call before getting OOB, PT Consult for mobility concerns, PT Consult for assist device competence, Strengthening exercises (ROM-active/passive), Utilize walker, cane, or other assistive device    Mentation Interventions: Adequate sleep, hydration, pain control, Bed/chair exit alarm, Evaluate medications/consider consulting pharmacy, Reorient patient, More frequent rounding, Room close to nurse's station, Self-releasing belt, Toileting rounds, Update white board    Medication Interventions: Assess postural VS orthostatic hypotension, Bed/chair exit alarm, Evaluate medications/consider consulting pharmacy, Patient to call before getting OOB, Teach patient to arise slowly    Elimination Interventions: Bed/chair exit alarm, Call light in reach, Elevated toilet seat, Patient to call for help with toileting needs, Stay With Me (per policy), Toilet paper/wipes in reach, Urinal in reach, Toileting schedule/hourly rounds    History of Falls Interventions: Bed/chair exit alarm, Evaluate medications/consider consulting pharmacy, Vital signs minimum Q4HRs X 24 hrs (comment for end date), Assess for delayed presentation/identification of injury for 48 hrs (comment for end date), Room close to nurse's station, Investigate reason for fall, Consult care management for discharge planning, Door open when patient unattended         Problem: Patient Education: Go to Patient Education Activity  Goal: Patient/Family Education  Outcome: Progressing Towards Goal     Problem: Patient Education: Go to Patient Education Activity  Goal: Patient/Family Education  Outcome: Progressing Towards Goal

## 2022-12-10 NOTE — PROGRESS NOTES
12/10/22 0140   Oxygen Therapy   O2 Sat (%) 96 %   Pulse via Oximetry 78 beats per minute   O2 Device Nasal cannula;Humidifier   O2 Flow Rate (L/min) 2 l/min       Patient continues to do well on NC. No need for bipap noted at this time.

## 2022-12-10 NOTE — PROGRESS NOTES
Dec UOP ~30ml/hr  100 ml 25% albumin ordered  Followed by Lasix 20 ml IV    Otf Browne PA-C  [facility]  [phone]

## 2022-12-10 NOTE — PROGRESS NOTES
1900: Bedside shift change report given to this nurse (oncoming nurse) by Renard Walker. Todd Randolph RN (offgoing nurse). Report included the following information SBAR, Kardex, and Cardiac Rhythm NSR . Wound Prevention Checklist    Patient: Mirta Jacobs (17 y.o. female)  Date: 12/10/2022  Diagnosis: CAD (coronary artery disease) [I25.10]  Aortic stenosis [I35.0] <principal problem not specified>    Precautions: Fall, Skin, Sternal       []  Heel prevention boots placed on patient    [x]  Patient turned q2h during shift    []  Lift team ordered    [x]  Patient on [place] bed/Specialty bed    [x]  Each Wound is documented during shift (Stage, Color, drainage, odor, measurements, and dressings)    [x]  Dual skin check done with MERRY Randolph RN  Patient resting in bed, NSR on monitor. O2 sats in mid 90's on 2liters o2 NC. Chest tubes and villagomez in place and draining. The following drips running and verified with off going nurse: epinephrine and rachel-synephrine. No distress noted at this time. Dressing in place to sternal incision, chest tubes, and left leg incisions. Agatha-wound area to distal left leg incision intact, warm, and with blanchable redness. No warmth or redness noted to other incisions or right leg.  1955: Spoke with KO Katz via telephone and given telephone order with readback to repeat one dose Albumin 25% 100mls IV if urine output falls below 30mls x 2 consecutive hours. Patient declining bipap at this time but agrees to wear it if o2 sats fall below 92. Current sats 97% on 2 liters NC. RT made aware. 2230: Patient resting in bed, PRN pain medications given as ordered. No distress noted. 0025: Patient sleeping in bed, no distress noted. 0230: Patient sleeping in bed, O2 sat currently at 96% on 2 liters NC. No distress noted. 0430:Patient sleeping in bed, no distress noted  0645:Patient transferred to chair with, requiring max assist of staff x3. Dyspnea noted during that resolved once in chair.   0720: Spoke with KO [place] to report hemoglobin 6.7 and hematocrit 20.9. Received verbal order with readback for 1 unit PRBC.  Report given to JAVY Manuel Re

## 2022-12-10 NOTE — PROGRESS NOTES
Patient perfers not to wear bipap tonight. RN spoke with patient and let patient know as long as she continued to do well on NC we would not place bipap on. RN and RT spoke and agree with patient's wishes at this time. Will continue to monitor need for bipap.

## 2022-12-10 NOTE — PROGRESS NOTES
[facility] -Progress Note        Date of  Admission: 12/5/2022  5:51 AM   Primary Care Physician:  Nic Raymond MD  Attending Physician: Felicia Gomes MD         Assessment:     -Coronary artery disease with left main disease, EF 55% November 2022. Heart catheterization November 28, 2022 with left main 70%, LAD 40%, diagonal 80%  -Severe aortic stenosis, mean gradient 57 mmHg by echocardiogram November 28, 2022  -PAD with moderate left THOMAS 50-69% by ultrasound 11/2022  -Diabetes  -Hypertension  -Chronic kidney disease, stage III  -Chronic anemia  -Hemoglobin 6.7 today-receiving blood. Primary cardiologist: Dr. Cleotis Cogan:     Patient is S/P CABG X 2 with aortic valve replacement, ([facility]'s bovine pericardial bioprosthesis ) (12/7/2022), slow progress. POD #3  BP supported. Receiving PRBCs  No significant arrhythmia. Appreciate nephrology assistance. Midodrine 10 mg 3 times daily     History of Present Illness: This is a 79 y.o. female admitted for CAD (coronary artery disease) [I25.10]; Aortic stenosis [I35.0]. Patient complains of:    Patient was scheduled for CABG and aortic valve surgery yesterday. Procedure was aborted due to equipment issues. Tentative plan is for CABG and AVR tomorrow. Initial attempt November 28 was to stent the LAD however upon further evaluation was found to have significant left main disease and therefore planning CABG. Patient has chronic chest congestion without any current shortness of breath or chest pain. Initially presented with shortness of breath triggering work-up this spring by her immunologist.    Patient has a history of hypertension as well as diabetes and kidney disease. Found to have ostial LAD disease not amenable to stenting as well as significant aortic stenosis.     Review of Symptoms:  Except as stated above include:  Constitutional:  Negative  Ears, nose, throat:  Negative  Respiratory: Chronic dyspnea  Cardiovascular: negative  Gastrointestinal: negative  Genitourinary:  negative  Musculoskeletal:  Negative  Neurological:  Negative  Dermatological:  Negative  Hematological: Negative  Endocrinological: Negative  Allergy: Negative  Psychological:  Negative       Past Medical History:     Past Medical History:   Diagnosis Date    Aortic stenosis 11/28/2022    Coronary artery disease involving native coronary artery of native heart 11/28/2022    Primary hypertension 11/28/2022    S/P AVR (aortic valve replacement) and aortoplasty 12/7/2022    S/p CABG x2 (LIMA-LAD, rSVG-OM), AVR 23 mm Inspiris with aortic root enlargement - Dr. Katie Meadows    S/P CABG x 2 12/7/2022    S/p CABG x2 (LIMA-LAD, rSVG-OM), AVR 23 mm Inspiris with aortic root enlargement - Dr. Katie Meadows    Type 2 diabetes mellitus, without long-term current use of insulin [facility] 11/28/2022         Social History:     Social History     Socioeconomic History    Marital status:    Tobacco Use    Smoking status: Heavy Smoker     Packs/day: 1.50     Types: Cigarettes   Substance and Sexual Activity    Alcohol use: Yes     Comment: 1    Drug use: No        Family History:   History reviewed. No pertinent family history. Medications:      Allergies   Allergen Reactions    Albuterol Sulfate Shortness of Breath    Entex [Pseudoephedrine Tannate] Rash    Terbutaline Shortness of Breath    Terramycin [Oxytetracycline] Unknown (comments)        Current Facility-Administered Medications   Medication Dose Route Frequency    0.9% sodium chloride infusion 250 mL  250 mL IntraVENous PRN    0.9% sodium chloride infusion 250 mL  250 mL IntraVENous PRN    furosemide (LASIX) injection 20 mg  20 mg IntraVENous ONCE    calcium gluconate 1 gram in sodium chloride (ISO-OSM) 50 mL infusion  1 g IntraVENous ONCE    And    calcium gluconate 1 gram in sodium chloride (ISO-OSM) 50 mL infusion  1 g IntraVENous ONCE    iron sucrose (VENOFER) injection 200 mg  200 mg IntraVENous DAILY    gabapentin (NEURONTIN) capsule 200 mg  200 mg Oral TID PRN    midodrine (PROAMATINE) tablet 10 mg  10 mg Oral TID WITH MEALS    insulin glargine (LANTUS) injection 20 Units  20 Units SubCUTAneous DAILY    insulin lispro (HUMALOG) injection   SubCUTAneous AC&HS    glucose chewable tablet 16 g  4 Tablet Oral PRN    glucagon (GLUCAGEN) injection 1 mg  1 mg IntraMUSCular PRN    dextrose 10% infusion 0-250 mL  0-250 mL IntraVENous PRN    cyanocobalamin tablet 1,000 mcg  1,000 mcg Oral DAILY    multivitamin, tx-iron-ca-min (THERA-M w/ IRON) tablet 1 Tablet  1 Tablet Oral DAILY    folic acid (FOLVITE) tablet 1 mg  1 mg Oral DAILY    oxyCODONE IR (ROXICODONE) tablet 5-10 mg  5-10 mg Oral Q4H PRN    acetaminophen (TYLENOL) tablet 1,000 mg  1,000 mg Oral Q6H    EPINEPHrine (ADRENALIN) 4 mg in 0.9% sodium chloride 250 mL infusion  2 mcg/min IntraVENous TITRATE    PHENYLephrine (FRANKI-SYNEPHRINE) 30 mg in 0.9% sodium chloride 250 mL infusion   mcg/min IntraVENous TITRATE    montelukast (SINGULAIR) tablet 10 mg  10 mg Oral DAILY    sodium chloride (NS) flush 5-40 mL  5-40 mL IntraVENous Q8H    sodium chloride (NS) flush 5-40 mL  5-40 mL IntraVENous PRN    morphine injection 2-4 mg  2-4 mg IntraVENous Q4H PRN    naloxone (NARCAN) injection 0.4 mg  0.4 mg IntraVENous PRN    amiodarone (CORDARONE) tablet 200 mg  200 mg Oral TID    albuterol (PROVENTIL VENTOLIN) nebulizer solution 2.5 mg  2.5 mg Nebulization Q4H PRN    aspirin delayed-release tablet 81 mg  81 mg Oral DAILY    clopidogreL (PLAVIX) tablet 75 mg  75 mg Oral DAILY    chlorhexidine (PERIDEX) 0.12 % mouthwash 10 mL  10 mL Oral BID    docusate sodium (COLACE) capsule 100 mg  100 mg Oral BID    ELECTROLYTE REPLACEMENT PROTOCOL - Potassium Standard Dosing  1 Each Other PRN    ELECTROLYTE REPLACEMENT PROTOCOL - Magnesium  1 Each Other PRN    heparin (porcine) injection 5,000 Units  5,000 Units SubCUTAneous Q8H    bisacodyL (DULCOLAX) tablet 10 mg  10 mg Oral DAILY    famotidine (PEPCID) tablet 20 mg  20 mg Oral BID    polyethylene glycol (MIRALAX) packet 17 g  17 g Oral DAILY    atorvastatin (LIPITOR) tablet 40 mg  40 mg Oral QHS    sodium chloride (OCEAN) 0.65 % nasal squeeze bottle 2 Spray  2 Spray Both Nostrils Q2H PRN    guaiFENesin ER (MUCINEX) tablet 600 mg  600 mg Oral Q8H PRN    ondansetron (ZOFRAN) injection 4 mg  4 mg IntraVENous Q4H PRN         Physical Exam:   Visit Vitals  BP (!) 113/49   Pulse 83   Temp 97.9 °F (36.6 °C)   Resp 22   Ht 6' (1.829 m)   Wt 134.2 kg (295 lb 12.8 oz)   SpO2 95%   BMI 40.12 kg/m²     BP Readings from Last 3 Encounters:   12/10/22 (!) 113/49   11/28/22 119/73   08/02/22 (!) 156/58     Pulse Readings from Last 3 Encounters:   12/10/22 83   11/28/22 86   08/02/22 77     Wt Readings from Last 3 Encounters:   12/10/22 134.2 kg (295 lb 12.8 oz)   11/28/22 127.5 kg (281 lb)   08/02/22 127.5 kg (281 lb 1.4 oz)       General:  alert, cooperative, sitting in chair  Neck:  nontender  Lungs: Decreased breath sounds at bases  Heart:  regular rate and rhythm, FRANSISCA  Abdomen:  abdomen is soft without significant tenderness, masses, organomegaly or guarding  Extremities:  extremities normal, atraumatic, no cyanosis or edema  Skin: Warm and dry.  no hyperpigmentation, vitiligo, or suspicious lesions  Neuro: alert, oriented x3, affect appropriate, no focal neurological deficits, moves all extremities well, no involuntary movements, reflexes at knee and ankle intact  Psych: non focal     Data Review:     Recent Labs     12/10/22  0405 12/09/22  0534 12/08/22  1625   WBC 13.8* 14.6* 15.4*   HGB 6.7* 7.4* 7.6*   HCT 20.9* 22.9* 23.3*    166 158       Recent Labs     12/10/22  0405 12/09/22  0534 12/08/22  0400 12/07/22  1519   * 134* 138 139   K 4.1 4.3 4.3 4.3    102 104 106   CO2 26 23 24 23   * 206* 97 240*   BUN 39* 32* 31* 26*   CREA 1.52* 1.58* 1.55* 1.43*   CA 8.2* 8.4* 8.5 8.0*   MG  --   --   --  2.7*   INR  --   --   --  1.4* Results for orders placed or performed during the hospital encounter of 12/05/22   EKG, 12 LEAD, INITIAL   Result Value Ref Range    Ventricular Rate 101 BPM    Atrial Rate 101 BPM    P-R Interval 144 ms    QRS Duration 92 ms    Q-T Interval 368 ms    QTC Calculation (Bezet) 477 ms    Calculated P Axis 58 degrees    Calculated R Axis 76 degrees    Calculated T Axis 63 degrees    Diagnosis       Sinus tachycardia  Low voltage QRS  Borderline ECG  When compared with ECG of 07-DEC-2022 15:08,    Criteria for Septal infarct are no longer present  Non-specific change in ST segment in Anterior leads  Confirmed by Bryon Galindo ([id number]) on 12/9/2022 11:40:39 AM         All Cardiac Markers in the last 24 hours:  No results found for: CPK, CK, CKMMB, CKMB, RCK3, CKMBT, CKNDX, CKND1, RHODA, TROPT, TROIQ, KASH, TROPT, TNIPOC, BNP, BNPP    Last Lipid:  No results found for: CHOL, CHOLX, CHLST, CHOLV, HDL, HDLP, LDL, LDLC, DLDLP, TGLX, TRIGL, TRIGP, CHHD, CHHDX    Signed By: Princess Espinoza MD     December 10, 2022

## 2022-12-11 ENCOUNTER — APPOINTMENT (OUTPATIENT)
Dept: GENERAL RADIOLOGY | Age: 67
End: 2022-12-11
Attending: PHYSICIAN ASSISTANT
Payer: MEDICARE

## 2022-12-11 LAB
ABO + RH BLD: NORMAL
ANION GAP SERPL CALC-SCNC: 5 MMOL/L (ref 3–18)
BASOPHILS # BLD: 0.1 K/UL (ref 0–0.1)
BASOPHILS NFR BLD: 1 % (ref 0–2)
BLD PROD TYP BPU: NORMAL
BLD PROD TYP BPU: NORMAL
BLOOD GROUP ANTIBODIES SERPL: NORMAL
BPU ID: NORMAL
BPU ID: NORMAL
BUN SERPL-MCNC: 41 MG/DL (ref 7–18)
BUN/CREAT SERPL: 29 (ref 12–20)
CALCIUM SERPL-MCNC: 8.7 MG/DL (ref 8.5–10.1)
CALLED TO:,BCALL1: NORMAL
CHLORIDE SERPL-SCNC: 101 MMOL/L (ref 100–111)
CO2 SERPL-SCNC: 27 MMOL/L (ref 21–32)
CREAT SERPL-MCNC: 1.39 MG/DL (ref 0.6–1.3)
CROSSMATCH RESULT,%XM: NORMAL
CROSSMATCH RESULT,%XM: NORMAL
DIFFERENTIAL METHOD BLD: ABNORMAL
EOSINOPHIL # BLD: 0.4 K/UL (ref 0–0.4)
EOSINOPHIL NFR BLD: 3 % (ref 0–5)
ERYTHROCYTE [DISTWIDTH] IN BLOOD BY AUTOMATED COUNT: 17.3 % (ref 11.6–14.5)
GLUCOSE BLD STRIP.AUTO-MCNC: 121 MG/DL (ref 70–110)
GLUCOSE BLD STRIP.AUTO-MCNC: 141 MG/DL (ref 70–110)
GLUCOSE BLD STRIP.AUTO-MCNC: 148 MG/DL (ref 70–110)
GLUCOSE BLD STRIP.AUTO-MCNC: 209 MG/DL (ref 70–110)
GLUCOSE SERPL-MCNC: 150 MG/DL (ref 74–99)
HCT VFR BLD AUTO: 26.4 % (ref 35–45)
HGB BLD-MCNC: 8.9 G/DL (ref 12–16)
IMM GRANULOCYTES # BLD AUTO: 0.1 K/UL (ref 0–0.04)
IMM GRANULOCYTES NFR BLD AUTO: 1 % (ref 0–0.5)
LYMPHOCYTES # BLD: 2 K/UL (ref 0.9–3.6)
LYMPHOCYTES NFR BLD: 13 % (ref 21–52)
MCH RBC QN AUTO: 30 PG (ref 24–34)
MCHC RBC AUTO-ENTMCNC: 33.7 G/DL (ref 31–37)
MCV RBC AUTO: 88.9 FL (ref 78–100)
MONOCYTES # BLD: 1.2 K/UL (ref 0.05–1.2)
MONOCYTES NFR BLD: 8 % (ref 3–10)
NEUTS SEG # BLD: 11.1 K/UL (ref 1.8–8)
NEUTS SEG NFR BLD: 75 % (ref 40–73)
NRBC # BLD: 0 K/UL (ref 0–0.01)
NRBC BLD-RTO: 0 PER 100 WBC
PLATELET # BLD AUTO: 192 K/UL (ref 135–420)
PMV BLD AUTO: 9.1 FL (ref 9.2–11.8)
POTASSIUM SERPL-SCNC: 4.2 MMOL/L (ref 3.5–5.5)
RBC # BLD AUTO: 2.97 M/UL (ref 4.2–5.3)
SODIUM SERPL-SCNC: 133 MMOL/L (ref 136–145)
SPECIMEN EXP DATE BLD: NORMAL
STATUS OF UNIT,%ST: NORMAL
STATUS OF UNIT,%ST: NORMAL
UNIT DIVISION, %UDIV: 0
UNIT DIVISION, %UDIV: 0
WBC # BLD AUTO: 14.8 K/UL (ref 4.6–13.2)

## 2022-12-11 PROCEDURE — 80048 BASIC METABOLIC PNL TOTAL CA: CPT

## 2022-12-11 PROCEDURE — 65620000000 HC RM CCU GENERAL

## 2022-12-11 PROCEDURE — 74011636637 HC RX REV CODE- 636/637: Performed by: PHYSICIAN ASSISTANT

## 2022-12-11 PROCEDURE — 99232 SBSQ HOSP IP/OBS MODERATE 35: CPT | Performed by: INTERNAL MEDICINE

## 2022-12-11 PROCEDURE — 71045 X-RAY EXAM CHEST 1 VIEW: CPT

## 2022-12-11 PROCEDURE — 82962 GLUCOSE BLOOD TEST: CPT

## 2022-12-11 PROCEDURE — 85025 COMPLETE CBC W/AUTO DIFF WBC: CPT

## 2022-12-11 PROCEDURE — 94762 N-INVAS EAR/PLS OXIMTRY CONT: CPT

## 2022-12-11 PROCEDURE — 74011250637 HC RX REV CODE- 250/637: Performed by: PHYSICIAN ASSISTANT

## 2022-12-11 PROCEDURE — 99024 POSTOP FOLLOW-UP VISIT: CPT | Performed by: PHYSICIAN ASSISTANT

## 2022-12-11 PROCEDURE — 74011000272 HC RX REV CODE- 272: Performed by: PHYSICIAN ASSISTANT

## 2022-12-11 PROCEDURE — 74011250636 HC RX REV CODE- 250/636: Performed by: PHYSICIAN ASSISTANT

## 2022-12-11 PROCEDURE — C9113 INJ PANTOPRAZOLE SODIUM, VIA: HCPCS | Performed by: PHYSICIAN ASSISTANT

## 2022-12-11 PROCEDURE — 77010033678 HC OXYGEN DAILY

## 2022-12-11 PROCEDURE — APPNB45 APP NON BILLABLE 31-45 MINUTES: Performed by: PHYSICIAN ASSISTANT

## 2022-12-11 PROCEDURE — 74011000250 HC RX REV CODE- 250: Performed by: PHYSICIAN ASSISTANT

## 2022-12-11 PROCEDURE — 74011250636 HC RX REV CODE- 250/636: Performed by: INTERNAL MEDICINE

## 2022-12-11 RX ORDER — OXYCODONE HYDROCHLORIDE 5 MG/1
5-10 TABLET ORAL
Status: DISCONTINUED | OUTPATIENT
Start: 2022-12-11 | End: 2022-12-11

## 2022-12-11 RX ORDER — HYDROCODONE BITARTRATE AND ACETAMINOPHEN 5; 325 MG/1; MG/1
1-2 TABLET ORAL
Status: DISCONTINUED | OUTPATIENT
Start: 2022-12-11 | End: 2022-12-15 | Stop reason: HOSPADM

## 2022-12-11 RX ORDER — FUROSEMIDE 10 MG/ML
20 INJECTION INTRAMUSCULAR; INTRAVENOUS 2 TIMES DAILY
Status: DISCONTINUED | OUTPATIENT
Start: 2022-12-11 | End: 2022-12-12

## 2022-12-11 RX ADMIN — HEPARIN SODIUM 5000 UNITS: 5000 INJECTION INTRAVENOUS; SUBCUTANEOUS at 15:09

## 2022-12-11 RX ADMIN — SODIUM CHLORIDE, PRESERVATIVE FREE 10 ML: 5 INJECTION INTRAVENOUS at 06:42

## 2022-12-11 RX ADMIN — ONDANSETRON 4 MG: 2 INJECTION INTRAMUSCULAR; INTRAVENOUS at 08:20

## 2022-12-11 RX ADMIN — DOCUSATE SODIUM 100 MG: 100 CAPSULE, LIQUID FILLED ORAL at 08:51

## 2022-12-11 RX ADMIN — POLYETHYLENE GLYCOL 3350 17 G: 17 POWDER, FOR SOLUTION ORAL at 08:51

## 2022-12-11 RX ADMIN — ASPIRIN 81 MG: 81 TABLET, COATED ORAL at 08:52

## 2022-12-11 RX ADMIN — SODIUM CHLORIDE, PRESERVATIVE FREE 40 MG: 5 INJECTION INTRAVENOUS at 11:09

## 2022-12-11 RX ADMIN — SODIUM CHLORIDE, PRESERVATIVE FREE 10 ML: 5 INJECTION INTRAVENOUS at 15:07

## 2022-12-11 RX ADMIN — Medication 1 TABLET: at 08:52

## 2022-12-11 RX ADMIN — CYANOCOBALAMIN TAB 1000 MCG 1000 MCG: 1000 TAB at 08:52

## 2022-12-11 RX ADMIN — ATORVASTATIN CALCIUM 40 MG: 40 TABLET, FILM COATED ORAL at 21:53

## 2022-12-11 RX ADMIN — CLOPIDOGREL BISULFATE 75 MG: 75 TABLET ORAL at 08:52

## 2022-12-11 RX ADMIN — CHLORHEXIDINE GLUCONATE 0.12% ORAL RINSE 10 ML: 1.2 LIQUID ORAL at 18:12

## 2022-12-11 RX ADMIN — HYDROCODONE BITARTRATE AND ACETAMINOPHEN 1 TABLET: 5; 325 TABLET ORAL at 21:53

## 2022-12-11 RX ADMIN — FUROSEMIDE 20 MG: 10 INJECTION, SOLUTION INTRAMUSCULAR; INTRAVENOUS at 18:12

## 2022-12-11 RX ADMIN — SODIUM CHLORIDE, PRESERVATIVE FREE 10 ML: 5 INJECTION INTRAVENOUS at 21:53

## 2022-12-11 RX ADMIN — AMIODARONE HYDROCHLORIDE 200 MG: 200 TABLET ORAL at 08:52

## 2022-12-11 RX ADMIN — FOLIC ACID 1 MG: 1 TABLET ORAL at 08:52

## 2022-12-11 RX ADMIN — FAMOTIDINE 20 MG: 20 TABLET ORAL at 08:52

## 2022-12-11 RX ADMIN — HEPARIN SODIUM 5000 UNITS: 5000 INJECTION INTRAVENOUS; SUBCUTANEOUS at 06:41

## 2022-12-11 RX ADMIN — BISACODYL 10 MG: 5 TABLET, COATED ORAL at 08:52

## 2022-12-11 RX ADMIN — MIDODRINE HYDROCHLORIDE 10 MG: 5 TABLET ORAL at 11:09

## 2022-12-11 RX ADMIN — Medication 6 UNITS: at 13:11

## 2022-12-11 RX ADMIN — CHLORHEXIDINE GLUCONATE 0.12% ORAL RINSE 10 ML: 1.2 LIQUID ORAL at 08:51

## 2022-12-11 RX ADMIN — MIDODRINE HYDROCHLORIDE 10 MG: 5 TABLET ORAL at 18:12

## 2022-12-11 RX ADMIN — MONTELUKAST 10 MG: 10 TABLET, FILM COATED ORAL at 08:52

## 2022-12-11 RX ADMIN — MINERAL OIL 500 ML: 1000 LIQUID ORAL at 13:58

## 2022-12-11 RX ADMIN — IRON SUCROSE 200 MG: 20 INJECTION, SOLUTION INTRAVENOUS at 08:51

## 2022-12-11 RX ADMIN — ACETAMINOPHEN 1000 MG: 500 TABLET ORAL at 06:41

## 2022-12-11 RX ADMIN — HEPARIN SODIUM 5000 UNITS: 5000 INJECTION INTRAVENOUS; SUBCUTANEOUS at 21:53

## 2022-12-11 RX ADMIN — MIDODRINE HYDROCHLORIDE 10 MG: 5 TABLET ORAL at 08:52

## 2022-12-11 RX ADMIN — Medication 30 UNITS: at 08:50

## 2022-12-11 NOTE — PROGRESS NOTES
[facility]NAL DAILY PROGRESS NOTE              Subjective:       Complaint:   Overnight events noted  Only on 1 of epi  UO is maintained at 40 cc per hr  Chapa removed  Feeling better    IMPRESSION:   OSVALDO ? contrast nephropathy on CKD stage 3 ? ATN related to CABG /AVR procedure  S/p CABG and AVR 12/7/22  Underlying diabetic and hypertensive nephropathy  Controlled diabetes  Severe AS   Diabetes  Hypertension   PLAN:   BID low dose lasix as looks hypervolemic on exam  Monitor output closely  Avoid ACE I or diuretics for now  Daily labs for now                Current Facility-Administered Medications   Medication Dose Route Frequency    pantoprazole (PROTONIX) 40 mg in 0.9% sodium chloride 10 mL injection  40 mg IntraVENous DAILY    HYDROcodone-acetaminophen (NORCO) 5-325 mg per tablet 1-2 Tablet  1-2 Tablet Oral Q6H PRN    david enema  500 mL Rectal ONCE    furosemide (LASIX) injection 20 mg  20 mg IntraVENous BID    0.9% sodium chloride infusion 250 mL  250 mL IntraVENous PRN    0.9% sodium chloride infusion 250 mL  250 mL IntraVENous PRN    insulin glargine (LANTUS) injection 30 Units  30 Units SubCUTAneous DAILY    iron sucrose (VENOFER) injection 200 mg  200 mg IntraVENous DAILY    gabapentin (NEURONTIN) capsule 200 mg  200 mg Oral TID PRN    midodrine (PROAMATINE) tablet 10 mg  10 mg Oral TID WITH MEALS    insulin lispro (HUMALOG) injection   SubCUTAneous AC&HS    glucose chewable tablet 16 g  4 Tablet Oral PRN    glucagon (GLUCAGEN) injection 1 mg  1 mg IntraMUSCular PRN    dextrose 10% infusion 0-250 mL  0-250 mL IntraVENous PRN    cyanocobalamin tablet 1,000 mcg  1,000 mcg Oral DAILY    multivitamin, tx-iron-ca-min (THERA-M w/ IRON) tablet 1 Tablet  1 Tablet Oral DAILY    folic acid (FOLVITE) tablet 1 mg  1 mg Oral DAILY    EPINEPHrine (ADRENALIN) 4 mg in 0.9% sodium chloride 250 mL infusion  1 mcg/min IntraVENous TITRATE    montelukast (SINGULAIR) tablet 10 mg  10 mg Oral DAILY    sodium chloride (NS) flush 5-40 mL  5-40 mL IntraVENous Q8H    sodium chloride (NS) flush 5-40 mL  5-40 mL IntraVENous PRN    naloxone (NARCAN) injection 0.4 mg  0.4 mg IntraVENous PRN    albuterol (PROVENTIL VENTOLIN) nebulizer solution 2.5 mg  2.5 mg Nebulization Q4H PRN    aspirin delayed-release tablet 81 mg  81 mg Oral DAILY    clopidogreL (PLAVIX) tablet 75 mg  75 mg Oral DAILY    chlorhexidine (PERIDEX) 0.12 % mouthwash 10 mL  10 mL Oral BID    docusate sodium (COLACE) capsule 100 mg  100 mg Oral BID    ELECTROLYTE REPLACEMENT PROTOCOL - Potassium Standard Dosing  1 Each Other PRN    ELECTROLYTE REPLACEMENT PROTOCOL - Magnesium  1 Each Other PRN    heparin (porcine) injection 5,000 Units  5,000 Units SubCUTAneous Q8H    bisacodyL (DULCOLAX) tablet 10 mg  10 mg Oral DAILY    polyethylene glycol (MIRALAX) packet 17 g  17 g Oral DAILY    atorvastatin (LIPITOR) tablet 40 mg  40 mg Oral QHS    sodium chloride (OCEAN) 0.65 % nasal squeeze bottle 2 Spray  2 Spray Both Nostrils Q2H PRN    guaiFENesin ER (MUCINEX) tablet 600 mg  600 mg Oral Q8H PRN    ondansetron (ZOFRAN) injection 4 mg  4 mg IntraVENous Q4H PRN       Review of Symptoms: comprehensive ROS negative except above.    Objective:   Patient Vitals for the past 24 hrs:   Temp Pulse Resp BP SpO2   12/11/22 1300 -- 80 21 (!) 130/57 93 %   12/11/22 1230 -- 79 18 129/64 95 %   12/11/22 1200 97.9 °F (36.6 °C) 77 26 (!) 133/53 94 %   12/11/22 1130 -- 77 25 (!) 132/57 94 %   12/11/22 1100 -- 77 21 (!) 128/50 92 %   12/11/22 1030 -- 79 28 (!) 124/51 92 %   12/11/22 1000 -- 79 26 (!) 114/49 94 %   12/11/22 0930 -- 82 29 (!) 136/59 95 %   12/11/22 0900 -- 76 21 (!) 128/49 95 %   12/11/22 0830 -- 79 24 (!) 138/52 95 %   12/11/22 0800 97.5 °F (36.4 °C) 76 19 (!) 123/47 95 %   12/11/22 0730 -- 75 19 (!) 117/90 95 %   12/11/22 0700 -- 77 25 (!) 139/50 94 %   12/11/22 0630 -- 80 27 (!) 149/62 95 %   12/11/22 0600 -- 75 17 (!) 125/56 96 %   12/11/22 0500 -- 75 19 (!) 140/57 98 %   12/11/22 0400 97.8 °F (36.6 °C) 72 23 (!) 140/56 94 %   12/11/22 0300 -- 75 24 (!) 144/58 95 %   12/11/22 0200 -- 74 18 (!) 123/53 96 %   12/11/22 0100 -- 73 19 (!) 130/48 96 %   12/11/22 0000 97.9 °F (36.6 °C) 76 19 (!) 128/50 94 %   12/10/22 2300 -- 80 23 (!) 126/100 94 %   12/10/22 2200 -- 75 20 (!) 141/55 95 %   12/10/22 2100 -- 75 18 (!) 127/50 95 %   12/10/22 2000 98 °F (36.7 °C) 78 25 (!) 157/53 94 %   12/10/22 1900 -- 79 25 (!) 141/59 93 %   12/10/22 1830 -- 79 25 119/88 93 %   12/10/22 1800 -- 79 24 (!) 135/113 96 %   12/10/22 1730 -- 84 27 (!) 152/61 95 %   12/10/22 1700 -- 77 21 (!) 127/54 94 %   12/10/22 1630 -- 78 25 (!) 134/56 95 %   12/10/22 1600 97.9 °F (36.6 °C) 79 24 (!) 128/55 93 %   12/10/22 1545 -- 80 21 (!) 127/50 93 %   12/10/22 1530 -- 79 25 (!) 117/53 94 %   12/10/22 1515 -- 82 27 (!) 128/55 94 %   12/10/22 1500 98 °F (36.7 °C) 81 24 121/61 94 %   12/10/22 1445 -- 83 23 (!) 128/56 93 %   12/10/22 1430 98.1 °F (36.7 °C) 83 27 (!) 118/51 93 %   12/10/22 1400 -- 85 26 (!) 131/54 92 %   12/10/22 1330 -- 87 25 (!) 133/56 92 %          Weight change: 4.486 kg (9 lb 14.2 oz)     12/09 1901 - 12/11 0700  In: 3111.1 [P.O.:1708;  I.V.:669.8]  Out: 3335 [Urine:2785; Drains:550]    Intake/Output Summary (Last 24 hours) at 12/11/2022 1317  Last data filed at 12/11/2022 1300  Gross per 24 hour   Intake 1166.28 ml   Output 2170 ml   Net -1003.72 ml       Physical Exam:   General: intubated  HEENT sclera anicteric, supple neck, no thyromegaly  CVS: S1S2 heard,  no rub  RS: + air entry b/l,   Abd: Soft, Non tender  Extrm: plus 2 edema, no cyanosis, clubbing   Skin: no visible  Rash  Musculoskeletal: No gross joints or bone deformities         Data Review:     LABS:   Hematology:   Recent Labs     12/11/22  0428 12/10/22  1835 12/10/22  0405 12/09/22  0534 12/08/22  1625   WBC 14.8*  --  13.8* 14.6* 15.4*   HGB 8.9* 9.1* 6.7* 7.4* 7.6*   HCT 26.4* 27.4* 20.9* 22.9* 23.3*       Chemistry:   Recent Labs     12/11/22 0428 12/10/22  0405 12/09/22  0534   BUN 41* 39* 32*   CREA 1.39* 1.52* 1.58*   CA 8.7 8.2* 8.4*   K 4.2 4.1 4.3   * 132* 134*    101 102   CO2 27 26 23   * 132* 206*              Procedures/imaging: see electronic medical records for all procedures, Xrays and details which were not copied into this note but were reviewed prior to creation of Plan          Assessment & Plan:     See above        Christian Sparks MD  12/11/2022

## 2022-12-11 NOTE — PROGRESS NOTES
CM spoke with patient, updated her that PT and OT are recommending SNF for discharge plan. Patient is not agreeable to SNF at this time. Patient currently lives alone. Per last PT note 12/08/2022, patient refused to get up and OOB with PT. CM encouraged patient to work with PT and OT, and then discharge plan could possibly change to Home with  [facility]. CM will need to follow-up with patient for discharge plan when possible.              Gita Sauceda, RN  Case Management [phone]

## 2022-12-11 NOTE — PROGRESS NOTES
Problem: Pressure Injury - Risk of  Goal: *Prevention of pressure injury  Description: Document Nas Scale and appropriate interventions in the flowsheet. Outcome: Progressing Towards Goal  Note: Pressure Injury Interventions:  Sensory Interventions: Assess changes in LOC, Avoid rigorous massage over bony prominences, Check visual cues for pain, Float heels, Discuss PT/OT consult with provider, Turn and reposition approx. every two hours (pillows and wedges if needed)         Activity Interventions: Assess need for specialty bed, Chair cushion, Increase time out of bed, Pressure redistribution bed/mattress(bed type), PT/OT evaluation    Mobility Interventions: Assess need for specialty bed, Chair cushion, HOB 30 degrees or less, Pressure redistribution bed/mattress (bed type), Turn and reposition approx.  every two hours(pillow and wedges)    Nutrition Interventions: Document food/fluid/supplement intake, Discuss nutritional consult with provider    Friction and Shear Interventions: Apply protective barrier, creams and emollients, Foam dressings/transparent film/skin sealants, Transfer aides:transfer board/Roya lift/ceiling lift

## 2022-12-11 NOTE — ROUTINE PROCESS
1900: Bedside shift change report given to this nurse (oncoming nurse) by Lacey Navarro RN (offgoing nurse). Report included the following information SBAR, Kardex, and Cardiac Rhythm NSR . Wound Prevention Checklist    Patient: Zabrina Carpio (34 y.o. female)  Date: 12/10/2022  Diagnosis: CAD (coronary artery disease) [I25.10]  Aortic stenosis [I35.0] <principal problem not specified>    Precautions: Fall, Skin, Sternal       []  Heel prevention boots placed on patient    [x]  Patient turned q2h during shift    []  Lift team ordered    [x]  Patient on [place] bed/Specialty bed    [x]  Each Wound is documented during shift (Stage, Color, drainage, odor, measurements, and dressings)    [x]  Dual skin check done with Lacey Navarro RN  Patient resting in bed, NSR on monitor. Chapa draining clear yellow urine. Chest tubes draining to suction, anterior/posterior mediastinal chest tubes to -20cm suction, pleural chest tube to -40cm suction. Patient report still feeling some nausea. No vomiting at this time. 2130: Patient c/o nausea. Dry heaving noted, coughing up thick white sputum. PRN zofran given as ordered  0000: Patient able to take 2200 p.o. meds but followed by nausea and coughing. Patient declined bipap and 0000 tylenol due to nausea and fear of vomiting. Pain levels 2-3/10 per patient. No distress noted at this time. 0230: Patient sleeping at this time. No distress noted. 0500: Patient sleeping at this time. No distress noted. 2772: Patient transferred up to chair with moderate assist of staff x2. Dypsnea noted but ceased once sitting down. Patient continues to have productive cough and voices \"unsettled stomach. \" Able to take 0600 meds without vomiting.  0700: Report given to JAVY Jordan

## 2022-12-11 NOTE — PROGRESS NOTES
Problem: Pressure Injury - Risk of  Goal: *Prevention of pressure injury  Description: Document Nas Scale and appropriate interventions in the flowsheet. Outcome: Progressing Towards Goal  Note: Pressure Injury Interventions:  Sensory Interventions: Assess changes in LOC, Avoid rigorous massage over bony prominences, Turn and reposition approx. every two hours (pillows and wedges if needed), Chair cushion, Assess need for specialty bed         Activity Interventions: Assess need for specialty bed, Chair cushion, PT/OT evaluation, Increase time out of bed, Pressure redistribution bed/mattress(bed type)    Mobility Interventions: Assess need for specialty bed, PT/OT evaluation, Turn and reposition approx. every two hours(pillow and wedges), Chair cushion, Float heels    Nutrition Interventions: Document food/fluid/supplement intake, Discuss nutritional consult with provider    Friction and Shear Interventions: Apply protective barrier, creams and emollients, Feet elevated on foot rest, Transferring/repositioning devices, HOB 30 degrees or less, Lift sheet, Foam dressings/transparent film/skin sealants, Lift team/patient mobility team, Minimize layers                Problem: Falls - Risk of  Goal: *Absence of Falls  Description: Document Yanet Fall Risk and appropriate interventions in the flowsheet.   Outcome: Progressing Towards Goal  Note: Fall Risk Interventions:  Mobility Interventions: Assess mobility with egress test, Bed/chair exit alarm, Communicate number of staff needed for ambulation/transfer, OT consult for ADLs, PT Consult for mobility concerns, PT Consult for assist device competence, Utilize walker, cane, or other assistive device, Patient to call before getting OOB, Strengthening exercises (ROM-active/passive)    Mentation Interventions: Adequate sleep, hydration, pain control, Bed/chair exit alarm, Evaluate medications/consider consulting pharmacy, Door open when patient unattended, More frequent rounding, Reorient patient, Update white board, Toileting rounds, Self-releasing belt, Room close to nurse's station, Increase mobility    Medication Interventions: Assess postural VS orthostatic hypotension, Bed/chair exit alarm, Evaluate medications/consider consulting pharmacy, Patient to call before getting OOB, Teach patient to arise slowly    Elimination Interventions: Bed/chair exit alarm, Call light in reach, Patient to call for help with toileting needs, Toilet paper/wipes in reach, Toileting schedule/hourly rounds, Stay With Me (per policy), Elevated toilet seat    History of Falls Interventions: Bed/chair exit alarm

## 2022-12-11 NOTE — PROGRESS NOTES
CARDIAC SURGERY PROGRESS NOTE    2022     Post Operative Day # 4     Chart reviewed. Interval History/Events of Past 24 hours:   Up to chair with max assist on Epi. Small LEFT PTX on CXR. Has not ambulated outside of room    Assessment:  CAD, AS S/P  CABG x 2, AVR - ASA, statin, Plavix  Respiratory parameters stable  Cardiac status stable   Hypotension on Midodrine    Plans:  Epi to 1  Cont CT  PT/OT. Needs encouragement. Heart Hugger use encouraged to mitigate pain and will also assist with deep breathing and incentive spirometry goals. Possible discharge 2-3 days. Sumi Castelan PA-C  [facility]  [phone]  _____________________________________________________________________________________________________________________________________________  Subjective:  Patient seen and examined on rounds today. Pain level: poorly controlled   Ambulating: no  Sleeping: not well  Eating:  not Well   Sternal pain: YES     BM: No    Objective:  Vital signs:   Visit Vitals  BP (!) 123/47   Pulse 76   Temp 97.8 °F (36.6 °C)   Resp 19   Ht 6' (1.829 m)   Wt 138.7 kg (305 lb 11 oz) Comment: weight taken twice to ensure accuracy   SpO2 95%   BMI 41.46 kg/m²     Temp (24hrs), Av °F (36.7 °C), Min:97.8 °F (36.6 °C), Max:98.4 °F (36.9 °C)    Admission Weight: Last Weight   Weight: 130 kg (286 lb 9.6 oz) Weight: 138.7 kg (305 lb 11 oz) (weight taken twice to ensure accuracy)     Last 3 Recorded Weights in this Encounter    22 0600 12/10/22 0645 22 0600   Weight: 127.6 kg (281 lb 6.4 oz) 134.2 kg (295 lb 12.8 oz) 138.7 kg (305 lb 11 oz)       Telemetry: NSR    Physical Examination:     General:  Alert, oriented  Lungs: Clear to ascultation without rales, wheezes or rhonchi. Chest: Dressings clean and dry. Midline incision healing well. Sternum stable. Heart: regular rate and rhythm, No murmur. Abdomen: Soft and non-tender without masses.  Bowel sounds present. Extremities: Warm and well perfused. Edema moderate. Incisions: clean and dry  Neuro: No deficit. SUP Prophylaxis: Pepcid  DVT Prophylaxis:   pneumatic compression boots: Yes  Compression stockings:  Yes  DVT ppx: Yes SC Heparin    Chest tubes:  present    DME needs:  tbd          Labs:  Lab Results   Component Value Date/Time    WBC 14.8 (H) 12/11/2022 04:28 AM    HCT 26.4 (L) 12/11/2022 04:28 AM     12/11/2022 04:28 AM      Lab Results   Component Value Date/Time     (L) 12/11/2022 04:28 AM    K 4.2 12/11/2022 04:28 AM     12/11/2022 04:28 AM    CO2 27 12/11/2022 04:28 AM     (H) 12/11/2022 04:28 AM    BUN 41 (H) 12/11/2022 04:28 AM    CREA 1.39 (H) 12/11/2022 04:28 AM    CREA 1.52 (H) 12/10/2022 04:05 AM    CREA 1.58 (H) 12/09/2022 05:34 AM     Lab Results   Component Value Date/Time    HBA1C 5.7 (H) 11/28/2022 07:40 AM         EKG: NSR     CXR: t/l ok LEFT Ptx       PLEASE NOTE:  This document may have been produced using voice recognition software. Unrecognized errors in transcription may be present. Please call with any questions. NOTE TO PATIENT:  The purpose of this note is to communicate optimally with the other providers involved in your care. It is written using standard medical terminology. If you have questions regarding details of the note please call my office at [phone] and make an appointment to discuss your concerns.

## 2022-12-11 NOTE — PROGRESS NOTES
0700- Change of shift. Report received from Marlene Russell RN. Will assume care of the patient. 0740- Attempted to ambulate patient. Patient reluctant to participate in any physical activity. However patient up X 2 assist and able to remain standing for 1 minute. 0930- Patient ambulated, 2x assist. Able to walk 6 steps with minimal support. 1000- Villagomez removed. 1100- Patient ambulated a few steps in room with RN.   1300- Patient up to bedside commode. Successfully voided 150ml following villagomez removal.   1350- Patient ambulated out of room with RN. Distance about 30 feet. 1400- Patient back to bed. Enema given. Patient passed large amount of gas and liquid. No formed stool. 1645- Up to bedside commode, large formed BM passed. Patient now resting in recliner for dinner. 1845- Patient back to bed. 1900- Change of shift. Report given to Marlene Russell RN.

## 2022-12-11 NOTE — PROGRESS NOTES
Reason for Admission:  CAD (coronary artery disease) [I25.10]  Aortic stenosis [I35.0]                 RUR Score:    15%            Plan for utilizing home health:    Not as this time. PT and OT are recommending SNF for discharge plan. Likelihood of Readmission:   Moderate                         Do you (patient/family) have any concerns for transition/discharge?  no, not at this time. Transition of Care Plan:       Initial assessment completed with patient. Cognitive status of patient: oriented to time, place, person and situation. Face sheet information confirmed:  yes. The patient designates her sister Giovanny Sanford [phone] to participate in her discharge plan and to receive any needed information. This patient lives alone, in a trailer, with 4 steps to enter. Patient was able to navigate steps as needed. Prior to hospitalization, patient was considered to be independent with ADLs/IADLS : yes . Patient has a current ACP document on file: no.      Healthcare Decision Maker:     Click here to complete [address] including selection of the Healthcare Decision Maker Relationship (ie \"Primary\")      The patient's sister will be available to transport patient home upon discharge. The patient already has none reported,  medical equipment available in the home. Patient is not currently active with home health. Patient has not stayed in a skilled nursing facility or rehab. This patient is on dialysis :no.          Currently, the discharge plan is To Be Determined. The patient states that she can obtain her medications from the pharmacy, and take her medications as directed. Patient's current insurance is [address] [place] [address]. Care Management Interventions  PCP Verified by CM:  Yes  Mode of Transport at Discharge: Self (Patient's sister will be transporting patient home at time of discharge. )  Transition of Care Consult (CM Consult): Discharge Planning  Discharge Durable Medical Equipment: No  Physical Therapy Consult: Yes  Occupational Therapy Consult: Yes  Speech Therapy Consult: No  Support Systems: Other (Comment) (Patient lives alone. )  Confirm Follow Up Transport: Family  Discharge Location  Patient Expects to be Discharged to[de-identified] Unable to determine at this time          Ruth Cote RN  Case Management [phone]

## 2022-12-11 NOTE — PROGRESS NOTES
[facility] -Progress Note        Date of  Admission: 12/5/2022  5:51 AM   Primary Care Physician:  India Sarah MD  Attending Physician: Anastacio Maher MD         Assessment:     -Coronary artery disease with left main disease, EF 55% . Heart catheterization November 28, 2022 with left main 70%, LAD 40%, diagonal 80%  -Severe aortic stenosis, mean gradient 57 mmHg by echocardiogram November 28, 2022  -PAD with moderate left THOMAS 50-69% by ultrasound 11/2022  -Diabetes  -Hypertension  -Chronic kidney disease, stage III  -Chronic anemia  -Hemoglobin 6.7 yesterday. 8.9 today  Primary cardiologist: Dr. Laron Cervantes:     Patient is S/P CABG X 2 with aortic valve replacement, ([employer]'s bovine pericardial bioprosthesis ) (12/7/2022), slow progress. POD #3  BP supported with epi x1 and midodrine. Received 2 units PRBCs  No significant arrhythmia. Appreciate nephrology assistance. Holding diuretics per their recommendation     History of Present Illness: This is a 79 y.o. female admitted for CAD (coronary artery disease) [I25.10]; Aortic stenosis [I35.0]. Patient complains of:    Patient was scheduled for CABG and aortic valve surgery yesterday. Procedure was aborted due to equipment issues. Tentative plan is for CABG and AVR tomorrow. Initial attempt November 28 was to stent the LAD however upon further evaluation was found to have significant left main disease and therefore planning CABG. Patient has chronic chest congestion without any current shortness of breath or chest pain. Initially presented with shortness of breath triggering work-up this spring by her immunologist.    Patient has a history of hypertension as well as diabetes and kidney disease. Found to have ostial LAD disease not amenable to stenting as well as significant aortic stenosis.     Review of Symptoms:  Except as stated above include:  Constitutional:  Negative  Ears, nose, throat:  Negative  Respiratory: Chronic dyspnea  Cardiovascular:  negative  Gastrointestinal: negative  Genitourinary:  negative  Musculoskeletal:  Negative  Neurological:  Negative  Dermatological:  Negative  Hematological: Negative  Endocrinological: Negative  Allergy: Negative  Psychological:  Negative       Past Medical History:     Past Medical History:   Diagnosis Date    Aortic stenosis 11/28/2022    Coronary artery disease involving native coronary artery of native heart 11/28/2022    Primary hypertension 11/28/2022    S/P AVR (aortic valve replacement) and aortoplasty 12/7/2022    S/p CABG x2 (LIMA-LAD, rSVG-OM), AVR 23 mm Inspiris with aortic root enlargement - Dr. Franca Ley    S/P CABG x 2 12/7/2022    S/p CABG x2 (LIMA-LAD, rSVG-OM), AVR 23 mm Inspiris with aortic root enlargement - Dr. Franca Ley    Type 2 diabetes mellitus, without long-term current use of insulin ([facility] 11/28/2022         Social History:     Social History     Socioeconomic History    Marital status:    Tobacco Use    Smoking status: Heavy Smoker     Packs/day: 1.50     Types: Cigarettes   Substance and Sexual Activity    Alcohol use: Yes     Comment: 1    Drug use: No        Family History:   History reviewed. No pertinent family history. Medications:      Allergies   Allergen Reactions    Albuterol Sulfate Shortness of Breath    Entex [Pseudoephedrine Tannate] Rash    Terbutaline Shortness of Breath    Terramycin [Oxytetracycline] Unknown (comments)        Current Facility-Administered Medications   Medication Dose Route Frequency    pantoprazole (PROTONIX) 40 mg in 0.9% sodium chloride 10 mL injection  40 mg IntraVENous DAILY    HYDROcodone-acetaminophen (NORCO) 5-325 mg per tablet 1-2 Tablet  1-2 Tablet Oral Q6H PRN    david enema  500 mL Rectal ONCE    0.9% sodium chloride infusion 250 mL  250 mL IntraVENous PRN    0.9% sodium chloride infusion 250 mL  250 mL IntraVENous PRN    insulin glargine (LANTUS) injection 30 Units  30 Units SubCUTAneous DAILY    iron sucrose (VENOFER) injection 200 mg  200 mg IntraVENous DAILY    gabapentin (NEURONTIN) capsule 200 mg  200 mg Oral TID PRN    midodrine (PROAMATINE) tablet 10 mg  10 mg Oral TID WITH MEALS    insulin lispro (HUMALOG) injection   SubCUTAneous AC&HS    glucose chewable tablet 16 g  4 Tablet Oral PRN    glucagon (GLUCAGEN) injection 1 mg  1 mg IntraMUSCular PRN    dextrose 10% infusion 0-250 mL  0-250 mL IntraVENous PRN    cyanocobalamin tablet 1,000 mcg  1,000 mcg Oral DAILY    multivitamin, tx-iron-ca-min (THERA-M w/ IRON) tablet 1 Tablet  1 Tablet Oral DAILY    folic acid (FOLVITE) tablet 1 mg  1 mg Oral DAILY    EPINEPHrine (ADRENALIN) 4 mg in 0.9% sodium chloride 250 mL infusion  1 mcg/min IntraVENous TITRATE    montelukast (SINGULAIR) tablet 10 mg  10 mg Oral DAILY    sodium chloride (NS) flush 5-40 mL  5-40 mL IntraVENous Q8H    sodium chloride (NS) flush 5-40 mL  5-40 mL IntraVENous PRN    naloxone (NARCAN) injection 0.4 mg  0.4 mg IntraVENous PRN    albuterol (PROVENTIL VENTOLIN) nebulizer solution 2.5 mg  2.5 mg Nebulization Q4H PRN    aspirin delayed-release tablet 81 mg  81 mg Oral DAILY    clopidogreL (PLAVIX) tablet 75 mg  75 mg Oral DAILY    chlorhexidine (PERIDEX) 0.12 % mouthwash 10 mL  10 mL Oral BID    docusate sodium (COLACE) capsule 100 mg  100 mg Oral BID    ELECTROLYTE REPLACEMENT PROTOCOL - Potassium Standard Dosing  1 Each Other PRN    ELECTROLYTE REPLACEMENT PROTOCOL - Magnesium  1 Each Other PRN    heparin (porcine) injection 5,000 Units  5,000 Units SubCUTAneous Q8H    bisacodyL (DULCOLAX) tablet 10 mg  10 mg Oral DAILY    polyethylene glycol (MIRALAX) packet 17 g  17 g Oral DAILY    atorvastatin (LIPITOR) tablet 40 mg  40 mg Oral QHS    sodium chloride (OCEAN) 0.65 % nasal squeeze bottle 2 Spray  2 Spray Both Nostrils Q2H PRN    guaiFENesin ER (MUCINEX) tablet 600 mg  600 mg Oral Q8H PRN    ondansetron (ZOFRAN) injection 4 mg  4 mg IntraVENous Q4H PRN         Physical Exam:   Visit Vitals  BP (!) 130/57   Pulse 80   Temp 97.9 °F (36.6 °C)   Resp 21   Ht 6' (1.829 m)   Wt 138.7 kg (305 lb 11 oz)   SpO2 93%   BMI 41.46 kg/m²     BP Readings from Last 3 Encounters:   12/11/22 (!) 130/57   11/28/22 119/73   08/02/22 (!) 156/58     Pulse Readings from Last 3 Encounters:   12/11/22 80   11/28/22 86   08/02/22 77     Wt Readings from Last 3 Encounters:   12/11/22 138.7 kg (305 lb 11 oz)   11/28/22 127.5 kg (281 lb)   08/02/22 127.5 kg (281 lb 1.4 oz)       General:  alert, cooperative, sitting in chair  Neck:  nontender  Lungs: Decreased breath sounds at bases  Heart:  regular rate and rhythm, FRANSISCA  Abdomen:  abdomen is soft without significant tenderness, masses, organomegaly or guarding  Extremities:  extremities normal, atraumatic, no cyanosis or edema  Skin: Warm and dry.  no hyperpigmentation, vitiligo, or suspicious lesions  Neuro: alert, oriented x3, affect appropriate, no focal neurological deficits, moves all extremities well, no involuntary movements, reflexes at knee and ankle intact  Psych: non focal     Data Review:     Recent Labs     12/11/22  0428 12/10/22  1835 12/10/22  0405 12/09/22  0534   WBC 14.8*  --  13.8* 14.6*   HGB 8.9* 9.1* 6.7* 7.4*   HCT 26.4* 27.4* 20.9* 22.9*     --  169 166       Recent Labs     12/11/22  0428 12/10/22  0405 12/09/22  0534   * 132* 134*   K 4.2 4.1 4.3    101 102   CO2 27 26 23   * 132* 206*   BUN 41* 39* 32*   CREA 1.39* 1.52* 1.58*   CA 8.7 8.2* 8.4*         Results for orders placed or performed during the hospital encounter of 12/05/22   EKG, 12 LEAD, INITIAL   Result Value Ref Range    Ventricular Rate 101 BPM    Atrial Rate 101 BPM    P-R Interval 144 ms    QRS Duration 92 ms    Q-T Interval 368 ms    QTC Calculation (Bezet) 477 ms    Calculated P Axis 58 degrees    Calculated R Axis 76 degrees    Calculated T Axis 63 degrees    Diagnosis       Sinus tachycardia  Low voltage QRS  Borderline ECG  When compared with ECG of 07-DEC-2022 15:08,    Criteria for Septal infarct are no longer present  Non-specific change in ST segment in Anterior leads  Confirmed by Corena Proper ([phone]) on 12/9/2022 11:40:39 AM         All Cardiac Markers in the last 24 hours:  No results found for: CPK, CK, CKMMB, CKMB, RCK3, CKMBT, CKNDX, CKND1, RHODA, TROPT, TROIQ, KASH, TROPT, TNIPOC, BNP, BNPP    Last Lipid:  No results found for: CHOL, CHOLX, CHLST, CHOLV, HDL, HDLP, LDL, LDLC, DLDLP, TGLX, TRIGL, TRIGP, CHHD, CHHDX    Signed By: Sola Castrejon MD     December 11, 2022

## 2022-12-12 ENCOUNTER — APPOINTMENT (OUTPATIENT)
Dept: GENERAL RADIOLOGY | Age: 67
End: 2022-12-12
Attending: PHYSICIAN ASSISTANT
Payer: MEDICARE

## 2022-12-12 LAB
ANION GAP SERPL CALC-SCNC: 5 MMOL/L (ref 3–18)
BASOPHILS # BLD: 0.1 K/UL (ref 0–0.1)
BASOPHILS NFR BLD: 1 % (ref 0–2)
BUN SERPL-MCNC: 40 MG/DL (ref 7–18)
BUN/CREAT SERPL: 28 (ref 12–20)
CALCIUM SERPL-MCNC: 8.3 MG/DL (ref 8.5–10.1)
CHLORIDE SERPL-SCNC: 101 MMOL/L (ref 100–111)
CO2 SERPL-SCNC: 29 MMOL/L (ref 21–32)
CREAT SERPL-MCNC: 1.41 MG/DL (ref 0.6–1.3)
DIFFERENTIAL METHOD BLD: ABNORMAL
EOSINOPHIL # BLD: 0.7 K/UL (ref 0–0.4)
EOSINOPHIL NFR BLD: 6 % (ref 0–5)
ERYTHROCYTE [DISTWIDTH] IN BLOOD BY AUTOMATED COUNT: 17.1 % (ref 11.6–14.5)
GLUCOSE BLD STRIP.AUTO-MCNC: 109 MG/DL (ref 70–110)
GLUCOSE BLD STRIP.AUTO-MCNC: 136 MG/DL (ref 70–110)
GLUCOSE BLD STRIP.AUTO-MCNC: 137 MG/DL (ref 70–110)
GLUCOSE BLD STRIP.AUTO-MCNC: 205 MG/DL (ref 70–110)
GLUCOSE SERPL-MCNC: 134 MG/DL (ref 74–99)
HCT VFR BLD AUTO: 27.6 % (ref 35–45)
HGB BLD-MCNC: 8.6 G/DL (ref 12–16)
IMM GRANULOCYTES # BLD AUTO: 0.1 K/UL (ref 0–0.04)
IMM GRANULOCYTES NFR BLD AUTO: 0 % (ref 0–0.5)
LYMPHOCYTES # BLD: 2.1 K/UL (ref 0.9–3.6)
LYMPHOCYTES NFR BLD: 17 % (ref 21–52)
MCH RBC QN AUTO: 30.1 PG (ref 24–34)
MCHC RBC AUTO-ENTMCNC: 31.2 G/DL (ref 31–37)
MCV RBC AUTO: 96.5 FL (ref 78–100)
MONOCYTES # BLD: 1.1 K/UL (ref 0.05–1.2)
MONOCYTES NFR BLD: 9 % (ref 3–10)
NEUTS SEG # BLD: 8.3 K/UL (ref 1.8–8)
NEUTS SEG NFR BLD: 68 % (ref 40–73)
NRBC # BLD: 0 K/UL (ref 0–0.01)
NRBC BLD-RTO: 0 PER 100 WBC
PLATELET # BLD AUTO: 202 K/UL (ref 135–420)
PMV BLD AUTO: 9.6 FL (ref 9.2–11.8)
POTASSIUM SERPL-SCNC: 4.1 MMOL/L (ref 3.5–5.5)
RBC # BLD AUTO: 2.86 M/UL (ref 4.2–5.3)
SODIUM SERPL-SCNC: 135 MMOL/L (ref 136–145)
WBC # BLD AUTO: 12.2 K/UL (ref 4.6–13.2)

## 2022-12-12 PROCEDURE — 65620000000 HC RM CCU GENERAL

## 2022-12-12 PROCEDURE — 97535 SELF CARE MNGMENT TRAINING: CPT

## 2022-12-12 PROCEDURE — 74011250637 HC RX REV CODE- 250/637: Performed by: PHYSICIAN ASSISTANT

## 2022-12-12 PROCEDURE — 74011000250 HC RX REV CODE- 250: Performed by: PHYSICIAN ASSISTANT

## 2022-12-12 PROCEDURE — 99232 SBSQ HOSP IP/OBS MODERATE 35: CPT | Performed by: INTERNAL MEDICINE

## 2022-12-12 PROCEDURE — 99024 POSTOP FOLLOW-UP VISIT: CPT | Performed by: PHYSICIAN ASSISTANT

## 2022-12-12 PROCEDURE — 85025 COMPLETE CBC W/AUTO DIFF WBC: CPT

## 2022-12-12 PROCEDURE — 97530 THERAPEUTIC ACTIVITIES: CPT

## 2022-12-12 PROCEDURE — 97116 GAIT TRAINING THERAPY: CPT

## 2022-12-12 PROCEDURE — 74011636637 HC RX REV CODE- 636/637: Performed by: PHYSICIAN ASSISTANT

## 2022-12-12 PROCEDURE — C9113 INJ PANTOPRAZOLE SODIUM, VIA: HCPCS | Performed by: PHYSICIAN ASSISTANT

## 2022-12-12 PROCEDURE — 74011250636 HC RX REV CODE- 250/636: Performed by: THORACIC SURGERY (CARDIOTHORACIC VASCULAR SURGERY)

## 2022-12-12 PROCEDURE — 71045 X-RAY EXAM CHEST 1 VIEW: CPT

## 2022-12-12 PROCEDURE — 82962 GLUCOSE BLOOD TEST: CPT

## 2022-12-12 PROCEDURE — APPNB60 APP NON BILLABLE TIME 46-60 MINS: Performed by: PHYSICIAN ASSISTANT

## 2022-12-12 PROCEDURE — 77010033678 HC OXYGEN DAILY

## 2022-12-12 PROCEDURE — 94762 N-INVAS EAR/PLS OXIMTRY CONT: CPT

## 2022-12-12 PROCEDURE — 74011250636 HC RX REV CODE- 250/636: Performed by: PHYSICIAN ASSISTANT

## 2022-12-12 PROCEDURE — 80048 BASIC METABOLIC PNL TOTAL CA: CPT

## 2022-12-12 RX ORDER — CALCIUM GLUCONATE 20 MG/ML
1 INJECTION, SOLUTION INTRAVENOUS
Status: COMPLETED | OUTPATIENT
Start: 2022-12-12 | End: 2022-12-13

## 2022-12-12 RX ORDER — CALCIUM GLUCONATE 20 MG/ML
2 INJECTION, SOLUTION INTRAVENOUS ONCE
Status: DISCONTINUED | OUTPATIENT
Start: 2022-12-12 | End: 2022-12-12 | Stop reason: CLARIF

## 2022-12-12 RX ADMIN — DOCUSATE SODIUM 100 MG: 100 CAPSULE, LIQUID FILLED ORAL at 17:36

## 2022-12-12 RX ADMIN — Medication 30 UNITS: at 08:10

## 2022-12-12 RX ADMIN — MIDODRINE HYDROCHLORIDE 10 MG: 5 TABLET ORAL at 17:35

## 2022-12-12 RX ADMIN — CALCIUM GLUCONATE 1000 MG: 20 INJECTION, SOLUTION INTRAVENOUS at 08:31

## 2022-12-12 RX ADMIN — Medication 6 UNITS: at 12:26

## 2022-12-12 RX ADMIN — SODIUM CHLORIDE, PRESERVATIVE FREE 10 ML: 5 INJECTION INTRAVENOUS at 06:44

## 2022-12-12 RX ADMIN — MIDODRINE HYDROCHLORIDE 10 MG: 5 TABLET ORAL at 12:26

## 2022-12-12 RX ADMIN — HEPARIN SODIUM 5000 UNITS: 5000 INJECTION INTRAVENOUS; SUBCUTANEOUS at 06:44

## 2022-12-12 RX ADMIN — IRON SUCROSE 200 MG: 20 INJECTION, SOLUTION INTRAVENOUS at 08:09

## 2022-12-12 RX ADMIN — ASPIRIN 81 MG: 81 TABLET, COATED ORAL at 08:09

## 2022-12-12 RX ADMIN — CLOPIDOGREL BISULFATE 75 MG: 75 TABLET ORAL at 08:09

## 2022-12-12 RX ADMIN — CYANOCOBALAMIN TAB 1000 MCG 1000 MCG: 1000 TAB at 08:09

## 2022-12-12 RX ADMIN — Medication 1 TABLET: at 08:09

## 2022-12-12 RX ADMIN — SODIUM CHLORIDE, PRESERVATIVE FREE 40 MG: 5 INJECTION INTRAVENOUS at 08:09

## 2022-12-12 RX ADMIN — CALCIUM GLUCONATE 1000 MG: 20 INJECTION, SOLUTION INTRAVENOUS at 09:01

## 2022-12-12 RX ADMIN — SODIUM CHLORIDE, PRESERVATIVE FREE 10 ML: 5 INJECTION INTRAVENOUS at 21:28

## 2022-12-12 RX ADMIN — CHLORHEXIDINE GLUCONATE 0.12% ORAL RINSE 10 ML: 1.2 LIQUID ORAL at 17:36

## 2022-12-12 RX ADMIN — SODIUM CHLORIDE, PRESERVATIVE FREE 10 ML: 5 INJECTION INTRAVENOUS at 15:49

## 2022-12-12 RX ADMIN — ATORVASTATIN CALCIUM 40 MG: 40 TABLET, FILM COATED ORAL at 21:28

## 2022-12-12 RX ADMIN — HEPARIN SODIUM 5000 UNITS: 5000 INJECTION INTRAVENOUS; SUBCUTANEOUS at 21:28

## 2022-12-12 RX ADMIN — HYDROCODONE BITARTRATE AND ACETAMINOPHEN 1 TABLET: 5; 325 TABLET ORAL at 15:51

## 2022-12-12 RX ADMIN — CHLORHEXIDINE GLUCONATE 0.12% ORAL RINSE 10 ML: 1.2 LIQUID ORAL at 08:09

## 2022-12-12 RX ADMIN — MIDODRINE HYDROCHLORIDE 10 MG: 5 TABLET ORAL at 08:09

## 2022-12-12 RX ADMIN — MONTELUKAST 10 MG: 10 TABLET, FILM COATED ORAL at 08:09

## 2022-12-12 RX ADMIN — FOLIC ACID 1 MG: 1 TABLET ORAL at 08:09

## 2022-12-12 RX ADMIN — HEPARIN SODIUM 5000 UNITS: 5000 INJECTION INTRAVENOUS; SUBCUTANEOUS at 15:49

## 2022-12-12 NOTE — PROGRESS NOTES
0700- Change of shift. Report received from Giovanna Yun RN. Will assume care of the patient. 0740- Epi off per Dr Shayne Casas. D/c lasix. 1000- Patient ambulated in hallway with PT/OT. 1200- Patient resting comfortably in recliner. No complaints, call bell with patient. 1330- Patient ambulated in hallway with RN.   1600- Patient resting in recliner, no complaints. 1730- Patient ambulated in room. 1815- Patient back to bed. 1900- Change of shift. Report given to Jeannie Branch RN.

## 2022-12-12 NOTE — PROGRESS NOTES
[facility]  -  Progress Note      Patient: Khloe Lopez MRN: [id number]  SSN: [id number]    YOB: 1955  Age: 79 y.o. Sex: female      Admit Date: 12/5/2022    Assessment:     Hospital Problems  Never Reviewed            Codes Class Noted POA    S/P AVR (aortic valve replacement) and aortoplasty ICD-10-CM: Z95.2  ICD-9-CM: V43.3  12/7/2022 No    Overview Signed 12/9/2022  9:24 AM by Analilia Signs, PA-C     S/p CABG x2 (LIMA-LAD, rSVG-OM), AVR 23 mm Inspiris with aortic root enlargement - Dr. Lesia Florez             S/P CABG x 2 ICD-10-CM: Z95.1  ICD-9-CM: V45.81  12/7/2022 No    Overview Signed 12/9/2022  9:24 AM by Analilia Signs, PA-C     S/p CABG x2 (LIMA-LAD, rSVG-OM), AVR 23 mm Inspiris with aortic root enlargement - Dr. Lesia Florez             CAD (coronary artery disease) ICD-10-CM: I25.10  ICD-9-CM: 414.00  12/5/2022 Yes        Aortic stenosis ICD-10-CM: I35.0  ICD-9-CM: 424.1  11/28/2022 Yes        Primary hypertension ICD-10-CM: I10  ICD-9-CM: 401.9  11/28/2022 Yes        Coronary artery disease involving native coronary artery of native heart ICD-10-CM: I25.10  ICD-9-CM: 414.01  11/28/2022 Yes        Type 2 diabetes mellitus, without long-term current use of insulin ([facility] ICD-10-CM: E11.9  ICD-9-CM: 250.00  11/28/2022 Yes         -Coronary artery disease with left main disease, EF 55% . Heart catheterization November 28, 2022 with left main 70%, LAD 40%, diagonal 80%  -Severe aortic stenosis, mean gradient 57 mmHg by echocardiogram November 28, 2022  -PAD with moderate left THOMAS 50-69% by ultrasound 11/2022  -Diabetes  -Hypertension  -Chronic kidney disease, stage III  -Chronic anemia  -Hemoglobin 6.7 yesterday. 8.9 today    Primary cardiologist: Dr. Rajeev Del Rio    Patient is S/P CABG X 2 (LIMA-LAD, SVG-OM) with aortic valve replacement, ([employer]'s bovine pericardial bioprosthesis ) (12/7/2022)    Plan:     Making slow progress post bypass and AVR.   Continue supportive measures. Have encouraged incentive spirometry. She still has chest tubes in place. Continue routine post open heart surgery support. Subjective:     No new complaints.      Objective:      Patient Vitals for the past 8 hrs:   Temp Pulse Resp BP SpO2   12/12/22 1600 98.1 °F (36.7 °C) -- -- -- --   12/12/22 1500 -- 75 27 (!) 137/54 92 %   12/12/22 1400 -- 78 30 (!) 137/50 92 %   12/12/22 1300 -- 82 26 (!) 136/52 93 %   12/12/22 1200 98.1 °F (36.7 °C) 76 28 (!) 131/55 93 %   12/12/22 1021 -- 84 30 -- 93 %   12/12/22 1000 -- 82 28 (!) 123/51 93 %   12/12/22 0951 -- 80 23 -- 93 %   12/12/22 0911 -- -- -- -- 93 %   12/12/22 0900 -- 77 24 (!) 126/47 (!) 88 %         Patient Vitals for the past 96 hrs:   Weight   12/12/22 0615 139.1 kg (306 lb 9.6 oz)   12/11/22 0600 138.7 kg (305 lb 11 oz)   12/10/22 0645 134.2 kg (295 lb 12.8 oz)         Intake/Output Summary (Last 24 hours) at 12/12/2022 1611  Last data filed at 12/12/2022 1600  Gross per 24 hour   Intake 1628.73 ml   Output 1260 ml   Net 368.73 ml       Physical Exam:  General:  alert, cooperative, no distress, appears stated age  Neck:  no JVD  Lungs: Decreased breath sounds, but good air movement  Heart:  regular rate and rhythm  Abdomen:  no guarding or rigidity  Extremities:  no edema    Data Review:     Labs: Results:       Chemistry Recent Labs     12/12/22  0430 12/11/22  0428 12/10/22  0405   * 150* 132*   * 133* 132*   K 4.1 4.2 4.1    101 101   CO2 29 27 26   BUN 40* 41* 39*   CREA 1.41* 1.39* 1.52*   CA 8.3* 8.7 8.2*   AGAP 5 5 5   BUCR 28* 29* 26*      CBC w/Diff Recent Labs     12/12/22  0430 12/11/22  0428 12/10/22  1835 12/10/22  0405   WBC 12.2 14.8*  --  13.8*   RBC 2.86* 2.97*  --  2.30*   HGB 8.6* 8.9* 9.1* 6.7*   HCT 27.6* 26.4* 27.4* 20.9*    192  --  169   GRANS 68 75*  --  75*   LYMPH 17* 13*  --  13*   EOS 6* 3  --  2      Cardiac Enzymes No results found for: CPK, CK, CKMMB, CKMB, RCK3, CKMBT, CKNDX, CKND1, RHODA, TROPT, TROIQ, KASH, TROPT, TNIPOC, BNP, BNPP   Coagulation No results for input(s): PTP, INR, APTT, INREXT in the last 72 hours. Lipid Panel No results found for: CHOL, CHOLPOCT, CHOLX, CHLST, CHOLV, 672539, HDL, HDLP, LDL, LDLC, DLDLP, 211427, VLDLC, VLDL, TGLX, TRIGL, TRIGP, TGLPOCT, CHHD, CHHDX   BNP No results found for: BNP, BNPP, XBNPT   Liver Enzymes No results for input(s): TP, ALB, TBIL, AP in the last 72 hours.     No lab exists for component: SGOT, GPT, DBIL   Digoxin    Thyroid Studies Lab Results   Component Value Date/Time    TSH 0.99 11/28/2022 07:40 AM

## 2022-12-12 NOTE — PROGRESS NOTES
Problem: Falls - Risk of  Goal: *Absence of Falls  Description: Document Mattie Mcgraw Fall Risk and appropriate interventions in the flowsheet.   Outcome: Progressing Towards Goal  Note: Fall Risk Interventions:  Mobility Interventions: Assess mobility with egress test, Bed/chair exit alarm, Communicate number of staff needed for ambulation/transfer, OT consult for ADLs, PT Consult for mobility concerns, PT Consult for assist device competence, Utilize walker, cane, or other assistive device, Patient to call before getting OOB, Strengthening exercises (ROM-active/passive)    Mentation Interventions: Adequate sleep, hydration, pain control, Bed/chair exit alarm, Evaluate medications/consider consulting pharmacy, Door open when patient unattended, More frequent rounding, Reorient patient, Update white board, Toileting rounds, Self-releasing belt, Room close to nurse's station, Increase mobility    Medication Interventions: Assess postural VS orthostatic hypotension, Bed/chair exit alarm, Evaluate medications/consider consulting pharmacy, Patient to call before getting OOB, Teach patient to arise slowly    Elimination Interventions: Bed/chair exit alarm, Call light in reach, Patient to call for help with toileting needs, Toilet paper/wipes in reach, Toileting schedule/hourly rounds, Stay With Me (per policy), Elevated toilet seat    History of Falls Interventions: Bed/chair exit alarm         Problem: CABG: Post-Op Day 5  Goal: Activity/Safety  Outcome: Progressing Towards Goal  Goal: Diagnostic Test/Procedures  Outcome: Progressing Towards Goal  Goal: Nutrition/Diet  Outcome: Progressing Towards Goal  Goal: Discharge Planning  Outcome: Progressing Towards Goal  Goal: Medications  Outcome: Progressing Towards Goal  Goal: Respiratory  Outcome: Progressing Towards Goal  Goal: Treatments/Interventions/Procedures  Outcome: Progressing Towards Goal  Goal: Psychosocial  Outcome: Progressing Towards Goal

## 2022-12-12 NOTE — PROGRESS NOTES
Problem: Mobility Impaired (Adult and Pediatric)  Goal: *Acute Goals and Plan of Care (Insert Text)  Description: Physical Therapy Goals  Initiated 12/8/2022 and to be accomplished within 7 day(s)  1. Patient will move from supine to sit and sit to supine  in bed with modified independence. 2.  Patient will transfer from bed to chair and chair to bed with minimal assistance/contact guard assist using the least restrictive device. 3.  Patient will perform sit to stand with minimal assistance/contact guard assist.  4.  Patient will ambulate with minimal assistance/contact guard assist for 50 feet with the least restrictive device. 5.  Patient will ascend/descend 4 stairs with B handrail(s) with minimal assistance/contact guard assist.    PLOF: Pt lives alone in a [facility] [place] with 4 OLU. She was [place] with either SPC or RW PTA. Outcome: Progressing Towards Goal     PHYSICAL THERAPY TREATMENT    Patient: Silke Rodriguez (89 y.o. female)  Date: 12/12/2022  Diagnosis: CAD (coronary artery disease) [I25.10]  Aortic stenosis [I35.0] <principal problem not specified>  Procedure(s) (LRB):  CORONARY ARTERY BYPASS GRAFT TIMES CORONARY ARTERY BYPASS GRAFT TIMES TWO; AORTIC VALVE REPAIR/REPLACEMENT (INSPIRIS)/ ANNULAR ENLARGEMENT PROCEDURE/ NON CORONARY SINUS (N/A) 5 Days Post-Op  Precautions: Fall, Skin, Sternal      ASSESSMENT:  Patient seen with OT to maximize safety and participation with functional mobility. Reviewed sternal precautions. Additional time needed to initiate mobility 2/2 abdominal/gas pain. Mod/max A x2 sit to stand transfer with verbal cues for rocking and use of heart hugger. She transfers to the [facility] to urinate. Increase ease of transfer from [facility] due to higher surface level. Patient ambulates 40 ft using rollator with min A. Forward flexed posture and accelerate gait. Verbal cues for safety awareness. Patient needs brief standing rest break at correction point.  Educated on the importance of resting to avoid over exertion. Patient returns back to the chair, leg elevated, and call bell within reach. Progression toward goals:   []      Improving appropriately and progressing toward goals  [x]      Improving slowly and progressing toward goals  []      Not making progress toward goals and plan of care will be adjusted     PLAN:  Patient continues to benefit from skilled intervention to address the above impairments. Continue treatment per established plan of care. Further Equipment Recommendations for Discharge:  rolling walker    AMPAC:   Current research shows that an AM-PAC score of 17 (13 without stairs) or less is not associated with a discharge to the patient's home setting. Based on an AM-PAC score of 16/24 (**/20 if omitting stairs) and their current functional mobility deficits, it is recommended that the patient have 3-5 sessions per week of Physical Therapy at d/c to increase the patient's independence. This AMPAC score should be considered in conjunction with interdisciplinary team recommendations to determine the most appropriate discharge setting. Patient's social support, diagnosis, medical stability, and prior level of function should also be taken into consideration. SUBJECTIVE:   Patient stated \"  Bring the commode closer.     OBJECTIVE DATA SUMMARY:   Critical Behavior:  Neurologic State: Alert  Orientation Level: Oriented X4  Cognition: Follows commands  Safety/Judgement: Fall prevention  Functional Mobility Training:           Transfers:  Sit to Stand: Maximum assistance;Assist x2  Stand to Sit: Maximum assistance;Assist x2           Balance:  Sitting: Intact; With support  Standing: Impaired; With support  Standing - Static: Fair  Standing - Dynamic : Fair (-)            Ambulation/Gait Training:  Distance (ft): 40 Feet (ft)  Assistive Device: Walker, rollator  Ambulation - Level of Assistance: Minimal assistance        Gait Abnormalities: Decreased step clearance Pain:  Pain level pre-treatment: 3-4/10  Pain level post-treatment: 3-4/10   Pain Intervention(s): Medication (see MAR); Rest, Ice, Repositioning   Response to intervention: Nurse notified, See doc flow    Activity Tolerance:   Fair  Please refer to the flowsheet for vital signs taken during this treatment. After treatment:   [x] Patient left in no apparent distress sitting up in chair  [] Patient left in no apparent distress in bed  [x] Call bell left within reach  [x] Nursing notified  [] Caregiver present  [] Bed alarm activated  [] SCDs applied      COMMUNICATION/EDUCATION:   [x]         Role of Physical Therapy in the acute care setting. [x]         Fall prevention education was provided and the patient/caregiver indicated understanding. [x]         Patient/family have participated as able in working toward goals and plan of care. [x]         Patient/family agree to work toward stated goals and plan of care. []         Patient understands intent and goals of therapy, but is neutral about his/her participation. []         Patient is unable to participate in stated goals/plan of care: ongoing with therapy staff.  []         Other:        Tawanda New, PT   Time Calculation: 38 mins    [facility] AM-PAC® Basic Mobility Inpatient Short Form (6-Clicks) Version 2    How much HELP from another person does the patient currently need    (If the patient hasn't done an activity recently, how much help from another person do you think he/she would need if he/she tried?)   Total (Total A or Dep)   A Lot  (Mod to Max A)   A Little (Sup or Min A)   None (Mod I to I)   Turning from your back to your side while in a flat bed without using bedrails? [] 1 [] 2 [x] 3 [] 4   2. Moving from lying on your back to sitting on the side of a flat bed without using bedrails? [] 1 [] 2 [x] 3 [] 4   3. Moving to and from a bed to a chair (including a wheelchair)? [] 1 [] 2 [x] 3 [] 4   4.  Standing up from a chair using your arms (e.g., wheelchair, or bedside chair)? [] 1 [x] 2 [] 3 [] 4   5. Walking in hospital room? [] 1 [] 2 [x] 3 [] 4   6. Climbing 3-5 steps with a railing?+   [] 1 [x] 2 [] 3 [] 4   +If stair climbing cannot be assessed, skip item #6. Sum responses from items 1-5.

## 2022-12-12 NOTE — PROGRESS NOTES
CARDIAC SURGERY PROGRESS NOTE    2022  7:16 AM     CC:  Post Operative Day # 5     Chart, images and labs reviewed. Discussed with available staff. Interval History/Events of Past 24 hours:   Walked in hair x 1 with 1 l/m supplemental oxygen. Stable on epi 1 and midodrine 10 tid. Adequate uo, creat 1.14.  sodium up to 135. HCT 27.6%. tolerates diet and moved her bowels yesterday. IS to 750 c. Stable left ptx    Subjective:  Patient seen and examined on rounds today. No complaints  Pain level: controlled    Objective:  Vital signs:   Visit Vitals  BP (!) 128/56   Pulse 73   Temp 97.8 °F (36.6 °C)   Resp 18   Ht 6' (1.829 m)   Wt 139.1 kg (306 lb 9.6 oz)   SpO2 94%   BMI 41.58 kg/m²     Temp (24hrs), Av.9 °F (36.6 °C), Min:97.5 °F (36.4 °C), Max:98.2 °F (36.8 °C)    Admission Weight: Last Weight   Weight: 130 kg (286 lb 9.6 oz) Weight: 139.1 kg (306 lb 9.6 oz)     Telemetry: SR 75    Physical Examination:     General:  Alert, oriented   Lungs: Clear to auscultation without rales, wheezes or rhonchi. Chest: Dressings clean and dry. Midline incision healing well. Sternum stable. Heart: regular rate and rhythm, No murmur. Abdomen: Soft and non-tender without masses. Bowel sounds present. Extremities: Warm and well perfused. Edema moderate. Incisions: clean, dry, no drainage. Neuro: No deficit. Beta-blocker: No  ASA: Yes   Statin: Yes  ACE-I: No  Diuretic: No     DVT Prophylaxis:   pneumatic compression boots: Yes  Compression stockings:  Yes  Heparin:  Yes    Chest tubes:  Present.   Air Leak:  pleural tube on suction 40            Labs:  Lab Results   Component Value Date/Time    WBC 12.2 2022 04:30 AM    HCT 27.6 (L) 2022 04:30 AM     2022 04:30 AM      Lab Results   Component Value Date/Time     (L) 2022 04:30 AM    K 4.1 2022 04:30 AM     2022 04:30 AM    CO2 29 2022 04:30 AM     (H) 2022 04:30 AM    BUN 40 (H) 12/12/2022 04:30 AM    CREA 1.41 (H) 12/12/2022 04:30 AM    CREA 1.39 (H) 12/11/2022 04:28 AM    CREA 1.52 (H) 12/10/2022 04:05 AM          CXR: rotated. Similar left apical ptx. LLL atx     Assessment:  S/P  CABG x 2, AVR,Ao enlargement  Respiratory parameters stable  Cardiac status stable     Plans:  1. Epi per Dr. Arlene Mason. Hold BB. Not on amio. Cont ASAA and statin  2. Cont pulm toilet, wean 02 to off  3. Chest tubes per Dr. Penny Pitts draining  4. Cont cathartics  5. Rehab or SNF this week     Heart Hugger use is encouraged to mitigate pain and will also assist with deep breathing and incentive spirometry goals. Possible discharge 3 days. Tiffany Shukla PA-C    PLEASE NOTE:  This document has been produced using voice recognition software. Unrecognized errors in transcription may be present. NOTE TO PATIENT:  The purpose of this note is to communicate optimally with the other providers involved in your care. It is written using standard medical terminology. If you have questions regarding details of the note please call my office at [phone] and make an appointment to discuss your concerns.

## 2022-12-12 NOTE — PROGRESS NOTES
RENAL DAILY PROGRESS NOTE            74y F with PMH DM, HTN, s/p CABG, following for renal failure   Subjective:       Complaint:   Overnight events noted  Documented urine output about 840cc  Off epi now,       IMPRESSION:   OSVALDO ? contrast nephropathy on CKD stage 3 ? ATN related to CABG /AVR procedure  S/p CABG and AVR 12/7/22  Underlying diabetic and hypertensive nephropathy  Controlled diabetes  Severe AS   Diabetes  Hypertension   PLAN:   Agree with holding diuretics, monitor urine output and lytes. Will likely needed once more stable hemodynamics. Discussed with nursing staff.        Current Facility-Administered Medications   Medication Dose Route Frequency    pantoprazole (PROTONIX) 40 mg in 0.9% sodium chloride 10 mL injection  40 mg IntraVENous DAILY    HYDROcodone-acetaminophen (NORCO) 5-325 mg per tablet 1-2 Tablet  1-2 Tablet Oral Q6H PRN    insulin glargine (LANTUS) injection 30 Units  30 Units SubCUTAneous DAILY    iron sucrose (VENOFER) injection 200 mg  200 mg IntraVENous DAILY    gabapentin (NEURONTIN) capsule 200 mg  200 mg Oral TID PRN    midodrine (PROAMATINE) tablet 10 mg  10 mg Oral TID WITH MEALS    insulin lispro (HUMALOG) injection   SubCUTAneous AC&HS    glucose chewable tablet 16 g  4 Tablet Oral PRN    glucagon (GLUCAGEN) injection 1 mg  1 mg IntraMUSCular PRN    dextrose 10% infusion 0-250 mL  0-250 mL IntraVENous PRN    cyanocobalamin tablet 1,000 mcg  1,000 mcg Oral DAILY    multivitamin, tx-iron-ca-min (THERA-M w/ IRON) tablet 1 Tablet  1 Tablet Oral DAILY    folic acid (FOLVITE) tablet 1 mg  1 mg Oral DAILY    montelukast (SINGULAIR) tablet 10 mg  10 mg Oral DAILY    sodium chloride (NS) flush 5-40 mL  5-40 mL IntraVENous Q8H    sodium chloride (NS) flush 5-40 mL  5-40 mL IntraVENous PRN    naloxone (NARCAN) injection 0.4 mg  0.4 mg IntraVENous PRN    albuterol (PROVENTIL VENTOLIN) nebulizer solution 2.5 mg  2.5 mg Nebulization Q4H PRN    aspirin delayed-release tablet 81 mg  81 mg Oral DAILY    clopidogreL (PLAVIX) tablet 75 mg  75 mg Oral DAILY    chlorhexidine (PERIDEX) 0.12 % mouthwash 10 mL  10 mL Oral BID    docusate sodium (COLACE) capsule 100 mg  100 mg Oral BID    ELECTROLYTE REPLACEMENT PROTOCOL - Potassium Standard Dosing  1 Each Other PRN    ELECTROLYTE REPLACEMENT PROTOCOL - Magnesium  1 Each Other PRN    heparin (porcine) injection 5,000 Units  5,000 Units SubCUTAneous Q8H    polyethylene glycol (MIRALAX) packet 17 g  17 g Oral DAILY    atorvastatin (LIPITOR) tablet 40 mg  40 mg Oral QHS    sodium chloride (OCEAN) 0.65 % nasal squeeze bottle 2 Spray  2 Spray Both Nostrils Q2H PRN    guaiFENesin ER (MUCINEX) tablet 600 mg  600 mg Oral Q8H PRN    ondansetron (ZOFRAN) injection 4 mg  4 mg IntraVENous Q4H PRN       Review of Symptoms: comprehensive ROS negative except above.    Objective:   Patient Vitals for the past 24 hrs:   Temp Pulse Resp BP SpO2   12/12/22 1021 -- 84 30 -- 93 %   12/12/22 1000 -- 82 28 (!) 123/51 93 %   12/12/22 0951 -- 80 23 -- 93 %   12/12/22 0911 -- -- -- -- 93 %   12/12/22 0900 -- 77 24 (!) 126/47 (!) 88 %   12/12/22 0800 97.9 °F (36.6 °C) 75 24 (!) 129/59 92 %   12/12/22 0730 -- 71 20 -- 95 %   12/12/22 0700 -- 75 27 (!) 136/55 95 %   12/12/22 0600 -- -- -- -- 94 %   12/12/22 0500 -- 73 18 (!) 128/56 95 %   12/12/22 0400 97.8 °F (36.6 °C) 72 18 (!) 136/55 96 %   12/12/22 0300 -- 74 20 (!) 135/57 96 %   12/12/22 0200 -- 71 20 (!) 122/53 93 %   12/12/22 0100 -- 74 25 (!) 145/60 94 %   12/12/22 0000 98 °F (36.7 °C) 71 19 (!) 132/55 95 %   12/11/22 2300 -- 70 19 (!) 126/55 95 %   12/11/22 2200 -- 72 26 (!) 136/54 96 %   12/11/22 2100 -- 74 26 (!) 147/61 95 %   12/11/22 2000 97.8 °F (36.6 °C) 75 25 (!) 151/62 95 %   12/11/22 1900 -- 76 27 (!) 153/55 93 %   12/11/22 1812 -- 78 -- (!) 140/51 --   12/11/22 1800 -- 76 25 (!) 140/51 96 %   12/11/22 1709 -- 76 26 (!) 144/58 97 %   12/11/22 1600 98.2 °F (36.8 °C) 75 22 (!) 149/60 92 %   12/11/22 1530 -- 75 20 (!) 135/54 94 %   12/11/22 1500 -- 79 27 (!) 141/55 94 %   12/11/22 1430 -- 84 26 129/66 --   12/11/22 1400 -- 81 18 (!) 132/52 --   12/11/22 1330 -- 80 16 (!) 134/54 94 %   12/11/22 1300 -- 80 21 (!) 130/57 93 %   12/11/22 1230 -- 79 18 129/64 95 %   12/11/22 1200 97.9 °F (36.6 °C) 77 26 (!) 133/53 94 %   12/11/22 1130 -- 77 25 (!) 132/57 94 %   12/11/22 1100 -- 77 21 (!) 128/50 92 %        Weight change: 0.413 kg (14.6 oz)     12/10 1901 - 12/12 0700  In: 2012.4 [P.O.:1831;  I.V.:181.4]  Out: 2085 [Urine:1535; Drains:550]    Intake/Output Summary (Last 24 hours) at 12/12/2022 1031  Last data filed at 12/12/2022 1021  Gross per 24 hour   Intake 1276.33 ml   Output 1200 ml   Net 76.33 ml     Physical Exam:   General: intubated  HEENT sclera anicteric, supple neck, no thyromegaly  CVS: S1S2 heard,  no rub  RS: + air entry b/l,   Abd: Soft, Non tender  Extrm: plus 2 edema, no cyanosis, clubbing   Skin: no visible  Rash  Musculoskeletal: No gross joints or bone deformities         Data Review:     LABS:   Hematology:   Recent Labs     12/12/22  0430 12/11/22  0428 12/10/22  1835 12/10/22  0405   WBC 12.2 14.8*  --  13.8*   HGB 8.6* 8.9* 9.1* 6.7*   HCT 27.6* 26.4* 27.4* 20.9*     Chemistry:   Recent Labs     12/12/22  0430 12/11/22  0428 12/10/22  0405   BUN 40* 41* 39*   CREA 1.41* 1.39* 1.52*   CA 8.3* 8.7 8.2*   K 4.1 4.2 4.1   * 133* 132*    101 101   CO2 29 27 26   * 150* 132*            Procedures/imaging: see electronic medical records for all procedures, Xrays and details which were not copied into this note but were reviewed prior to creation of Plan          Assessment & Plan:     See above        Waldemar Pittman MD  12/12/2022

## 2022-12-12 NOTE — PROGRESS NOTES
Problem: Self Care Deficits Care Plan (Adult)  Goal: *Acute Goals and Plan of Care (Insert Text)  Description: Occupational Therapy Goals  Initiated 12/8/2022 within 7 day(s). 1.  Patient will perform grooming with supervision/set-up standing > 3 minutes, F+ balance. 2.  Patient will perform upper body dressing with supervision/set-up maintaining sternal precautions. 3.  Patient will perform lower body dressing with supervision/set-up using AE prn.  4.  Patient will perform toilet transfers with minimal assistance/contact guard assist.  5.  Patient will perform all aspects of toileting with supervision/set-up. 6.  Patient will participate in upper extremity therapeutic exercise/activities with supervision/set-up for 8 minutes to increase ROM/strength for ADLs. 7.  Patient will utilize energy conservation techniques during functional activities with verbal cues. 8.  Patient will recall all sternal precautions prior to ADLs with 100% accuracy. Prior Level of Function: Patient lives alone and was independent with self-care and functional mobility PTA. Outcome: Progressing Towards Goal   OCCUPATIONAL THERAPY TREATMENT    Patient: Monica Florez (34 y.o. female)  Date: 12/12/2022  Diagnosis: CAD (coronary artery disease) [I25.10]  Aortic stenosis [I35.0] <principal problem not specified>  Procedure(s) (LRB):  CORONARY ARTERY BYPASS GRAFT TIMES CORONARY ARTERY BYPASS GRAFT TIMES TWO; AORTIC VALVE REPAIR/REPLACEMENT (INSPIRIS)/ ANNULAR ENLARGEMENT PROCEDURE/ NON CORONARY SINUS (N/A) 5 Days Post-Op  Precautions: Fall, Skin, Sternal  PLOF: Patient lives alone and was independent with self-care and functional mobility PTA. Chart, occupational therapy assessment, plan of care, and goals were reviewed. ASSESSMENT:  PT co tx to maximize pt safety and participation. Pt presented sitting upright in reclining chair upon entry requiring encouragement for participation.  Reviewed sternal precautions with pt as she was able to recall 1/3. Pt requesting to use BSC. STS transfer MAX A x 2 with Rollator requiring vc's to utilize heart hugger and to perform rocking forward method. She performed SPT to [facility] MIN A w/ Rollator. Pt supervision toileting while seated then given MAX A once in stance for eleazar area care 2/2 decreased dyn standing balance and tolerance. Pt maneuvered in room and hallway ~ 40 ft with 1 standing rest break to improve functional activity tolerance needed for self cares. She returned to reclining chair at end of session with B MARIO's elevated and all needs left within reach. RN made aware. Progression toward goals:  []          Improving appropriately and progressing toward goals  [x]          Improving slowly and progressing toward goals  []          Not making progress toward goals and plan of care will be adjusted     PLAN:  Patient continues to benefit from skilled intervention to address the above impairments. Continue treatment per established plan of care. Further Equipment Recommendations for Discharge:  TBA    AMPAC: Current research shows that an AM-PAC score of 17 or less is not associated with a discharge to the patient's home setting. Based on an AM-PAC score of 14/24 and their current ADL deficits; it is recommended that the patient have 3-5 sessions per week of Occupational Therapy at d/c to increase the patient's independence. This AMPAC score should be considered in conjunction with interdisciplinary team recommendations to determine the most appropriate discharge setting. Patient's social support, diagnosis, medical stability, and prior level of function should also be taken into consideration. SUBJECTIVE:   Patient stated I really want to sit down right now.     OBJECTIVE DATA SUMMARY:   Cognitive/Behavioral Status:  Neurologic State: Alert  Orientation Level: Oriented X4  Cognition: Follows commands  Safety/Judgement: Fall prevention    Functional Mobility and Transfers for ADLs:      Transfers:  Sit to Stand: Maximum assistance;Assist x2  Stand to Sit: Maximum assistance;Assist x2          Toilet Transfer : Moderate assistance;Assist x2 (SPT to [facility] with Rollator)        Balance:  Sitting: Intact; With support  Standing: Impaired; With support  Standing - Static: Fair  Standing - Dynamic : Fair (-)    ADL Intervention:     Toileting  Bladder Hygiene: Maximum assistance  Adaptive Equipment: Elevated seat      Pain:  Pain level pre-treatment: 6/10   Pain level post-treatment: 6/10  Pain Intervention(s): Rest, Repositioning   Response to intervention: Nurse notified, See doc flow    Activity Tolerance:    Fair   Please refer to the flowsheet for vital signs taken during this treatment. After treatment:   [x]  Patient left in no apparent distress sitting up in chair  []  Patient left in no apparent distress in bed  [x]  Call bell left within reach  [x]  Nursing notified  []  Caregiver present  []  Bed alarm activated    COMMUNICATION/EDUCATION:   [x] Role of Occupational Therapy in the acute care setting  [] Home safety education was provided and the patient/caregiver indicated understanding. [x] Patient/family have participated as able in working towards goals and plan of care. [x] Patient/family agree to work toward stated goals and plan of care. [] Patient understands intent and goals of therapy, but is neutral about his/her participation. [] Patient is unable to participate in goal setting and plan of care.       Thank you for this referral.  SOSA Ramirez  Time Calculation: 38 mins    [facility] AM-PAC® Daily Activity Inpatient Short Form (6-Clicks)    How much HELP from another person does the patient currently need    (If the patient hasn't done an activity recently, how much help from another person do you think he/she would need if he/she tried?)   Total (Total A or Dep)   A Lot  (Mod to Max A)   A Little (Sup or Min A)   None (Mod I to I)   Putting on and taking off regular lower body clothing? [] 1 [x] 2 [] 3 [] 4   2. Bathing (including washing, rinsing,      drying)? [] 1 [x] 2 [] 3 [] 4   3. Toileting, which includes using toilet, bedpan or urinal?   [] 1 [x] 2 [] 3 [] 4   4. Putting on and taking off regular upper body clothing? [] 1 [x] 2 [] 3 [] 4   5. Taking care of personal grooming such as brushing teeth? [] 1 [] 2 [x] 3 [] 4   6. Eating meals?    [] 1 [] 2 [x] 3 [] 4

## 2022-12-12 NOTE — PROGRESS NOTES
1900: Bedside shift change report given to this nurse (oncoming nurse) by Hollis Hernandez RN (offgoing nurse). Report included the following information SBAR, Kardex, and Cardiac Rhythm NSR . Wound Prevention Checklist    Patient: Escobar Espinoza (79 y.o. female)  Date: 12/11/2022  Diagnosis: CAD (coronary artery disease) [I25.10]  Aortic stenosis [I35.0] <principal problem not specified>    Precautions: Fall, Skin, Sternal       []  Heel prevention boots placed on patient    [x]  Patient turned q2h during shift    []  Lift team ordered    [x]  Patient on [place] bed/Specialty bed    [x]  Each Wound is documented during shift (Stage, Color, drainage, odor, measurements, and dressings)    [x]  Dual skin check done with Hollis Hernandez RN  Patient resting in bed. NSR on monitor. O2 sats in mid 90's on o2 via NC. Chest tubes draining. No c/o nausea or vomiting. Conitinues to have periodic productive cough. Epinephrine running and verified with off going nurse. No distress noted. 2100: Jullie Sutherland put in place. Patient resting in bed, no distress noted. 2330: Patient sleeping in bed. No distress noted. 0130: Patient sleeping in bed, no distress noted. 0330: Patient sleeping in bed, no distress noted. 0530: Patient sleeping in bed, no distress noted. 6529: Patient transferred to bedside commode then chair with moderate assist of staff x2. No distress noted during transfer. Using heart hugger appropriately.   0700: Report given to JAVY Valladares

## 2022-12-12 NOTE — PROGRESS NOTES
Problem: Pressure Injury - Risk of  Goal: *Prevention of pressure injury  Description: Document Nas Scale and appropriate interventions in the flowsheet. Outcome: Progressing Towards Goal  Note: Pressure Injury Interventions:  Sensory Interventions: Assess changes in LOC, Avoid rigorous massage over bony prominences, Assess need for specialty bed, Discuss PT/OT consult with provider, Chair cushion, Pressure redistribution bed/mattress (bed type), Sit a 90-degree angle/use footstool if needed, Turn and reposition approx. every two hours (pillows and wedges if needed), Maintain/enhance activity level         Activity Interventions: Assess need for specialty bed, Chair cushion, PT/OT evaluation, Pressure redistribution bed/mattress(bed type), Increase time out of bed    Mobility Interventions: Assess need for specialty bed, Chair cushion, HOB 30 degrees or less, Pressure redistribution bed/mattress (bed type), Turn and reposition approx. every two hours(pillow and wedges)    Nutrition Interventions: Document food/fluid/supplement intake, Discuss nutritional consult with provider    Friction and Shear Interventions: Apply protective barrier, creams and emollients, Foam dressings/transparent film/skin sealants, HOB 30 degrees or less, Transfer aides:transfer board/Roya lift/ceiling lift, Lift sheet, Lift team/patient mobility team, Feet elevated on foot rest                Problem: Patient Education: Go to Patient Education Activity  Goal: Patient/Family Education  Outcome: Progressing Towards Goal     Problem: Falls - Risk of  Goal: *Absence of Falls  Description: Document Adonisttjefferson Carsonop Fall Risk and appropriate interventions in the flowsheet.   Outcome: Progressing Towards Goal  Note: Fall Risk Interventions:  Mobility Interventions: Assess mobility with egress test, Bed/chair exit alarm, OT consult for ADLs, PT Consult for assist device competence, Patient to call before getting OOB, Communicate number of staff needed for ambulation/transfer, Utilize walker, cane, or other assistive device, Strengthening exercises (ROM-active/passive), PT Consult for mobility concerns    Mentation Interventions: Adequate sleep, hydration, pain control, Bed/chair exit alarm, Door open when patient unattended, Evaluate medications/consider consulting pharmacy, More frequent rounding, Reorient patient, Update white board    Medication Interventions: Assess postural VS orthostatic hypotension, Bed/chair exit alarm, Evaluate medications/consider consulting pharmacy, Teach patient to arise slowly, Patient to call before getting OOB    Elimination Interventions: Bed/chair exit alarm, Call light in reach, Patient to call for help with toileting needs, Elevated toilet seat, Stay With Me (per policy), Toilet paper/wipes in reach, Toileting schedule/hourly rounds    History of Falls Interventions: Bed/chair exit alarm, Consult care management for discharge planning, Evaluate medications/consider consulting pharmacy, Assess for delayed presentation/identification of injury for 48 hrs (comment for end date), Door open when patient unattended

## 2022-12-12 NOTE — PROGRESS NOTES
ARU/IPR REFERRAL CONTACT NOTE  [address] [facility]    RE:  Francy Cisse      Thank you for the opportunity to review this patient's case for admission to [id number] [place] [address] for Physical Rehabilitation. Based on our pre-admission screening:     [ Winsome Kunz Team/Medical Director is following this case. Comments:  Referral received. Not medically ready for transfer to [facility] still with chest tubes. Currently requiring max A 2 for transfer. Will review with team 12/13/22; clarify safe dispo, interview patient to assess willingness to participate in IPR program in preparation for return home alone. Please be advised admission to ARU will be based on meeting admission requirements. Thank you for this referral.   Please do not hesitate to call if you need further information or have additional questions.      Regards,    Rod Blackmon PTA   Admissions [facility]  [phone]

## 2022-12-13 ENCOUNTER — APPOINTMENT (OUTPATIENT)
Dept: GENERAL RADIOLOGY | Age: 67
End: 2022-12-13
Attending: PHYSICIAN ASSISTANT
Payer: MEDICARE

## 2022-12-13 LAB
ANION GAP SERPL CALC-SCNC: 5 MMOL/L (ref 3–18)
BASOPHILS # BLD: 0.1 K/UL (ref 0–0.1)
BASOPHILS NFR BLD: 1 % (ref 0–2)
BUN SERPL-MCNC: 40 MG/DL (ref 7–18)
BUN/CREAT SERPL: 31 (ref 12–20)
CALCIUM SERPL-MCNC: 8.4 MG/DL (ref 8.5–10.1)
CHLORIDE SERPL-SCNC: 102 MMOL/L (ref 100–111)
CO2 SERPL-SCNC: 28 MMOL/L (ref 21–32)
CREAT SERPL-MCNC: 1.27 MG/DL (ref 0.6–1.3)
DIFFERENTIAL METHOD BLD: ABNORMAL
EOSINOPHIL # BLD: 0.9 K/UL (ref 0–0.4)
EOSINOPHIL NFR BLD: 8 % (ref 0–5)
ERYTHROCYTE [DISTWIDTH] IN BLOOD BY AUTOMATED COUNT: 17.3 % (ref 11.6–14.5)
GLUCOSE BLD STRIP.AUTO-MCNC: 112 MG/DL (ref 70–110)
GLUCOSE BLD STRIP.AUTO-MCNC: 127 MG/DL (ref 70–110)
GLUCOSE BLD STRIP.AUTO-MCNC: 135 MG/DL (ref 70–110)
GLUCOSE BLD STRIP.AUTO-MCNC: 141 MG/DL (ref 70–110)
GLUCOSE SERPL-MCNC: 104 MG/DL (ref 74–99)
HCT VFR BLD AUTO: 27 % (ref 35–45)
HGB BLD-MCNC: 8.7 G/DL (ref 12–16)
IMM GRANULOCYTES # BLD AUTO: 0.1 K/UL (ref 0–0.04)
IMM GRANULOCYTES NFR BLD AUTO: 0 % (ref 0–0.5)
LYMPHOCYTES # BLD: 2 K/UL (ref 0.9–3.6)
LYMPHOCYTES NFR BLD: 18 % (ref 21–52)
MCH RBC QN AUTO: 30 PG (ref 24–34)
MCHC RBC AUTO-ENTMCNC: 32.2 G/DL (ref 31–37)
MCV RBC AUTO: 93.1 FL (ref 78–100)
MONOCYTES # BLD: 1.2 K/UL (ref 0.05–1.2)
MONOCYTES NFR BLD: 11 % (ref 3–10)
NEUTS SEG # BLD: 6.9 K/UL (ref 1.8–8)
NEUTS SEG NFR BLD: 62 % (ref 40–73)
NRBC # BLD: 0 K/UL (ref 0–0.01)
NRBC BLD-RTO: 0 PER 100 WBC
PLATELET # BLD AUTO: 203 K/UL (ref 135–420)
PMV BLD AUTO: 9.3 FL (ref 9.2–11.8)
POTASSIUM SERPL-SCNC: 4.1 MMOL/L (ref 3.5–5.5)
RBC # BLD AUTO: 2.9 M/UL (ref 4.2–5.3)
SODIUM SERPL-SCNC: 135 MMOL/L (ref 136–145)
WBC # BLD AUTO: 11.2 K/UL (ref 4.6–13.2)

## 2022-12-13 PROCEDURE — 97530 THERAPEUTIC ACTIVITIES: CPT

## 2022-12-13 PROCEDURE — 74011250637 HC RX REV CODE- 250/637: Performed by: PHYSICIAN ASSISTANT

## 2022-12-13 PROCEDURE — 74011636637 HC RX REV CODE- 636/637: Performed by: PHYSICIAN ASSISTANT

## 2022-12-13 PROCEDURE — 74011250636 HC RX REV CODE- 250/636: Performed by: THORACIC SURGERY (CARDIOTHORACIC VASCULAR SURGERY)

## 2022-12-13 PROCEDURE — 85025 COMPLETE CBC W/AUTO DIFF WBC: CPT

## 2022-12-13 PROCEDURE — 97535 SELF CARE MNGMENT TRAINING: CPT

## 2022-12-13 PROCEDURE — 71045 X-RAY EXAM CHEST 1 VIEW: CPT

## 2022-12-13 PROCEDURE — 77010033678 HC OXYGEN DAILY

## 2022-12-13 PROCEDURE — 80048 BASIC METABOLIC PNL TOTAL CA: CPT

## 2022-12-13 PROCEDURE — 74011250636 HC RX REV CODE- 250/636: Performed by: PHYSICIAN ASSISTANT

## 2022-12-13 PROCEDURE — 74011000250 HC RX REV CODE- 250: Performed by: PHYSICIAN ASSISTANT

## 2022-12-13 PROCEDURE — C9113 INJ PANTOPRAZOLE SODIUM, VIA: HCPCS | Performed by: PHYSICIAN ASSISTANT

## 2022-12-13 PROCEDURE — 94762 N-INVAS EAR/PLS OXIMTRY CONT: CPT

## 2022-12-13 PROCEDURE — 65620000000 HC RM CCU GENERAL

## 2022-12-13 PROCEDURE — 82962 GLUCOSE BLOOD TEST: CPT

## 2022-12-13 PROCEDURE — 97116 GAIT TRAINING THERAPY: CPT

## 2022-12-13 PROCEDURE — 99232 SBSQ HOSP IP/OBS MODERATE 35: CPT | Performed by: INTERNAL MEDICINE

## 2022-12-13 RX ORDER — ZOLPIDEM TARTRATE 5 MG/1
5 TABLET ORAL
Status: DISCONTINUED | OUTPATIENT
Start: 2022-12-13 | End: 2022-12-15 | Stop reason: HOSPADM

## 2022-12-13 RX ORDER — FUROSEMIDE 10 MG/ML
20 INJECTION INTRAMUSCULAR; INTRAVENOUS ONCE
Status: COMPLETED | OUTPATIENT
Start: 2022-12-13 | End: 2022-12-13

## 2022-12-13 RX ADMIN — CYANOCOBALAMIN TAB 1000 MCG 1000 MCG: 1000 TAB at 08:18

## 2022-12-13 RX ADMIN — MIDODRINE HYDROCHLORIDE 10 MG: 5 TABLET ORAL at 16:43

## 2022-12-13 RX ADMIN — HYDROCODONE BITARTRATE AND ACETAMINOPHEN 2 TABLET: 5; 325 TABLET ORAL at 21:59

## 2022-12-13 RX ADMIN — DOCUSATE SODIUM 100 MG: 100 CAPSULE, LIQUID FILLED ORAL at 08:18

## 2022-12-13 RX ADMIN — IRON SUCROSE 200 MG: 20 INJECTION, SOLUTION INTRAVENOUS at 08:19

## 2022-12-13 RX ADMIN — GABAPENTIN 200 MG: 100 CAPSULE ORAL at 16:43

## 2022-12-13 RX ADMIN — MONTELUKAST 10 MG: 10 TABLET, FILM COATED ORAL at 08:18

## 2022-12-13 RX ADMIN — SODIUM CHLORIDE, PRESERVATIVE FREE 40 MG: 5 INJECTION INTRAVENOUS at 08:19

## 2022-12-13 RX ADMIN — Medication 1 TABLET: at 08:18

## 2022-12-13 RX ADMIN — Medication 30 UNITS: at 08:19

## 2022-12-13 RX ADMIN — FUROSEMIDE 20 MG: 10 INJECTION, SOLUTION INTRAMUSCULAR; INTRAVENOUS at 08:19

## 2022-12-13 RX ADMIN — SODIUM CHLORIDE, PRESERVATIVE FREE 40 ML: 5 INJECTION INTRAVENOUS at 14:00

## 2022-12-13 RX ADMIN — HEPARIN SODIUM 5000 UNITS: 5000 INJECTION INTRAVENOUS; SUBCUTANEOUS at 14:35

## 2022-12-13 RX ADMIN — MIDODRINE HYDROCHLORIDE 10 MG: 5 TABLET ORAL at 08:18

## 2022-12-13 RX ADMIN — CHLORHEXIDINE GLUCONATE 0.12% ORAL RINSE 10 ML: 1.2 LIQUID ORAL at 08:19

## 2022-12-13 RX ADMIN — DOCUSATE SODIUM 100 MG: 100 CAPSULE, LIQUID FILLED ORAL at 18:42

## 2022-12-13 RX ADMIN — SODIUM CHLORIDE, PRESERVATIVE FREE 10 ML: 5 INJECTION INTRAVENOUS at 06:54

## 2022-12-13 RX ADMIN — HYDROCODONE BITARTRATE AND ACETAMINOPHEN 2 TABLET: 5; 325 TABLET ORAL at 11:36

## 2022-12-13 RX ADMIN — FOLIC ACID 1 MG: 1 TABLET ORAL at 08:18

## 2022-12-13 RX ADMIN — ZOLPIDEM TARTRATE 5 MG: 5 TABLET ORAL at 21:59

## 2022-12-13 RX ADMIN — HYDROCODONE BITARTRATE AND ACETAMINOPHEN 2 TABLET: 5; 325 TABLET ORAL at 04:54

## 2022-12-13 RX ADMIN — SODIUM CHLORIDE, PRESERVATIVE FREE 10 ML: 5 INJECTION INTRAVENOUS at 22:00

## 2022-12-13 RX ADMIN — POLYETHYLENE GLYCOL 3350 17 G: 17 POWDER, FOR SOLUTION ORAL at 08:18

## 2022-12-13 RX ADMIN — HEPARIN SODIUM 5000 UNITS: 5000 INJECTION INTRAVENOUS; SUBCUTANEOUS at 21:59

## 2022-12-13 RX ADMIN — ASPIRIN 81 MG: 81 TABLET, COATED ORAL at 08:18

## 2022-12-13 RX ADMIN — MIDODRINE HYDROCHLORIDE 10 MG: 5 TABLET ORAL at 11:36

## 2022-12-13 RX ADMIN — CLOPIDOGREL BISULFATE 75 MG: 75 TABLET ORAL at 08:18

## 2022-12-13 RX ADMIN — CHLORHEXIDINE GLUCONATE 0.12% ORAL RINSE 10 ML: 1.2 LIQUID ORAL at 18:42

## 2022-12-13 RX ADMIN — HEPARIN SODIUM 5000 UNITS: 5000 INJECTION INTRAVENOUS; SUBCUTANEOUS at 06:54

## 2022-12-13 RX ADMIN — ATORVASTATIN CALCIUM 40 MG: 40 TABLET, FILM COATED ORAL at 21:59

## 2022-12-13 NOTE — PROGRESS NOTES
ARU/IPR REFERRAL CONTACT NOTE  [id number] [place] [address] for Physical Rehabilitation    RE:  Melchor Victoria     Thank you for the opportunity to review this patient's case for admission to [id number] [place] [address] for Physical Rehabilitation. Based on our pre-admission screening:     Marty.[email or web address] ] Our Team/Medical Director is following this case. Comments: Following for IPR needs. Concern for dispo as per therapy note PLOF patient living alone. Will review with IPR team.     Please be advised admission to ARU will be based on meeting admission requirements. Thank you for this referral.   Please do not hesitate to call if you need further information or have additional questions.      Regards,    Nicci Denney PTA   Admissions [facility]  [phone]

## 2022-12-13 NOTE — PROGRESS NOTES
07:15  Received pt up in chair from [facility], RN.  3 chest tubes to 2 atriums attached to -20 cm constant wall suction. Chest tubes draining serousanguinous fluid. No air leak noted. Pt A&O x 4. Denies pain. VSS: BP 1135/85; HR 74 Sinus rhythm; RR 15; Sp02 95% on 1 Liter 02 via n/c. Wound Prevention Checklist    Patient: Ashli Handley (80 y.o. female)  Date: 12/13/2022  Diagnosis: CAD (coronary artery disease) [I25.10]  Aortic stenosis [I35.0] <principal problem not specified>    Precautions: Fall, Skin, Sternal       []  Heel prevention boots placed on patient    []  Patient turned q2h during shift    []  Lift team ordered    [x]  Patient on [place] bed/Specialty bed    []  Each Wound is documented during shift (Stage, Color, drainage, odor, measurements, and dressings)    [x]  [facility] ;  08:00  Dressing removed from  LLE. Incisions dry, well approximated. Some ecchymosis noted. Chest tube dressings CDI. Sternal incision SHORTY, well approximated without drainage. 08:20  20 mg iv lasix administered. 09:00  Pt ambulated to toilet with FWW and 2 person assist for 550 ml clear, yellow urine. Pt refused to ambulate out of room. 11:00  Pt ambulated with PT/OT out to nurses station and back. .   14:35  Pt ambulated out of unit - approximately 80 ft. Pt ambulated without oxygen. Desated to 88% while ambulation. Recovers to 95% quickly. 17:00  Pt ambulated length of nurses station. Approx 80 feet. Tolerated well. 19:00  Pt ambulated with FWW to toilet and then to bed. Bedside and Verbal shift change report given to [facility] ([facility]coming nurse) by Janeen Montoya RN (offgoing nurse). Report included the following information SBAR, Kardex, Procedure Summary, Intake/Output, MAR, Recent Results, Med Rec Status, and Cardiac Rhythm Sinus rhythm .

## 2022-12-13 NOTE — PROGRESS NOTES
RENAL DAILY PROGRESS NOTE            74y F with PMH DM, HTN, s/p CABG, following for renal failure   Subjective:       Complaint:   Overnight events noted  Received lasix this morning    IMPRESSION:   OSVALDO ? contrast nephropathy on CKD stage 3 ? ATN related to CABG /AVR procedure  S/p CABG and AVR 12/7/22  Underlying diabetic and hypertensive nephropathy  Controlled diabetes  Severe AS   Diabetes  Hypertension   PLAN:   Her renal function seems stabilizing, agree with resuming diuretics. Monitor urine output and electrolytes.            Discussed with nursing staff and Dr. Livier Trejo,      Current Facility-Administered Medications   Medication Dose Route Frequency    zolpidem (AMBIEN) tablet 5 mg  5 mg Oral QHS PRN    pantoprazole (PROTONIX) 40 mg in 0.9% sodium chloride 10 mL injection  40 mg IntraVENous DAILY    HYDROcodone-acetaminophen (NORCO) 5-325 mg per tablet 1-2 Tablet  1-2 Tablet Oral Q6H PRN    insulin glargine (LANTUS) injection 30 Units  30 Units SubCUTAneous DAILY    gabapentin (NEURONTIN) capsule 200 mg  200 mg Oral TID PRN    midodrine (PROAMATINE) tablet 10 mg  10 mg Oral TID WITH MEALS    insulin lispro (HUMALOG) injection   SubCUTAneous AC&HS    glucose chewable tablet 16 g  4 Tablet Oral PRN    glucagon (GLUCAGEN) injection 1 mg  1 mg IntraMUSCular PRN    dextrose 10% infusion 0-250 mL  0-250 mL IntraVENous PRN    cyanocobalamin tablet 1,000 mcg  1,000 mcg Oral DAILY    multivitamin, tx-iron-ca-min (THERA-M w/ IRON) tablet 1 Tablet  1 Tablet Oral DAILY    folic acid (FOLVITE) tablet 1 mg  1 mg Oral DAILY    montelukast (SINGULAIR) tablet 10 mg  10 mg Oral DAILY    sodium chloride (NS) flush 5-40 mL  5-40 mL IntraVENous Q8H    sodium chloride (NS) flush 5-40 mL  5-40 mL IntraVENous PRN    naloxone (NARCAN) injection 0.4 mg  0.4 mg IntraVENous PRN    albuterol (PROVENTIL VENTOLIN) nebulizer solution 2.5 mg  2.5 mg Nebulization Q4H PRN    aspirin delayed-release tablet 81 mg  81 mg Oral DAILY clopidogreL (PLAVIX) tablet 75 mg  75 mg Oral DAILY    chlorhexidine (PERIDEX) 0.12 % mouthwash 10 mL  10 mL Oral BID    docusate sodium (COLACE) capsule 100 mg  100 mg Oral BID    ELECTROLYTE REPLACEMENT PROTOCOL - Potassium Standard Dosing  1 Each Other PRN    ELECTROLYTE REPLACEMENT PROTOCOL - Magnesium  1 Each Other PRN    heparin (porcine) injection 5,000 Units  5,000 Units SubCUTAneous Q8H    polyethylene glycol (MIRALAX) packet 17 g  17 g Oral DAILY    atorvastatin (LIPITOR) tablet 40 mg  40 mg Oral QHS    sodium chloride (OCEAN) 0.65 % nasal squeeze bottle 2 Spray  2 Spray Both Nostrils Q2H PRN    guaiFENesin ER (MUCINEX) tablet 600 mg  600 mg Oral Q8H PRN    ondansetron (ZOFRAN) injection 4 mg  4 mg IntraVENous Q4H PRN       Review of Symptoms: comprehensive ROS negative except above.    Objective:   Patient Vitals for the past 24 hrs:   Temp Pulse Resp BP SpO2   12/13/22 1000 -- 69 23 (!) 135/45 99 %   12/13/22 0900 -- (!) 50 25 -- --   12/13/22 0800 -- 72 21 (!) 115/48 93 %   12/13/22 0749 98.2 °F (36.8 °C) 69 -- -- --   12/13/22 0700 -- 89 24 (!) 133/54 95 %   12/13/22 0600 -- 73 20 (!) 138/58 94 %   12/13/22 0500 -- 72 23 (!) 129/50 91 %   12/13/22 0400 98.2 °F (36.8 °C) 75 23 (!) 127/56 90 %   12/13/22 0300 -- 74 18 (!) 122/51 92 %   12/13/22 0200 -- 75 23 (!) 134/57 93 %   12/13/22 0100 -- 73 21 (!) 127/55 92 %   12/13/22 0000 98 °F (36.7 °C) 69 22 (!) 123/55 95 %   12/12/22 2300 -- 80 22 135/67 91 %   12/12/22 2214 -- 71 22 (!) 134/59 95 %   12/12/22 2100 -- 71 25 (!) 136/53 94 %   12/12/22 2000 98.2 °F (36.8 °C) 72 22 (!) 135/57 91 %   12/12/22 1900 -- 71 21 (!) 135/49 94 %   12/12/22 1800 -- 76 20 (!) 130/53 95 %   12/12/22 1700 -- 75 22 (!) 130/56 93 %   12/12/22 1600 98.1 °F (36.7 °C) 75 27 (!) 108/94 94 %   12/12/22 1500 -- 75 27 (!) 137/54 92 %   12/12/22 1400 -- 78 30 (!) 137/50 92 %   12/12/22 1300 -- 82 26 (!) 136/52 93 %   12/12/22 1200 98.1 °F (36.7 °C) 76 28 (!) 131/55 93 %   12/12/22 1021 -- 84 30 -- 93 %        Weight change: -0.073 kg (-2.6 oz)     12/11 1901 - 12/13 0700  In: 1248.1 [P.O.:1200;  I.V.:48.1]  Out: 1630 [Urine:1220; Drains:410]    Intake/Output Summary (Last 24 hours) at 12/13/2022 1020  Last data filed at 12/13/2022 0656  Gross per 24 hour   Intake 720 ml   Output 960 ml   Net -240 ml     Physical Exam:   General: intubated  HEENT sclera anicteric, supple neck, no thyromegaly  CVS: S1S2 heard,  no rub  RS: + air entry b/l,   Abd: Soft, Non tender  Extrm: plus 2 edema, no cyanosis, clubbing   Skin: no visible  Rash  Musculoskeletal: No gross joints or bone deformities         Data Review:     LABS:   Hematology:   Recent Labs     12/13/22  0450 12/12/22  0430 12/11/22  0428 12/10/22  1835   WBC 11.2 12.2 14.8*  --    HGB 8.7* 8.6* 8.9* 9.1*   HCT 27.0* 27.6* 26.4* 27.4*     Chemistry:   Recent Labs     12/13/22 0450 12/12/22 0430 12/11/22  0428   BUN 40* 40* 41*   CREA 1.27 1.41* 1.39*   CA 8.4* 8.3* 8.7   K 4.1 4.1 4.2   * 135* 133*    101 101   CO2 28 29 27   * 134* 150*            Procedures/imaging: see electronic medical records for all procedures, Xrays and details which were not copied into this note but were reviewed prior to creation of Plan          Assessment & Plan:     See above        Ashley Nova MD  12/13/2022

## 2022-12-13 NOTE — PROGRESS NOTES
Problem: Mobility Impaired (Adult and Pediatric)  Goal: *Acute Goals and Plan of Care (Insert Text)  Description: Physical Therapy Goals  Initiated 12/8/2022 and to be accomplished within 7 day(s)  1. Patient will move from supine to sit and sit to supine  in bed with modified independence. 2.  Patient will transfer from bed to chair and chair to bed with minimal assistance/contact guard assist using the least restrictive device. 3.  Patient will perform sit to stand with minimal assistance/contact guard assist.  4.  Patient will ambulate with minimal assistance/contact guard assist for 50 feet with the least restrictive device. 5.  Patient will ascend/descend 4 stairs with B handrail(s) with minimal assistance/contact guard assist.    PLOF: Pt lives alone in a [facility] [place] with 4 OLU. She was Bonita with either SPC or RW PTA. Outcome: Progressing Towards Goal     PHYSICAL THERAPY TREATMENT    Patient: Yudy Faria (46 y.o. female)  Date: 12/13/2022  Diagnosis: CAD (coronary artery disease) [I25.10]  Aortic stenosis [I35.0] <principal problem not specified>  Procedure(s) (LRB):  CORONARY ARTERY BYPASS GRAFT TIMES CORONARY ARTERY BYPASS GRAFT TIMES TWO; AORTIC VALVE REPAIR/REPLACEMENT (INSPIRIS)/ ANNULAR ENLARGEMENT PROCEDURE/ NON CORONARY SINUS (N/A) 6 Days Post-Op  Precautions: Fall, Skin, Sternal    ASSESSMENT:  RN cleared for mobility. Pt seen with both PT and OT to maximize patients safety, mobility, and participation. Pt found sitting up in chair, willing to work with PT with increased motivation. Pt able to recall sternal precautions. X4 trials of STS with maxAx2 without successfully standing upright. On 5th trial, x3 assist for successful stand/ At this point, pt wanted to sit back down, MAX encouragement to amb in hallway. She amb 40 ft with Edith, unsteady gait with accelerated gait speed. She returned back to chair and was left with B LE's elevated, call bell nearby, all needs met.      Progression toward goals:   []      Improving appropriately and progressing toward goals  [x]      Improving slowly and progressing toward goals  []      Not making progress toward goals and plan of care will be adjusted     PLAN:  Patient continues to benefit from skilled intervention to address the above impairments. Continue treatment per established plan of care. Further Equipment Recommendations for Discharge:  N/A    AMPAC: Current research shows that an AM-PAC score of 17 (13 without stairs) or less is not associated with a discharge to the patient's home setting. Based on an AM-PAC score of 11/24 (**/20 if omitting stairs) and their current functional mobility deficits, it is recommended that the patient have 3-5 sessions per week of Physical Therapy at d/c to increase the patient's independence. This AMPAC score should be considered in conjunction with interdisciplinary team recommendations to determine the most appropriate discharge setting. Patient's social support, diagnosis, medical stability, and prior level of function should also be taken into consideration. SUBJECTIVE:   Patient stated I can't do it, my legs are shakey.     OBJECTIVE DATA SUMMARY:   Critical Behavior:  Neurologic State: Alert  Orientation Level: Oriented X4  Cognition: Follows commands  Safety/Judgement: Fall prevention  Functional Mobility Training:    Transfers:  Sit to Stand: Maximum assistance;Assist x2 (x3 assist needed)   Balance:  Sitting: Intact; With support  Standing: Impaired; With support  Standing - Static: Fair  Standing - Dynamic : Fair;Poor           Ambulation/Gait Training:  Distance (ft): 40 Feet (ft)  Assistive Device: Walker, rollator  Ambulation - Level of Assistance: Minimal assistance        Gait Abnormalities: Decreased step clearance;Trunk sway increased         Pain:  Pain level pre-treatment: 9/10  Pain level post-treatment: 9/10   Pain Intervention(s): Medication (see MAR);  Rest, Ice, Repositioning Response to intervention: Nurse notified, See doc flow    Activity Tolerance:   Pt tolerated mobility poorly  Please refer to the flowsheet for vital signs taken during this treatment. After treatment:   [x] Patient left in no apparent distress sitting up in chair  [] Patient left in no apparent distress in bed  [x] Call bell left within reach  [x] Nursing notified  [] Caregiver present  [] Bed alarm activated  [] SCDs applied      COMMUNICATION/EDUCATION:   [x]         Role of Physical Therapy in the acute care setting. [x]         Fall prevention education was provided and the patient/caregiver indicated understanding. [x]         Patient/family have participated as able in working toward goals and plan of care. []         Patient/family agree to work toward stated goals and plan of care. []         Patient understands intent and goals of therapy, but is neutral about his/her participation. []         Patient is unable to participate in stated goals/plan of care: ongoing with therapy staff.  []         Other:        Jose Francisco Furlainey   Time Calculation: 23 mins    [address] [facility] [address] [address] AM-PAC® Basic Mobility Inpatient Short Form (6-Clicks) Version 2    How much HELP from another person does the patient currently need    (If the patient hasn't done an activity recently, how much help from another person do you think he/she would need if he/she tried?)   Total (Total A or Dep)   A Lot  (Mod to Max A)   A Little (Sup or Min A)   None (Mod I to I)   Turning from your back to your side while in a flat bed without using bedrails? [] 1 [x] 2 [] 3 [] 4   2. Moving from lying on your back to sitting on the side of a flat bed without using bedrails? [] 1 [x] 2 [] 3 [] 4   3. Moving to and from a bed to a chair (including a wheelchair)? [] 1 [x] 2 [] 3 [] 4   4. Standing up from a chair using your arms (e.g., wheelchair, or bedside chair)? [] 1 [x] 2 [] 3 [] 4   5. Walking in hospital room?    [] 1 [x] 2 [] 3 [] 4 6. Climbing 3-5 steps with a railing?+   [x] 1 [] 2 [] 3 [] 4   +If stair climbing cannot be assessed, skip item #6. Sum responses from items 1-5. Current research shows that an AM-PAC score of 17 (13 without stairs) or less is not associated with a discharge to the patient's home setting. Based on an AM-PAC score of 11/24 (**/20 if omitting stairs) and their current functional mobility deficits, it is recommended that the patient have 3-5 sessions per week of Physical Therapy at d/c to increase the patient's independence.

## 2022-12-13 NOTE — PROGRESS NOTES
[facility]  -  Progress Note      Patient: Zabrina Carpio MRN: [id number]  SSN: [id number]    YOB: 1955  Age: 79 y.o. Sex: female      Admit Date: 12/5/2022    Assessment:     Hospital Problems  Never Reviewed            Codes Class Noted POA    S/P AVR (aortic valve replacement) and aortoplasty ICD-10-CM: Z95.2  ICD-9-CM: V43.3  12/7/2022 No    Overview Signed 12/9/2022  9:24 AM by Frannie Jackson PA-C     S/p CABG x2 (LIMA-LAD, rSVG-OM), AVR 23 mm Inspiris with aortic root enlargement - Dr. Nik Gasca             S/P CABG x 2 ICD-10-CM: Z95.1  ICD-9-CM: V45.81  12/7/2022 No    Overview Signed 12/9/2022  9:24 AM by Frannie Jackson PA-C     S/p CABG x2 (LIMA-LAD, rSVG-OM), AVR 23 mm Inspiris with aortic root enlargement - Dr. Nik Gasca             CAD (coronary artery disease) ICD-10-CM: I25.10  ICD-9-CM: 414.00  12/5/2022 Yes        Aortic stenosis ICD-10-CM: I35.0  ICD-9-CM: 424.1  11/28/2022 Yes        Primary hypertension ICD-10-CM: I10  ICD-9-CM: 401.9  11/28/2022 Yes        Coronary artery disease involving native coronary artery of native heart ICD-10-CM: I25.10  ICD-9-CM: 414.01  11/28/2022 Yes        Type 2 diabetes mellitus, without long-term current use of insulin ([facility] [address] ICD-10-CM: E11.9  ICD-9-CM: 250.00  11/28/2022 Yes       -Coronary artery disease with left main disease, EF 55% . Heart catheterization November 28, 2022 with left main 70%, LAD 40%, diagonal 80%  -Severe aortic stenosis, mean gradient 57 mmHg by echocardiogram November 28, 2022  -PAD with moderate left THOMAS 50-69% by ultrasound 11/2022  -Diabetes  -Hypertension  -Chronic kidney disease, stage III  -Chronic anemia  -Hemoglobin 6.7 yesterday. 8.9 today    Primary cardiologist: Dr. Bernestine Merlin    Patient is S/P CABG X 2 (LIMA-LAD, SVG-OM) with aortic valve replacement, (Fitzpatrick Inspra's bovine pericardial bioprosthesis ) (12/7/2022)    Plan:     Making slow progress post bypass and AVR.   Continue supportive measures. Have encouraged incentive spirometry. She still has chest tubes in place. Diuretics per surgery but she is also getting midodrine. Watch BP closely    Continue routine post open heart surgery support. Subjective:     No new complaints.      Objective:      Patient Vitals for the past 8 hrs:   Temp Pulse Resp BP SpO2   12/13/22 1100 -- 68 21 (!) 120/42 97 %   12/13/22 1000 -- 69 23 (!) 135/45 99 %   12/13/22 0900 -- (!) 50 25 -- --   12/13/22 0800 -- 72 21 (!) 115/48 93 %   12/13/22 0749 98.2 °F (36.8 °C) 69 -- -- --   12/13/22 0700 -- 89 24 (!) 133/54 95 %   12/13/22 0600 -- 73 20 (!) 138/58 94 %   12/13/22 0500 -- 72 23 (!) 129/50 91 %   12/13/22 0400 98.2 °F (36.8 °C) 75 23 (!) 127/56 90 %           Patient Vitals for the past 96 hrs:   Weight   12/13/22 0653 139 kg (306 lb 7 oz)   12/12/22 0615 139.1 kg (306 lb 9.6 oz)   12/11/22 0600 138.7 kg (305 lb 11 oz)   12/10/22 0645 134.2 kg (295 lb 12.8 oz)           Intake/Output Summary (Last 24 hours) at 12/13/2022 1152  Last data filed at 12/13/2022 1100  Gross per 24 hour   Intake 480 ml   Output 1430 ml   Net -950 ml         Physical Exam:  General:  alert, cooperative, no distress, appears stated age  Neck:  no JVD  Lungs: Decreased breath sounds and bilateral basal rales, but good air movement  Heart:  regular rate and rhythm, grade 3/6 ejection systolic murmur at right upper sternal border,  Abdomen:  no guarding or rigidity  Extremities: Mild edema    Data Review:     Labs: Results:       Chemistry Recent Labs     12/13/22  0450 12/12/22  0430 12/11/22  0428   * 134* 150*   * 135* 133*   K 4.1 4.1 4.2    101 101   CO2 28 29 27   BUN 40* 40* 41*   CREA 1.27 1.41* 1.39*   CA 8.4* 8.3* 8.7   AGAP 5 5 5   BUCR 31* 28* 29*        CBC w/Diff Recent Labs     12/13/22  0450 12/12/22  0430 12/11/22  0428   WBC 11.2 12.2 14.8*   RBC 2.90* 2.86* 2.97*   HGB 8.7* 8.6* 8.9*   HCT 27.0* 27.6* 26.4*    202 192   GRANS 62 68 75* LYMPH 18* 17* 13*   EOS 8* 6* 3        Cardiac Enzymes No results found for: CPK, CK, CKMMB, CKMB, RCK3, CKMBT, CKNDX, CKND1, RHODA, TROPT, TROIQ, KASH, TROPT, TNIPOC, BNP, BNPP   Coagulation No results for input(s): PTP, INR, APTT, INREXT, INREXT in the last 72 hours. Lipid Panel No results found for: CHOL, CHOLPOCT, CHOLX, CHLST, CHOLV, 347605, HDL, HDLP, LDL, LDLC, DLDLP, 812200, VLDLC, VLDL, TGLX, TRIGL, TRIGP, TGLPOCT, CHHD, CHHDX   BNP No results found for: BNP, BNPP, XBNPT   Liver Enzymes No results for input(s): TP, ALB, TBIL, AP in the last 72 hours.     No lab exists for component: SGOT, GPT, DBIL   Digoxin    Thyroid Studies Lab Results   Component Value Date/Time    TSH 0.99 11/28/2022 07:40 AM           Layne Turner MD

## 2022-12-13 NOTE — PROGRESS NOTES
Problem: Self Care Deficits Care Plan (Adult)  Goal: *Acute Goals and Plan of Care (Insert Text)  Description: Occupational Therapy Goals  Initiated 12/8/2022 within 7 day(s). 1.  Patient will perform grooming with supervision/set-up standing > 3 minutes, F+ balance. 2.  Patient will perform upper body dressing with supervision/set-up maintaining sternal precautions. 3.  Patient will perform lower body dressing with supervision/set-up using AE prn.  4.  Patient will perform toilet transfers with minimal assistance/contact guard assist.  5.  Patient will perform all aspects of toileting with supervision/set-up. 6.  Patient will participate in upper extremity therapeutic exercise/activities with supervision/set-up for 8 minutes to increase ROM/strength for ADLs. 7.  Patient will utilize energy conservation techniques during functional activities with verbal cues. 8.  Patient will recall all sternal precautions prior to ADLs with 100% accuracy. Prior Level of Function: Patient lives alone and was independent with self-care and functional mobility PTA. Outcome: Progressing Towards Goal   OCCUPATIONAL THERAPY TREATMENT    Patient: Al Mcgraw (1 y.o. female)  Date: 12/13/2022  Diagnosis: CAD (coronary artery disease) [I25.10]  Aortic stenosis [I35.0] <principal problem not specified>  Procedure(s) (LRB):  CORONARY ARTERY BYPASS GRAFT TIMES CORONARY ARTERY BYPASS GRAFT TIMES TWO; AORTIC VALVE REPAIR/REPLACEMENT (INSPIRIS)/ ANNULAR ENLARGEMENT PROCEDURE/ NON CORONARY SINUS (N/A) 6 Days Post-Op  Precautions: Fall, Skin, Sternal  PLOF: Patient lives alone and was independent with self-care and functional mobility PTA. Chart, occupational therapy assessment, plan of care, and goals were reviewed. ASSESSMENT:  PT co tx to maximize pt safety and participation. Pt presented sitting in reclining chair upon entry and agreeable for participation.  Pt able to recall sternal precautions by looking at white communication board and required mod cueing throughout session to utilize heart hugger. STS transfer MAX A x 2 x 4 trials with Rollator 2/2 difficulty achieving full stance. Pt on 5th trial about to transition to full stance with MAX A X 3. Pt educated on PLB technique and required encouragement for functional mobility. She maneuvered in hallway ~ 40 ft MIN A w/ Rollator for steadiness requiring mod cueing for proper pacing, simulating BR mobility. Pt returned to sitting in reclining chair at end of session and left with B LE's elevated and all needs left within reach. Vitals WNL. RN made aware. Progression toward goals:  []          Improving appropriately and progressing toward goals  [x]          Improving slowly and progressing toward goals  []          Not making progress toward goals and plan of care will be adjusted     PLAN:  Patient continues to benefit from skilled intervention to address the above impairments. Continue treatment per established plan of care. Further Equipment Recommendations for Discharge:  TBA    AMPAC: Current research shows that an AM-PAC score of 17 or less is not associated with a discharge to the patient's home setting. Based on an AM-PAC score of 14/24 and their current ADL deficits; it is recommended that the patient have 3-5 sessions per week of Occupational Therapy at d/c to increase the patient's independence. This AMPAC score should be considered in conjunction with interdisciplinary team recommendations to determine the most appropriate discharge setting. Patient's social support, diagnosis, medical stability, and prior level of function should also be taken into consideration. SUBJECTIVE:   Patient stated I have gas pains.     OBJECTIVE DATA SUMMARY:   Cognitive/Behavioral Status:  Neurologic State: Alert  Orientation Level: Oriented X4  Cognition: Follows commands  Safety/Judgement: Fall prevention    Functional Mobility and Transfers for ADLs: Transfers:  Sit to Stand: Maximum assistance;Assist x2 (x3 assist needed)       Balance:  Sitting: Intact; With support  Standing: Impaired; With support  Standing - Static: Fair  Standing - Dynamic : Fair;Poor    ADL Intervention:       Cognitive Retraining  Safety/Judgement: Fall prevention      Pain:  Pain level pre-treatment: 6/10   Pain level post-treatment: 6/10  Pain Intervention(s): Rest,  Repositioning   Response to intervention: Nurse notified, See doc flow    Activity Tolerance:    Fair   Please refer to the flowsheet for vital signs taken during this treatment. After treatment:   [x]  Patient left in no apparent distress sitting up in chair  []  Patient left in no apparent distress in bed  [x]  Call bell left within reach  [x]  Nursing notified  []  Caregiver present  []  Bed alarm activated    COMMUNICATION/EDUCATION:   [x] Role of Occupational Therapy in the acute care setting  [] Home safety education was provided and the patient/caregiver indicated understanding. [x] Patient/family have participated as able in working towards goals and plan of care. [x] Patient/family agree to work toward stated goals and plan of care. [] Patient understands intent and goals of therapy, but is neutral about his/her participation. [] Patient is unable to participate in goal setting and plan of care. Thank you for this referral.  SOSA Oliver  Time Calculation: 23 mins    [facility] AM-PAC® Daily Activity Inpatient Short Form (6-Clicks)    How much HELP from another person does the patient currently need    (If the patient hasn't done an activity recently, how much help from another person do you think he/she would need if he/she tried?)   Total (Total A or Dep)   A Lot  (Mod to Max A)   A Little (Sup or Min A)   None (Mod I to I)   Putting on and taking off regular lower body clothing? [] 1 [x] 2 [] 3 [] 4   2. Bathing (including washing, rinsing,      drying)?     [] 1 [x] 2 [] 3 [] 4 3. Toileting, which includes using toilet, bedpan or urinal?   [] 1 [x] 2 [] 3 [] 4   4. Putting on and taking off regular upper body clothing? [] 1 [x] 2 [] 3 [] 4   5. Taking care of personal grooming such as brushing teeth? [] 1 [] 2 [x] 3 [] 4   6. Eating meals?    [] 1 [] 2 [x] 3 [] 4

## 2022-12-14 ENCOUNTER — APPOINTMENT (OUTPATIENT)
Dept: GENERAL RADIOLOGY | Age: 67
End: 2022-12-14
Attending: PHYSICIAN ASSISTANT
Payer: MEDICARE

## 2022-12-14 LAB
ANION GAP SERPL CALC-SCNC: 6 MMOL/L (ref 3–18)
BASOPHILS # BLD: 0.1 K/UL (ref 0–0.1)
BASOPHILS NFR BLD: 1 % (ref 0–2)
BUN SERPL-MCNC: 42 MG/DL (ref 7–18)
BUN/CREAT SERPL: 32 (ref 12–20)
CALCIUM SERPL-MCNC: 8 MG/DL (ref 8.5–10.1)
CHLORIDE SERPL-SCNC: 101 MMOL/L (ref 100–111)
CO2 SERPL-SCNC: 29 MMOL/L (ref 21–32)
CREAT SERPL-MCNC: 1.32 MG/DL (ref 0.6–1.3)
DIFFERENTIAL METHOD BLD: ABNORMAL
EOSINOPHIL # BLD: 1 K/UL (ref 0–0.4)
EOSINOPHIL NFR BLD: 9 % (ref 0–5)
ERYTHROCYTE [DISTWIDTH] IN BLOOD BY AUTOMATED COUNT: 17.4 % (ref 11.6–14.5)
GLUCOSE BLD STRIP.AUTO-MCNC: 110 MG/DL (ref 70–110)
GLUCOSE BLD STRIP.AUTO-MCNC: 137 MG/DL (ref 70–110)
GLUCOSE BLD STRIP.AUTO-MCNC: 139 MG/DL (ref 70–110)
GLUCOSE BLD STRIP.AUTO-MCNC: 146 MG/DL (ref 70–110)
GLUCOSE SERPL-MCNC: 94 MG/DL (ref 74–99)
HCT VFR BLD AUTO: 27.4 % (ref 35–45)
HGB BLD-MCNC: 8.8 G/DL (ref 12–16)
IMM GRANULOCYTES # BLD AUTO: 0.1 K/UL (ref 0–0.04)
IMM GRANULOCYTES NFR BLD AUTO: 1 % (ref 0–0.5)
LYMPHOCYTES # BLD: 2.2 K/UL (ref 0.9–3.6)
LYMPHOCYTES NFR BLD: 20 % (ref 21–52)
MCH RBC QN AUTO: 30.2 PG (ref 24–34)
MCHC RBC AUTO-ENTMCNC: 32.1 G/DL (ref 31–37)
MCV RBC AUTO: 94.2 FL (ref 78–100)
MONOCYTES # BLD: 1.3 K/UL (ref 0.05–1.2)
MONOCYTES NFR BLD: 12 % (ref 3–10)
NEUTS SEG # BLD: 6.3 K/UL (ref 1.8–8)
NEUTS SEG NFR BLD: 58 % (ref 40–73)
NRBC # BLD: 0 K/UL (ref 0–0.01)
NRBC BLD-RTO: 0 PER 100 WBC
PLATELET # BLD AUTO: 220 K/UL (ref 135–420)
PMV BLD AUTO: 9.9 FL (ref 9.2–11.8)
POTASSIUM SERPL-SCNC: 4.2 MMOL/L (ref 3.5–5.5)
RBC # BLD AUTO: 2.91 M/UL (ref 4.2–5.3)
SODIUM SERPL-SCNC: 136 MMOL/L (ref 136–145)
WBC # BLD AUTO: 10.9 K/UL (ref 4.6–13.2)

## 2022-12-14 PROCEDURE — 77010033678 HC OXYGEN DAILY

## 2022-12-14 PROCEDURE — 99232 SBSQ HOSP IP/OBS MODERATE 35: CPT | Performed by: INTERNAL MEDICINE

## 2022-12-14 PROCEDURE — 74011250637 HC RX REV CODE- 250/637: Performed by: PHYSICIAN ASSISTANT

## 2022-12-14 PROCEDURE — 74011000250 HC RX REV CODE- 250: Performed by: PHYSICIAN ASSISTANT

## 2022-12-14 PROCEDURE — C9113 INJ PANTOPRAZOLE SODIUM, VIA: HCPCS | Performed by: PHYSICIAN ASSISTANT

## 2022-12-14 PROCEDURE — 77030038269 HC DRN EXT URIN PURWCK BARD -A

## 2022-12-14 PROCEDURE — 65620000000 HC RM CCU GENERAL

## 2022-12-14 PROCEDURE — 74011250636 HC RX REV CODE- 250/636: Performed by: PHYSICIAN ASSISTANT

## 2022-12-14 PROCEDURE — 94762 N-INVAS EAR/PLS OXIMTRY CONT: CPT

## 2022-12-14 PROCEDURE — 71045 X-RAY EXAM CHEST 1 VIEW: CPT

## 2022-12-14 PROCEDURE — 99024 POSTOP FOLLOW-UP VISIT: CPT | Performed by: PHYSICIAN ASSISTANT

## 2022-12-14 PROCEDURE — 2709999900 HC NON-CHARGEABLE SUPPLY

## 2022-12-14 PROCEDURE — 74011636637 HC RX REV CODE- 636/637: Performed by: PHYSICIAN ASSISTANT

## 2022-12-14 PROCEDURE — APPNB30 APP NON BILLABLE TIME 0-30 MINS: Performed by: PHYSICIAN ASSISTANT

## 2022-12-14 PROCEDURE — 85025 COMPLETE CBC W/AUTO DIFF WBC: CPT

## 2022-12-14 PROCEDURE — 82962 GLUCOSE BLOOD TEST: CPT

## 2022-12-14 PROCEDURE — 80048 BASIC METABOLIC PNL TOTAL CA: CPT

## 2022-12-14 RX ORDER — FUROSEMIDE 10 MG/ML
20 INJECTION INTRAMUSCULAR; INTRAVENOUS ONCE
Status: COMPLETED | OUTPATIENT
Start: 2022-12-14 | End: 2022-12-14

## 2022-12-14 RX ORDER — METOCLOPRAMIDE HYDROCHLORIDE 5 MG/ML
10 INJECTION INTRAMUSCULAR; INTRAVENOUS EVERY 6 HOURS
Status: COMPLETED | OUTPATIENT
Start: 2022-12-14 | End: 2022-12-14

## 2022-12-14 RX ADMIN — POLYETHYLENE GLYCOL 3350 17 G: 17 POWDER, FOR SOLUTION ORAL at 08:32

## 2022-12-14 RX ADMIN — SODIUM CHLORIDE, PRESERVATIVE FREE 5 ML: 5 INJECTION INTRAVENOUS at 21:37

## 2022-12-14 RX ADMIN — SODIUM CHLORIDE, PRESERVATIVE FREE 10 ML: 5 INJECTION INTRAVENOUS at 07:53

## 2022-12-14 RX ADMIN — Medication 30 UNITS: at 08:30

## 2022-12-14 RX ADMIN — GABAPENTIN 200 MG: 100 CAPSULE ORAL at 08:47

## 2022-12-14 RX ADMIN — CHLORHEXIDINE GLUCONATE 0.12% ORAL RINSE 10 ML: 1.2 LIQUID ORAL at 18:44

## 2022-12-14 RX ADMIN — GABAPENTIN 200 MG: 100 CAPSULE ORAL at 18:44

## 2022-12-14 RX ADMIN — FOLIC ACID 1 MG: 1 TABLET ORAL at 08:46

## 2022-12-14 RX ADMIN — MIDODRINE HYDROCHLORIDE 10 MG: 5 TABLET ORAL at 11:51

## 2022-12-14 RX ADMIN — HEPARIN SODIUM 5000 UNITS: 5000 INJECTION INTRAVENOUS; SUBCUTANEOUS at 21:37

## 2022-12-14 RX ADMIN — HEPARIN SODIUM 5000 UNITS: 5000 INJECTION INTRAVENOUS; SUBCUTANEOUS at 13:20

## 2022-12-14 RX ADMIN — METOCLOPRAMIDE 10 MG: 5 INJECTION, SOLUTION INTRAMUSCULAR; INTRAVENOUS at 11:51

## 2022-12-14 RX ADMIN — ASPIRIN 81 MG: 81 TABLET, COATED ORAL at 08:47

## 2022-12-14 RX ADMIN — CLOPIDOGREL BISULFATE 75 MG: 75 TABLET ORAL at 08:46

## 2022-12-14 RX ADMIN — CYANOCOBALAMIN TAB 1000 MCG 1000 MCG: 1000 TAB at 08:46

## 2022-12-14 RX ADMIN — ZOLPIDEM TARTRATE 5 MG: 5 TABLET ORAL at 21:37

## 2022-12-14 RX ADMIN — MIDODRINE HYDROCHLORIDE 10 MG: 5 TABLET ORAL at 07:46

## 2022-12-14 RX ADMIN — HYDROCODONE BITARTRATE AND ACETAMINOPHEN 2 TABLET: 5; 325 TABLET ORAL at 13:20

## 2022-12-14 RX ADMIN — HEPARIN SODIUM 5000 UNITS: 5000 INJECTION INTRAVENOUS; SUBCUTANEOUS at 06:58

## 2022-12-14 RX ADMIN — SODIUM CHLORIDE, PRESERVATIVE FREE 10 ML: 5 INJECTION INTRAVENOUS at 13:20

## 2022-12-14 RX ADMIN — Medication 1 TABLET: at 08:46

## 2022-12-14 RX ADMIN — HYDROCODONE BITARTRATE AND ACETAMINOPHEN 1 TABLET: 5; 325 TABLET ORAL at 06:58

## 2022-12-14 RX ADMIN — FUROSEMIDE 20 MG: 10 INJECTION, SOLUTION INTRAMUSCULAR; INTRAVENOUS at 08:59

## 2022-12-14 RX ADMIN — MONTELUKAST 10 MG: 10 TABLET, FILM COATED ORAL at 08:46

## 2022-12-14 RX ADMIN — DOCUSATE SODIUM 100 MG: 100 CAPSULE, LIQUID FILLED ORAL at 18:44

## 2022-12-14 RX ADMIN — MIDODRINE HYDROCHLORIDE 10 MG: 5 TABLET ORAL at 16:51

## 2022-12-14 RX ADMIN — SODIUM CHLORIDE, PRESERVATIVE FREE 40 MG: 5 INJECTION INTRAVENOUS at 08:46

## 2022-12-14 RX ADMIN — ATORVASTATIN CALCIUM 40 MG: 40 TABLET, FILM COATED ORAL at 21:37

## 2022-12-14 RX ADMIN — METOCLOPRAMIDE 10 MG: 5 INJECTION, SOLUTION INTRAMUSCULAR; INTRAVENOUS at 08:59

## 2022-12-14 RX ADMIN — METOCLOPRAMIDE 10 MG: 5 INJECTION, SOLUTION INTRAMUSCULAR; INTRAVENOUS at 18:44

## 2022-12-14 RX ADMIN — DOCUSATE SODIUM 100 MG: 100 CAPSULE, LIQUID FILLED ORAL at 08:47

## 2022-12-14 RX ADMIN — CHLORHEXIDINE GLUCONATE 0.12% ORAL RINSE 10 ML: 1.2 LIQUID ORAL at 08:47

## 2022-12-14 NOTE — PROGRESS NOTES
07:15  Received pt up in recliner chair. Pt A&O x 4. Pt going over her bills. Pt with 5/10 pain but just medicated with hydrocodone. VSS: 120/52, HR 79 and sinus rhythm, RR 20, Sp02 93% on room air. 3 chest tubes to 2 atriums to constant wall suction. No air leak noted. Chest tubes draining serosanguinous fluid. Incisions open to air - no drainage noted. Edges well approximated. Wound Prevention Checklist    Patient: Yudy Faria (78 y.o. female)  Date: 12/14/2022  Diagnosis: CAD (coronary artery disease) [I25.10]  Aortic stenosis [I35.0] <principal problem not specified>    Precautions: Fall, Skin, Sternal       []  Heel prevention boots placed on patient    []  Patient turned q2h during shift    []  Lift team ordered    [x]  Patient on [facility] bed/Specialty bed    []  Each Wound is documented during shift (Stage, Color, drainage, odor, measurements, and dressings)    [x]  Dual skin check done with JAVY Dalal RN   08:00  Pt ambulated length of nurses station with 2 person assist and FWW. Pt tolerated ambulation. Dressing changed to cordis and triple lumen CVL. 10:20  Chest tubes removed by KO Alan.    14:15  KO Alan alerted to 10 beats of v-tach at 13:03 - no new orders. 14:30  Pt ambulated in unit. Tolerated well. 17:05  Pt ambulated to toilet - voided 300 ml clear, yellow urine - no BM but moderate amount of flatus. 18:00  Pt hair washed with dry shampoo cap.    19:00  Bedside and Verbal shift change report given to Ellie Ward RN (oncoming nurse) by Ian Jacobo RN (offgoing nurse). Report included the following information SBAR, Kardex, OR Summary, Procedure Summary, Intake/Output, MAR, Accordion, Recent Results, Med Rec Status, and Cardiac Rhythm Sinus rhythm with one episode of 10 beats of v-tach .

## 2022-12-14 NOTE — PROGRESS NOTES
Comprehensive Nutrition Assessment    Type and Reason for Visit: Reassess    Nutrition Recommendations/Plan:   Continue current diet as tolerated. Modify oral supplement - Glucerna (each provides 220 kcal, 10g protein). Monitor PO intake, compliance of oral supplement, weight, labs and plan of care during admission. Malnutrition Assessment:  Malnutrition Status: At risk for malnutrition (specify) (s/p CABG) (12/08/22 1236)    Context:  Acute illness       Nutrition Assessment:    Admitted for AS/CAD, s/p CABG. Pt OOBTC eating breakfast. States not eating well in-house due to dislike of foods. Requesting Glucerna, not Ensure. Has no diet related questions at this time. Reports eating ~50% of meals but drinks the Glucerna when she receives it. Nutrition Related Findings:    Pertinent Meds: lipitor, cyanocobalamin, colace, folic acid, lantus, reglan, midodrine, thera-M w/ iron, miralax, protonix  Pertinent Labs: BUN/Cr 42/1.32, GFR 44 Wound Type: Surgical incision, Skin tears    Current Nutrition Intake & Therapies:  Average Meal Intake: 26-50%  Average Supplement Intake: %  ADULT ORAL NUTRITION SUPPLEMENT Breakfast, Dinner; Diabetic Supplement  ADULT DIET Regular; 4 carb choices (60 gm/meal); no concentrated sweets. no red meat. prefers tea, likes fruit, jello. dislikes coffee    Anthropometric Measures:  Height: 6' (182.9 cm)  Ideal Body Weight (IBW): 160 lbs (73 kg)  Admission Body Weight: 281 lb 4.9 oz (127.6 kg)  Current Body Wt:  138.5 kg (305 lb 5.4 oz), 175.8 % IBW. Current BMI (kg/m2): 41.4  BMI Category: Obese class 2 (BMI 35.0-39. 9)    Estimated Daily Nutrient Needs:  Energy Requirements Based On: Formula  Weight Used for Energy Requirements: Admission  Energy (kcal/day): 7855-0020 (MSJ 1-1.1)  Weight Used for Protein Requirements: Ideal  Protein (g/day): 109-145 (1.5-2 g/day)  Method Used for Fluid Requirements: 1 ml/kcal  Fluid (ml/day): 2937-6748    Nutrition Diagnosis:   Inadequate oral intake related to acute injury/trauma (, dislike of foods in-house) as evidenced by intake 51-75%    Nutrition Interventions:   Food and/or Nutrient Delivery: Continue current diet, Modify oral nutrition supplement  Nutrition Education/Counseling: Education not appropriate  Coordination of Nutrition Care: Continue to monitor while inpatient  Plan of Care discussed with: pt, RN    Goals:  Previous Goal Met: Progressing toward goal(s)  Goals: Meet at least 75% of estimated needs, by next RD assessment       Nutrition Monitoring and Evaluation:   Behavioral-Environmental Outcomes: None identified  Food/Nutrient Intake Outcomes: Food and nutrient intake, Supplement intake  Physical Signs/Symptoms Outcomes: Meal time behavior, Biochemical data, GI status, Weight, Fluid status or edema    Discharge Planning:    Continue current diet    Jackelyn Carmen [address], [address] [place] [address] Dr  Contact: [phone]

## 2022-12-14 NOTE — PROGRESS NOTES
ARU/IPR REFERRAL CONTACT NOTE  [id number] [place] [address] for Physical Rehabilitation    RE:  Billy Win     Thank you for the opportunity to review this patient's case for admission to [address] [facility] for Physical Rehabilitation. Based on our pre-admission screening:     [ [place] Team/Medical Director is following this case. Comments: Following for potential IPR admission. Not medically ready. Need to clarify dispo. Please be advised admission to ARU will be based on meeting admission requirements. Thank you for this referral.   Please do not hesitate to call if you need further information or have additional questions.      Regards,    [employer] PTA   Admissions [facility] for Physical Rehabilitation  [phone]

## 2022-12-14 NOTE — PROGRESS NOTES
[facility]  -  Progress Note      Patient: Evelyn Trevino MRN: [id number]  SSN: [id number]    YOB: 1955  Age: 79 y.o. Sex: female      Admit Date: 12/5/2022    Assessment:     -Coronary artery disease with left main disease, EF 55% . Heart catheterization November 28, 2022 with left main 70%, LAD 40%, diagonal 80%  -Severe aortic stenosis, mean gradient 57 mmHg by echocardiogram November 28, 2022  -PAD with moderate left THOMAS 50-69% by ultrasound 11/2022  -Diabetes  -Hypertension  -Chronic kidney disease, stage III  -Chronic anemia    Primary cardiologist: Dr. Bernett Epley    Patient is S/P CABG X 2 (LIMA-LAD, SVG-OM) with aortic valve replacement, ([facility]'s bovine pericardial bioprosthesis ) (12/7/2022)    Plan:     Currently on aspirin, Plavix, statin  Currently on midodrine however blood pressure has improved  Would recommend restarting low-dose of beta-blocker when okay by CT surgery team  Continue routine post open heart surgery support. We will be available as needed. Please call with question    Subjective:     No new complaints. Denies any chest pain.     Objective:      Patient Vitals for the past 8 hrs:   Temp Pulse Resp BP SpO2   12/14/22 1600 97.9 °F (36.6 °C) 71 17 (!) 118/48 92 %   12/14/22 1500 -- 72 23 (!) 125/44 91 %   12/14/22 1427 -- (!) 101 30 (!) 157/59 92 %   12/14/22 1400 -- 73 20 -- 94 %   12/14/22 1300 -- 79 24 (!) 133/59 92 %   12/14/22 1208 97.5 °F (36.4 °C) 78 17 (!) 120/51 95 %   12/14/22 1200 -- 71 23 (!) 120/51 91 %   12/14/22 1100 -- 68 20 (!) 121/52 91 %           Patient Vitals for the past 96 hrs:   Weight   12/14/22 0644 138.5 kg (305 lb 6.4 oz)   12/13/22 0653 139 kg (306 lb 7 oz)   12/12/22 0615 139.1 kg (306 lb 9.6 oz)   12/11/22 0600 138.7 kg (305 lb 11 oz)           Intake/Output Summary (Last 24 hours) at 12/14/2022 1809  Last data filed at 12/14/2022 1704  Gross per 24 hour   Intake 1265 ml   Output 1280 ml   Net -15 ml Physical Exam:  General:  alert, cooperative, no distress, appears stated age  Neck:  no JVD  Lungs: Chest tube present but plan to remove it according to nursing staff today  Heart: Regular rhythm. Faint 2 out of 6 aortic murmur  Abdomen:  no guarding or rigidity  Extremities: Mild edema    Data Review:     Labs: Results:       Chemistry Recent Labs     12/14/22 0430 12/13/22 0450 12/12/22 0430   GLU 94 104* 134*    135* 135*   K 4.2 4.1 4.1    102 101   CO2 29 28 29   BUN 42* 40* 40*   CREA 1.32* 1.27 1.41*   CA 8.0* 8.4* 8.3*   AGAP 6 5 5   BUCR 32* 31* 28*        CBC w/Diff Recent Labs     12/14/22 0430 12/13/22 0450 12/12/22 0430   WBC 10.9 11.2 12.2   RBC 2.91* 2.90* 2.86*   HGB 8.8* 8.7* 8.6*   HCT 27.4* 27.0* 27.6*    203 202   GRANS 58 62 68   LYMPH 20* 18* 17*   EOS 9* 8* 6*        Cardiac Enzymes No results found for: CPK, CK, CKMMB, CKMB, RCK3, CKMBT, CKNDX, CKND1, RHODA, TROPT, TROIQ, KASH, TROPT, TNIPOC, BNP, BNPP   Coagulation No results for input(s): PTP, INR, APTT, INREXT, INREXT in the last 72 hours. Lipid Panel No results found for: CHOL, CHOLPOCT, CHOLX, CHLST, CHOLV, 812918, HDL, HDLP, LDL, LDLC, DLDLP, 086483, VLDLC, VLDL, TGLX, TRIGL, TRIGP, TGLPOCT, CHHD, CHHDX   BNP No results found for: BNP, BNPP, XBNPT   Liver Enzymes No results for input(s): TP, ALB, TBIL, AP in the last 72 hours.     No lab exists for component: SGOT, GPT, DBIL   Digoxin    Thyroid Studies Lab Results   Component Value Date/Time    TSH 0.99 11/28/2022 07:40 AM           Merlin Frees, MD

## 2022-12-14 NOTE — PROGRESS NOTES
Problem: Pressure Injury - Risk of  Goal: *Prevention of pressure injury  Description: Document Nas Scale and appropriate interventions in the flowsheet. Outcome: Progressing Towards Goal  Note: Pressure Injury Interventions:  Sensory Interventions: Assess changes in LOC    Moisture Interventions: Absorbent underpads, Apply protective barrier, creams and emollients, Maintain skin hydration (lotion/cream), Minimize layers    Activity Interventions: Assess need for specialty bed, Chair cushion, Increase time out of bed, Pressure redistribution bed/mattress(bed type), PT/OT evaluation    Mobility Interventions: Assess need for specialty bed, Chair cushion, Pressure redistribution bed/mattress (bed type), PT/OT evaluation, Turn and reposition approx.  every two hours(pillow and wedges)    Nutrition Interventions: Document food/fluid/supplement intake, Discuss nutritional consult with provider    Friction and Shear Interventions: Apply protective barrier, creams and emollients, Minimize layers, Foam dressings/transparent film/skin sealants

## 2022-12-14 NOTE — PROGRESS NOTES
RENAL DAILY PROGRESS NOTE            74y F with PMH DM, HTN, s/p CABG, following for renal failure   Subjective:       Complaint:   Overnight events noted  Urine output about 1.2L      IMPRESSION:   OSVALDO ? contrast nephropathy on CKD stage 3 ? ATN related to CABG /AVR procedure  S/p CABG and AVR 12/7/22  Underlying diabetic and hypertensive nephropathy  Controlled diabetes  Severe AS   Diabetes  Hypertension   PLAN:   Monitor renal function and urine output with on going diuretics.            Discussed with nursing staff       Current Facility-Administered Medications   Medication Dose Route Frequency    metoclopramide HCl (REGLAN) injection 10 mg  10 mg IntraVENous Q6H    zolpidem (AMBIEN) tablet 5 mg  5 mg Oral QHS PRN    pantoprazole (PROTONIX) 40 mg in 0.9% sodium chloride 10 mL injection  40 mg IntraVENous DAILY    HYDROcodone-acetaminophen (NORCO) 5-325 mg per tablet 1-2 Tablet  1-2 Tablet Oral Q6H PRN    insulin glargine (LANTUS) injection 30 Units  30 Units SubCUTAneous DAILY    gabapentin (NEURONTIN) capsule 200 mg  200 mg Oral TID PRN    midodrine (PROAMATINE) tablet 10 mg  10 mg Oral TID WITH MEALS    insulin lispro (HUMALOG) injection   SubCUTAneous AC&HS    glucose chewable tablet 16 g  4 Tablet Oral PRN    glucagon (GLUCAGEN) injection 1 mg  1 mg IntraMUSCular PRN    dextrose 10% infusion 0-250 mL  0-250 mL IntraVENous PRN    cyanocobalamin tablet 1,000 mcg  1,000 mcg Oral DAILY    multivitamin, tx-iron-ca-min (THERA-M w/ IRON) tablet 1 Tablet  1 Tablet Oral DAILY    folic acid (FOLVITE) tablet 1 mg  1 mg Oral DAILY    montelukast (SINGULAIR) tablet 10 mg  10 mg Oral DAILY    sodium chloride (NS) flush 5-40 mL  5-40 mL IntraVENous Q8H    sodium chloride (NS) flush 5-40 mL  5-40 mL IntraVENous PRN    naloxone (NARCAN) injection 0.4 mg  0.4 mg IntraVENous PRN    albuterol (PROVENTIL VENTOLIN) nebulizer solution 2.5 mg  2.5 mg Nebulization Q4H PRN    aspirin delayed-release tablet 81 mg  81 mg Oral DAILY    clopidogreL (PLAVIX) tablet 75 mg  75 mg Oral DAILY    chlorhexidine (PERIDEX) 0.12 % mouthwash 10 mL  10 mL Oral BID    docusate sodium (COLACE) capsule 100 mg  100 mg Oral BID    ELECTROLYTE REPLACEMENT PROTOCOL - Potassium Standard Dosing  1 Each Other PRN    ELECTROLYTE REPLACEMENT PROTOCOL - Magnesium  1 Each Other PRN    heparin (porcine) injection 5,000 Units  5,000 Units SubCUTAneous Q8H    polyethylene glycol (MIRALAX) packet 17 g  17 g Oral DAILY    atorvastatin (LIPITOR) tablet 40 mg  40 mg Oral QHS    sodium chloride (OCEAN) 0.65 % nasal squeeze bottle 2 Spray  2 Spray Both Nostrils Q2H PRN    guaiFENesin ER (MUCINEX) tablet 600 mg  600 mg Oral Q8H PRN    ondansetron (ZOFRAN) injection 4 mg  4 mg IntraVENous Q4H PRN       Review of Symptoms: comprehensive ROS negative except above.    Objective:   Patient Vitals for the past 24 hrs:   Temp Pulse Resp BP SpO2   12/14/22 1126 97.5 °F (36.4 °C) -- -- -- --   12/14/22 1100 -- 68 20 (!) 121/52 91 %   12/14/22 1000 -- 74 22 (!) 140/48 91 %   12/14/22 0900 -- 76 24 (!) 133/53 93 %   12/14/22 0800 97.8 °F (36.6 °C) 77 23 (!) 135/59 92 %   12/14/22 0700 -- 81 21 (!) 120/52 92 %   12/14/22 0658 97.8 °F (36.6 °C) -- -- -- --   12/14/22 0600 -- 79 -- (!) 163/59 92 %   12/14/22 0500 -- 78 -- (!) 152/64 91 %   12/14/22 0400 98.2 °F (36.8 °C) 72 20 (!) 141/60 93 %   12/14/22 0300 -- 72 25 126/62 (!) 86 %   12/14/22 0200 -- 72 -- 126/70 90 %   12/14/22 0100 -- 71 19 (!) 120/59 94 %   12/14/22 0000 98 °F (36.7 °C) 72 19 (!) 125/56 (!) 89 %   12/13/22 2300 -- 68 17 (!) 131/53 96 %   12/13/22 2223 -- -- -- -- 92 %   12/13/22 2222 -- 68 18 -- (!) 86 %   12/13/22 2212 -- 71 20 (!) 141/57 93 %   12/13/22 2100 -- 70 21 (!) 135/58 94 %   12/13/22 2000 98.2 °F (36.8 °C) 79 17 98/81 93 %   12/13/22 1900 -- 72 23 (!) 155/55 93 %   12/13/22 1800 -- 75 24 (!) 130/47 93 %   12/13/22 1700 -- 71 22 (!) 124/50 92 %   12/13/22 1600 97.8 °F (36.6 °C) 70 21 (!) 130/49 91 % 12/13/22 1500 -- 70 22 (!) 135/94 91 %   12/13/22 1400 -- 68 18 (!) 129/46 96 %   12/13/22 1300 -- 70 23 135/85 98 %        Weight change: -0.471 kg (-1 lb 0.6 oz)     12/12 1901 - 12/14 0700  In: 1315 [P.O.:1315]  Out: 7575 [Urine:1320; Drains:410]    Intake/Output Summary (Last 24 hours) at 12/14/2022 1204  Last data filed at 12/14/2022 1150  Gross per 24 hour   Intake 840 ml   Output 1030 ml   Net -190 ml     Physical Exam:   General: intubated  HEENT sclera anicteric, supple neck, no thyromegaly  CVS: S1S2 heard,  no rub  RS: + air entry b/l,   Abd: Soft, Non tender  Extrm: plus 2 edema, no cyanosis, clubbing   Skin: no visible  Rash  Musculoskeletal: No gross joints or bone deformities         Data Review:     LABS:   Hematology:   Recent Labs     12/14/22  0430 12/13/22 0450 12/12/22 0430   WBC 10.9 11.2 12.2   HGB 8.8* 8.7* 8.6*   HCT 27.4* 27.0* 27.6*     Chemistry:   Recent Labs     12/14/22  0430 12/13/22  0450 12/12/22  0430   BUN 42* 40* 40*   CREA 1.32* 1.27 1.41*   CA 8.0* 8.4* 8.3*   K 4.2 4.1 4.1    135* 135*    102 101   CO2 29 28 29   GLU 94 104* 134*            Procedures/imaging: see electronic medical records for all procedures, Xrays and details which were not copied into this note but were reviewed prior to creation of Plan          Assessment & Plan:     See above        Ranjit Archuleta MD  12/14/2022

## 2022-12-14 NOTE — WOUND CARE
Physical Exam  Room 2355: focused wound assess    Inner gluteal cleft, friction injury, linear shaped injury from scooting forward, whole area is 8x2.5x0.1cm, pink base tissue on the 2 open areas, hyperpigmentation noted on left buttock fleshy part of skin. Skin care protocol: Unit staff to apply Remedy No-Rinse foam cleanser incontinence spray, Blue packaged Phytoplex Barrier Cream Cloths, thin layer & small amount zinc cream par room barrier cream with orange top, one disposable incontinence pad with each incontinent check under patient, Booster incontinence pad in front of perineum, chair cushion to chair when up. Education provided to pt on care of her skin & topical treatment of inner gluteal cleft wound. Will turn over care to nursing staff at this time.   Gabby CRUMPN, RN, [facility], [address]

## 2022-12-14 NOTE — PROGRESS NOTES
CARDIAC SURGERY PROGRESS NOTE    2022     Post Operative Day # 7     Chart reviewed. Interval History/Events of Past 24 hours:   Ambulates the length of nurses station on [address]. Assessment:  CAD, AS  S/P  CABG x 2 , AVR -  ASA, statin  Respiratory parameters stable  Cardiac status stable     Plans:  Wean Midodrine as BP ankit  Wean oxygen  Lasix 20mg IV  CT d/c'd per Dr. Donnell Majano care RN to see re:poss DTI on buttocks  ARU following. Will need placement    Heart Hugger use encouraged to mitigate pain and will also assist with deep breathing and incentive spirometry goals. Possible discharge 2 days. Moses Conley PA-C  [facility]  [phone]  _____________________________________________________________________________________________________________________________________________  Subjective:  Patient seen and examined on rounds today. Pain level: controlled   Ambulating: improving  Sleeping: improved on ambien  Eating:  Well   Sternal pain: NO     BM: Yes    Objective:  Vital signs:   Visit Vitals  BP (!) 141/60 (BP 1 Location: Left upper arm, BP Patient Position: At rest)   Pulse 72   Temp 97.8 °F (36.6 °C)   Resp 20   Ht 6' (1.829 m)   Wt 138.5 kg (305 lb 6.4 oz)   SpO2 93%   BMI 41.42 kg/m²     Temp (24hrs), Av.1 °F (36.7 °C), Min:97.8 °F (36.6 °C), Max:98.3 °F (36.8 °C)    Admission Weight: Last Weight   Weight: 130 kg (286 lb 9.6 oz) Weight: 138.5 kg (305 lb 6.4 oz)     Last 3 Recorded Weights in this Encounter    22 0615 22 0653 22 0644   Weight: 139.1 kg (306 lb 9.6 oz) 139 kg (306 lb 7 oz) 138.5 kg (305 lb 6.4 oz)       Telemetry: NSR    Physical Examination:     General:  Alert, oriented  Lungs: Clear to ascultation without rales, wheezes or rhonchi. Chest: Dressings clean and dry. Midline incision healing well. Sternum stable. Heart: regular rate and rhythm, No murmur. Abdomen: Soft and non-tender without masses. Bowel sounds present. Extremities: Warm and well perfused. Edema mild. Incisions: clean and dry  Neuro: No deficit. Beta-blocker: No  ASA: Yes   Statin: Yes  ACE-I: No  Diuretic: No     SUP Prophylaxis: Pepcid  DVT Prophylaxis:   pneumatic compression boots: Yes  Compression stockings:  Yes  DVT ppx: Yes SC Heparin    Chest tubes:  present    Pacing wires:  not present    DME needs:  tbd          Labs:  Lab Results   Component Value Date/Time    WBC 10.9 12/14/2022 04:30 AM    HCT 27.4 (L) 12/14/2022 04:30 AM     12/14/2022 04:30 AM      Lab Results   Component Value Date/Time     12/14/2022 04:30 AM    K 4.2 12/14/2022 04:30 AM     12/14/2022 04:30 AM    CO2 29 12/14/2022 04:30 AM    GLU 94 12/14/2022 04:30 AM    BUN 42 (H) 12/14/2022 04:30 AM    CREA 1.32 (H) 12/14/2022 04:30 AM    CREA 1.27 12/13/2022 04:50 AM    CREA 1.41 (H) 12/12/2022 04:30 AM     Lab Results   Component Value Date/Time    HBA1C 5.7 (H) 11/28/2022 07:40 AM       CXR: t/l ok, +small apical PTX          PLEASE NOTE:  This document may have been produced using voice recognition software. Unrecognized errors in transcription may be present. Please call with any questions. NOTE TO PATIENT:  The purpose of this note is to communicate optimally with the other providers involved in your care. It is written using standard medical terminology. If you have questions regarding details of the note please call my office at [phone] and make an appointment to discuss your concerns.

## 2022-12-14 NOTE — PROGRESS NOTES
2159:  PRN Ambien administered. 2222:  O2 sat 86%, 2L NC placed on patient. O2 95%. 0530:  Patient awake and alert. NC removed and patient is on room air.

## 2022-12-15 ENCOUNTER — HOSPITAL ENCOUNTER (INPATIENT)
Age: 67
LOS: 6 days | Discharge: HOME HEALTH | End: 2022-12-21
Attending: EMERGENCY MEDICINE | Admitting: EMERGENCY MEDICINE
Payer: MEDICARE

## 2022-12-15 ENCOUNTER — APPOINTMENT (OUTPATIENT)
Dept: GENERAL RADIOLOGY | Age: 67
End: 2022-12-15
Attending: PHYSICIAN ASSISTANT
Payer: MEDICARE

## 2022-12-15 VITALS
HEART RATE: 82 BPM | DIASTOLIC BLOOD PRESSURE: 54 MMHG | RESPIRATION RATE: 19 BRPM | HEIGHT: 72 IN | OXYGEN SATURATION: 92 % | SYSTOLIC BLOOD PRESSURE: 137 MMHG | BODY MASS INDEX: 39.68 KG/M2 | TEMPERATURE: 98.3 F | WEIGHT: 293 LBS

## 2022-12-15 DIAGNOSIS — Z95.1 S/P CABG X 2: Primary | ICD-10-CM

## 2022-12-15 LAB
ANION GAP SERPL CALC-SCNC: 2 MMOL/L (ref 3–18)
BASOPHILS # BLD: 0.1 K/UL (ref 0–0.1)
BASOPHILS NFR BLD: 1 % (ref 0–2)
BUN SERPL-MCNC: 40 MG/DL (ref 7–18)
BUN/CREAT SERPL: 34 (ref 12–20)
CALCIUM SERPL-MCNC: 8.3 MG/DL (ref 8.5–10.1)
CHLORIDE SERPL-SCNC: 101 MMOL/L (ref 100–111)
CO2 SERPL-SCNC: 29 MMOL/L (ref 21–32)
CREAT SERPL-MCNC: 1.18 MG/DL (ref 0.6–1.3)
DIFFERENTIAL METHOD BLD: ABNORMAL
EOSINOPHIL # BLD: 0.8 K/UL (ref 0–0.4)
EOSINOPHIL NFR BLD: 8 % (ref 0–5)
ERYTHROCYTE [DISTWIDTH] IN BLOOD BY AUTOMATED COUNT: 17.8 % (ref 11.6–14.5)
GLUCOSE BLD STRIP.AUTO-MCNC: 113 MG/DL (ref 70–110)
GLUCOSE BLD STRIP.AUTO-MCNC: 136 MG/DL (ref 70–110)
GLUCOSE BLD STRIP.AUTO-MCNC: 138 MG/DL (ref 70–110)
GLUCOSE BLD STRIP.AUTO-MCNC: 144 MG/DL (ref 70–110)
GLUCOSE BLD STRIP.AUTO-MCNC: 166 MG/DL (ref 70–110)
GLUCOSE SERPL-MCNC: 99 MG/DL (ref 74–99)
HCT VFR BLD AUTO: 28.4 % (ref 35–45)
HGB BLD-MCNC: 9 G/DL (ref 12–16)
IMM GRANULOCYTES # BLD AUTO: 0.1 K/UL (ref 0–0.04)
IMM GRANULOCYTES NFR BLD AUTO: 1 % (ref 0–0.5)
LYMPHOCYTES # BLD: 2.1 K/UL (ref 0.9–3.6)
LYMPHOCYTES NFR BLD: 20 % (ref 21–52)
MCH RBC QN AUTO: 29.9 PG (ref 24–34)
MCHC RBC AUTO-ENTMCNC: 31.7 G/DL (ref 31–37)
MCV RBC AUTO: 94.4 FL (ref 78–100)
MONOCYTES # BLD: 1.1 K/UL (ref 0.05–1.2)
MONOCYTES NFR BLD: 11 % (ref 3–10)
NEUTS SEG # BLD: 6.3 K/UL (ref 1.8–8)
NEUTS SEG NFR BLD: 60 % (ref 40–73)
NRBC # BLD: 0 K/UL (ref 0–0.01)
NRBC BLD-RTO: 0 PER 100 WBC
PLATELET # BLD AUTO: 240 K/UL (ref 135–420)
PMV BLD AUTO: 9.7 FL (ref 9.2–11.8)
POTASSIUM SERPL-SCNC: 4.5 MMOL/L (ref 3.5–5.5)
RBC # BLD AUTO: 3.01 M/UL (ref 4.2–5.3)
SODIUM SERPL-SCNC: 132 MMOL/L (ref 136–145)
WBC # BLD AUTO: 10.4 K/UL (ref 4.6–13.2)

## 2022-12-15 PROCEDURE — 94762 N-INVAS EAR/PLS OXIMTRY CONT: CPT

## 2022-12-15 PROCEDURE — 97168 OT RE-EVAL EST PLAN CARE: CPT

## 2022-12-15 PROCEDURE — 74011636637 HC RX REV CODE- 636/637: Performed by: EMERGENCY MEDICINE

## 2022-12-15 PROCEDURE — 97530 THERAPEUTIC ACTIVITIES: CPT

## 2022-12-15 PROCEDURE — 74011250637 HC RX REV CODE- 250/637: Performed by: PHYSICIAN ASSISTANT

## 2022-12-15 PROCEDURE — 99024 POSTOP FOLLOW-UP VISIT: CPT | Performed by: PHYSICIAN ASSISTANT

## 2022-12-15 PROCEDURE — 74011250636 HC RX REV CODE- 250/636: Performed by: THORACIC SURGERY (CARDIOTHORACIC VASCULAR SURGERY)

## 2022-12-15 PROCEDURE — 2709999900 HC NON-CHARGEABLE SUPPLY

## 2022-12-15 PROCEDURE — 85025 COMPLETE CBC W/AUTO DIFF WBC: CPT

## 2022-12-15 PROCEDURE — 71045 X-RAY EXAM CHEST 1 VIEW: CPT

## 2022-12-15 PROCEDURE — 74011636637 HC RX REV CODE- 636/637: Performed by: PHYSICIAN ASSISTANT

## 2022-12-15 PROCEDURE — 74011000250 HC RX REV CODE- 250: Performed by: PHYSICIAN ASSISTANT

## 2022-12-15 PROCEDURE — 97535 SELF CARE MNGMENT TRAINING: CPT

## 2022-12-15 PROCEDURE — 80048 BASIC METABOLIC PNL TOTAL CA: CPT

## 2022-12-15 PROCEDURE — 97116 GAIT TRAINING THERAPY: CPT

## 2022-12-15 PROCEDURE — 74011250637 HC RX REV CODE- 250/637: Performed by: THORACIC SURGERY (CARDIOTHORACIC VASCULAR SURGERY)

## 2022-12-15 PROCEDURE — 65310000000 HC RM PRIVATE REHAB

## 2022-12-15 PROCEDURE — 82962 GLUCOSE BLOOD TEST: CPT

## 2022-12-15 PROCEDURE — 74011250637 HC RX REV CODE- 250/637: Performed by: EMERGENCY MEDICINE

## 2022-12-15 PROCEDURE — 74011250636 HC RX REV CODE- 250/636: Performed by: PHYSICIAN ASSISTANT

## 2022-12-15 PROCEDURE — APPNB30 APP NON BILLABLE TIME 0-30 MINS: Performed by: PHYSICIAN ASSISTANT

## 2022-12-15 PROCEDURE — 97164 PT RE-EVAL EST PLAN CARE: CPT

## 2022-12-15 RX ORDER — POTASSIUM CHLORIDE 20 MEQ/1
20 TABLET, EXTENDED RELEASE ORAL DAILY
Qty: 7 TABLET | Refills: 0 | Status: SHIPPED
Start: 2022-12-15

## 2022-12-15 RX ORDER — MAGNESIUM SULFATE 100 %
4 CRYSTALS MISCELLANEOUS AS NEEDED
Status: DISCONTINUED | OUTPATIENT
Start: 2022-12-15 | End: 2022-12-21 | Stop reason: HOSPADM

## 2022-12-15 RX ORDER — FLUTICASONE PROPIONATE 50 MCG
2 SPRAY, SUSPENSION (ML) NASAL DAILY
Status: DISCONTINUED | OUTPATIENT
Start: 2022-12-16 | End: 2022-12-21 | Stop reason: HOSPADM

## 2022-12-15 RX ORDER — FUROSEMIDE 40 MG/1
40 TABLET ORAL DAILY
Status: DISCONTINUED | OUTPATIENT
Start: 2022-12-16 | End: 2022-12-21 | Stop reason: HOSPADM

## 2022-12-15 RX ORDER — BISACODYL 5 MG
10 TABLET, DELAYED RELEASE (ENTERIC COATED) ORAL
Status: DISCONTINUED | OUTPATIENT
Start: 2022-12-15 | End: 2022-12-21 | Stop reason: HOSPADM

## 2022-12-15 RX ORDER — LANOLIN ALCOHOL/MO/W.PET/CERES
500 CREAM (GRAM) TOPICAL DAILY
Status: DISCONTINUED | OUTPATIENT
Start: 2022-12-16 | End: 2022-12-21 | Stop reason: HOSPADM

## 2022-12-15 RX ORDER — MONTELUKAST SODIUM 10 MG/1
10 TABLET ORAL DAILY
Status: DISCONTINUED | OUTPATIENT
Start: 2022-12-16 | End: 2022-12-21 | Stop reason: HOSPADM

## 2022-12-15 RX ORDER — INSULIN GLARGINE 100 [IU]/ML
30 INJECTION, SOLUTION SUBCUTANEOUS DAILY
Status: DISCONTINUED | OUTPATIENT
Start: 2022-12-16 | End: 2022-12-21 | Stop reason: HOSPADM

## 2022-12-15 RX ORDER — ATORVASTATIN CALCIUM 40 MG/1
40 TABLET, FILM COATED ORAL
Status: DISCONTINUED | OUTPATIENT
Start: 2022-12-15 | End: 2022-12-21 | Stop reason: HOSPADM

## 2022-12-15 RX ORDER — MIDODRINE HYDROCHLORIDE 5 MG/1
5 TABLET ORAL
Status: DISCONTINUED | OUTPATIENT
Start: 2022-12-15 | End: 2022-12-15 | Stop reason: HOSPADM

## 2022-12-15 RX ORDER — ASPIRIN 81 MG/1
81 TABLET ORAL DAILY
Status: DISCONTINUED | OUTPATIENT
Start: 2022-12-17 | End: 2022-12-21 | Stop reason: HOSPADM

## 2022-12-15 RX ORDER — HYDROCODONE BITARTRATE AND ACETAMINOPHEN 5; 325 MG/1; MG/1
1 TABLET ORAL
Qty: 28 TABLET | Refills: 0 | Status: SHIPPED
Start: 2022-12-15 | End: 2022-12-21

## 2022-12-15 RX ORDER — MIDODRINE HYDROCHLORIDE 5 MG/1
5 TABLET ORAL
Status: DISCONTINUED | OUTPATIENT
Start: 2022-12-15 | End: 2022-12-21 | Stop reason: HOSPADM

## 2022-12-15 RX ORDER — BISMUTH SUBSALICYLATE 262 MG
1 TABLET,CHEWABLE ORAL DAILY
Status: DISCONTINUED | OUTPATIENT
Start: 2022-12-16 | End: 2022-12-15

## 2022-12-15 RX ORDER — ASPIRIN 81 MG/1
81 TABLET ORAL DAILY
Qty: 30 TABLET | Refills: 2 | Status: SHIPPED | OUTPATIENT
Start: 2022-12-16 | End: 2022-12-15 | Stop reason: SDUPTHER

## 2022-12-15 RX ORDER — DOCUSATE SODIUM 100 MG/1
100 CAPSULE, LIQUID FILLED ORAL 2 TIMES DAILY
Qty: 60 CAPSULE | Refills: 0 | Status: SHIPPED
Start: 2022-12-15 | End: 2022-12-21

## 2022-12-15 RX ORDER — HYDROCODONE BITARTRATE AND ACETAMINOPHEN 5; 325 MG/1; MG/1
1 TABLET ORAL
Status: DISCONTINUED | OUTPATIENT
Start: 2022-12-15 | End: 2022-12-21 | Stop reason: HOSPADM

## 2022-12-15 RX ORDER — INSULIN LISPRO 100 [IU]/ML
INJECTION, SOLUTION INTRAVENOUS; SUBCUTANEOUS
Status: DISCONTINUED | OUTPATIENT
Start: 2022-12-15 | End: 2022-12-21 | Stop reason: HOSPADM

## 2022-12-15 RX ORDER — ASPIRIN 81 MG/1
81 TABLET ORAL DAILY
Qty: 30 TABLET | Refills: 2 | Status: SHIPPED | OUTPATIENT
Start: 2022-12-16 | End: 2022-12-21

## 2022-12-15 RX ORDER — DEXTROSE MONOHYDRATE 100 MG/ML
0-250 INJECTION, SOLUTION INTRAVENOUS AS NEEDED
Status: DISCONTINUED | OUTPATIENT
Start: 2022-12-15 | End: 2022-12-21 | Stop reason: HOSPADM

## 2022-12-15 RX ORDER — INSULIN GLARGINE 100 [IU]/ML
30 INJECTION, SOLUTION SUBCUTANEOUS DAILY
Qty: 1 ML | Refills: 0 | Status: SHIPPED
Start: 2022-12-15 | End: 2022-12-21

## 2022-12-15 RX ORDER — ZOLPIDEM TARTRATE 5 MG/1
5 TABLET ORAL
Status: DISCONTINUED | OUTPATIENT
Start: 2022-12-15 | End: 2022-12-21 | Stop reason: HOSPADM

## 2022-12-15 RX ORDER — DOCUSATE SODIUM 100 MG/1
100 CAPSULE, LIQUID FILLED ORAL 2 TIMES DAILY
Status: DISCONTINUED | OUTPATIENT
Start: 2022-12-15 | End: 2022-12-15

## 2022-12-15 RX ORDER — MIDODRINE HYDROCHLORIDE 5 MG/1
5 TABLET ORAL
Qty: 90 TABLET | Refills: 0 | Status: SHIPPED
Start: 2022-12-15 | End: 2022-12-21

## 2022-12-15 RX ORDER — NALOXONE HYDROCHLORIDE 0.4 MG/ML
0.4 INJECTION, SOLUTION INTRAMUSCULAR; INTRAVENOUS; SUBCUTANEOUS AS NEEDED
Status: DISCONTINUED | OUTPATIENT
Start: 2022-12-15 | End: 2022-12-21 | Stop reason: HOSPADM

## 2022-12-15 RX ORDER — CLOPIDOGREL BISULFATE 75 MG/1
75 TABLET ORAL DAILY
Qty: 30 TABLET | Refills: 2 | Status: ON HOLD | OUTPATIENT
Start: 2022-12-16 | End: 2022-12-21 | Stop reason: SDUPTHER

## 2022-12-15 RX ORDER — DOCUSATE SODIUM 100 MG/1
100 CAPSULE, LIQUID FILLED ORAL 2 TIMES DAILY
Status: DISCONTINUED | OUTPATIENT
Start: 2022-12-15 | End: 2022-12-21 | Stop reason: HOSPADM

## 2022-12-15 RX ORDER — FUROSEMIDE 40 MG/1
40 TABLET ORAL DAILY
Qty: 7 TABLET | Refills: 0 | Status: SHIPPED
Start: 2022-12-15

## 2022-12-15 RX ORDER — CLOPIDOGREL BISULFATE 75 MG/1
75 TABLET ORAL DAILY
Status: DISCONTINUED | OUTPATIENT
Start: 2022-12-17 | End: 2022-12-21 | Stop reason: HOSPADM

## 2022-12-15 RX ORDER — ZOLPIDEM TARTRATE 5 MG/1
5 TABLET ORAL
Qty: 30 TABLET | Refills: 2 | Status: SHIPPED
Start: 2022-12-15 | End: 2022-12-21

## 2022-12-15 RX ORDER — FOLIC ACID 1 MG/1
1 TABLET ORAL DAILY
Status: DISCONTINUED | OUTPATIENT
Start: 2022-12-16 | End: 2022-12-21 | Stop reason: HOSPADM

## 2022-12-15 RX ORDER — FUROSEMIDE 10 MG/ML
20 INJECTION INTRAMUSCULAR; INTRAVENOUS ONCE
Status: COMPLETED | OUTPATIENT
Start: 2022-12-15 | End: 2022-12-15

## 2022-12-15 RX ADMIN — HEPARIN SODIUM 5000 UNITS: 5000 INJECTION INTRAVENOUS; SUBCUTANEOUS at 07:01

## 2022-12-15 RX ADMIN — HYDROCODONE BITARTRATE AND ACETAMINOPHEN 2 TABLET: 5; 325 TABLET ORAL at 09:44

## 2022-12-15 RX ADMIN — CHLORHEXIDINE GLUCONATE 0.12% ORAL RINSE 10 ML: 1.2 LIQUID ORAL at 08:52

## 2022-12-15 RX ADMIN — MIDODRINE HYDROCHLORIDE 5 MG: 5 TABLET ORAL at 14:12

## 2022-12-15 RX ADMIN — Medication 0.2 UNITS: at 21:25

## 2022-12-15 RX ADMIN — ZOLPIDEM TARTRATE 5 MG: 5 TABLET ORAL at 21:08

## 2022-12-15 RX ADMIN — SODIUM CHLORIDE, PRESERVATIVE FREE 5 ML: 5 INJECTION INTRAVENOUS at 07:01

## 2022-12-15 RX ADMIN — HEPARIN SODIUM 5000 UNITS: 5000 INJECTION INTRAVENOUS; SUBCUTANEOUS at 14:12

## 2022-12-15 RX ADMIN — HYDROCODONE BITARTRATE AND ACETAMINOPHEN 1 TABLET: 5; 325 TABLET ORAL at 21:08

## 2022-12-15 RX ADMIN — DOCUSATE SODIUM 100 MG: 100 CAPSULE, LIQUID FILLED ORAL at 08:52

## 2022-12-15 RX ADMIN — MIDODRINE HYDROCHLORIDE 5 MG: 5 TABLET ORAL at 07:35

## 2022-12-15 RX ADMIN — MONTELUKAST 10 MG: 10 TABLET, FILM COATED ORAL at 08:52

## 2022-12-15 RX ADMIN — ATORVASTATIN CALCIUM 40 MG: 40 TABLET, FILM COATED ORAL at 21:23

## 2022-12-15 RX ADMIN — CYANOCOBALAMIN TAB 1000 MCG 1000 MCG: 1000 TAB at 08:52

## 2022-12-15 RX ADMIN — ASPIRIN 81 MG: 81 TABLET, COATED ORAL at 08:51

## 2022-12-15 RX ADMIN — Medication 30 UNITS: at 08:52

## 2022-12-15 RX ADMIN — CLOPIDOGREL BISULFATE 75 MG: 75 TABLET ORAL at 08:52

## 2022-12-15 RX ADMIN — FOLIC ACID 1 MG: 1 TABLET ORAL at 08:52

## 2022-12-15 RX ADMIN — FUROSEMIDE 20 MG: 10 INJECTION, SOLUTION INTRAMUSCULAR; INTRAVENOUS at 07:35

## 2022-12-15 RX ADMIN — SODIUM CHLORIDE, PRESERVATIVE FREE 10 ML: 5 INJECTION INTRAVENOUS at 14:13

## 2022-12-15 RX ADMIN — Medication 1 TABLET: at 08:51

## 2022-12-15 RX ADMIN — DOCUSATE SODIUM 100 MG: 100 CAPSULE, LIQUID FILLED ORAL at 17:59

## 2022-12-15 NOTE — PROGRESS NOTES
Problem: Pressure Injury - Risk of  Goal: *Prevention of pressure injury  Description: Document Nas Scale and appropriate interventions in the flowsheet. Outcome: Progressing Towards Goal  Note: Pressure Injury Interventions:  Sensory Interventions: Assess changes in LOC  Moisture Interventions: Absorbent underpads, Apply protective barrier, creams and emollients, Maintain skin hydration (lotion/cream), Minimize layers  Activity Interventions: Increase time out of bed, Pressure redistribution bed/mattress(bed type), PT/OT evaluation  Mobility Interventions: Pressure redistribution bed/mattress (bed type), PT/OT evaluation  Nutrition Interventions: Document food/fluid/supplement intake  Friction and Shear Interventions: Apply protective barrier, creams and emollients, Foam dressings/transparent film/skin sealants, Minimize layers    Problem: Patient Education: Go to Patient Education Activity  Goal: Patient/Family Education  Outcome: Progressing Towards Goal     Problem: Falls - Risk of  Goal: *Absence of Falls  Description: Document Yanet Fall Risk and appropriate interventions in the flowsheet.   Outcome: Progressing Towards Goal  Note: Fall Risk Interventions:  Mobility Interventions: Assess mobility with egress test, Bed/chair exit alarm, Communicate number of staff needed for ambulation/transfer, OT consult for ADLs, Patient to call before getting OOB, PT Consult for mobility concerns, PT Consult for assist device competence, Strengthening exercises (ROM-active/passive), Utilize walker, cane, or other assistive device  Mentation Interventions: Adequate sleep, hydration, pain control, Bed/chair exit alarm, Door open when patient unattended, Eyeglasses and hearing aids, Increase mobility, Room close to nurse's station  Medication Interventions: Assess postural VS orthostatic hypotension, Bed/chair exit alarm, Evaluate medications/consider consulting pharmacy, Patient to call before getting OOB, Teach patient to arise slowly  Elimination Interventions: Bed/chair exit alarm, Call light in reach, Patient to call for help with toileting needs, Toilet paper/wipes in reach, Stay With Me (per policy), Toileting schedule/hourly rounds  History of Falls Interventions: Bed/chair exit alarm, Evaluate medications/consider consulting pharmacy, Room close to nurse's station    Problem: Patient Education: Go to Patient Education Activity  Goal: Patient/Family Education  Outcome: Progressing Towards Goal

## 2022-12-15 NOTE — PROGRESS NOTES
Problem: Mobility Impaired (Adult and Pediatric)  Goal: *Acute Goals and Plan of Care (Insert Text)  Description: Physical Therapy Goals  Initiated 12/8/2022, re-evaled 12/15/22 and to be accomplished within 7 day(s)  1. Patient will move from supine to sit and sit to supine  in bed with modified independence. 2.  Patient will transfer from bed to chair and chair to bed with minimal assistance/contact guard assist using the least restrictive device. 3.  Patient will perform sit to stand with minimal assistance/contact guard assist.  4.  Patient will ambulate with contact guard assist for 20 feet with the least restrictive device. 5.  Patient will ascend/descend 4 stairs with B handrail(s) with minimal assistance/contact guard assist.    PLOF: Pt lives alone in a [facility] with 4 OLU. She was [place] with either SPC or RW PTA. Outcome: Progressing Towards Goal     PHYSICAL THERAPY RE-EVALUATION    Patient: Evelyn Trevino (72 y.o. female)  Date: 12/15/2022  Primary Diagnosis: CAD (coronary artery disease) [I25.10]  Aortic stenosis [I35.0]  Procedure(s) (LRB):  CORONARY ARTERY BYPASS GRAFT TIMES CORONARY ARTERY BYPASS GRAFT TIMES TWO; AORTIC VALVE REPAIR/REPLACEMENT (INSPIRIS)/ ANNULAR ENLARGEMENT PROCEDURE/ NON CORONARY SINUS (N/A) 8 Days Post-Op   Precautions:  Fall, Skin, Sternal    ASSESSMENT :  Based on the objective data described below, the patient presents with generalized weakness, decreased functional mobility tolerance, and pain. RN cleared for mobility. Pt seen with both PT and OT to maximize patients safety, mobility, and participation. Pt found sitting on EOB, requesting assist to toilet. STS with modA, mod VC's throughout session for using davis [employer]. She amb quickly to toilet. Once finished, dependence for pericare, modA for STS. Pt then had to sit back in recliner for ~5 minutes break. STS again with modA.  Pt was able to amb 75 ft at a times before needing to standing for a rest. Pt also had to sit in recliner in hallway for ~8 minutes for seated rest. RR up to 44. She amb back to recliner and was left with B LE elevated, call bell nearby, all needs met. RN notified of performance. Patient will benefit from skilled intervention to address the above impairments. Patient's rehabilitation potential is considered to be Fair  Factors which may influence rehabilitation potential include:   []         None noted  []         Mental ability/status  [x]         Medical condition  [x]         Home/family situation and support systems  [x]         Safety awareness  [x]         Pain tolerance/management  []         Other:      PLAN :  Recommendations and Planned Interventions:   [x]           Bed Mobility Training             [x]    Neuromuscular Re-Education  [x]           Transfer Training                   []    Orthotic/Prosthetic Training  [x]           Gait Training                          []    Modalities  [x]           Therapeutic Exercises           []    Edema Management/Control  [x]           Therapeutic Activities            [x]    Family Training/Education  [x]           Patient Education  []           Other (comment):    Frequency/Duration: Patient will be followed by physical therapy 1-2 times per day/4-7 days per week to address goals. Further Equipment Recommendations for Discharge: N/A    AMPAC: Current research shows that an AM-PAC score of 17 (13 without stairs) or less is not associated with a discharge to the patient's home setting. Based on an AM-PAC score of 13/24 (or **/20 if omitting stairs) and their current functional mobility deficits, it is recommended that the patient have 5-7 sessions per week of Physical Therapy at d/c to increase the patient's independence. Currently, this patient demonstrates the potential endurance, and/or tolerance for 3 hours of therapy each day at d/c.     This AMPAC score should be considered in conjunction with interdisciplinary team recommendations to determine the most appropriate discharge setting. Patient's social support, diagnosis, medical stability, and prior level of function should also be taken into consideration. SUBJECTIVE:   Patient stated You guys are sweet, I was just getting used to you.     OBJECTIVE DATA SUMMARY:   Hospital course since last seen and reason for re-evaluation: Pt has progressed well with therapy. She still requires a lot of assist for standing and continues to have fatigue with short distances. She will continue to benefit from acute PT  Past Medical History:   Diagnosis Date    Aortic stenosis 11/28/2022    Coronary artery disease involving native coronary artery of native heart 11/28/2022    Primary hypertension 11/28/2022    S/P AVR (aortic valve replacement) and aortoplasty 12/7/2022    S/p CABG x2 (LIMA-LAD, rSVG-OM), AVR 23 mm Inspiris with aortic root enlargement - Dr. Shayne Casas    S/P CABG x 2 12/7/2022    S/p CABG x2 (LIMA-LAD, rSVG-OM), AVR 23 mm Inspiris with aortic root enlargement - Dr. Shayne Casas    Type 2 diabetes mellitus, without long-term current use of insulin [facility] 11/28/2022     Past Surgical History:   Procedure Laterality Date    HX HYSTERECTOMY       Barriers to Learning/Limitations: None  Compensate with: N/A  Home Situation:   Home Situation  Home Environment: Private residence  # Steps to Enter: 4  One/Two Story Residence: One story  Living Alone: Yes  Support Systems: Other (Comment), Child(jesus), Other Family Member(s), Friend/Neighbor (Patient lives alone. )  Patient Expects to be Discharged to[de-identified] Home with home health  Current DME Used/Available at Home: None  Tub or Shower Type: Tub/Shower combination  Critical Behavior:  Neurologic State: Alert  Orientation Level: Oriented X4  Cognition: Follows commands  Safety/Judgement: Fall prevention  Psychosocial  Patient Behaviors: Calm; Cooperative  Family  Behaviors: Appropriate for situation  Visitor Behaviors: Appropriate for situation  Needs Expressed: Emotional;Educational  Purposeful Interaction: Yes  Pt Identified Daily Priority: Clinical issues (comment)  Caritas Process: Nurture loving kindness;Enable mohan/hope;Establish trust;Nurture spiritual self;Teaching/learning; Attend basic human needs;Create healing environment;Supportive expression;Creative use of self  Caring Interventions: Reassure; Therapeutic modalities; Other caring modalities  Reassure: Therapeutic listening; Informing; Acceptance;Quiet presence;Caring rounds  Therapeutic Modalities: Intentional therapeutic touch  Other Caring Modalities: Hourly Rounds     Family  Behaviors: Appropriate for situation  Skin Integrity: Incision (comment)  Skin Integumentary  Skin Integrity: Incision (comment)     Strength:    Strength: Generally decreased, functional       Tone & Sensation:   Tone: Normal    Sensation: Intact        Range Of Motion:  AROM: Generally decreased, functional       Transfers:  Sit to Stand: Moderate assistance;Assist x2  Stand to Sit: Minimum assistance;Assist x2          Balance:   Sitting: Intact; With support  Standing: Impaired; With support  Standing - Static: Good;Fair  Standing - Dynamic : Fair;Occasional       Ambulation/Gait Training:  Distance (ft): 75 Feet (ft) (x3, standing/sitting breaks needed)  Assistive Device: Walker, rollator  Ambulation - Level of Assistance: Minimal assistance        Gait Abnormalities: Trunk sway increased;Decreased step clearance     Pain:  Pain level pre-treatment: 6/10   Pain level post-treatment: 6/10   Pain Intervention(s) : Medication (see MAR); Rest, Ice, Repositioning   Response to intervention: Nurse notified, See doc flow    Activity Tolerance:   Pt tolerated mobility fair  Please refer to the flowsheet for vital signs taken during this treatment.   After treatment:   [x]         Patient left in no apparent distress sitting up in chair  []         Patient left in no apparent distress in bed  [x]         Call bell left within reach  [x]         Nursing notified  []         Caregiver present  []         Bed alarm activated  []         SCDs applied    COMMUNICATION/EDUCATION:   [x]         Role of Physical Therapy in the acute care setting. [x]         Fall prevention education was provided and the patient/caregiver indicated understanding. [x]         Patient/family have participated as able in goal setting and plan of care. []         Patient/family agree to work toward stated goals and plan of care. []         Patient understands intent and goals of therapy, but is neutral about his/her participation. []         Patient is unable to participate in goal setting/plan of care: ongoing with therapy staff.  []         Other: Thank you for this referral.  Shahab Glass   Time Calculation: 42 mins    [employer] AM-PAC® Basic Mobility Inpatient Short Form (6-Clicks) Version 2    How much HELP from another person does the patient currently need    (If the patient hasn't done an activity recently, how much help from another person do you think he/she would need if he/she tried?)   Total (Total A or Dep)   A Lot  (Mod to Max A)   A Little (Sup or Min A)   None (Mod I to I)   Turning from your back to your side while in a flat bed without using bedrails? [] 1 [x] 2 [] 3 [] 4   2. Moving from lying on your back to sitting on the side of a flat bed without using bedrails? [] 1 [x] 2 [] 3 [] 4   3. Moving to and from a bed to a chair (including a wheelchair)? [] 1 [x] 2 [] 3 [] 4   4. Standing up from a chair using your arms (e.g., wheelchair, or bedside chair)? [] 1 [x] 2 [] 3 [] 4   5. Walking in hospital room? [] 1 [] 2 [x] 3 [] 4   6. Climbing 3-5 steps with a railing?+   [] 1 [x] 2 [] 3 [] 4   +If stair climbing cannot be assessed, skip item #6. Sum responses from items 1-5.      Current research shows that an AM-PAC score of 17 (13 without stairs) or less is not associated with a discharge to the patient's home setting. Based on an AM-PAC score of 13/24 (or **/20 if omitting stairs) and their current functional mobility deficits, it is recommended that the patient have 5-7 sessions per week of Physical Therapy at d/c to increase the patient's independence. Currently, this patient demonstrates the potential endurance, and/or tolerance for 3 hours of therapy each day at d/c.

## 2022-12-15 NOTE — DISCHARGE SUMMARY
CARDIAC SURGERY DISCHARGE SUMMARY    CHIEF COMPLAINT: aortic stenosis      HISTORY OF PRESENT ILLNESS:  Ashli Handley is a 79 y.o. female patient of Dr. Manjeet Cody who presented with CAD and aortic stenosis. She was admitted for surgical coronary revascularization and aortic valve replacement. PAST MEDICAL HISTORY:   Past Medical History:   Diagnosis Date    Aortic stenosis 11/28/2022    Coronary artery disease involving native coronary artery of native heart 11/28/2022    Primary hypertension 11/28/2022    S/P AVR (aortic valve replacement) and aortoplasty 12/7/2022    S/p CABG x2 (LIMA-LAD, rSVG-OM), AVR 23 mm Inspiris with aortic root enlargement - Dr. Crystal Gan    S/P CABG x 2 12/7/2022    S/p CABG x2 (LIMA-LAD, rSVG-OM), AVR 23 mm Inspiris with aortic root enlargement - Dr. Crystal Gan    Type 2 diabetes mellitus, without long-term current use of insulin ([address] 11/28/2022   . PAST SURGICAL HISTORY:   Past Surgical History:   Procedure Laterality Date    HX HYSTERECTOMY     . HOSPITAL COURSE:  She was admitted to the hospital and underwent CABG x 2 with AVR on 12/7/22. (NOte she was originally admitted on 12/5 for OR and was canceleld due to equipment issues.) She was extubated on the first night following surgery and was eventually up and ambulating well and taking a diet well. Wires and tubes were removed. Notable postoperative events included elevated creatinine on admission, Nephrology was consulted. Procedures:   Procedure(s):  CORONARY ARTERY BYPASS GRAFT TIMES TWO; AORTIC VALVE REPAIR/REPLACEMENT (INSPIRIS)/ ANNULAR ENLARGEMENT PROCEDURE/ NON CORONARY SINUS      Consultants: Nephrology    She is to be discharged to  today. At the time of discharge, her lungs were clear to auscultation bilaterally and the heart had a regular rate and rhythm. The sternum was stable and all wounds were well healed with no evidence of infection. There was mild pedal edema. Room air oximetry was 92%.     LABS:  Lab Results   Component Value Date/Time    WBC 10.4 12/15/2022 05:19 AM    HCT 28.4 (L) 12/15/2022 05:19 AM     12/15/2022 05:19 AM      Lab Results   Component Value Date/Time     (L) 12/15/2022 05:19 AM    K 4.5 12/15/2022 05:19 AM     12/15/2022 05:19 AM    CO2 29 12/15/2022 05:19 AM    GLU 99 12/15/2022 05:19 AM    BUN 40 (H) 12/15/2022 05:19 AM    CREA 1.18 12/15/2022 05:19 AM    CREA 1.32 (H) 12/14/2022 04:30 AM    CREA 1.27 12/13/2022 04:50 AM       Imaging:  ECHO ADULT COMPLETE    Result Date: 11/28/2022    Technical qualifiers: Echo study was technically difficult with poor endocardial visualization. Contrast used: Definity. Left Ventricle: Normal left ventricular systolic function with a visually estimated EF of 55 - 60%. Left ventricle size is normal. Moderately increased wall thickness. Findings consistent with moderate concentric hypertrophy. Normal wall motion. Abnormal diastolic function. Aortic Valve: Severe stenosis of the aortic valve. AV mean gradient is 57 mmHg. AV peak gradient is 81 mmHg. Mitral Valve: Mildly thickened leaflet. Mild annular calcification of the mitral valve. Moderate stenosis noted with mean gradient 7 mmHg. Pericardium: Small (<1 cm) localized pericardial effusion present around the left ventricle. No indication of cardiac tamponade. CARDIAC PROCEDURE    Result Date: 11/28/2022  Table formatting from the original result was not included. Images from the original result were not included. Cardiac Catheterization Procedure Note Patient: Khloe Lopez Age: 79 y.o. Sex: female  YOB: 1955 Admit Date: 11/28/2022 PCP: Kelvin Courtney MD MRN: [id number]  CSN: [id number]     DATE: November 28, 2022 PHYSICIAN: Dr. Yuko Hanson MD, [facility] POSTOPERATIVE DIAGNOSIS: 1. SOB, progressive 2. Severe AS 3. ASCVD with LAD stenosis 4. CHF, mixed INDICATIONS: 1. SOB, progressive 2. Severe AS 3. ASCVD with LAD stenosis 4.  CHF, mixed PROCEDURE PERFORMED: 1. Left heart cardiac catheterization. 2. Selective left and right coronary angiography. 3. Left ventriculography. 4. Aortic root angiography 5. Right radial arterial access using ultrasound guidance 6. Radiologic supervision and interpretation 7. Aborted planned LAD stenting, aborted DESCRIPTION OF PROCEDURE: After informed consent where the procedure was discussed both during outpatient evaluation and again immediately prior to the procedure, the patient was brought to the cardiac catheterization suite. The patient was prepped and draped in the usual sterile fashion. After documentation of normal Adebayo and reverse Adebayo tests right radial artery was used for the procedure. After local lidocaine infiltration above the radial artery, right radial access was achieved using ultrasound guidance and with micro puncture needle. A 6Fr glide sheath was advanced and placed into the right radial artery using Seldinger technique. Combination of heparin, verapamil and nitroglycerin were injected into the artery through the sheath. A 5Fr. TIG catheter was advanced over J-glide wire and used to perform left ventriculography, selective right and left coronary angiography and aortic root angiography with images obtained in multiple projections and angulations. Planned PCI attempt to treat LAD stenosis was aborted due to additional imaging showing LM stenosis. At the conclusion, all procedure catheters and wires were removed. Hemostasis was to be achieved using manual pressure with patient instructed to keep the right arm straight for 2 hours. MODERATE CONSCIOUS SEDATION: Sedation was provided under my direct supervision with a sedation trained nurse using 1 mg of IV Versed and 50 mcg of IV Fentanyl. See hospital trained nurse sedation orders for pre and post service care.  COMPLICATIONS: None ESTIMATED BLOOD LOSS: None DIAGNOSTIC CORONARY ANGIOGRAPHY FINDINGS: The diagnostic angiography was characterized by right dominant coronary anatomy with no significant obstructive CAD Left Main: The left main has an eccentric LM stenosis 70% narrowed with mild dampening with a 6Fr catheter LAD: The LAD is a large sized vessel with proximal 40% narrowing and mid artery 80% stenosis involving a diagonal branch Circumflex: The circumflex coronary artery was a large vessel with mid artery 40% narrowing RCA: The RCA was a large dominant artery and appeared unobstructed LEFT VENTRICULOGRAPHY: Not done AORTIC ROOT ANGIOGRAPHY: Demonstrated normal type 1 aortic root with no aortic insufficiency. PLAN and RECOMMENDATION: Medical management focused on prevention and disease modification. DUPLEX LOWER EXT VEIN MAPPING LEFT    Result Date: 11/29/2022  · Greater saphenous vein bifurcates at the mid thigh (image [id number]) and at the proximal calf (image [id number]). · Smaller saphenous vein bifurcates at the popliteal to proximal calf (image [id number]). DUPLEX CAROTID BILATERAL    Result Date: 11/28/2022  · Technically difficult exam due to bounding vessels causing reduced visability. · Right internal carotid demontrated moderate (<50%) plaque without evidence of significant stenosis. · Left internal carotid artery demonstrated moderate (50-69%) stenosis with mixed density plaque observed. · Bilateral external carotid arteries were patent. · Bilateral vertebral arteries were antegrade. · Bilateral subclavian arteries were biphasic. DISCHARGE DIAGNOSES:    CAD s/p CABG on ASA, statin, Plavix  AS s/p AVR  OSVALDO resolved  on CKD   Hypotension on midodrine  Not on beta blocker 2/2 midodrine  DM2 controlled on Lantus in hospital       DISCHARGE DISPOSITION:  Activities: normal, with strict sternal precautions including no heavy lifting and with constant wearing of the sternal harness.   Diet: Cardiac Diet and Diabetic Diet  Condition: good  Prognosis:  good    DISCHARGE INSTRUCTIONS:  She is to follow up in the Cardiac Surgery clinic in 7 days and 3 weeks, and will see the primary care physician and cardiologist preferably within one month. The Home health visiting nurses will see her once home. Follow-up appointments: Follow-up Information       Follow up With Specialties Details Why Contact Info    Deanne Viramontes MD Family Medicine   [address] [place] [address] [phone]              DISCHARGE MEDICATIONS:    Current Discharge Medication List        START taking these medications    Details   clopidogreL (PLAVIX) 75 mg tab Take 1 Tablet by mouth daily. Qty: 30 Tablet, Refills: 2  Start date: 12/16/2022      HYDROcodone-acetaminophen (NORCO) 5-325 mg per tablet Take 1 Tablet by mouth every six (6) hours as needed for Pain for up to 7 days. Max Daily Amount: 4 Tablets. Qty: 28 Tablet, Refills: 0  Start date: 12/15/2022, End date: 12/22/2022    Associated Diagnoses: S/P CABG x 2; S/P AVR (aortic valve replacement) and aortoplasty      midodrine (PROAMATINE) 5 mg tablet Take 1 Tablet by mouth three (3) times daily (with meals) for 30 days. Qty: 90 Tablet, Refills: 0  Start date: 12/15/2022, End date: 1/14/2023      insulin glargine (LANTUS) 100 unit/mL injection 30 Units by SubCUTAneous route daily. Caution: Long Acting Insulin. DO NOT HOLD without physician order. DO NOT MIX or dilute with any other insulin. Qty: 1 mL, Refills: 0  Start date: 12/15/2022      multivitamin, tx-iron-ca-min (THERA-M w/ IRON) 9 mg iron-400 mcg tab tablet Take 1 Tablet by mouth daily. Qty: 30 Tablet, Refills: 2  Start date: 12/16/2022      zolpidem (AMBIEN) 5 mg tablet Take 1 Tablet by mouth nightly as needed for Sleep. Max Daily Amount: 5 mg. Qty: 30 Tablet, Refills: 2  Start date: 12/15/2022    Associated Diagnoses: Insomnia due to medical condition      docusate sodium (COLACE) 100 mg capsule Take 1 Capsule by mouth two (2) times a day for 30 days.   Qty: 60 Capsule, Refills: 0  Start date: 12/15/2022, End date: 1/14/2023      aspirin delayed-release 81 mg tablet Take 1 Tablet by mouth daily. Qty: 30 Tablet, Refills: 2  Start date: 12/16/2022      furosemide (LASIX) 40 mg tablet Take 1 Tablet by mouth daily. Qty: 7 Tablet, Refills: 0  Start date: 12/15/2022      potassium chloride (K-DUR, KLOR-CON M20) 20 mEq tablet Take 1 Tablet by mouth daily. Qty: 7 Tablet, Refills: 0  Start date: 12/15/2022           CONTINUE these medications which have NOT CHANGED    Details   azelastine (ASTELIN) 137 mcg (0.1 %) nasal spray 1 Spray two (2) times a day. Use in each nostril as directed      fluticasone propionate (FLONASE) 50 mcg/actuation nasal spray 2 Sprays by Both Nostrils route daily. empagliflozin (JARDIANCE) 25 mg tablet Take  by mouth daily. metFORMIN (GLUCOPHAGE) 500 mg tablet Take 500 mg by mouth two (2) times daily (with meals). montelukast (SINGULAIR) 10 mg tablet Take 10 mg by mouth in the morning. atorvastatin (LIPITOR) 40 mg tablet Take 40 mg by mouth in the morning. dulaglutide (Trulicity) 3 TI/4.5 mL pnij by SubCUTAneous route. multivitamin (ONE A DAY) tablet Take 1 Tablet by mouth daily.            STOP taking these medications       chlorthalidone (HYGROTON) 25 mg tablet Comments:   Reason for Stopping:         fexofenadine-pseudoephedrine (ALLEGRA-D)  mg Tb12 Comments:   Reason for Stopping:               Teagan Cat PA-C  [facility]  [phone]

## 2022-12-15 NOTE — PROGRESS NOTES
CARDIAC SURGERY PROGRESS NOTE    12/15/2022     Post Operative Day # 8     Chart and labs reviewed. Interval History/Events of Past 24 hours:   Ambulates the length of nurses station on RA. Na [address]    Assessment:  CAD, AS  S/P  CABG x 2 , AVR -  ASA, statin  Respiratory parameters stable  Cardiac status stable   Mild hyponatremia    Plans:  Dec Midodrine to 5mg TID  Resume BB when off midodrine. Hold free water. D/c protonix. Taking PO consistently  Medically ready for discharge. Will reach out to ARU/CM    Heart Hugger use encouraged to mitigate pain and will also assist with deep breathing and incentive spirometry goals. Mauricio Brannon PA-C  [facility]  [phone]  _____________________________________________________________________________________________________________________________________________  Subjective:  Patient seen and examined on rounds today. Pain level: controlled   Ambulating: improving  Sleeping: improved on ambien  Eating:  Well   Sternal pain: NO     BM: Yes    Objective:  Vital signs:   Visit Vitals  BP (!) 145/51   Pulse 80   Temp 97.7 °F (36.5 °C)   Resp 23   Ht 6' (1.829 m)   Wt 139.4 kg (307 lb 6.4 oz)   SpO2 90%   BMI 41.69 kg/m²     Temp (24hrs), Av.7 °F (36.5 °C), Min:97.5 °F (36.4 °C), Max:97.9 °F (36.6 °C)    Admission Weight: Last Weight   Weight: 130 kg (286 lb 9.6 oz) Weight: 139.4 kg (307 lb 6.4 oz)     Last 3 Recorded Weights in this Encounter    22 0653 22 0644 12/15/22 0600   Weight: 139 kg (306 lb 7 oz) 138.5 kg (305 lb 6.4 oz) 139.4 kg (307 lb 6.4 oz)       Telemetry: NSR    Physical Examination:     General:  Alert, oriented  Lungs: Clear to ascultation without rales, wheezes or rhonchi. Chest: Dressings clean and dry. Midline incision healing well. Sternum stable. Heart: regular rate and rhythm, No murmur. Abdomen: Soft and non-tender without masses. Bowel sounds present.   Extremities: Warm and well perfused. Edema mild. Incisions: clean and dry  Neuro: No deficit. Beta-blocker: No  ASA: Yes   Statin: Yes  ACE-I: No  Diuretic: No     SUP Prophylaxis: Pepcid  DVT Prophylaxis:   pneumatic compression boots: Yes  Compression stockings:  Yes  DVT ppx: Yes SC Heparin      DME needs:  tbd          Labs:  Lab Results   Component Value Date/Time    WBC 10.4 12/15/2022 05:19 AM    HCT 28.4 (L) 12/15/2022 05:19 AM     12/15/2022 05:19 AM      Lab Results   Component Value Date/Time     (L) 12/15/2022 05:19 AM    K 4.5 12/15/2022 05:19 AM     12/15/2022 05:19 AM    CO2 29 12/15/2022 05:19 AM    GLU 99 12/15/2022 05:19 AM    BUN 40 (H) 12/15/2022 05:19 AM    CREA 1.18 12/15/2022 05:19 AM    CREA 1.32 (H) 12/14/2022 04:30 AM    CREA 1.27 12/13/2022 04:50 AM     Lab Results   Component Value Date/Time    HBA1C 5.7 (H) 11/28/2022 07:40 AM              PLEASE NOTE:  This document may have been produced using voice recognition software. Unrecognized errors in transcription may be present. Please call with any questions. NOTE TO PATIENT:  The purpose of this note is to communicate optimally with the other providers involved in your care. It is written using standard medical terminology. If you have questions regarding details of the note please call my office at [phone] and make an appointment to discuss your concerns.

## 2022-12-15 NOTE — REHAB NOTE
ARU/IPR REFERRAL CONTACT NOTE  [address] [facility]      Thank you for the opportunity to review this patient's case for admission to [id number] [place] [address] for Physical Rehabilitation. Based on our pre-admission screening:     [ x] This patient meets criteria for admission to [facility]. Met with patient and she is agreeable to ARU admission. Pt is medically ready for discharge per CVT PA. Plan to admit pt to room 179@ 4pm  Please call report to [phone]  Please have completed discharge summary in chart prior to transfer. Again, Thank you for this referral. Should you have any questions please do not hesitate to call.      Sincerely,  Trena Schmid Liafaisal  [facility]  [phone]

## 2022-12-15 NOTE — PROGRESS NOTES
Pt discharged and transferred to ARU via wheelchair with all of her belongings.  Follow-up appointment made with CT surgery for [place] [facility] at 3:00 PM.

## 2022-12-15 NOTE — PROGRESS NOTES
Problem: Pressure Injury - Risk of  Goal: *Prevention of pressure injury  Description: Document Nas Scale and appropriate interventions in the flowsheet.   Outcome: Progressing Towards Goal  Note: Pressure Injury Interventions:  Sensory Interventions: Assess changes in LOC, Assess need for specialty bed, Chair cushion, Pad between skin to skin, Monitor skin under medical devices, Minimize linen layers, Pressure redistribution bed/mattress (bed type)    Moisture Interventions: Absorbent underpads, Apply protective barrier, creams and emollients, Limit adult briefs, Maintain skin hydration (lotion/cream), Minimize layers, Moisture barrier    Activity Interventions: Assess need for specialty bed, Chair cushion, Increase time out of bed, PT/OT evaluation, Pressure redistribution bed/mattress(bed type)    Mobility Interventions: Assess need for specialty bed, Chair cushion, Float heels, HOB 30 degrees or less, Pressure redistribution bed/mattress (bed type), PT/OT evaluation    Nutrition Interventions: Document food/fluid/supplement intake, Discuss nutritional consult with provider    Friction and Shear Interventions: Apply protective barrier, creams and emollients, HOB 30 degrees or less, Lift sheet, Minimize layers                Problem: Patient Education: Go to Patient Education Activity  Goal: Patient/Family Education  Outcome: Progressing Towards Goal

## 2022-12-15 NOTE — PROGRESS NOTES
Problem: Self Care Deficits Care Plan (Adult)  Goal: *Acute Goals and Plan of Care (Insert Text)  Description: Occupational Therapy Goals  Initiated 12/8/2022, Re-evaluated 12/15/22, goals to be contiued within 7 day(s). 1.  Patient will perform grooming with supervision/set-up standing > 3 minutes, F+ balance. 2.  Patient will perform upper body dressing with supervision/set-up maintaining sternal precautions. 3.  Patient will perform lower body dressing with supervision/set-up using AE prn.  4.  Patient will perform toilet transfers with minimal assistance/contact guard assist.  5.  Patient will perform all aspects of toileting with supervision/set-up. 6.  Patient will participate in upper extremity therapeutic exercise/activities with supervision/set-up for 8 minutes to increase ROM/strength for ADLs. 7.  Patient will utilize energy conservation techniques during functional activities with verbal cues. 8.  Patient will recall all sternal precautions prior to ADLs with 100% accuracy. Prior Level of Function: Patient lives alone and was independent with self-care and functional mobility PTA. Outcome: Progressing Towards Goal       OCCUPATIONAL THERAPY RE-EVALUATION    Patient: Mari Gar (89 y.o. female)  Date: 12/15/2022  Primary Diagnosis: CAD (coronary artery disease) [I25.10]  Aortic stenosis [I35.0]  Procedure(s) (LRB):  CORONARY ARTERY BYPASS GRAFT TIMES CORONARY ARTERY BYPASS GRAFT TIMES TWO; AORTIC VALVE REPAIR/REPLACEMENT (INSPIRIS)/ ANNULAR ENLARGEMENT PROCEDURE/ NON CORONARY SINUS (N/A) 8 Days Post-Op   Precautions:   Fall, Skin, Sternal      ASSESSMENT :  Patient cleared to participate in OT re-evaluation by RN. Upon entering the room, the patient was seated in chair, alert, and requesting to get to toilet. Patient was seen with PT to maximize pt safety and participation.  Patient able to recall all sternal precautions, importance of heart hugger and agreeable to attempt walking in hair to increase endurance for ADLs. Patient required max verbal cues for energy conservation techniques; pt with difficulty performing pursed lip breathing as she reports she is a \"mouth breather\". Patient performed functional transfers in preparation for self-care tasks with moderate - minimal assistance x 2 and toileting standing with moderate assistance. Patient with self- limiting behaviors observed, encouragement needed to attempt ADL tasks first prior to stating she could not do them. Patient observed bearing onto elbows multiple times despite cues. Patient ambulated around unit with 3 standing rest breaks and 1 seated. O2 and HR WFL, RR increased to 41. Based on the objective data described below, the patient presents with decreased strength, decreased independence, decreased activity tolerance, decreased safety awareness, decreased functional balance, and decreased functional mobility, which impedes pt performance in basic self-care/ADL tasks. Patient would benefit from skilled OT during admission to restore PLOF and maximize function. Patient will benefit from skilled intervention to address the above impairments.   Patient's rehabilitation potential is considered to be Fair  Factors which may influence rehabilitation potential include:   []             None noted  []             Mental ability/status  [x]             Medical condition  [x]             Home/family situation and support systems  [x]             Safety awareness  [x]             Pain tolerance/management  []             Other:      PLAN :  Recommendations and Planned Interventions:   [x]               Self Care Training                  [x]      Therapeutic Activities  [x]               Functional Mobility Training   []      Cognitive Retraining  [x]               Therapeutic Exercises           [x]      Endurance Activities  [x]               Balance Training                    [x]      Neuromuscular Re-Education  [] Visual/Perceptual Training     [x]      Home Safety Training  [x]               Patient Education                   [x]      Family Training/Education  []               Other (comment):    Frequency/Duration: Patient will be followed by occupational therapy 3 - 5 times a week to address goals. Further Equipment Recommendations for Discharge: bedside commode, transfer bench, and rollator    AMPAC: Current research shows that an AM-PAC score of 17 or less is not associated with a discharge to the patient's home setting. Based on an AM-PAC score of 15/24 and their current ADL deficits; it is recommended that the patient have 5-7 sessions per week of Occupational Therapy at d/c to increase the patient's independence. Currently, this patient demonstrates the potential endurance, and/or tolerance for 3 hours of therapy each day at d/c. This AMPAC score should be considered in conjunction with interdisciplinary team recommendations to determine the most appropriate discharge setting. Patient's social support, diagnosis, medical stability, and prior level of function should also be taken into consideration.      SUBJECTIVE:   Patient stated Michaela Lied you guys for keep coming, I know im a b, but I have to do it huh    OBJECTIVE DATA SUMMARY:     Past Medical History:   Diagnosis Date    Aortic stenosis 11/28/2022    Coronary artery disease involving native coronary artery of native heart 11/28/2022    Primary hypertension 11/28/2022    S/P AVR (aortic valve replacement) and aortoplasty 12/7/2022    S/p CABG x2 (LIMA-LAD, rSVG-OM), AVR 23 mm Inspiris with aortic root enlargement - Dr. Lynnda Leventhal    S/P CABG x 2 12/7/2022    S/p CABG x2 (LIMA-LAD, rSVG-OM), AVR 23 mm Inspiris with aortic root enlargement - Dr. Lynnda Leventhal    Type 2 diabetes mellitus, without long-term current use of insulin [facility] 11/28/2022     Past Surgical History:   Procedure Laterality Date    HX HYSTERECTOMY       Barriers to Learning/Limitations: None  Compensate with: visual, verbal, tactile, kinesthetic cues/model    Home Situation:   Home Situation  Home Environment: Private residence  # Steps to Enter: 4  One/Two Story Residence: One story  Living Alone: Yes  Support Systems: Other (Comment), Child(jesus), Other Family Member(s), Friend/Neighbor (Patient lives alone. )  Patient Expects to be Discharged to[de-identified] Home with home health  Current DME Used/Available at Home: None  Tub or Shower Type: Tub/Shower combination  [x]  Right hand dominant   []  Left hand dominant    Cognitive/Behavioral Status:  Neurologic State: Alert  Orientation Level: Oriented X4  Cognition: Follows commands  Safety/Judgement: Fall prevention    Skin: Intact  Edema: None noted    Vision/Perceptual:    Acuity: Within Defined Limits    Corrective Lenses: Glasses    Coordination: BUE  Coordination: Generally decreased, functional  Fine Motor Skills-Upper: Left Intact; Right Intact    Gross Motor Skills-Upper: Left Intact; Right Intact    Balance:  Sitting: Intact; With support  Standing: Impaired; With support  Standing - Static: Good;Fair  Standing - Dynamic : Fair;Occasional    Strength: BUE  Strength: Generally decreased, functional    Tone & Sensation: BUE  Tone: Normal  Sensation: Intact    Range of Motion: BUE  AROM: Generally decreased, functional    Functional Mobility and Transfers for ADLs:    Transfers:  Sit to Stand: Moderate assistance;Assist x2  Stand to Sit: Minimum assistance;Assist x2   Toilet Transfer : Moderate assistance;Assist x2     ADL Assessment:   Feeding: Setup    Oral Facial Hygiene/Grooming: Contact guard assistance (standing)    Bathing: Moderate assistance    Upper Body Dressing: Minimum assistance    Lower Body Dressing: Moderate assistance    Toileting: Moderate assistance     ADL Intervention:  Toileting  Toileting Assistance: Contact guard assistance; Moderate assistance  Bladder Hygiene: Contact guard assistance  Bowel Hygiene:  Moderate assistance    Cognitive Retraining  Safety/Judgement: Fall prevention    Pain:  Pain level pre-treatment: 5/10   Pain level post-treatment: 5/10   Pain Intervention(s): Medication (see MAR); Rest, Ice, Repositioning   Response to intervention: Nurse notified, See doc flow    Activity Tolerance:   Fair+      Please refer to the flowsheet for vital signs taken during this treatment. After treatment:   [x] Patient left in no apparent distress sitting up in chair  [] Patient left in no apparent distress in bed  [x] Call bell left within reach  [x] Nursing notified  [] Caregiver present  [] Bed alarm activated    COMMUNICATION/EDUCATION:   [x] Role of Occupational Therapy in the acute care setting  [x] Home safety education was provided and the patient/caregiver indicated understanding. [x] Patient/family have participated as able in goal setting and plan of care. [x] Patient/family agree to work toward stated goals and plan of care. [] Patient understands intent and goals of therapy, but is neutral about his/her participation. [] Patient is unable to participate in goal setting and plan of care. Thank you for this referral.  Dmitry Drake OTR/L  Time Calculation: 42 mins    [employer] AM-PAC® Daily Activity Inpatient Short Form (6-Clicks)*    How much HELP from another person does the patient currently need    (If the patient hasn't done an activity recently, how much help from another person do you think he/she would need if he/she tried?)   Total (Total A or Dep)   A Lot  (Mod to Max A)   A Little (Sup or Min A)   None (Mod I to I)   Putting on and taking off regular lower body clothing? [] 1 [x] 2 [] 3 [] 4   2. Bathing (including washing, rinsing,      drying)? [] 1 [x] 2 [] 3 [] 4   3. Toileting, which includes using toilet, bedpan or urinal?   [] 1 [x] 2 [] 3 [] 4   4. Putting on and taking off regular upper body clothing? [] 1 [] 2 [x] 3 [] 4   5.  Taking care of personal grooming such as brushing teeth? [] 1 [] 2 [x] 3 [] 4   6. Eating meals?    [] 1 [] 2 [x] 3 [] 4

## 2022-12-15 NOTE — PROGRESS NOTES
RENAL DAILY PROGRESS NOTE            74y F with PMH DM, HTN, s/p CABG, following for renal failure   Subjective:       Complaint:   Overnight event noted      IMPRESSION:   OSVALDO ? contrast nephropathy on CKD stage 3 ? ATN related to CABG /AVR procedure  S/p CABG and AVR 12/7/22  Underlying diabetic and hypertensive nephropathy  Controlled diabetes  Severe AS   Diabetes  Hypertension   PLAN:   Continue diuretics per surgery team.   Will be available if any questions or concern.            Discussed with nursing staff       Current Facility-Administered Medications   Medication Dose Route Frequency    midodrine (PROAMATINE) tablet 5 mg  5 mg Oral TID WITH MEALS    zolpidem (AMBIEN) tablet 5 mg  5 mg Oral QHS PRN    HYDROcodone-acetaminophen (NORCO) 5-325 mg per tablet 1-2 Tablet  1-2 Tablet Oral Q6H PRN    insulin glargine (LANTUS) injection 30 Units  30 Units SubCUTAneous DAILY    gabapentin (NEURONTIN) capsule 200 mg  200 mg Oral TID PRN    insulin lispro (HUMALOG) injection   SubCUTAneous AC&HS    glucose chewable tablet 16 g  4 Tablet Oral PRN    glucagon (GLUCAGEN) injection 1 mg  1 mg IntraMUSCular PRN    dextrose 10% infusion 0-250 mL  0-250 mL IntraVENous PRN    cyanocobalamin tablet 1,000 mcg  1,000 mcg Oral DAILY    multivitamin, tx-iron-ca-min (THERA-M w/ IRON) tablet 1 Tablet  1 Tablet Oral DAILY    folic acid (FOLVITE) tablet 1 mg  1 mg Oral DAILY    montelukast (SINGULAIR) tablet 10 mg  10 mg Oral DAILY    sodium chloride (NS) flush 5-40 mL  5-40 mL IntraVENous Q8H    sodium chloride (NS) flush 5-40 mL  5-40 mL IntraVENous PRN    naloxone (NARCAN) injection 0.4 mg  0.4 mg IntraVENous PRN    albuterol (PROVENTIL VENTOLIN) nebulizer solution 2.5 mg  2.5 mg Nebulization Q4H PRN    aspirin delayed-release tablet 81 mg  81 mg Oral DAILY    clopidogreL (PLAVIX) tablet 75 mg  75 mg Oral DAILY    chlorhexidine (PERIDEX) 0.12 % mouthwash 10 mL  10 mL Oral BID    docusate sodium (COLACE) capsule 100 mg 100 mg Oral BID    ELECTROLYTE REPLACEMENT PROTOCOL - Potassium Standard Dosing  1 Each Other PRN    ELECTROLYTE REPLACEMENT PROTOCOL - Magnesium  1 Each Other PRN    heparin (porcine) injection 5,000 Units  5,000 Units SubCUTAneous Q8H    atorvastatin (LIPITOR) tablet 40 mg  40 mg Oral QHS    sodium chloride (OCEAN) 0.65 % nasal squeeze bottle 2 Spray  2 Spray Both Nostrils Q2H PRN    guaiFENesin ER (MUCINEX) tablet 600 mg  600 mg Oral Q8H PRN    ondansetron (ZOFRAN) injection 4 mg  4 mg IntraVENous Q4H PRN       Review of Symptoms: comprehensive ROS negative except above.    Objective:   Patient Vitals for the past 24 hrs:   Temp Pulse Resp BP SpO2   12/15/22 1200 -- 82 19 -- 92 %   12/15/22 1100 -- 79 23 -- 90 %   12/15/22 0900 -- 80 27 -- --   12/15/22 0800 98.3 °F (36.8 °C) 84 25 (!) 137/54 92 %   12/15/22 0700 -- 85 21 (!) 148/71 92 %   12/15/22 0600 -- 80 23 (!) 145/51 90 %   12/15/22 0500 -- 82 25 (!) 142/61 92 %   12/15/22 0400 97.7 °F (36.5 °C) 75 18 (!) 140/58 90 %   12/15/22 0300 -- 81 26 (!) 148/56 90 %   12/15/22 0200 -- 77 24 (!) 144/53 90 %   12/15/22 0100 -- 78 25 (!) 141/58 90 %   12/15/22 0000 97.5 °F (36.4 °C) 72 18 (!) 139/58 90 %   12/14/22 2300 -- 70 18 (!) 143/61 91 %   12/14/22 2200 -- 72 24 (!) 152/53 92 %   12/14/22 2100 -- 69 17 (!) 140/59 94 %   12/14/22 2000 97.7 °F (36.5 °C) 68 19 127/75 93 %   12/14/22 1900 -- 73 26 (!) 133/47 90 %   12/14/22 1800 -- 74 25 (!) 145/53 (!) 89 %   12/14/22 1700 -- -- -- (!) 149/54 --   12/14/22 1600 97.9 °F (36.6 °C) 71 17 (!) 118/48 92 %   12/14/22 1500 -- 72 23 (!) 125/44 91 %   12/14/22 1427 -- (!) 101 30 (!) 157/59 92 %        Weight change: 0.907 kg (2 lb)     12/13 1901 - 12/15 0700  In: 1165 [P.O.:1165]  Out: 1260 [Urine:1150; Drains:110]    Intake/Output Summary (Last 24 hours) at 12/15/2022 1406  Last data filed at 12/15/2022 0900  Gross per 24 hour   Intake 400 ml   Output 900 ml   Net -500 ml     Physical Exam:   General: intubated  HEENT sclera anicteric, supple neck, no thyromegaly  CVS: S1S2 heard,  no rub  RS: + air entry b/l,   Abd: Soft, Non tender  Extrm: plus 2 edema, no cyanosis, clubbing   Skin: no visible  Rash  Musculoskeletal: No gross joints or bone deformities         Data Review:     LABS:   Hematology:   Recent Labs     12/15/22  0519 12/14/22  0430 12/13/22  0450   WBC 10.4 10.9 11.2   HGB 9.0* 8.8* 8.7*   HCT 28.4* 27.4* 27.0*     Chemistry:   Recent Labs     12/15/22  0519 12/14/22  0430 12/13/22  0450   BUN 40* 42* 40*   CREA 1.18 1.32* 1.27   CA 8.3* 8.0* 8.4*   K 4.5 4.2 4.1   * 136 135*    101 102   CO2 29 29 28   GLU 99 94 104*            Procedures/imaging: see electronic medical records for all procedures, Xrays and details which were not copied into this note but were reviewed prior to creation of Plan          Assessment & Plan:     See above        Artem Gan MD  12/15/2022

## 2022-12-15 NOTE — PROGRESS NOTES
1900- Change of shift. Received bedside report from Israel Beltre RN. Patient in bed, resting quietly. No complaints of pain at this time. Reviewed plan of care with patient. Will assume care of patient. Wound Prevention Checklist  Patient: Silke Rodriguez (73 y.o. female)  Date: 12/14/2022  Diagnosis: CAD (coronary artery disease) [I25.10]  Aortic stenosis [I35.0] <principal problem not specified>  Precautions: Fall, Skin, Sternal  []  Heel prevention boots placed on patient  []  Patient turned q2h during shift  []  Lift team ordered  [x]  Patient on Dejon bed/Specialty bed  [x]  Each Wound is documented during shift (Stage, Color, drainage, odor, measurements, and dressings)  [x]  Dual skin check done with JAVY Leslie RN      2000- No assessment changes. No complaints of pain. 2137- Ambien given at the request of the patient. 0000- No assessment changes. No complaints of pain. 0400- No assessment changes. No complaints of pain. Patient asked if she could get up closer to 6, rather than now. 0500- Patient bathed with CHG. Incontinent episode noted. Gown, linen, and bed pads changed. 0600- Patient weighed and ambulated to toilet. Large soft BM noted. 0630- Patient ambulated to chair. 0700- Change of shift. Bedside report given to Guilherme Torres RN.

## 2022-12-15 NOTE — PROGRESS NOTES
Met with pt and discussed PTOT recommendations. Pt stated she will like to go to [facility]. She stated she does not want SNF. She stated instead of SNF, she will return home and ask her family and friends for assistance. Sent message to Camron Slater of [facility].           Leyla Perdomo, BSN RN  Care Management  Pager: [phone]

## 2022-12-15 NOTE — PROGRESS NOTES
Per Carlitos Marie of [facility], they will accept pt today at 4pm to room 179. Nurse Celia Beckett aware  Pt aware.             Jey Irene, BSN RN  Care Management  Pager: [phone]

## 2022-12-16 LAB
ANION GAP SERPL CALC-SCNC: 5 MMOL/L (ref 3–18)
BASOPHILS # BLD: 0.1 K/UL (ref 0–0.1)
BASOPHILS NFR BLD: 1 % (ref 0–2)
BUN SERPL-MCNC: 36 MG/DL (ref 7–18)
BUN/CREAT SERPL: 34 (ref 12–20)
CALCIUM SERPL-MCNC: 8.2 MG/DL (ref 8.5–10.1)
CHLORIDE SERPL-SCNC: 102 MMOL/L (ref 100–111)
CO2 SERPL-SCNC: 29 MMOL/L (ref 21–32)
CREAT SERPL-MCNC: 1.05 MG/DL (ref 0.6–1.3)
DIFFERENTIAL METHOD BLD: ABNORMAL
EOSINOPHIL # BLD: 0.6 K/UL (ref 0–0.4)
EOSINOPHIL NFR BLD: 8 % (ref 0–5)
ERYTHROCYTE [DISTWIDTH] IN BLOOD BY AUTOMATED COUNT: 18.3 % (ref 11.6–14.5)
GLUCOSE BLD STRIP.AUTO-MCNC: 134 MG/DL (ref 70–110)
GLUCOSE BLD STRIP.AUTO-MCNC: 138 MG/DL (ref 70–110)
GLUCOSE BLD STRIP.AUTO-MCNC: 158 MG/DL (ref 70–110)
GLUCOSE BLD STRIP.AUTO-MCNC: 172 MG/DL (ref 70–110)
GLUCOSE SERPL-MCNC: 106 MG/DL (ref 74–99)
HCT VFR BLD AUTO: 27.4 % (ref 35–45)
HGB BLD-MCNC: 8.7 G/DL (ref 12–16)
IMM GRANULOCYTES # BLD AUTO: 0.1 K/UL (ref 0–0.04)
IMM GRANULOCYTES NFR BLD AUTO: 1 % (ref 0–0.5)
LYMPHOCYTES # BLD: 1.5 K/UL (ref 0.9–3.6)
LYMPHOCYTES NFR BLD: 18 % (ref 21–52)
MAGNESIUM SERPL-MCNC: 2.7 MG/DL (ref 1.6–2.6)
MCH RBC QN AUTO: 30 PG (ref 24–34)
MCHC RBC AUTO-ENTMCNC: 31.8 G/DL (ref 31–37)
MCV RBC AUTO: 94.5 FL (ref 78–100)
MONOCYTES # BLD: 0.8 K/UL (ref 0.05–1.2)
MONOCYTES NFR BLD: 10 % (ref 3–10)
NEUTS SEG # BLD: 5.1 K/UL (ref 1.8–8)
NEUTS SEG NFR BLD: 63 % (ref 40–73)
NRBC # BLD: 0 K/UL (ref 0–0.01)
NRBC BLD-RTO: 0 PER 100 WBC
PLATELET # BLD AUTO: 245 K/UL (ref 135–420)
PMV BLD AUTO: 9.5 FL (ref 9.2–11.8)
POTASSIUM SERPL-SCNC: 4.4 MMOL/L (ref 3.5–5.5)
RBC # BLD AUTO: 2.9 M/UL (ref 4.2–5.3)
SODIUM SERPL-SCNC: 136 MMOL/L (ref 136–145)
WBC # BLD AUTO: 8.1 K/UL (ref 4.6–13.2)

## 2022-12-16 PROCEDURE — 97116 GAIT TRAINING THERAPY: CPT

## 2022-12-16 PROCEDURE — 80048 BASIC METABOLIC PNL TOTAL CA: CPT

## 2022-12-16 PROCEDURE — 97110 THERAPEUTIC EXERCISES: CPT

## 2022-12-16 PROCEDURE — 82962 GLUCOSE BLOOD TEST: CPT

## 2022-12-16 PROCEDURE — 74011636637 HC RX REV CODE- 636/637: Performed by: EMERGENCY MEDICINE

## 2022-12-16 PROCEDURE — 74011250637 HC RX REV CODE- 250/637: Performed by: NURSE PRACTITIONER

## 2022-12-16 PROCEDURE — 97530 THERAPEUTIC ACTIVITIES: CPT

## 2022-12-16 PROCEDURE — 97535 SELF CARE MNGMENT TRAINING: CPT

## 2022-12-16 PROCEDURE — 77030040393 HC DRSG OPTIFOAM GENT MDII -B

## 2022-12-16 PROCEDURE — 97162 PT EVAL MOD COMPLEX 30 MIN: CPT

## 2022-12-16 PROCEDURE — 83735 ASSAY OF MAGNESIUM: CPT

## 2022-12-16 PROCEDURE — 77030040392 HC DRSG OPTIFOAM MDII -A

## 2022-12-16 PROCEDURE — 2709999900 HC NON-CHARGEABLE SUPPLY

## 2022-12-16 PROCEDURE — 97166 OT EVAL MOD COMPLEX 45 MIN: CPT

## 2022-12-16 PROCEDURE — 85025 COMPLETE CBC W/AUTO DIFF WBC: CPT

## 2022-12-16 PROCEDURE — 65310000000 HC RM PRIVATE REHAB

## 2022-12-16 PROCEDURE — 74011250637 HC RX REV CODE- 250/637: Performed by: PHYSICIAN ASSISTANT

## 2022-12-16 PROCEDURE — 36415 COLL VENOUS BLD VENIPUNCTURE: CPT

## 2022-12-16 PROCEDURE — 99223 1ST HOSP IP/OBS HIGH 75: CPT | Performed by: EMERGENCY MEDICINE

## 2022-12-16 PROCEDURE — 74011250637 HC RX REV CODE- 250/637: Performed by: EMERGENCY MEDICINE

## 2022-12-16 RX ORDER — ACETAMINOPHEN 325 MG/1
650 TABLET ORAL
Status: DISCONTINUED | OUTPATIENT
Start: 2022-12-16 | End: 2022-12-21 | Stop reason: HOSPADM

## 2022-12-16 RX ORDER — POTASSIUM CHLORIDE 20 MEQ/1
20 TABLET, EXTENDED RELEASE ORAL DAILY
Status: DISCONTINUED | OUTPATIENT
Start: 2022-12-16 | End: 2022-12-21 | Stop reason: HOSPADM

## 2022-12-16 RX ADMIN — FOLIC ACID 1 MG: 1 TABLET ORAL at 09:18

## 2022-12-16 RX ADMIN — MIDODRINE HYDROCHLORIDE 5 MG: 5 TABLET ORAL at 13:03

## 2022-12-16 RX ADMIN — Medication 30 UNITS: at 09:19

## 2022-12-16 RX ADMIN — Medication 500 MCG: at 09:18

## 2022-12-16 RX ADMIN — HYDROCODONE BITARTRATE AND ACETAMINOPHEN 1 TABLET: 5; 325 TABLET ORAL at 14:49

## 2022-12-16 RX ADMIN — FLUTICASONE PROPIONATE 2 SPRAY: 50 SPRAY, METERED NASAL at 09:00

## 2022-12-16 RX ADMIN — MIDODRINE HYDROCHLORIDE 5 MG: 5 TABLET ORAL at 09:18

## 2022-12-16 RX ADMIN — ATORVASTATIN CALCIUM 40 MG: 40 TABLET, FILM COATED ORAL at 21:12

## 2022-12-16 RX ADMIN — POTASSIUM CHLORIDE 20 MEQ: 1500 TABLET, EXTENDED RELEASE ORAL at 13:03

## 2022-12-16 RX ADMIN — Medication 2 UNITS: at 12:28

## 2022-12-16 RX ADMIN — DOCUSATE SODIUM 100 MG: 100 CAPSULE, LIQUID FILLED ORAL at 09:19

## 2022-12-16 RX ADMIN — MONTELUKAST 10 MG: 10 TABLET, FILM COATED ORAL at 09:18

## 2022-12-16 RX ADMIN — HYDROCODONE BITARTRATE AND ACETAMINOPHEN 1 TABLET: 5; 325 TABLET ORAL at 21:12

## 2022-12-16 RX ADMIN — ACETAMINOPHEN 650 MG: 325 TABLET, FILM COATED ORAL at 09:19

## 2022-12-16 RX ADMIN — FUROSEMIDE 40 MG: 40 TABLET ORAL at 09:18

## 2022-12-16 RX ADMIN — MIDODRINE HYDROCHLORIDE 5 MG: 5 TABLET ORAL at 18:24

## 2022-12-16 RX ADMIN — ZOLPIDEM TARTRATE 5 MG: 5 TABLET ORAL at 21:12

## 2022-12-16 RX ADMIN — Medication 0.2 UNITS: at 21:32

## 2022-12-16 NOTE — ROUTINE PROCESS
0800 Pt. Awake sitting up in bed no change in assessment. 0930 Pt. Sitting up in chair eating breakfast.   1200 with therapy. 1330 able to transfer from bed to chair with assist.   1500 no change in assessment. 1800 Pt. Sitting up in chair eating dinner.

## 2022-12-16 NOTE — PROGRESS NOTES
Problem: Self Care Deficits Care Plan (Adult)  Goal: *Therapy Goal (Edit Goal, Insert Text)  Description: Occupational Therapy Goals   Long Term Goals  Initiated 22 and to be accomplished within 2 week(s)  1. Pt will perform self-feeding with Bonita. 2. Pt will perform grooming with Bonita. 3. Pt will perform UB bathing with supervision. 4. Pt will perform LB bathing with supervision. 5. Pt will perform tub/shower transfer with supervision. 6. Pt will perform UB dressing with Bonita. 7. Pt will perform LB dressing with Bonita. 8. Pt will perform toileting task with Bnoita. 9. Pt will perform toilet transfer with Bonita. Short Term Goals   Initiated 22 and to be accomplished within 7 day(s)  1. Pt will perform self-feeding with setup. 2. Pt will perform grooming with setup. 3. Pt will perform UB bathing with SBA. 4. Pt will perform LB bathing with Edith. 5. Pt will perform tub/shower transfer with CGA. 6. Pt will perform UB dressing with SBA. 7. Pt will perform LB dressing with modA. 8. Pt will perform toileting task with CGA. 9. Pt will perform toilet transfer with CGA. Outcome: Progressing Towards Goal  Goal: Interventions  Outcome: Progressing Towards Goal   OCCUPATIONAL THERAPY EVALUATION    Patient: Jack Selby 79 y.o.   Date: 2022  Primary Diagnosis: CAD (coronary artery disease) [I25.10]    Patient identified with name and : yes    Past Medical History:   Past Medical History:   Diagnosis Date    Aortic stenosis 2022    Coronary artery disease involving native coronary artery of native heart 2022    Primary hypertension 2022    S/P AVR (aortic valve replacement) and aortoplasty 2022    S/p CABG x2 (LIMA-LAD, rSVG-OM), AVR 23 mm Inspiris with aortic root enlargement - Dr. Nolvia Villanueva    S/P CABG x 2 2022    S/p CABG x2 (LIMA-LAD, rSVG-OM), AVR 23 mm Inspiris with aortic root enlargement - Dr. Nolvia Villanueva    Type 2 diabetes mellitus, without long-term current use of insulin ([facility] [address] 11/28/2022     Precautions: falls, sternal precautions, skin    Time In: 0933  Time Out[de-identified] 1115    Pain:  Pt reports 0/10 pain or discomfort prior to treatment. Pt reports 0/10 pain or discomfort post treatment. SUBJECTIVE:   Patient stated My left leg is tender.     OBJECTIVE DATA SUMMARY:       [x]  Right hand dominant   []  Left hand dominant    Therapy Diagnosis:   Difficulty with ADLs  [x]     Difficulty with functional transfers  [x]     Difficulty with ambulation  [x]     Difficulty with IADLs  [x]       Problem List:    Decreased strength B UE  [x]     Decreased strength trunk/core  []     Decreased AROM   []     Decreased endurance  [x]     Decreased balance sitting  []     Decreased balance standing  [x]     Pain   []     Decreased PROM  []       Functional Limitations:   Decreased independence with ADL  [x]     Decreased independence with functional transfers  [x]     Decreased independence with ambulation  [x]     Decreased independence with IADL  []       Previous Functional Level: Pre-Morbid Level of Function: Independent with all ADL/IADL's, uses cane/rollator in the community and no AD in home. Pt has assistance to do grocery shopping pushing cart at grocery store.     Home Environment: Home Situation  Home Environment: Private residence  # Steps to Enter: 4  Rails to Enter: Yes  Hand Rails : Bilateral  One/Two Story Residence: One story  Living Alone: Yes  Support Systems: Other Family Member(s)  Patient Expects to be Discharged to[de-identified] Home  Current DME Used/Available at Home: None  Tub or Shower Type: Tub/Shower combination    Barriers to Learning/Limitations: None  Compensate with: visual, verbal, tactile, kinesthetic cues/model    Outcome Measures:      MMT Initial Assessment   Right Upper Extremity  Left Upper Extremity    UE AROM [facility] WFL   Shoulder flexion NT due to sternal precautions NT due to sternal precautions   Shoulder extension     Shoulder ABDuction Shoulder ADDUction     Elbow Flexion 4-/5 4-/5   Elbow Extension 4-/5 4-/5   Wrist Extension/Flexion          0/5 No palpable muscle contraction  1/5 Palpable muscle contraction, no joint movement  2-/5 Less than full range of motion in gravity eliminated position  2/5 Able to complete full range of motion in gravity eliminated position  2+/5 Able to initiate movement against gravity  3-/5 More than half but not full range of motion against gravity  3/5 Able to complete full range of motion against gravity  3+/5 Completes full range of motion against gravity with minimal resistance  4-/5 Completes full range of motion against gravity with minimal resistance  4/5 Completes full range of motion against gravity with moderate resistance  5/5 Completes full range of motion against gravity with maximum resistance    Sensation: appears intact  Coordination: [facility]    FIM SCORES Initial Assessment   Bladder - level of assist     Bladder - accident frequency score     Bowel - level of assist     Bowel - accident frequency score     Please see IRC Interdisciplinary Eval: Coordination/Balance Section for details regarding FIM score description.     COGNITION/PERCEPTION Initial Assessment   Reading Status Literate   Writing Status     Arousal/Alertness Generalized responses   Orientation Level Oriented X4   Visual Fields     Praxis     Body Scheme       COMPREHENSION MODE Initial Assessment   Primary Mode of Comprehension Auditory   Hearing Aide     Corrective Lenses     Score 5     EXPRESSION Initial Assessment   Primary Mode of Expression Verbal   Score 5   Comments       SOCIAL INTERACTION/PRAGMATICS Initial Assessment   Score 5   Comments       PROBLEM SOLVING Initial Assessment   Score 5   Comments       MEMORY Initial Assessment   Score 5   Comments       EATING Initial Assessment   Functional Level 5   Comments       GROOMING Initial Assessment   Functional Level 5       Tasks completed by patient     Comments BATHING Initial Assessment   Functional Level 3   Body parts patient bathed Abdomen, Arm, left, Arm, right, Chest, Lower leg and foot, left, Lower leg and foot, right, Agatha area, Thigh, left, Thigh, right   Comments Pt performed UB bathing via sponge bath with Mike to wash back and LB bathing via sponge bath with modA to wash lower BLE feet in seated position and buttocks in standing at FWW.     TUB/SHOWER TRANSFER INDEPENDENCE Initial Assessment   Score 0   Comments       UPPER BODY DRESSING/UNDRESSING Initial Assessment   Functional Level 4   Items applied/Steps completed Pullover (4 steps)   Comments Pt performed UB dressing with Mike to don pullover shirt in seated position. LOWER BODY DRESSING/UNDRESSING Initial Assessment   Functional Level 2    Sock and/or Shoe Management FIM:2   Items applied/Steps completed Underpants (3 steps), Elastic waist pants (3 steps), Sock, right (1 step), Sock, left (1 step)   Comments Pt performed LB dressing with maxA to don compression stockings and socks. Pt required modA to threadh BLE feet through underwear and pants and pull up over buttocks. TOILETING Initial Assessment   Functional Level 4   Comments Pt performed toileting with Mike and FWW to [facility]. Pt required Mike to perform toilet transfer and toileting hygiene. TOILET TRANSFER INDEPENDENCE Initial Assessment   Transfer score 4   Comments Pt performed toilet transfer with FWW and BSC with Mike to stand. INSTRUMENTAL ADL Initial Assessment (PLOF)   Meal preparation Independent   Homemaking Independent   Medicine Management Independent   Financial Management Independent        ASSESSMENT :  Based on the objective data described above, the patient presents with impaired standing balance, impaired activity tolerance, and safety, sternal precautions and impaired BUE strength which negatively impact pt performance with functional mobility and self cares.  OTR noted open areas on skin folds and buttocks and reported to RN. Pt would benefit from skilled occupational therapy in order to improve independent functional mobility/ADLs,/IADLs within the home. Interventions may include range of motion (AROM, PROM B UE), motor function (B UE/ strengthening/coordination), activity tolerance (vitals, oxygen saturation levels), balance training, ADL/IADL training and functional transfer training. Please see IRC; Interdisciplinary Eval, Care Plan, and Patient Education for further information regarding occupational therapy examination and plan of care. PLAN :  Recommendations and Planned Interventions:  [x]               Self Care Training                   [x]        Therapeutic Activities  [x]               Functional Mobility Training    [x]        Cognitive Retraining  [x]               Therapeutic Exercises            [x]        Endurance Activities  [x]               Balance Training                     [x]        Neuromuscular Re-Education  [x]               Visual/Perceptual Training      [x]   Home Safety Training  [x]               Patient Education                    [x]        Family Training/Education  []               Other (comment):    Frequency/Duration: Patient will be followed by occupational therapy 1-2 times per day/4-7 days per week to address goals. Discharge Recommendations: Home Health  Further Equipment Recommendations for Discharge: bedside commode and transfer bench  IRF-FLORESITA Completed: yes  Entered Differentiated Treatment minutes: Yes     Please refer to the flow sheet for vital signs taken during this treatment. After treatment:   [x] Patient left in no apparent distress sitting up in chair  [] Patient left in no apparent distress in bed  [x] Call bell left within reach  [x] Nursing notified  [] Caregiver present  [] Bed alarm activated    COMMUNICATION/EDUCATION:   [x] Home safety education was provided and the patient/caregiver indicated understanding.   [x] Patient/family have participated as able in goal setting and plan of care. [x] Patient/family agree to work toward stated goals and plan of care. [] Patient understands intent and goals of therapy, but is neutral about his/her participation. [] Patient is unable to participate in goal setting and plan of care. Order received from MD for occupational therapy services and chart reviewed. Pt to be seen 5 times per week for 3 hours of total therapy per day for 2 weeks.   Thank you for the referral.    Thank you for this referral.  Cherri Morrison, OT

## 2022-12-16 NOTE — PROGRESS NOTES
Problem: Mobility Impaired (Adult and Pediatric)  Goal: *Acute Goals and Plan of Care (Insert Text)  Description: Physical Therapy Short Term Goals  Initiated 2022 and to be accomplished within 7-10 day(s) 2022  1. Patient will move from supine to sit and sit to supine  in bed with modified independence. 2.  Patient will transfer from bed to chair and chair to bed with modified independence using the least restrictive device. 3.  Patient will perform sit to stand with modified independence. 4.  Patient will ambulate with modified independence for 150 feet with the least restrictive device. 5.  Patient will ascend/descend 4 stairs with B handrail(s) with modified independence. Outcome: Progressing Towards Goal    PHYSICAL THERAPY EVALUATION    Patient: Al Mcgraw (54 y.o. female)  Date: 2022  Diagnosis: CAD (coronary artery disease) [I25.10]   Precautions: Sternal, Fall  Chart, physical therapy assessment, plan of care and goals were reviewed. Time in:1110  Time out:1217  Entered Differentiated Treatment minutes: Yes    Patient seen for: Balance activities;Gait training;Patient education;Transfer training; Wheelchair mobility    Pain:  Pt pain was reported as 4/10 pre-treatment. Pt pain was reported as 4/10 post-treatment. Intervention: Pt notes left LE discomforts but reports she received pain medication prior to treatment session. Patient identified with name and : yes    SUBJECTIVE:     Patient stated I didn't sleep well.  Pt is pleasant but reports fatigue from OT evaluation and lack of sleep. Pt also expresses frustration re: unproductive cough and nursing staff was notified re: pt's concerns.   Pt appears reluctant to participate in mobility assessment at times requiring encouragement and education with pt declining to participate in stair negotiation assessment and requesting to return to room after gait trial.    Patient's Goal for Physical Therapy: \"to get up and walk around by myself. \"    OBJECTIVE DATA SUMMARY:     Past Medical History:   Diagnosis Date    Aortic stenosis 11/28/2022    Coronary artery disease involving native coronary artery of native heart 11/28/2022    Primary hypertension 11/28/2022    S/P AVR (aortic valve replacement) and aortoplasty 12/7/2022    S/p CABG x2 (LIMA-LAD, rSVG-OM), AVR 23 mm Inspiris with aortic root enlargement - Dr. Tamar To    S/P CABG x 2 12/7/2022    S/p CABG x2 (LIMA-LAD, rSVG-OM), AVR 23 mm Inspiris with aortic root enlargement - Dr. Tamar To    Type 2 diabetes mellitus, without long-term current use of insulin ([facility] [address] 11/28/2022     Past Surgical History:   Procedure Laterality Date    HX HYSTERECTOMY         Problem List:    Decreased strength B LE  [x]     Decreased strength trunk/core  [x]     Decreased AROM   [x]     Decreased PROM  [x]    Decreased endurance  [x]     Decreased balance sitting  []     Decreased balance standing  [x]     Pain   [x]     Slow ambulation velocity  [x]    Decreased coordination  []    Decreased safety awareness  [x]      Functional Limitations:   Decreased independence with bed mobility  [x]     Decreased independence with functional transfers  [x]     Decreased independence with ambulation  [x]     Decreased independence with stair negotiation  [x]       Previous Functional Level: Per pt she was previously functionally independent with mobility. PT notes a straight cane in the room that the pt acknowledges is hers but denies using at home.     Home Environment: Home Environment: Private residence  # Steps to Enter: 4  Rails to Enter: Yes  Hand Rails : Bilateral  One/Two Story Residence: One story [facility] [address] per chart review  Living Alone: Yes  Support Systems: Other Family Member(s)  Patient Expects to be Discharged to[de-identified] Home  Current DME Used/Available at Home: Straight Cane  Tub or Shower Type: Tub/Shower combination    Barriers to Learning/Limitations: yes;  emotional and physical  Compensate with: visual, verbal, tactile, kinesthetic cues/model         Outcome Measures: Mobility assessment     MMT Initial Assessment:  Not formally assessed - Generally decreased B LEs with decreased ROM against gravity B LEs based on functional assessment with bed mobility. Right Lower Extremity Left Lower Extremity   Hip Flexion       Knee Extension       Knee Flexion       Ankle Dorsiflexion       0/5 No palpable muscle contraction  1/5 Palpable muscle contraction, no joint movement  2-/5 Less than full range of motion in gravity eliminated position  2/5 Able to complete full range of motion in gravity eliminated position  2+/5 Able to initiate movement against gravity  3-/5 More than half but not full range of motion against gravity  3/5 Able to complete full range of motion against gravity  3+/5 Completes full range of motion against gravity with minimal resistance  4-/5 Completes full range of motion against gravity with minimal-moderate resistance  4/5 Completes full range of motion against gravity with moderate resistance  4+/5 Completes full range of motion against gravity with moderate-maximum resistance  5/5 Completes full range of motion against gravity with maximum resistance        GROSS ASSESSMENT Initial Assessment 12/16/2022   AROM Generally decreased, functional   Strength Generally decreased, functional   Coordination Within functional limits   Tone Normal   Sensation Intact   PROM  Generally decreased, functional       POSTURE Initial Assessment 12/16/2022   Posture (WDL) Exceptions to WDL   Posture Assessment Kyphosis;Rounded shoulders       BALANCE Initial Assessment 12/16/2022    Sitting - Static: Good (unsupported)  Sitting - Dynamic: Good (unsupported)  Standing - Static: Fair  Standing - Dynamic : Impaired    Ability to  small object from floor: NT 2/2 pt declines multiple mobility assessments.        BED/CHAIR/WHEELCHAIR TRANSFERS Initial Assessment 12/16/2022   Rolling Right 0 (Not tested) (pt declined)   Rolling Left 0 (Not tested) (pt declined)   Supine to Sit 5 (Supervision) with increased time to perform and HOB elevated - pt reports she sleeps in a recliner at home due to difficulty tolerating supine   Sit to Stand Minimal assistance   Sit to Supine 5 (Supervision) with increased time and two attempts to manage B LEs from dependent position against gravity onto bed. Transfer Assist Score 4 (Minimal assistance)   Transfer Type Other   Comments Pt requires CGA/Minimal assistance with balance and safety with functional transfers due to sternal precautions with pt demonstrating compensatory wide CHIOMA and anterior/posterior rocking. Car Transfer Not tested (pt declined)   Car Type Car Transfer Trainer       [facility] MOBILITY/MANAGEMENT Initial Assessment 12/16/2022   Able to Propel 2 feet with maximal assistance - pt self selects to discontinue due to fatigue. Assist Level 1   Curbs/ramps assistance required NT   Wheelchair set up assistance required 3 (Moderate assistance)   Wheelchair management Manages left brake, Manages right brake   Comments PT requires maximal assistance for propulsion within room with pt declining to attempt further w/c mobility relying on PT for dependent assistance from room to gym. GAIT Initial Assessment 12/16/2022   Gait Description (WDL) Exceptions to WDL   Gait Abnormalities Decreased step clearance       WALKING INDEPENDENCE Initial Assessment 12/16/2022   Assistive device Walker, rolling, Gait belt   Ambulation assistance - level surface 4 (Minimal assistance) (CGA/Min A)   Distance 42 Feet (ft)   Comments  PT ambulates with forward flexed, rounded shoulder posture with decreased B foot clearance requiring maximal cuing for forward gaze, upright posture and safe RW management. Pt requires CGA for balance and intermittent minimal assistance for RW management.    Ambulation assistance - unlevel surface  NT       STEPS/STAIRS Initial Assessment 12/16/2022   Steps/Stairs ambulated 0   Rail Use None   Assistance Level 0 (Not tested) (Pt declined)   Comments  PT declines participation 2/2 fatigue   Curbs/Ramps  NT       ASSESSMENT :  Based on the objective data described above, the patient presents with impaired activity tolerance and endurance, impaired functional strength and impaired balance limiting safety and independence with functional mobility. Patient will benefit from skilled intervention to address the above impairments. Patient's rehabilitation potential is considered to be Good  Factors which may influence rehabilitation potential include:   []         None noted  []         Mental ability/status  [x]         Medical condition  [x]         Home/family situation and support systems  [x]         Safety awareness  [x]         Pain tolerance/management  []         Other:      PLAN :  Please refer to POC for details re: long term goals. Order received from MD for physical therapy services and chart reviewed. Pt to be seen 5 times per week for 3 hours of total therapy per day for 1-2 weeks. Thank you for the referral.    Pt would benefit from skilled physical therapy in order to improve independent functional mobility within the home. Interventions may include range of motion (AROM, PROM B LE/trunk), motor function (B LE/trunk strengthening/coordination), activity tolerance (vitals, oxygen saturation levels), bed mobility training, balance activities, gait training (progressive ambulation program), wheelchair management and functional transfer training. Patient would also benefit from concurrent and group therapy sessions to promote increased participation in skilled therapy interventions and to provide opportunities for increased social interaction.      Discharge Recommendations: Home Physical Therapy to progress to Cardiac Rehabilitation  Further Equipment Recommendations for Discharge: rolling walker  IRF-FLORESITA Completed: yes       Activity Tolerance:   Fair  Please refer to the flowsheet for vital signs taken during this treatment. After treatment:   [] Patient left in no apparent distress in bed  [x] Patient left in no apparent distress sitting up in chair  [] Patient left in no apparent distress in w/c mobilizing under own power  [] Patient left in no apparent distress dining area  [] Patient left in no apparent distress mobilizing under own power  [x] Call bell left within reach  [x] Nursing notified  [] Caregiver present  [] Bed alarm activated   [x] Chair alarm activated. COMMUNICATION/EDUCATION:   [x]         Fall prevention education was provided and the patient/caregiver indicated understanding. [x]         Patient/family have participated as able in goal setting and plan of care. [x]         Patient/family agree to work toward stated goals and plan of care. []         Patient understands intent and goals of therapy, but is neutral about his/her participation. []         Patient is unable to participate in goal setting and plan of care.     Thank you for this referral.  Parth Lei, PT, DPT  12/16/2022

## 2022-12-16 NOTE — REHAB NOTE
Wound Prevention Checklist    Patient: Blaise Cotton (40 y.o. female)  Date: 12/16/2022  Diagnosis: CAD (coronary artery disease) [I25.10] <principal problem not specified>    Precautions: Sternal, Fall       []  Heel prevention boots placed on patient    []  Patient turned q2h during shift    []  Lift team ordered    []  Patient on [place] bed/Specialty bed    []  Each Wound is documented during shift (Stage, Color, drainage, odor, measurements, and dressings)    []  Dual skin check done with Jaiden Hyatt RN

## 2022-12-16 NOTE — CONSULTS
Comprehensive Nutrition Assessment    Type and Reason for Visit: Initial, Consult    Nutrition Recommendations/Plan:   Add supplement:  Glucerna Shake BID (220 kcal, 9 gm protein each)  Continue all other nutrition interventions. Encourage/ monitor po intake of meals and supplements. Malnutrition Assessment:  Malnutrition Status: At risk for malnutrition (specify) (s/p CABG and noted decreased po intake some meals PTA) (12/16/22 1102)      Nutrition History and Allergies:   Past medical hx:  CAD s/p recent CABG x2 and AVR on 12/7/22, DM type 2, HTN. Fair/good meal intake while in acute care hospital PTA per chart documentation. Weight gain PTA per chart hx; question if related to fluid status as pt has edema. No known food allergies. Nutrition Assessment:    Pt admitted from acute care hospital for therapy; has CAD, s/p CABG x2 on 12/7/22. Is on po diet; has good meal intake. Had good/ fair meal intake while in acute care hospital PTA per chart documentation. Was receiving nutrition supplements. Tolerating diet. Nutrition Related Findings:    BM 12/16.    + edema. Pertinent meds: vitamin B12, bowel regimen, folic acid, lasix, lantus, SSI, KCl. MVI with iron ordered. Wound Type: Surgical incision (friction wound)    Current Nutrition Intake & Therapies:  Average Meal Intake: 51-75%  Average Supplement Intake: None ordered  ADULT DIET Regular; 4 carb choices (60 gm/meal); Low Fat/Low Chol/High Fiber/2 gm Na    Anthropometric Measures:  Height: 5' 11\" (180.3 cm)  Ideal Body Weight (IBW): 155 lbs (70 kg)  Admission Body Weight: 307 lb 5.1 oz  Current Body Wt:  139.4 kg (307 lb 5.1 oz), 198.3 % IBW.  Standing scale (12/15/22)  Current BMI (kg/m2): 42.9  Usual Body Weight: 127.5 kg (281 lb) (November 2022)  % Weight Change (Calculated): 9.4  Weight Adjustment: No adjustment  BMI Category: Obese class 3 (BMI 40.0 or greater)    Estimated Daily Nutrient Needs:  Energy Requirements Based On: Formula (MSJ x1-1.1)  Weight Used for Energy Requirements: Admission  Energy (kcal/day): 2026-2228  Weight Used for Protein Requirements: Admission  Protein (g/day): 112-139 (wt x0.8-1)  Method Used for Fluid Requirements: 1 ml/kcal  Fluid (ml/day): 2026-2228    Nutrition Diagnosis:   Inadequate oral intake related to acute injury/trauma (dislike of some in-house meals PTA while in acute care hospital) as evidenced by intake 26-50%, intake 51-75% (some meals)    Nutrition Interventions:   Food and/or Nutrient Delivery: Continue current diet, Mineral supplement, Vitamin supplement, Start oral nutrition supplement  Nutrition Education/Counseling: Education not indicated  Coordination of Nutrition Care: Continue to monitor while inpatient       Goals:     Goals: Meet at least 75% of estimated needs, by next RD assessment       Nutrition Monitoring and Evaluation:   Behavioral-Environmental Outcomes: None identified  Food/Nutrient Intake Outcomes: Food and nutrient intake, Supplement intake, Vitamin/mineral intake  Physical Signs/Symptoms Outcomes: Biochemical data, GI status, Meal time behavior    Discharge Planning:     Too soon to determine    Tammy Perez, [address]  Contact: [phone]

## 2022-12-16 NOTE — H&P
[facility] PHYSICAL REHABILITATION  [address] [place] [address], [address]     INPATIENT REHABILITATION  HISTORY AND PHYSICAL  ([facility] Admission Physician Evaluation)    Name: Marco Copeland CSN: [id number]   Age: 79 y.o. MRN: [id number]   Sex: female Admit Date: 12/15/2022         Primary Rehabilitation Impairment Category (ANDREW): Cardiac    Impairment Group Label: Cardiac    Etiologic Diagnosis: Atherosclerotic heart disease of native coronary artery without angina pectoris      Subjective:     Patient seen and examined. History of the Present Illness: This is a 71-year-old female with a history of severe aortic stenosis and diabetes and hypertension who presented for elective cardiac catheterization and was found to have ostial LAD disease. Cardiothoracic surgery was consulted. Patient also had severe aortic stenosis. Patient subsequently underwent coronary artery bypass graft x2 and aortic valve replacement. Postoperatively patient remained stable. For full details regarding hospital course please refer to chart. PT and OT saw the patient and was felt that patient may benefit from transitioning to the inpatient rehab. Patient was transitioned to the inpatient rehab at [facility] where I saw the patient for the first time today. Patient is sitting in a chair in no apparent distress. Patient is awake and alert and follows commands. No history of any fever or chills. No history of any nausea vomiting or abdominal pain. No history of any shortness of breath or cough. Patient is wearing the sternal harness. No history of any urinary complaints or rectal bleeding. No history of any black or dark-colored stools. Patient does have lower extremity edema. Patient states she has been having some loose bowel movements but not watery. No history of any focal weakness or visual changes. Patient does complain of some headaches occasionally.       Past Medical History:  Past Medical History: Diagnosis Date    Aortic stenosis 2022    Coronary artery disease involving native coronary artery of native heart 2022    Primary hypertension 2022    S/P AVR (aortic valve replacement) and aortoplasty 2022    S/p CABG x2 (LIMA-LAD, rSVG-OM), AVR 23 mm Inspiris with aortic root enlargement - Dr. Lis Santillan    S/P CABG x 2 2022    S/p CABG x2 (LIMA-LAD, rSVG-OM), AVR 23 mm Inspiris with aortic root enlargement - Dr. Lis Santillan    Type 2 diabetes mellitus, without long-term current use of insulin ([facility] [address] 2022       Past Surgical History:  Past Surgical History:   Procedure Laterality Date    HX HYSTERECTOMY         Allergies: Allergies   Allergen Reactions    Albuterol Sulfate Shortness of Breath    Entex [Pseudoephedrine Tannate] Rash    Terbutaline Shortness of Breath    Terramycin [Oxytetracycline] Unknown (comments)         Social History: Patient was continuing to smoke till she presented to the hospital.  Patient has a history of drinking alcohol socially. Family History:  Patient did not provide any details regarding her family's medical history          Transfer Medications (  Prior to Admission Medications   Prescriptions Last Dose Informant Patient Reported? Taking? HYDROcodone-acetaminophen (NORCO) 5-325 mg per tablet Unknown  No No   Sig: Take 1 Tablet by mouth every six (6) hours as needed for Pain for up to 7 days. Max Daily Amount: 4 Tablets. aspirin delayed-release 81 mg tablet Unknown  No No   Sig: Take 1 Tablet by mouth daily. atorvastatin (LIPITOR) 40 mg tablet Unknown  Yes No   Sig: Take 40 mg by mouth in the morning. azelastine (ASTELIN) 137 mcg (0.1 %) nasal spray Unknown  Yes No   Si Spray two (2) times a day. Use in each nostril as directed   clopidogreL (PLAVIX) 75 mg tab Unknown  No No   Sig: Take 1 Tablet by mouth daily. docusate sodium (COLACE) 100 mg capsule Unknown  No No   Sig: Take 1 Capsule by mouth two (2) times a day for 30 days. dulaglutide (Trulicity) 3 DA/6.3 mL pnij Unknown  Yes No   Sig: by SubCUTAneous route. empagliflozin (JARDIANCE) 25 mg tablet Unknown  Yes No   Sig: Take  by mouth daily. fluticasone propionate (FLONASE) 50 mcg/actuation nasal spray Unknown  Yes No   Si Sprays by Both Nostrils route daily. furosemide (LASIX) 40 mg tablet Unknown  No No   Sig: Take 1 Tablet by mouth daily. insulin glargine (LANTUS) 100 unit/mL injection Unknown  No No   Si Units by SubCUTAneous route daily. Caution: Long Acting Insulin. DO NOT HOLD without physician order. DO NOT MIX or dilute with any other insulin. metFORMIN (GLUCOPHAGE) 500 mg tablet Unknown  Yes No   Sig: Take 500 mg by mouth two (2) times daily (with meals). midodrine (PROAMATINE) 5 mg tablet Unknown  No No   Sig: Take 1 Tablet by mouth three (3) times daily (with meals) for 30 days. montelukast (SINGULAIR) 10 mg tablet Unknown  Yes No   Sig: Take 10 mg by mouth in the morning.   multivitamin (ONE A DAY) tablet Unknown  Yes No   Sig: Take 1 Tablet by mouth daily. multivitamin, tx-iron-ca-min (THERA-M w/ IRON) 9 mg iron-400 mcg tab tablet Unknown  No No   Sig: Take 1 Tablet by mouth daily. potassium chloride (K-DUR, KLOR-CON M20) 20 mEq tablet Unknown  No No   Sig: Take 1 Tablet by mouth daily. zolpidem (AMBIEN) 5 mg tablet Unknown  No No   Sig: Take 1 Tablet by mouth nightly as needed for Sleep. Max Daily Amount: 5 mg. Facility-Administered Medications: None       Review Of Systems:   Review of Systems  GENERAL: Patient alert, awake and oriented times 3, able to communicate full sentences and not in distress. HEENT: No change in vision, no earache, tinnitus, sore throat or sinus congestion. NECK: No pain or stiffness. PULMONARY: No shortness of breath, cough or wheeze. Cardiovascular: no pnd or orthopnea, no CP  GASTROINTESTINAL: No abdominal pain, nausea, vomiting or diarrhea, melena or bright red blood per rectum.    GENITOURINARY: No urinary frequency, urgency, hesitancy or dysuria. MUSCULOSKELETAL: No back pain, no leg pain  DERMATOLOGIC: No rash, no itching, no lesions. ENDOCRINE: No polyuria, polydipsia, no heat or cold intolerance. No recent change in weight. HEMATOLOGICAL: No anemia or easy bruising or bleeding. NEUROLOGIC: No seizures, numbness, tingling or weakness. Does give history of some headaches. Patient also gives a history of lower extremity edema    Objective:     Vital Signs:  Patient Vitals for the past 24 hrs:   BP Temp Pulse Resp SpO2 Height   12/16/22 1058 -- -- -- -- -- 5' 11\" (1.803 m)   12/16/22 0800 128/78 98.1 °F (36.7 °C) 82 22 95 % --   12/15/22 1959 131/70 98.5 °F (36.9 °C) 81 20 93 % --   12/15/22 1639 (!) 147/64 97.7 °F (36.5 °C) 78 20 94 % 5' 11\" (1.803 m)        Body mass index is 42.87 kg/m². Physical Examination:  General:    Sitting in a chair in no acute distress. HEENT:  Pupils equal.  Sclera anicteric. Conjunctiva pink. Mucous membranes                           Moist, no ear or nasal discharge  Neck:  Supple. Trachea midline. No accessory muscle use. No thyromegaly. No jugular venous distention, no carotid bruit  CV:                  Regular rate and rhythm. S1S2+  Lungs:   Clear to auscultation bilaterally. No Wheezing or Rhonchi. No rales. Decreased breath sounds bilateral bases  Abdomen:   Soft, non-tender. Not distended. Bowel sounds normal.   Extremities: No cyanosis. + edema. Pulses 1+ b/l  Neurologic: Alert and oriented X 3. Follows commands, responds appropriately. No focal neurological deficit was noted  Skin:                Warm and dry. No rashes.    Patient is wearing her sternal harness    Current Medications:  Current Facility-Administered Medications   Medication Dose Route Frequency    acetaminophen (TYLENOL) tablet 650 mg  650 mg Oral Q4H PRN    potassium chloride (K-DUR, KLOR-CON M20) SR tablet 20 mEq  20 mEq Oral DAILY    [START ON 12/17/2022] aspirin delayed-release tablet 81 mg  81 mg Oral DAILY    atorvastatin (LIPITOR) tablet 40 mg  40 mg Oral QHS    [START ON 12/17/2022] clopidogreL (PLAVIX) tablet 75 mg  75 mg Oral DAILY    fluticasone propionate (FLONASE) 50 mcg/actuation nasal spray 2 Spray  2 Spray Both Nostrils DAILY    furosemide (LASIX) tablet 40 mg  40 mg Oral DAILY    HYDROcodone-acetaminophen (NORCO) 5-325 mg per tablet 1 Tablet  1 Tablet Oral Q6H PRN    insulin glargine (LANTUS) injection 30 Units  30 Units SubCUTAneous DAILY    midodrine (PROAMATINE) tablet 5 mg  5 mg Oral TID WITH MEALS    montelukast (SINGULAIR) tablet 10 mg  10 mg Oral DAILY    [START ON 12/17/2022] multivitamin, tx-iron-ca-min (THERA-M w/ IRON) tablet 1 Tablet  1 Tablet Oral DAILY    zolpidem (AMBIEN) tablet 5 mg  5 mg Oral QHS PRN    docusate sodium (COLACE) capsule 100 mg  100 mg Oral BID    bisacodyL (DULCOLAX) tablet 10 mg  10 mg Oral Q48H PRN    naloxone (NARCAN) injection 0.4 mg  0.4 mg IntraVENous PRN    cyanocobalamin (VITAMIN B12) tablet 500 mcg  500 mcg Oral DAILY    folic acid (FOLVITE) tablet 1 mg  1 mg Oral DAILY    insulin lispro (HUMALOG) injection   SubCUTAneous AC&HS    glucose chewable tablet 16 g  4 Tablet Oral PRN    glucagon (GLUCAGEN) injection 1 mg  1 mg IntraMUSCular PRN    dextrose 10% infusion 0-250 mL  0-250 mL IntraVENous PRN       Functional Assessment:     Occupational Therapy   Prior Level of Function  Pre-Admission Screen  Post-Admission Evaluation   Eating   Independent Eating   Set up Eating      Grooming   Independent Grooming   Contact Guard Assist Grooming      Upper Body Dressing   Independent Upper Body Dressing   Minimal Assist Upper Body Dressing      Lower Body Dressing   Independent Lower Body Dressing   Moderate Assist Lower Body Dressing      Bladder Management   Independent Bladder Management   Modified Independent Toileting      Bowel Management   Independent Bowel Management   Modified Independent Physical Therapy   Prior Level of Function  Pre-Admission Screen  Post-Admission Evaluation   Ambulation   Independent Ambulation   Minimal Assist     Bed Mobility   Independent Bed Mobility   Minimal Assist Bed/Mat Mobility  Rolling Right : 0 (Not tested) (pt declined)  Rolling Left : 0 (Not tested) (pt declined)  Supine to Sit : 5 (Supervision)  Sit to Supine : 5 (Supervision)   Supine to Sit   Independent Supine to Sit   Minimal Assist Bed/Mat Mobility  Rolling Right : 0 (Not tested) (pt declined)  Rolling Left : 0 (Not tested) (pt declined)  Supine to Sit : 5 (Supervision)  Sit to Supine : 5 (Supervision)   Sit to Stand   Independent Sit to Stand   Moderate Assist Bed/Mat Mobility  Rolling Right : 0 (Not tested) (pt declined)  Rolling Left : 0 (Not tested) (pt declined)  Supine to Sit : 5 (Supervision)  Sit to Supine : 5 (Supervision)   Bed/Chair Transfers   Independent Bed/Chair Transfers   Moderate Assist Transfers  Transfer Type:  Other  Other: stand step without AD  Transfer Assistance : 4 (Minimal assistance)   Toilet Transfers   Independent Toilet Transfers   Moderate Assist Toilet Transfers        Speech and Language Pathology  Post-Admission Evaluation     Comprehension (Native Language)  Primary Mode of Comprehension: Auditory  Score: 5     Expression (Native Language)  Primary Mode of Expression: Verbal  Score: 5     Social Interaction/Pragmatics  Score: 5                   Legend:   7 - Independent   6 - Modified Independent   5 - Standby Assistance / Supervision / Set-up   4 - Minimum Assistance / Contact Guard Assistance   3 - Moderate Assistance   2 - Maximum Assistance   1 - Total Assistance / Dependent       Labs on Admission:  Recent Results (from the past 24 hour(s))   GLUCOSE, POC    Collection Time: 12/15/22  4:08 PM   Result Value Ref Range    Glucose (POC) 144 (H) 70 - 110 mg/dL   GLUCOSE, POC    Collection Time: 12/15/22  4:52 PM   Result Value Ref Range    Glucose (POC) 136 (H) 70 - 110 mg/dL   GLUCOSE, POC    Collection Time: 12/15/22  9:24 PM   Result Value Ref Range    Glucose (POC) 166 (H) 70 - 110 mg/dL   CBC WITH AUTOMATED DIFF    Collection Time: 12/16/22  5:10 AM   Result Value Ref Range    WBC 8.1 4.6 - 13.2 K/uL    RBC 2.90 (L) 4.20 - 5.30 M/uL    HGB 8.7 (L) 12.0 - 16.0 g/dL    HCT 27.4 (L) 35.0 - 45.0 %    MCV 94.5 78.0 - 100.0 FL    MCH 30.0 24.0 - 34.0 PG    MCHC 31.8 31.0 - 37.0 g/dL    RDW 18.3 (H) 11.6 - 14.5 %    PLATELET 312 670 - 988 K/uL    MPV 9.5 9.2 - 11.8 FL    NRBC 0.0 0  WBC    ABSOLUTE NRBC 0.00 0.00 - 0.01 K/uL    NEUTROPHILS 63 40 - 73 %    LYMPHOCYTES 18 (L) 21 - 52 %    MONOCYTES 10 3 - 10 %    EOSINOPHILS 8 (H) 0 - 5 %    BASOPHILS 1 0 - 2 %    IMMATURE GRANULOCYTES 1 (H) 0.0 - 0.5 %    ABS. NEUTROPHILS 5.1 1.8 - 8.0 K/UL    ABS. LYMPHOCYTES 1.5 0.9 - 3.6 K/UL    ABS. MONOCYTES 0.8 0.05 - 1.2 K/UL    ABS. EOSINOPHILS 0.6 (H) 0.0 - 0.4 K/UL    ABS. BASOPHILS 0.1 0.0 - 0.1 K/UL    ABS. IMM.  GRANS. 0.1 (H) 0.00 - 0.04 K/UL    DF AUTOMATED     METABOLIC PANEL, BASIC    Collection Time: 12/16/22  5:10 AM   Result Value Ref Range    Sodium 136 136 - 145 mmol/L    Potassium 4.4 3.5 - 5.5 mmol/L    Chloride 102 100 - 111 mmol/L    CO2 29 21 - 32 mmol/L    Anion gap 5 3.0 - 18 mmol/L    Glucose 106 (H) 74 - 99 mg/dL    BUN 36 (H) 7.0 - 18 MG/DL    Creatinine 1.05 0.6 - 1.3 MG/DL    BUN/Creatinine ratio 34 (H) 12 - 20      eGFR 58 (L) >60 ml/min/1.73m2    Calcium 8.2 (L) 8.5 - 10.1 MG/DL   MAGNESIUM    Collection Time: 12/16/22  5:10 AM   Result Value Ref Range    Magnesium 2.7 (H) 1.6 - 2.6 mg/dL   GLUCOSE, POC    Collection Time: 12/16/22  7:57 AM   Result Value Ref Range    Glucose (POC) 138 (H) 70 - 110 mg/dL   GLUCOSE, POC    Collection Time: 12/16/22 12:08 PM   Result Value Ref Range    Glucose (POC) 158 (H) 70 - 110 mg/dL         Assessment:     Primary rehabilitation diagnosis  Impaired mobility and ADLs in the setting of coronary artery disease and patient is status post CABG x2 and aortic valve replacement    Other medical issues  Severe AS, status post aortic valve replacement  Peripheral arterial disease  Type 2 diabetes  Hypertension  Chronic anemia  History of tobacco use  Chronic kidney disease stage II    Willingness to participate in the program: Good      Rehabilitation Potential: Good      Plan:     1. Medical Issues being followed closely:    [x]  Fall and safety precautions     [x]  Wound Care     [x]  Bowel and Bladder Function     [x]  Fluid Electrolyte and Nutrition Balance     [x]  Pain Control      2. Issues that 24 hour rehabilitation nursing is following:    [x]  Fall and safety precautions     [x]  Wound Care     [x]  Bowel and Bladder Function     [x]  Fluid Electrolyte and Nutrition Balance     [x]  Pain Control      [x]  Assistance with and education on in-room safety with transfers to and from the bed, wheelchair, toilet and shower. 3. Acute rehabilitation plan of care:    [x]  Patient to be evaluated and treated by:           [x]  Physical Therapy           [x]  Occupational Therapy           []  Speech Therapy     []  Hold Rehab until further notice     5. Medications:    [x]  MAR Reviewed     [x]  Continue Present Medications     6. Chemical DVT Prophylaxis:      []  Enoxaparin     []  Unfractionated Heparin     []  Warfarin     []  NOAC     []  Aspirin     [x]  None     7. Mechanical DVT Prophylaxis:      []  CHELSIE Stockings     [x]  Sequential Compression Device     []  None     8. GI Prophylaxis:      []  PPI     []  H2 Blocker     [x]  None / Not indicated     9. Code status:    [x]  Full code     []  Partial code     []  Do not intubate     []  Do not resuscitate         12. Rehabilitation program and expectations from patient, as well as medical issues discussed with the patient.     1. The patient is being admitted to a comprehensive acute inpatient rehabilitation program consisting of at least 180 minutes a day, 5 out of 7 days a week of  combined physical, and occupational therapy  2. The patient's prognosis for significant practical improvement within a reasonable period of time appears to be good. 3. The estimated length of stay is 12 days. 4. The patient/family has a good understanding of our discharge process. The patient has potential to make improvement and is in need of at least two of the following multidisciplinary therapies including but not limited to physical, occupational, speech, nutritional services, wound care, prosthetics and orthotics. The patient is expected to be able to return to home with home health therapy and family support. 5. Given the patient's multiple co-morbidities and risk for further medical complications, rehabilitation services could not be safely provided at a lower level of care such as a skilled nursing facility. 6. Physical therapy for therapeutic exercise, progressive mobility, gait training, transfer training, bed mobility training, patient and family education, and wheelchair mobility training. Physical therapy goals to address - extremity function, range of motion, balance, safety awareness, independence in transfers, activity tolerance, independence in bed mobility, and independence in ambulation. 7. Occupational therapy for self-care home management, transfer training, therapeutic exercise, activity, wheelchair mobility training. Occupational therapy goals to address - extremity function, cognition, balance, activity tolerance, independence in functional transfers, range of motion, safety awareness, independence in ADL  and independence in home management skills. 9. Specialized 24 hour rehabilitation nursing care for bowel and bladder retraining, disease management, pain management, pressure ulcer prevention and management per policy, education on pressure relief techniques, embolism prevention, nutrition management, hydration management, transfer training and   medication distribution. 10. Nutrition and Dietary services will be obtained for assessment of adequate calorie needs, hydration and calorie counts as appropriate. 11. Therapeutic recreation for leisure skills. 15. Rehab psychology for coping skills. 15. Social work services for patient and family counseling and safe discharge planning. MEDICAL PLAN:  > We will continue prior to admission medications including aspirin Plavix and atorvastatin. We will continue Lantus and sliding scale insulin. On midodrine. Continue Lasix. We will monitor labs intermittently.    > Analgesia  Norco as needed    PRECAUTIONS:   1. Safety/fall precautions. 2. Deep venous thrombosis precautions. 3. Aspiration precautions. POTENTIAL BARRIERS TO DISCHARGE:   1. Risk for falls. 3. Current home layout. 4. Family limited in ability to provide constant care. 5. Limited community resources. RELEVANT CHANGES SINCE PREADMISSION SCREENING: I have compared the patients medical and functional status at the time of the pre-admission screening and on this post-admission evaluation. The preadmission screen and findings from therapy evaluations both support my post admission physician evaluation, deeming this patient to be an appropriate candidate for the IRF. The patient requires multidisciplinary treatment, physician oversight and intensive therapy not provided at a lower level of care. By signing this document, I acknowledge that I have personally performed a full physical examination on this patient within 24 hours of admission to this inpatient rehabilitation facility and have determined the patient to be able to tolerate the above course of treatment at an intensive level for a reasonable period of time. I will be completing a detailed individualized plan of care for this patient by day #4 of the patients stay based upon the Pre-Admission Screen, the Post-Admission Evaluation, and the therapy evaluations.       [employer] medical dictation software was used for portions of this report. Unintended errors may occur. Total clinical care time is 75 minutes, including review of chart including all labs, radiology, past medical history, and discussion with patient. Greater than 50% of my time was spent in coordination of care and counseling.       Signed:    Bridger Rahman MD      December 16, 2022

## 2022-12-16 NOTE — PROGRESS NOTES
Problem: Mobility Impaired (Adult and Pediatric)  Goal: *Acute Goals and Plan of Care (Insert Text)  Description: Physical Therapy Short Term Goals  Initiated 2022 and to be accomplished within 7-10 day(s) 2022  1. Patient will move from supine to sit and sit to supine  in bed with modified independence. 2.  Patient will transfer from bed to chair and chair to bed with modified independence using the least restrictive device. 3.  Patient will perform sit to stand with modified independence. 4.  Patient will ambulate with modified independence for 150 feet with the least restrictive device. 5.  Patient will ascend/descend 4 stairs with B handrail(s) with modified independence. Outcome: Progressing Towards Goal   PHYSICAL THERAPY TREATMENT    Patient: Shyam Teague (46 y.o. female)  Date: 2022  Diagnosis: CAD (coronary artery disease) [I25.10]   Precautions: Sternal, Fall  Chart, physical therapy assessment, plan of care and goals were reviewed. Time In:1415  Time Out:1445  Entered Differentiated Treatment minutes: Yes    Patient seen for: PM;Gait training;Transfer training    Pain:  Pt pain was reported as 6 pre-treatment. Pt pain was reported as 8 post-treatment. Intervention: Patient received pain medication after session    Patient identified with name and :   yes    SUBJECTIVE:      Oh I really can't do this right now. Do I have to?  (Therapist communicated to patient that the requirement for this level of care is 3 hrs daily of skilled rehab sessions)    OBJECTIVE DATA SUMMARY:    Objective:     GROSS ASSESSMENT Daily Assessment     AROM: Generally decreased, functional  Strength: Generally decreased, functional  Coordination: Within functional limits  Tone: Normal  Sensation: Intact      BED/MAT MOBILITY Daily Assessment     Rolling Right : 0 (Not tested) (pt declined)  Rolling Left : 0 (Not tested) (pt declined)  Supine to Sit : 5 (Supervision)  Sit to Supine : 5 (Supervision)      TRANSFERS Daily Assessment     Transfer Type: Other  Other:  (stand step with RW)  Transfer Assistance : 4 (Minimal assistance) (to maintain sternal precautions)  Sit to Stand Assistance: Maximum assistance (out of low recliner to maintain sternal precautions)  Car Transfers: Not tested  Car Type: Car Transfer Trainer        GAIT Daily Assessment    Gait Description (WDL) Exceptions to WDL    Gait Abnormalities Decreased step clearance    Assistive device Walker, rolling;Gait belt    Ambulation assistance - level surface 4 (Contact guard assistance)    Distance  (15 ft x 2)    Ambulation assistance- uneven surface  NT    Comments Patient requires cues to straighten up during ambulation as she is hovering over her RW. The walker that she has is too short, however patient refused adjustment recommendation as she reports that it feels good to her. Educated patient on the importance of correct height of RW and she acknowledged education, however still refused adjustment. BALANCE Daily Assessment     Sitting - Static: Good (unsupported)  Sitting - Dynamic: Good (unsupported)  Standing - Static: Fair  Standing - Dynamic : Impaired        WHEELCHAIR MOBILITY Daily Assessment     Able to Propel (ft): 2 feet  Functional Level: 1  Wheelchair Setup Assist Required : 3 (Moderate assistance)  Wheelchair Management: Manages left brake;Manages right brake        THERAPEUTIC EXERCISES Daily Assessment       Seated:BLEs; elissa pumps, LAQs, hip flexion x 10 reps each; 1 set with increased SOB and rest breaks between each specified exercise. ASSESSMENT:  Patient is seen for deficits in strength, mobility, transfers, ambulation, safety and balance. Patient is agreeable to session in her room as she reported not feeling up to session as she was fatigued. Patient was educated on the requirements for admission to acute rehab and she reported understanding.  Patient is hesitant to follow corrective suggestions to improve safety and decrease risk of falls. Patient also had to be cued with transfers on maintaining current precautions to avoid complications from the major surgery that she has had. Patient reported appreciating the education. Will benefit from skilled care to improve strength, balance and safety for ease with transition home. Progression toward goals:  []      Improving appropriately and progressing toward goals  [x]      Improving slowly and progressing toward goals  []      Not making progress toward goals and plan of care will be adjusted      PLAN:  Patient continues to benefit from skilled intervention to address the above impairments. Continue treatment per established plan of care. Discharge Recommendations:  Home Physical Therapy  Further Equipment Recommendations for Discharge:        Estimated Discharge Date: 7-10 days    Activity Tolerance:   Patient with fair tolerance as she reported feeling fatigued and washed out. Please refer to the flowsheet for vital signs taken during this treatment.     After treatment:   [] Patient left in no apparent distress in bed  [x] Patient left in no apparent distress sitting up in chair  [] Patient left in no apparent distress in w/c mobilizing under own power  [] Patient left in no apparent distress dining area  [] Patient left in no apparent distress mobilizing under own power  [x] Call bell left within reach  [x] Nursing notified  [] Caregiver present  [] Bed alarm activated   [x] Chair alarm activated      Maye Ferrara  12/16/2022

## 2022-12-17 LAB
ANION GAP SERPL CALC-SCNC: 5 MMOL/L (ref 3–18)
BUN SERPL-MCNC: 30 MG/DL (ref 7–18)
BUN/CREAT SERPL: 26 (ref 12–20)
CALCIUM SERPL-MCNC: 8.5 MG/DL (ref 8.5–10.1)
CHLORIDE SERPL-SCNC: 101 MMOL/L (ref 100–111)
CO2 SERPL-SCNC: 29 MMOL/L (ref 21–32)
CREAT SERPL-MCNC: 1.15 MG/DL (ref 0.6–1.3)
GLUCOSE BLD STRIP.AUTO-MCNC: 113 MG/DL (ref 70–110)
GLUCOSE BLD STRIP.AUTO-MCNC: 128 MG/DL (ref 70–110)
GLUCOSE BLD STRIP.AUTO-MCNC: 171 MG/DL (ref 70–110)
GLUCOSE BLD STRIP.AUTO-MCNC: 180 MG/DL (ref 70–110)
GLUCOSE SERPL-MCNC: 121 MG/DL (ref 74–99)
POTASSIUM SERPL-SCNC: 4.4 MMOL/L (ref 3.5–5.5)
SODIUM SERPL-SCNC: 135 MMOL/L (ref 136–145)

## 2022-12-17 PROCEDURE — 80048 BASIC METABOLIC PNL TOTAL CA: CPT

## 2022-12-17 PROCEDURE — 74011250637 HC RX REV CODE- 250/637: Performed by: EMERGENCY MEDICINE

## 2022-12-17 PROCEDURE — 97542 WHEELCHAIR MNGMENT TRAINING: CPT

## 2022-12-17 PROCEDURE — 74011636637 HC RX REV CODE- 636/637: Performed by: EMERGENCY MEDICINE

## 2022-12-17 PROCEDURE — 97530 THERAPEUTIC ACTIVITIES: CPT

## 2022-12-17 PROCEDURE — 74011250637 HC RX REV CODE- 250/637: Performed by: PHYSICIAN ASSISTANT

## 2022-12-17 PROCEDURE — 97110 THERAPEUTIC EXERCISES: CPT

## 2022-12-17 PROCEDURE — 82962 GLUCOSE BLOOD TEST: CPT

## 2022-12-17 PROCEDURE — 36415 COLL VENOUS BLD VENIPUNCTURE: CPT

## 2022-12-17 PROCEDURE — 97535 SELF CARE MNGMENT TRAINING: CPT

## 2022-12-17 PROCEDURE — 74011250637 HC RX REV CODE- 250/637: Performed by: INTERNAL MEDICINE

## 2022-12-17 PROCEDURE — 65310000000 HC RM PRIVATE REHAB

## 2022-12-17 PROCEDURE — 97116 GAIT TRAINING THERAPY: CPT

## 2022-12-17 RX ORDER — HYDRALAZINE HYDROCHLORIDE 25 MG/1
25 TABLET, FILM COATED ORAL
Status: DISCONTINUED | OUTPATIENT
Start: 2022-12-17 | End: 2022-12-21 | Stop reason: HOSPADM

## 2022-12-17 RX ORDER — GUAIFENESIN/DEXTROMETHORPHAN 100-10MG/5
5 SYRUP ORAL
Status: DISCONTINUED | OUTPATIENT
Start: 2022-12-17 | End: 2022-12-21 | Stop reason: HOSPADM

## 2022-12-17 RX ADMIN — POTASSIUM CHLORIDE 20 MEQ: 1500 TABLET, EXTENDED RELEASE ORAL at 08:49

## 2022-12-17 RX ADMIN — FOLIC ACID 1 MG: 1 TABLET ORAL at 08:49

## 2022-12-17 RX ADMIN — Medication 1 CAPSULE: at 08:54

## 2022-12-17 RX ADMIN — HYDRALAZINE HYDROCHLORIDE 25 MG: 25 TABLET, FILM COATED ORAL at 21:17

## 2022-12-17 RX ADMIN — FUROSEMIDE 40 MG: 40 TABLET ORAL at 08:49

## 2022-12-17 RX ADMIN — Medication 1 TABLET: at 08:54

## 2022-12-17 RX ADMIN — ZOLPIDEM TARTRATE 5 MG: 5 TABLET ORAL at 21:18

## 2022-12-17 RX ADMIN — HYDROCODONE BITARTRATE AND ACETAMINOPHEN 1 TABLET: 5; 325 TABLET ORAL at 21:18

## 2022-12-17 RX ADMIN — GUAIFENESIN AND DEXTROMETHORPHAN 5 ML: 100; 10 SYRUP ORAL at 17:43

## 2022-12-17 RX ADMIN — Medication 30 UNITS: at 08:55

## 2022-12-17 RX ADMIN — HYDROCODONE BITARTRATE AND ACETAMINOPHEN 1 TABLET: 5; 325 TABLET ORAL at 12:31

## 2022-12-17 RX ADMIN — Medication 0.2 UNITS: at 21:17

## 2022-12-17 RX ADMIN — MONTELUKAST 10 MG: 10 TABLET, FILM COATED ORAL at 08:49

## 2022-12-17 RX ADMIN — ATORVASTATIN CALCIUM 40 MG: 40 TABLET, FILM COATED ORAL at 21:17

## 2022-12-17 RX ADMIN — CLOPIDOGREL BISULFATE 75 MG: 75 TABLET ORAL at 08:54

## 2022-12-17 RX ADMIN — Medication 500 MCG: at 08:49

## 2022-12-17 RX ADMIN — Medication 2 UNITS: at 11:54

## 2022-12-17 RX ADMIN — ASPIRIN 81 MG: 81 TABLET, COATED ORAL at 08:54

## 2022-12-17 NOTE — PROGRESS NOTES
Problem: Pressure Injury - Risk of  Goal: *Prevention of pressure injury  Description: Document Nas Scale and appropriate interventions in the flowsheet.   12/16/2022 2154 by Chrissy James LPN  Outcome: Progressing Towards Goal  Note: Pressure Injury Interventions:  Sensory Interventions: Assess changes in LOC    Moisture Interventions: Absorbent underpads    Activity Interventions: Assess need for specialty bed    Mobility Interventions: Assess need for specialty bed    Nutrition Interventions: Document food/fluid/supplement intake    Friction and Shear Interventions: Apply protective barrier, creams and emollients             12/16/2022 2150 by Chrissy James LPN  Outcome: Progressing Towards Goal  Note: Pressure Injury Interventions:  Sensory Interventions: Assess changes in LOC    Moisture Interventions: Absorbent underpads    Activity Interventions: Assess need for specialty bed    Mobility Interventions: Assess need for specialty bed    Nutrition Interventions: Document food/fluid/supplement intake    Friction and Shear Interventions: Apply protective barrier, creams and emollients                Problem: Patient Education: Go to Patient Education Activity  Goal: Patient/Family Education  12/16/2022 2154 by Chrissy James LPN  Outcome: Progressing Towards Goal  12/16/2022 2150 by Chrissy James LPN  Outcome: Progressing Towards Goal     Problem: Risk for Elopement  Goal: Patient will not exit the unit/facility without proper escort  12/16/2022 2154 by Chrissy James LPN  Outcome: Progressing Towards Goal  12/16/2022 2150 by Chrissy James LPN  Outcome: Progressing Towards Goal     Problem: Nutrition Deficit  Goal: *Optimize nutritional status  12/16/2022 2154 by Chrissy James LPN  Outcome: Progressing Towards Goal  12/16/2022 2150 by Chrissy James LPN  Outcome: Progressing Towards Goal     Problem: Patient Education: Go to Patient Education Activity  Goal: Patient/Family Education  12/16/2022 2154 by Norberto Cm LPN  Outcome: Progressing Towards Goal  12/16/2022 2150 by Norberto Cm LPN  Outcome: Progressing Towards Goal     Problem: Patient Education: Go to Patient Education Activity  Goal: Patient/Family Education  12/16/2022 2154 by Norberto Cm LPN  Outcome: Progressing Towards Goal  12/16/2022 2150 by Norberto Cm LPN  Outcome: Progressing Towards Goal

## 2022-12-17 NOTE — ROUTINE PROCESS
SHIFT CHANGE NOTE FOR [facility]    Bedside and Verbal shift change report given to Tash Wei (oncoming nurse) by Ines Nguyen RN (offgoing nurse). Report included the following information SBAR, Kardex, MAR and Recent Results.     Situation:   Code Status: Full Code   Hospital Day: 1   Problem List:   Hospital Problems  Never Reviewed            Codes Class Noted POA    CAD (coronary artery disease) ICD-10-CM: I25.10  ICD-9-CM: 414.00  12/5/2022 Unknown           Background:   Past Medical History:   Past Medical History:   Diagnosis Date    Aortic stenosis 11/28/2022    Coronary artery disease involving native coronary artery of native heart 11/28/2022    Primary hypertension 11/28/2022    S/P AVR (aortic valve replacement) and aortoplasty 12/7/2022    S/p CABG x2 (LIMA-LAD, rSVG-OM), AVR 23 mm Inspiris with aortic root enlargement - Dr. Nik Gasca    S/P CABG x 2 12/7/2022    S/p CABG x2 (LIMA-LAD, rSVG-OM), AVR 23 mm Inspiris with aortic root enlargement - Dr. Nik Gasca    Type 2 diabetes mellitus, without long-term current use of insulin ([facility] [address] 11/28/2022        Assessment:  Changes in Assessment throughout shift: No change to previous assessment    Patient has a central line: no Reasons if yes: na  Insertion date:na Last dressing date:na  Patient has Villagomez Cath: no Reasons if yes: na   Insertion date:na  Shift villagomez care completed: NO    Last Vitals:     Vitals:    12/15/22 1959 12/16/22 0800 12/16/22 1058 12/16/22 1600   BP: 131/70 128/78  139/61   Pulse: 81 82  78   Resp: 20 22 16   Temp: 98.5 °F (36.9 °C) 98.1 °F (36.7 °C)  97.1 °F (36.2 °C)   SpO2: 93% 95%  93%   Height:   5' 11\" (1.803 m)        PAIN    Pain Assessment    Pain Intensity 1: 0 (12/16/22 1600) Pain Intensity 1: 2 (12/29/14 1105)    Pain Location 1: Back, Arm Pain Location 1: Abdomen    Pain Intervention(s) 1: Medication (see MAR) Pain Intervention(s) 1: Medication (see MAR)  Patient Stated Pain Goal: 0 Patient Stated Pain Goal: 0  Intervention effective: yes  Other actions taken for pain: Medication (see MAR)    Skin Assessment  Skin color    Condition/Temperature    Integrity Skin Integrity: Incision (comment)  Turgor    Weekly Pressure Ulcer Documentation  Pressure  Injury Documentation: No Pressure Injury Noted-Pressure Ulcer Prevention Initiated  Wound Prevention & Protection Methods  Orientation of wound Orientation of Wound Prevention: Posterior  Location of Prevention Location of Wound Prevention: Buttocks, Sacrum/Coccyx  Dressing Present Dressing Present : No  Dressing Status Dressing Status: Intact  Wound Offloading Wound Offloading (Prevention Methods): Bed, pressure redistribution/air, Turning, Repositioning    INTAKE/OUPUT  Date 12/15/22 1900 - 12/16/22 0659 12/16/22 0700 - 12/17/22 0659   Shift 9974-7396 24 Hour Total 6498-3346 1280-4312 24 Hour Total   INTAKE   P.O.  240 1320  1320     P. O.  240 1320  1320   Shift Total  240 1320  1320   OUTPUT   Urine          Urine Occurrence(s) 3 x 4 x 5 x  5 x   Emesis/NG output          Emesis Occurrence(s) 0 x 0 x      Stool          Stool Occurrence(s) 0 x 0 x 3 x  3 x   Shift Total        NET  240 1320  1320   Weight (kg)            Recommendations:  Patient needs and requests: na    Pending tests/procedures: na     Functional Level/Equipment: Partial (one person) /      Fall Precautions:   Fall risk precautions were reinforced with the patient; she was instructed to call for help prior to getting up. The following fall risk precautions were continued: bed/ chair alarms, door signage, yellow bracelet and socks as well as update of the [employer] tool in the patient's room. Yanet Score: 1    HEALS Safety Check    A safety check occurred in the patient's room between off going nurse and oncoming nurse listed above.     The safety check included the below items  Area Items   H  High Alert Medications Verify all high alert medication drips (heparin, PCA, etc.)   E  Equipment Suction is set up for ALL patients (with yanker)  Red plugs utilized for all equipment (IV pumps, etc.)  WOWs wiped down at end of shift. Room stocked with oxygen, suction, and other unit-specific supplies   A  Alarms Bed alarm is set for fall risk patients  Ensure chair alarm is in place and activated if patient is up in a chair   L  Lines Check IV for any infiltration  Chapa bag is empty if patient has a Chapa   Tubing and IV bags are labeled   S  Safety   Room is clean, patient is clean, and equipment is clean. Hallways are clear from equipment besides carts. Fall bracelet on for fall risk patients  Ensure room is clear and free of clutter  Suction is set up for ALL patients (with nora)  Hallways are clear from equipment besides carts.    Isolation precautions followed, supplies available outside room, sign posted     Yaima Baird RN

## 2022-12-17 NOTE — ROUTINE PROCESS
SHIFT CHANGE NOTE FOR [facility]    Bedside and Verbal shift change report given to Levi Zaragoza RN(oncoming nurse) by Austen Gill LPN (offgoing nurse). Report included the following information SBAR, Kardex, MAR and Recent Results.     Situation:   Code Status: Full Code   Hospital Day: 1   Problem List:   Hospital Problems  Never Reviewed            Codes Class Noted POA    CAD (coronary artery disease) ICD-10-CM: I25.10  ICD-9-CM: 414.00  12/5/2022 Unknown         Background:   Past Medical History:   Past Medical History:   Diagnosis Date    Aortic stenosis 11/28/2022    Coronary artery disease involving native coronary artery of native heart 11/28/2022    Primary hypertension 11/28/2022    S/P AVR (aortic valve replacement) and aortoplasty 12/7/2022    S/p CABG x2 (LIMA-LAD, rSVG-OM), AVR 23 mm Inspiris with aortic root enlargement - Dr. Diana Rodriguez    S/P CABG x 2 12/7/2022    S/p CABG x2 (LIMA-LAD, rSVG-OM), AVR 23 mm Inspiris with aortic root enlargement - Dr. Diana Rodriguez    Type 2 diabetes mellitus, without long-term current use of insulin ([facility] [address] 11/28/2022        Assessment:  Changes in Assessment throughout shift: No change to previous assessment    Patient has a central line: no Reasons if yes: na  Insertion date:na Last dressing date:na  Patient has Villagomez Cath: no Reasons if yes: na   Insertion date:na  Shift villagomez care completed: NO    Last Vitals:     Vitals:    12/16/22 0800 12/16/22 1058 12/16/22 1600 12/16/22 1925   BP: 128/78  139/61 (!) 145/66   Pulse: 82  78 82   Resp: 22 16 18   Temp: 98.1 °F (36.7 °C)  97.1 °F (36.2 °C) 98.1 °F (36.7 °C)   SpO2: 95%  93% 94%   Height:  5' 11\" (1.803 m)         PAIN    Pain Assessment    Pain Intensity 1: 0 (12/16/22 1600) Pain Intensity 1: 2 (12/29/14 1105)    Pain Location 1: Back, Arm Pain Location 1: Abdomen    Pain Intervention(s) 1: Medication (see MAR) Pain Intervention(s) 1: Medication (see MAR)  Patient Stated Pain Goal: 0 Patient Stated Pain Goal: 0  Intervention effective: yes  Other actions taken for pain: Medication (see MAR)    Skin Assessment  Skin color    Condition/Temperature    Integrity Skin Integrity: Incision (comment)  Turgor    Weekly Pressure Ulcer Documentation  Pressure  Injury Documentation: No Pressure Injury Noted-Pressure Ulcer Prevention Initiated  Wound Prevention & Protection Methods  Orientation of wound Orientation of Wound Prevention: Posterior  Location of Prevention Location of Wound Prevention: Buttocks, Sacrum/Coccyx  Dressing Present Dressing Present : No  Dressing Status Dressing Status: Intact  Wound Offloading Wound Offloading (Prevention Methods): Bed, pressure redistribution/air, Turning, Repositioning    INTAKE/OUPUT  Date 12/15/22 1900 - 12/16/22 0659 12/16/22 0700 - 12/17/22 0659   Shift 3204-5063 24 Hour Total 7152-1511 3893-7733 24 Hour Total   INTAKE   P.O.  240 1320  1320     P. O.  240 1320  1320   Shift Total  240 1320  1320   OUTPUT   Urine          Urine Occurrence(s) 3 x 4 x 5 x 1 x 6 x   Emesis/NG output          Emesis Occurrence(s) 0 x 0 x      Stool          Stool Occurrence(s) 0 x 0 x 3 x 2 x 5 x   Shift Total        NET  240 1320  1320   Weight (kg)              Recommendations:  Patient needs and requests: na    Pending tests/procedures: na     Functional Level/Equipment:   /      Fall Precautions:   Fall risk precautions were reinforced with the patient; she was instructed to call for help prior to getting up. The following fall risk precautions were continued: bed/ chair alarms, door signage, yellow bracelet and socks as well as update of the [employer] tool in the patient's room. Yanet Score: 1    HEALS Safety Check    A safety check occurred in the patient's room between off going nurse and oncoming nurse listed above.     The safety check included the below items  Area Items   H  High Alert Medications Verify all high alert medication drips (heparin, PCA, etc.)   E  Equipment Suction is set up for ALL patients (with nora)  Red plugs utilized for all equipment (IV pumps, etc.)  WOWs wiped down at end of shift. Room stocked with oxygen, suction, and other unit-specific supplies   A  Alarms Bed alarm is set for fall risk patients  Ensure chair alarm is in place and activated if patient is up in a chair   L  Lines Check IV for any infiltration  Chapa bag is empty if patient has a Chapa   Tubing and IV bags are labeled   S  Safety   Room is clean, patient is clean, and equipment is clean. Hallways are clear from equipment besides carts. Fall bracelet on for fall risk patients  Ensure room is clear and free of clutter  Suction is set up for ALL patients (with nora)  Hallways are clear from equipment besides carts.    Isolation precautions followed, supplies available outside room, sign posted     Tyra Painting LPN

## 2022-12-17 NOTE — ROUTINE PROCESS
SHIFT CHANGE NOTE FOR [facility]    Bedside and Verbal shift change report given to Ke[address] (oncoming nurse) by Chino Hyde LPN (offgoing nurse). Report included the following information SBAR, Kardex, MAR and Recent Results.     Situation:   Code Status: Full Code   Hospital Day: 2   Problem List:   Hospital Problems  Never Reviewed            Codes Class Noted POA    CAD (coronary artery disease) ICD-10-CM: I25.10  ICD-9-CM: 414.00  12/5/2022 Unknown         Background:   Past Medical History:   Past Medical History:   Diagnosis Date    Aortic stenosis 11/28/2022    Coronary artery disease involving native coronary artery of native heart 11/28/2022    Primary hypertension 11/28/2022    S/P AVR (aortic valve replacement) and aortoplasty 12/7/2022    S/p CABG x2 (LIMA-LAD, rSVG-OM), AVR 23 mm Inspiris with aortic root enlargement - Dr. Lis Santillan    S/P CABG x 2 12/7/2022    S/p CABG x2 (LIMA-LAD, rSVG-OM), AVR 23 mm Inspiris with aortic root enlargement - Dr. Lis Santillan    Type 2 diabetes mellitus, without long-term current use of insulin ([facility] [address] 11/28/2022        Assessment:  Changes in Assessment throughout shift: No change to previous assessment    Patient has a central line: no Reasons if yes: na  Insertion date:na Last dressing date:na  Patient has Villagomez Cath: no Reasons if yes: na   Insertion date:na  Shift villagomez care completed: NO    Last Vitals:     Vitals:    12/16/22 0800 12/16/22 1058 12/16/22 1600 12/16/22 1925   BP: 128/78  139/61 (!) 145/66   Pulse: 82  78 82   Resp: 22 16 18   Temp: 98.1 °F (36.7 °C)  97.1 °F (36.2 °C) 98.1 °F (36.7 °C)   SpO2: 95%  93% 94%   Height:  5' 11\" (1.803 m)         PAIN    Pain Assessment    Pain Intensity 1: 0 (12/17/22 0415) Pain Intensity 1: 2 (12/29/14 1105)    Pain Location 1: Back, Arm Pain Location 1: Abdomen    Pain Intervention(s) 1: Medication (see MAR) Pain Intervention(s) 1: Medication (see MAR)  Patient Stated Pain Goal: 0 Patient Stated Pain Goal: 0  Intervention effective: yes  Other actions taken for pain:      Skin Assessment  Skin color    Condition/Temperature    Integrity Skin Integrity: Incision (comment)  Turgor    Weekly Pressure Ulcer Documentation  Pressure  Injury Documentation: No Pressure Injury Noted-Pressure Ulcer Prevention Initiated  Wound Prevention & Protection Methods  Orientation of wound Orientation of Wound Prevention: Posterior  Location of Prevention Location of Wound Prevention: Sacrum/Coccyx  Dressing Present Dressing Present : No  Dressing Status Dressing Status: Intact  Wound Offloading Wound Offloading (Prevention Methods): Repositioning    INTAKE/OUPUT  Date 12/16/22 0700 - 12/17/22 0659 12/17/22 0700 - 12/18/22 0659   Shift 8676-9352 6734-2761 24 Hour Total 5447-0903 4655-9484 24 Hour Total   INTAKE   P.O. 1320  1320        P. O. 1320  1320      Shift Total 1320  1320      OUTPUT   Urine           Urine Occurrence(s) 5 x 3 x 8 x      Stool           Stool Occurrence(s) 3 x 4 x 7 x      Shift Total         NET 1320  1320      Weight (kg)               Recommendations:  Patient needs and requests: na    Pending tests/procedures: na     Functional Level/Equipment:   /      Fall Precautions:   Fall risk precautions were reinforced with the patient; she was instructed to call for help prior to getting up. The following fall risk precautions were continued: bed/ chair alarms, door signage, yellow bracelet and socks as well as update of the [employer] tool in the patient's room. Yanet Score: 1    HEALS Safety Check    A safety check occurred in the patient's room between off going nurse and oncoming nurse listed above. The safety check included the below items  Area Items   H  High Alert Medications Verify all high alert medication drips (heparin, PCA, etc.)   E  Equipment Suction is set up for ALL patients (with yanker)  Red plugs utilized for all equipment (IV pumps, etc.)  WOWs wiped down at end of shift.   Room stocked with oxygen, suction, and other unit-specific supplies   A  Alarms Bed alarm is set for fall risk patients  Ensure chair alarm is in place and activated if patient is up in a chair   L  Lines Check IV for any infiltration  Chapa bag is empty if patient has a Chapa   Tubing and IV bags are labeled   S  Safety   Room is clean, patient is clean, and equipment is clean. Hallways are clear from equipment besides carts. Fall bracelet on for fall risk patients  Ensure room is clear and free of clutter  Suction is set up for ALL patients (with gavinker)  Hallways are clear from equipment besides carts.    Isolation precautions followed, supplies available outside room, sign posted     Chino Hyde LPN

## 2022-12-17 NOTE — PROGRESS NOTES
Problem: Self Care Deficits Care Plan (Adult)  Goal: *Therapy Goal (Edit Goal, Insert Text)  Description: Occupational Therapy Goals   Long Term Goals  Initiated 22 and to be accomplished within 2 week(s)  1. Pt will perform self-feeding with Bonita. 2. Pt will perform grooming with Bonita. 3. Pt will perform UB bathing with supervision. 4. Pt will perform LB bathing with supervision. 5. Pt will perform tub/shower transfer with supervision. 6. Pt will perform UB dressing with Bonita. 7. Pt will perform LB dressing with Bonita. 8. Pt will perform toileting task with Bonita. 9. Pt will perform toilet transfer with Bonita. Short Term Goals   Initiated 22 and to be accomplished within 7 day(s)  1. Pt will perform self-feeding with setup. 2. Pt will perform grooming with setup. 3. Pt will perform UB bathing with SBA. 4. Pt will perform LB bathing with Edith. 5. Pt will perform tub/shower transfer with CGA. 6. Pt will perform UB dressing with SBA. 7. Pt will perform LB dressing with modA. 8. Pt will perform toileting task with CGA. 9. Pt will perform toilet transfer with CGA. Outcome: Progressing Towards Goal   Occupational Therapy TREATMENT    Patient: Ramiro Huang   79 y.o. Patient identified with name and : yes    Date: 2022    First Tx Session  Time In: 1  Time Out[de-identified] 1430        Diagnosis: CAD (coronary artery disease) [I25.10]   Precautions: Sternal, Fall  Chart, occupational therapy assessment, plan of care, and goals were reviewed. Pain:  Pt reports 0/10 pain or discomfort prior to treatment. Pt reports 0/10 pain or discomfort post treatment. Intervention Provided: na      SUBJECTIVE:   Patient stated I need to clean up this sink area because I can't stand looking at this mess anymore.     OBJECTIVE DATA SUMMARY:     FEEDING/EATING Daily Assessment        She wanted a large cup of ice, stir, straw, hot tea, and ginger ale.  OT provided the supplies that were available, and told her to ask a friend to bring tea bags if her nurse/MD gives her the ok w/her diet for the hot tea bags. GROOMING Daily Assessment        She brushed her hair, completed her oral care, washed her hair as well as straighten/cleaned the sink & counter top of her supplies. OT provided her w/a cup for oral care as she requested. She sat in her wc for 75% of the task & stood at the sink when I suggested it was good for her over all strength & endurance to stand. Oral hygiene: 5       MOBILITY/TRANSFERS Daily Assessment        Supervision from wc to her recliner chair after grooming. ASSESSMENT:  Good tx participation & motivation noted today. She completed all requested tasks as well as what she wanted to accomplish today in OT. She shared that she misses her feline, \"Bear\" and that her sister is caring for her cat. She enjoys shopping with her friends. I told her it will be advantageous to safely stand for as many self care needs to build up her strength & endurance so she can go shopping soon. Progression toward goals:  [x]          Improving appropriately and progressing toward goals  []          Improving slowly and progressing toward goals  []          Not making progress toward goals and plan of care will be adjusted     PLAN:  Patient continues to benefit from skilled intervention to address the above impairments. Continue treatment per established plan of care. Discharge Recommendations:  Home Health  Further Equipment Recommendations for Discharge:  N/A     Activity Tolerance:  Good      Estimated LOS:per original poc  IRF-FLORESITA Completed: no  Entered Differentiated Treatment minutes: No      Please refer to the flowsheet for vital signs taken during this treatment.   After treatment:   [x]  Patient left in no apparent distress sitting up in chair   []  Patient left in no apparent distress in bed  [x]  Call bell left within reach  []  Nursing notified  [] Caregiver present  []  Bed alarm activated    COMMUNICATION/EDUCATION:   [] Home safety education was provided and the patient/caregiver indicated understanding. [x] Patient/family have participated as able in goal setting and plan of care. [] Patient/family agree to work toward stated goals and plan of care. [] Patient understands intent and goals of therapy, but is neutral about his/her participation. [] Patient is unable to participate in goal setting and plan of care.       Paula Dominguez OTR/L

## 2022-12-17 NOTE — PROGRESS NOTES
Problem: Mobility Impaired (Adult and Pediatric)  Goal: *Acute Goals and Plan of Care (Insert Text)  Description: Physical Therapy Short Term Goals  Initiated 2022 and to be accomplished within 7-10 day(s) 2022  1. Patient will move from supine to sit and sit to supine  in bed with modified independence. 2.  Patient will transfer from bed to chair and chair to bed with modified independence using the least restrictive device. 3.  Patient will perform sit to stand with modified independence. 4.  Patient will ambulate with modified independence for 150 feet with the least restrictive device. 5.  Patient will ascend/descend 4 stairs with B handrail(s) with modified independence. Outcome: Progressing Towards Goal   PHYSICAL THERAPY TREATMENT    Patient: Evelyn Trevino (46 y.o. female)  Date: 2022  Diagnosis: CAD (coronary artery disease) [I25.10]   Precautions: Sternal, Fall  Chart, physical therapy assessment, plan of care and goals were reviewed. Time In:1100  Time Out:1230  Entered Differentiated Treatment minutes: Yes    Patient seen for: Balance activities;Gait training;Patient education; Therapeutic exercise;Transfer training; Wheelchair mobility    Pain:  Pt pain was reported as 5/10, in chest/incisional area, pre-treatment. Pt pain was reported as 5/10, in same location, post-treatment. Intervention: pt requested pain meds at end of session; rest breaks, repositioning    Patient identified with name and : yes    SUBJECTIVE:      Pt initially reluctant to participate in PT due to c/o \"diarrhea today\", but then agreed to participate \"as able\" after being assisted to toilet and helped to kannan pants, as well as pt education regarding importance and benefits of mobility. With frequent and extended rest breaks, pt was agreeable to continue to participate as she was \"able to tolerate\"     Pt had some questions regarding her medicines and this was relayed to her nurse today. Pt reports having trouble coughing up some stuff in her chest and \"would like an expectorant\". This was also relayed to the nurse. OBJECTIVE DATA SUMMARY:    Objective:     GROSS ASSESSMENT Daily Assessment     AROM: Generally decreased, functional  Strength: Generally decreased, functional  Coordination: Within functional limits  Tone: Normal  Sensation: Intact      BED/MAT MOBILITY Daily Assessment      Not assessed. Pt was sitting up in her recliner chair upon entry of PT and remained sitting up in w/c at end of session. TRANSFERS Daily Assessment     Transfer Type: Other (stand step w/RW)  Transfer Assistance : 4 (Minimal assistance) (forward flexed posture and RW too far ahead)  Sit to Stand Assistance: Moderate assistance (without UE's to try to maintain sternal precautions; vc's for keeping hand on heart hugger device; from lower recliner chair; Edith from higher w/c height and bsc seat height; assist for anterior wt shift and lift off from surface)  Car Transfers: Not tested    Pt participated in toilet transfer x 2 reps during today's session. 1st time to move her bowels and this was reported to nursing. The 2nd time it was to urinate. GAIT Daily Assessment    Gait Description (WDL) Exceptions to WDL    Gait Abnormalities Decreased step clearance (increased trunk flexion)    Assistive device Walker, rolling;Gait belt    Ambulation assistance - level surface 4 (Minimal assistance)    Distance 77 Feet (ft)    Ambulation assistance- uneven surface  NT    Comments Pt required CGA for balance, but min A for posture cues, safety cues, and management of RW device. W/c follow for safety and when fatigued. Pt reported Modified Edu dyspnea scale rating of 3/10 after gait this distance.         STEPS/STAIRS Daily Assessment     Steps/Stairs ambulated 2 (6\" steps, x 2 trials w/seated rest break between)    Assistance Required 4 (Minimal assistance)    Rail Use Both    Comments  Pt used step-to pattern both ascending and descending. Pt reported SOB which she rated as 5/10 \"severe\" on Modified Edu dyspnea scale after negotiating up/down the 2 steps. Curbs/Ramps  NT        BALANCE Daily Assessment     Sitting - Static: Good (unsupported)  Sitting - Dynamic: Good (unsupported)  Standing - Static: Fair (with RW support)  Standing - Dynamic : Impaired        WHEELCHAIR MOBILITY Daily Assessment     Able to Propel (ft): 15 feet (slow pace, small movements forward, increased effort)  Functional Level: 1 (min A for steering; pt attempting to use B LE's and only slight use of UE's due to sternal precautions. difficulty reaching floor with LE's)  Curbs/Ramps Assist Required (FIM Score): 0 (Not tested)  Wheelchair Setup Assist Required : 3 (Moderate assistance)  Wheelchair Management: Manages left brake;Manages right brake        THERAPEUTIC EXERCISES Daily Assessment       B LE therex; AROM; seated in w/c  Ex's included: MARGARITA vazquez's  Sets: 2  Reps: 10  Assistance needed: SBA with vc's for technique      Neuro Re-Education:  Standing balance at RW with SBA/CGA while managing clothing during toileting task and washing hands at sink after using toilet  B LE seated gentle massage and gentle positional stretch to hamstrings, due to tightness and increased edema B LE's. ASSESSMENT:  Pt initially reluctant to participate, but then was able to tolerate session without c/o's with slow pace, frequent rest breaks and extended rest breaks to \"catch my breath\". Pt expressed SOB with exertion rated as 3-5/10 on Modified Edu dyspnea scale with gait training and stair training. Pt able to initiate stair training this session. Pt  continues to have difficulty with sit to standing without using B UE's to push up due to sternal precautions.    Progression toward goals:  []      Improving appropriately and progressing toward goals  [x]      Improving slowly and progressing toward goals  []      Not making progress toward goals and plan of care will be adjusted      PLAN:  Patient continues to benefit from skilled intervention to address the above impairments. Continue treatment per established plan of care. Discharge Recommendations:  Home Health, with family/ caregiver assistance  Further Equipment Recommendations for Discharge:  TBD pending progress, pt currently using RW for mobility short distances and w/c for longer distances due to poor activity tolerance (SOB) and fatigue. Estimated Discharge Date: TBD, recommend 7-10 days    Activity Tolerance:   Fair  Please refer to the flowsheet for vital signs taken during this treatment.     After treatment:   [] Patient left in no apparent distress in bed  [x] Patient left in no apparent distress sitting up in chair  [] Patient left in no apparent distress in w/c mobilizing under own power  [] Patient left in no apparent distress dining area  [] Patient left in no apparent distress mobilizing under own power  [x] Call bell left within reach  [x] Nursing notified  [] Caregiver present  [] Bed alarm activated   [x] Chair alarm activated      Eve Vizcaino, PT  12/17/2022

## 2022-12-17 NOTE — PROGRESS NOTES
Problem: Pressure Injury - Risk of  Goal: *Prevention of pressure injury  Description: Document Nas Scale and appropriate interventions in the flowsheet.   Outcome: Progressing Towards Goal  Note: Pressure Injury Interventions:  Sensory Interventions: Assess changes in LOC, Chair cushion    Moisture Interventions: Absorbent underpads    Activity Interventions: Chair cushion, Increase time out of bed, Pressure redistribution bed/mattress(bed type)    Mobility Interventions: Pressure redistribution bed/mattress (bed type)    Nutrition Interventions: Document food/fluid/supplement intake    Friction and Shear Interventions: HOB 30 degrees or less                Problem: Patient Education: Go to Patient Education Activity  Goal: Patient/Family Education  Outcome: Progressing Towards Goal     Problem: Risk for Elopement  Goal: Patient will not exit the unit/facility without proper escort  Outcome: Progressing Towards Goal     Problem: Nutrition Deficit  Goal: *Optimize nutritional status  Outcome: Progressing Towards Goal     Problem: Patient Education: Go to Patient Education Activity  Goal: Patient/Family Education  Outcome: Progressing Towards Goal     Problem: Patient Education: Go to Patient Education Activity  Goal: Patient/Family Education  Outcome: Progressing Towards Goal

## 2022-12-17 NOTE — PROGRESS NOTES
Problem: Pressure Injury - Risk of  Goal: *Prevention of pressure injury  Description: Document Nas Scale and appropriate interventions in the flowsheet.   Outcome: Progressing Towards Goal  Note: Pressure Injury Interventions:  Sensory Interventions: Assess changes in LOC    Moisture Interventions: Absorbent underpads    Activity Interventions: Assess need for specialty bed    Mobility Interventions: Assess need for specialty bed    Nutrition Interventions: Document food/fluid/supplement intake    Friction and Shear Interventions: Apply protective barrier, creams and emollients                Problem: Patient Education: Go to Patient Education Activity  Goal: Patient/Family Education  Outcome: Progressing Towards Goal     Problem: Risk for Elopement  Goal: Patient will not exit the unit/facility without proper escort  Outcome: Progressing Towards Goal     Problem: Nutrition Deficit  Goal: *Optimize nutritional status  Outcome: Progressing Towards Goal     Problem: Patient Education: Go to Patient Education Activity  Goal: Patient/Family Education  Outcome: Progressing Towards Goal     Problem: Patient Education: Go to Patient Education Activity  Goal: Patient/Family Education  Outcome: Progressing Towards Goal

## 2022-12-17 NOTE — PROGRESS NOTES
conducted an initial consultation and Spiritual Assessment for Escobar Espinoza, who is a 79 y.o.,female. Patients Primary Language is: [place]. According to the patients EMR Lutheran Affiliation is: Roman Catholic. The reason the Patient came to the hospital is:   Patient Active Problem List    Diagnosis Date Noted    S/P AVR (aortic valve replacement) and aortoplasty 12/07/2022    S/P CABG x 2 12/07/2022    CAD (coronary artery disease) 12/05/2022    Aortic stenosis 11/28/2022    Primary hypertension 11/28/2022    Coronary artery disease involving native coronary artery of native heart 11/28/2022    Type 2 diabetes mellitus, without long-term current use of insulin ([facility] [address] 11/28/2022        The  provided the following Interventions:  Initiated a relationship of care and support. Patient denies any issues of mohan, belief, spirituality and Baptism/ritual needs while hospitalized. Listened empathically. Chart reviewed. The following outcomes where achieved:  Patient shared no information about both her medical narrative and spiritual journey/beliefs.  unable to confirm Patient's Lutheran Affiliation. Assessment:  Patient is hostile and does not want any future  visits. There are no spiritual or Baptism issues which require intervention at this time. Plan:  Chaplains will continue to follow and will provide pastoral care on an as needed/requested basis.  recommends bedside caregivers page  on duty if patient shows signs of acute spiritual or emotional distress.     Yann Fuller 605   [phone] 1 pair

## 2022-12-17 NOTE — ROUTINE PROCESS
SHIFT CHANGE NOTE FOR [facility]    Bedside and Verbal shift change report given to [address]on (oncoming nurse) by Indira Loza LPN (offgoing nurse). Report included the following information SBAR, Kardex, MAR and Recent Results.     Situation:   Code Status: Full Code   Hospital Day: 1   Problem List:   Hospital Problems  Never Reviewed            Codes Class Noted POA    CAD (coronary artery disease) ICD-10-CM: I25.10  ICD-9-CM: 414.00  12/5/2022 Unknown         Background:   Past Medical History:   Past Medical History:   Diagnosis Date    Aortic stenosis 11/28/2022    Coronary artery disease involving native coronary artery of native heart 11/28/2022    Primary hypertension 11/28/2022    S/P AVR (aortic valve replacement) and aortoplasty 12/7/2022    S/p CABG x2 (LIMA-LAD, rSVG-OM), AVR 23 mm Inspiris with aortic root enlargement - Dr. Roxanne Lucio    S/P CABG x 2 12/7/2022    S/p CABG x2 (LIMA-LAD, rSVG-OM), AVR 23 mm Inspiris with aortic root enlargement - Dr. Roxanne Lucio    Type 2 diabetes mellitus, without long-term current use of insulin ([facility] [address] 11/28/2022        Assessment:  Changes in Assessment throughout shift: No change to previous assessment    Patient has a central line: no Reasons if yes: na  Insertion date:na Last dressing date:na  Patient has Villagomez Cath: no Reasons if yes: na   Insertion date:na  Shift villagomez care completed: NO    Last Vitals:     Vitals:    12/16/22 0800 12/16/22 1058 12/16/22 1600 12/16/22 1925   BP: 128/78  139/61 (!) 145/66   Pulse: 82  78 82   Resp: 22  16 18   Temp: 98.1 °F (36.7 °C)  97.1 °F (36.2 °C) 98.1 °F (36.7 °C)   SpO2: 95%  93% 94%   Height:  5' 11\" (1.803 m)         PAIN    Pain Assessment    Pain Intensity 1: 0 (12/16/22 1600) Pain Intensity 1: 2 (12/29/14 1105)    Pain Location 1: Back, Arm Pain Location 1: Abdomen    Pain Intervention(s) 1: Medication (see MAR) Pain Intervention(s) 1: Medication (see MAR)  Patient Stated Pain Goal: 0 Patient Stated Pain Goal: 0  Intervention effective: yes  Other actions taken for pain: Medication (see MAR)    Skin Assessment  Skin color    Condition/Temperature    Integrity Skin Integrity: Incision (comment)  Turgor    Weekly Pressure Ulcer Documentation  Pressure  Injury Documentation: No Pressure Injury Noted-Pressure Ulcer Prevention Initiated  Wound Prevention & Protection Methods  Orientation of wound Orientation of Wound Prevention: Posterior  Location of Prevention Location of Wound Prevention: Buttocks, Sacrum/Coccyx  Dressing Present Dressing Present : No  Dressing Status Dressing Status: Intact  Wound Offloading Wound Offloading (Prevention Methods): Bed, pressure redistribution/air, Turning, Repositioning    INTAKE/OUPUT  Date 12/15/22 1900 - 12/16/22 0659 12/16/22 0700 - 12/17/22 0659   Shift 2490-8731 24 Hour Total 3619-0466 6942-3491 24 Hour Total   INTAKE   P.O.  240 1320  1320     P. O.  240 1320  1320   Shift Total  240 1320  1320   OUTPUT   Urine          Urine Occurrence(s) 3 x 4 x 5 x 1 x 6 x   Emesis/NG output          Emesis Occurrence(s) 0 x 0 x      Stool          Stool Occurrence(s) 0 x 0 x 3 x 2 x 5 x   Shift Total        NET  240 1320  1320   Weight (kg)              Recommendations:  Patient needs and requests: na    Pending tests/procedures: na     Functional Level/Equipment:   /      Fall Precautions:   Fall risk precautions were reinforced with the patient; she was instructed to call for help prior to getting up. The following fall risk precautions were continued: bed/ chair alarms, door signage, yellow bracelet and socks as well as update of the CoreyRough [employer] Film[employer] tool in the patient's room. Yanet Score: 1    HEALS Safety Check    A safety check occurred in the patient's room between off going nurse and oncoming nurse listed above.     The safety check included the below items  Area Items   H  High Alert Medications Verify all high alert medication drips (heparin, PCA, etc.)   E  Equipment Suction is set up for ALL patients (with nora)  Red plugs utilized for all equipment (IV pumps, etc.)  WOWs wiped down at end of shift. Room stocked with oxygen, suction, and other unit-specific supplies   A  Alarms Bed alarm is set for fall risk patients  Ensure chair alarm is in place and activated if patient is up in a chair   L  Lines Check IV for any infiltration  Chapa bag is empty if patient has a Chapa   Tubing and IV bags are labeled   S  Safety   Room is clean, patient is clean, and equipment is clean. Hallways are clear from equipment besides carts. Fall bracelet on for fall risk patients  Ensure room is clear and free of clutter  Suction is set up for ALL patients (with nora)  Hallways are clear from equipment besides carts.    Isolation precautions followed, supplies available outside room, sign posted     Haritha Avina LPN

## 2022-12-18 LAB
GLUCOSE BLD STRIP.AUTO-MCNC: 108 MG/DL (ref 70–110)
GLUCOSE BLD STRIP.AUTO-MCNC: 125 MG/DL (ref 70–110)
GLUCOSE BLD STRIP.AUTO-MCNC: 138 MG/DL (ref 70–110)
GLUCOSE BLD STRIP.AUTO-MCNC: 139 MG/DL (ref 70–110)

## 2022-12-18 PROCEDURE — 74011636637 HC RX REV CODE- 636/637: Performed by: EMERGENCY MEDICINE

## 2022-12-18 PROCEDURE — 97530 THERAPEUTIC ACTIVITIES: CPT

## 2022-12-18 PROCEDURE — 97110 THERAPEUTIC EXERCISES: CPT

## 2022-12-18 PROCEDURE — 74011250637 HC RX REV CODE- 250/637: Performed by: INTERNAL MEDICINE

## 2022-12-18 PROCEDURE — 74011250637 HC RX REV CODE- 250/637: Performed by: PHYSICIAN ASSISTANT

## 2022-12-18 PROCEDURE — 74011250637 HC RX REV CODE- 250/637: Performed by: EMERGENCY MEDICINE

## 2022-12-18 PROCEDURE — 65310000000 HC RM PRIVATE REHAB

## 2022-12-18 PROCEDURE — 2709999900 HC NON-CHARGEABLE SUPPLY

## 2022-12-18 PROCEDURE — 97535 SELF CARE MNGMENT TRAINING: CPT

## 2022-12-18 PROCEDURE — 82962 GLUCOSE BLOOD TEST: CPT

## 2022-12-18 RX ADMIN — MONTELUKAST 10 MG: 10 TABLET, FILM COATED ORAL at 08:11

## 2022-12-18 RX ADMIN — CLOPIDOGREL BISULFATE 75 MG: 75 TABLET ORAL at 08:11

## 2022-12-18 RX ADMIN — GUAIFENESIN AND DEXTROMETHORPHAN 5 ML: 100; 10 SYRUP ORAL at 21:16

## 2022-12-18 RX ADMIN — FOLIC ACID 1 MG: 1 TABLET ORAL at 08:11

## 2022-12-18 RX ADMIN — Medication 30 UNITS: at 08:12

## 2022-12-18 RX ADMIN — FLUTICASONE PROPIONATE 2 SPRAY: 50 SPRAY, METERED NASAL at 09:58

## 2022-12-18 RX ADMIN — FUROSEMIDE 40 MG: 40 TABLET ORAL at 08:11

## 2022-12-18 RX ADMIN — ATORVASTATIN CALCIUM 40 MG: 40 TABLET, FILM COATED ORAL at 21:16

## 2022-12-18 RX ADMIN — ASPIRIN 81 MG: 81 TABLET, COATED ORAL at 08:11

## 2022-12-18 RX ADMIN — GUAIFENESIN AND DEXTROMETHORPHAN 5 ML: 100; 10 SYRUP ORAL at 01:00

## 2022-12-18 RX ADMIN — Medication 500 MCG: at 08:11

## 2022-12-18 RX ADMIN — Medication 1 CAPSULE: at 08:11

## 2022-12-18 RX ADMIN — GUAIFENESIN AND DEXTROMETHORPHAN 5 ML: 100; 10 SYRUP ORAL at 09:53

## 2022-12-18 RX ADMIN — HYDROCODONE BITARTRATE AND ACETAMINOPHEN 1 TABLET: 5; 325 TABLET ORAL at 21:19

## 2022-12-18 RX ADMIN — Medication 1 TABLET: at 08:11

## 2022-12-18 RX ADMIN — ZOLPIDEM TARTRATE 5 MG: 5 TABLET ORAL at 22:13

## 2022-12-18 RX ADMIN — POTASSIUM CHLORIDE 20 MEQ: 1500 TABLET, EXTENDED RELEASE ORAL at 08:11

## 2022-12-18 NOTE — ROUTINE PROCESS
SHIFT CHANGE NOTE FOR [facility]    Bedside and Verbal shift change report given to [address] (oncoming nurse) by Viraj Johnson LPN (offgoing nurse). Report included the following information SBAR, Kardex, MAR and Recent Results.     Situation:   Code Status: Full Code   Hospital Day: 3   Problem List:   Hospital Problems  Never Reviewed            Codes Class Noted POA    CAD (coronary artery disease) ICD-10-CM: I25.10  ICD-9-CM: 414.00  12/5/2022 Unknown       Background:   Past Medical History:   Past Medical History:   Diagnosis Date    Aortic stenosis 11/28/2022    Coronary artery disease involving native coronary artery of native heart 11/28/2022    Primary hypertension 11/28/2022    S/P AVR (aortic valve replacement) and aortoplasty 12/7/2022    S/p CABG x2 (LIMA-LAD, rSVG-OM), AVR 23 mm Inspiris with aortic root enlargement - Dr. Katie Meadows    S/P CABG x 2 12/7/2022    S/p CABG x2 (LIMA-LAD, rSVG-OM), AVR 23 mm Inspiris with aortic root enlargement - Dr. Katie Meadows    Type 2 diabetes mellitus, without long-term current use of insulin ([facility] [address] 11/28/2022        Assessment:  Changes in Assessment throughout shift: No change to previous assessment    Patient has a central line: no Reasons if yes: na  Insertion date:na Last dressing date:na  Patient has Villagomez Cath: no Reasons if yes: na   Insertion date:na  Shift villagomez care completed: NO    Last Vitals:     Vitals:    12/17/22 0823 12/17/22 1219 12/17/22 1551 12/17/22 2007   BP: (!) 146/93 (!) 158/71 138/65 (!) 182/84   Pulse: 81 87 82 90   Resp: 19 19 20   Temp: 98.6 °F (37 °C)  98.1 °F (36.7 °C) 97.7 °F (36.5 °C)   SpO2: 95%  94% 95%   Height:           PAIN    Pain Assessment    Pain Intensity 1: 0 (12/18/22 0000) Pain Intensity 1: 2 (12/29/14 1105)    Pain Location 1: Rib cage Pain Location 1: Abdomen    Pain Intervention(s) 1: Medication (see MAR) Pain Intervention(s) 1: Medication (see MAR)  Patient Stated Pain Goal: 0 Patient Stated Pain Goal: 0  Intervention effective: yes  Other actions taken for pain:      Skin Assessment  Skin color    Condition/Temperature    Integrity Skin Integrity: Incision (comment)  Turgor    Weekly Pressure Ulcer Documentation  Pressure  Injury Documentation: No Pressure Injury Noted-Pressure Ulcer Prevention Initiated  Wound Prevention & Protection Methods  Orientation of wound Orientation of Wound Prevention: Posterior  Location of Prevention Location of Wound Prevention: Sacrum/Coccyx  Dressing Present Dressing Present : No  Dressing Status Dressing Status: Intact  Wound Offloading Wound Offloading (Prevention Methods): Bed, pressure reduction mattress    INTAKE/OUPUT  Date 12/17/22 0700 - 12/18/22 0659 12/18/22 0700 - 12/19/22 0659   Shift [id number] 24 Hour Total 9662-6649 6447-8161 24 Hour Total   INTAKE   P.O. 480 240 720        P. O. 480 240 720      Shift Total 480 240 720      OUTPUT   Urine           Urine Occurrence(s) 4 x 3 x 7 x      Emesis/NG output           Emesis Occurrence(s)  0 x 0 x      Stool           Stool Occurrence(s) 3 x 2 x 5 x      Shift Total          240 720      Weight (kg)               Recommendations:  Patient needs and requests: na    Pending tests/procedures: na     Functional Level/Equipment:   /      Fall Precautions:   Fall risk precautions were reinforced with the patient; she was instructed to call for help prior to getting up. The following fall risk precautions were continued: bed/ chair alarms, door signage, yellow bracelet and socks as well as update of the Cherylle Cockayne tool in the patient's room. Yanet Score: 3    HEALS Safety Check    A safety check occurred in the patient's room between off going nurse and oncoming nurse listed above.     The safety check included the below items  Area Items   H  High Alert Medications Verify all high alert medication drips (heparin, PCA, etc.)   E  Equipment Suction is set up for ALL patients (with yanker)  Red plugs utilized for all equipment (IV pumps, etc.)  WOWs wiped down at end of shift. Room stocked with oxygen, suction, and other unit-specific supplies   A  Alarms Bed alarm is set for fall risk patients  Ensure chair alarm is in place and activated if patient is up in a chair   L  Lines Check IV for any infiltration  Chapa bag is empty if patient has a Chapa   Tubing and IV bags are labeled   S  Safety   Room is clean, patient is clean, and equipment is clean. Hallways are clear from equipment besides carts. Fall bracelet on for fall risk patients  Ensure room is clear and free of clutter  Suction is set up for ALL patients (with nora)  Hallways are clear from equipment besides carts.    Isolation precautions followed, supplies available outside room, sign posted     Wendi Diego LPN

## 2022-12-18 NOTE — PROGRESS NOTES
Problem: Pressure Injury - Risk of  Goal: *Prevention of pressure injury  Description: Document Nas Scale and appropriate interventions in the flowsheet.   Outcome: Progressing Towards Goal  Note: Pressure Injury Interventions:  Sensory Interventions: Assess need for specialty bed    Moisture Interventions: Absorbent underpads, Apply protective barrier, creams and emollients    Activity Interventions: Chair cushion    Mobility Interventions: Assess need for specialty bed, Pressure redistribution bed/mattress (bed type)    Nutrition Interventions: Document food/fluid/supplement intake    Friction and Shear Interventions: HOB 30 degrees or less                Problem: Patient Education: Go to Patient Education Activity  Goal: Patient/Family Education  Outcome: Progressing Towards Goal     Problem: Risk for Elopement  Goal: Patient will not exit the unit/facility without proper escort  Outcome: Progressing Towards Goal     Problem: Nutrition Deficit  Goal: *Optimize nutritional status  Outcome: Progressing Towards Goal     Problem: Patient Education: Go to Patient Education Activity  Goal: Patient/Family Education  Outcome: Progressing Towards Goal     Problem: Patient Education: Go to Patient Education Activity  Goal: Patient/Family Education  Outcome: Progressing Towards Goal

## 2022-12-18 NOTE — PROGRESS NOTES
Problem: Self Care Deficits Care Plan (Adult)  Goal: *Therapy Goal (Edit Goal, Insert Text)  Description: Occupational Therapy Goals   Long Term Goals  Initiated 22 and to be accomplished within 2 week(s)  1. Pt will perform self-feeding with Bonita. 2. Pt will perform grooming with Bonita. 3. Pt will perform UB bathing with supervision. 4. Pt will perform LB bathing with supervision. 5. Pt will perform tub/shower transfer with supervision. 6. Pt will perform UB dressing with Bonita. 7. Pt will perform LB dressing with Bonita. 8. Pt will perform toileting task with Bonita. 9. Pt will perform toilet transfer with Bonita. Short Term Goals   Initiated 22 and to be accomplished within 7 day(s)  1. Pt will perform self-feeding with setup. 2. Pt will perform grooming with setup. 3. Pt will perform UB bathing with SBA. 4. Pt will perform LB bathing with Edith. 5. Pt will perform tub/shower transfer with CGA. 6. Pt will perform UB dressing with SBA. 7. Pt will perform LB dressing with modA. 8. Pt will perform toileting task with CGA. 9. Pt will perform toilet transfer with CGA. Occupational Therapy TREATMENT    Patient: Mari Gar   79 y.o. Patient identified with name and : Yes     Date: 2022    First Tx Session  Time In: 0930  Time Out[de-identified] 1100    Diagnosis: CAD (coronary artery disease) [I25.10]   Precautions: Sternal, Fall  Chart, occupational therapy assessment, plan of care, and goals were reviewed. Pain:  Pt reports 5/10 pain or discomfort prior to treatment. Pt reports 5/10 pain or discomfort post treatment. Intervention Provided: N/A      SUBJECTIVE:   Patient stated I want to watch SHERRY.   Pt agreeable to therapy in room after increased encouragement. Pt was agitated throughout session. OBJECTIVE DATA SUMMARY:     THERAPEUTIC ACTIVITY Daily Assessment    Pt participated in Small Mini Peg task to increase standing tolerance for ADLs.  Pt initially refused to follow instructions on not dumping pegs out of container but reluctantly complied later. Pt able to stand up to 2:00, 1:29, 1:43, 1:12,  1:00, 1:25. Verbal cues on upright posture and to hold onto RW while standing instead of leaning on table top table. Pt showed decreased compliance/carryover. Rest breaks x5     THERAPEUTIC EXERCISE Daily Assessment    Instruct pt. in home exercise program: UB HEP bicep curls, front raises 3x10. Theraflex bar ( supination/pronation) 3x10. TOILETING Daily Assessment    Functional Level: 5  Comments:  (Instruction in using reacher and toileting tongs to complete doffing/donning of brief. Education on proper inhalation/exhalation with pt stating \" I am stopped up, I   will breathe how I want to breathe\")    Verbal cues for hand hygiene. LOWER BODY DRESSING Daily Assessment     Sock and/or Shoe management: attempted to instruct in using sock aide for dressing but pt declined stating \"that frustrates me\". Pt reports having special gloves to put on socks that are home. Pt encouraged again to use sock aide and pt reported \" I don't wear socks\". Sock Aide not dispersed. MOBILITY/TRANSFERS Daily Assessment    Sit <>stand x5 with vcs x3 for holding Heart Hugger. Pt showed non compliance rushing to bathroom and repeatedly waved therapist off when reminded to hold Heart Hugger and proper tech. Toilet Transfer Score: 5                ASSESSMENT:  Pt reluctantly participated in increasing standing tolerance, endurance, and strength  this day showing non compliance with instructions, education, and belligerent behaviors impacting safety.   Progression toward goals:  [x]          Improving appropriately and progressing toward goals  [x]          Improving slowly and progressing toward goals  []          Not making progress toward goals and plan of care will be adjusted     PLAN:  Patient continues to benefit from skilled intervention to address the above impairments. Continue treatment per established plan of care. Discharge Recommendations:  Home Health with asst  Further Equipment Recommendations for Discharge:  Extra wide bedside commode     Activity Tolerance:  Fair-      Estimated LOS:TBD  IRF-FLORESITA Completed: Yes   Entered Differentiated Treatment minutes: Yes      Please refer to the flowsheet for vital signs taken during this treatment. After treatment:   [x]  Patient left in no apparent distress sitting up in chair   []  Patient left in no apparent distress in bed  []  Call bell left within reach  []  Nursing notified  []  Caregiver present  []  Bed alarm activated    COMMUNICATION/EDUCATION:   [] Home safety education was provided and the patient/caregiver indicated understanding. [x] Patient/family have participated as able in goal setting and plan of care. [] Patient/family agree to work toward stated goals and plan of care. [] Patient understands intent and goals of therapy, but is neutral about his/her participation. [] Patient is unable to participate in goal setting and plan of care.       Ara Blanchard, OT   Outcome: Progressing Towards Goal  Goal: Interventions  Outcome: Progressing Towards Goal

## 2022-12-19 LAB
GLUCOSE BLD STRIP.AUTO-MCNC: 116 MG/DL (ref 70–110)
GLUCOSE BLD STRIP.AUTO-MCNC: 141 MG/DL (ref 70–110)
GLUCOSE BLD STRIP.AUTO-MCNC: 144 MG/DL (ref 70–110)
GLUCOSE BLD STRIP.AUTO-MCNC: 150 MG/DL (ref 70–110)

## 2022-12-19 PROCEDURE — 74011250637 HC RX REV CODE- 250/637: Performed by: NURSE PRACTITIONER

## 2022-12-19 PROCEDURE — 97530 THERAPEUTIC ACTIVITIES: CPT

## 2022-12-19 PROCEDURE — 97116 GAIT TRAINING THERAPY: CPT

## 2022-12-19 PROCEDURE — 99232 SBSQ HOSP IP/OBS MODERATE 35: CPT | Performed by: EMERGENCY MEDICINE

## 2022-12-19 PROCEDURE — 97110 THERAPEUTIC EXERCISES: CPT

## 2022-12-19 PROCEDURE — 2709999900 HC NON-CHARGEABLE SUPPLY

## 2022-12-19 PROCEDURE — 74011250637 HC RX REV CODE- 250/637: Performed by: PHYSICIAN ASSISTANT

## 2022-12-19 PROCEDURE — 65310000000 HC RM PRIVATE REHAB

## 2022-12-19 PROCEDURE — 74011250637 HC RX REV CODE- 250/637: Performed by: EMERGENCY MEDICINE

## 2022-12-19 PROCEDURE — 82962 GLUCOSE BLOOD TEST: CPT

## 2022-12-19 PROCEDURE — 97535 SELF CARE MNGMENT TRAINING: CPT

## 2022-12-19 PROCEDURE — 74011636637 HC RX REV CODE- 636/637: Performed by: EMERGENCY MEDICINE

## 2022-12-19 PROCEDURE — 97542 WHEELCHAIR MNGMENT TRAINING: CPT

## 2022-12-19 PROCEDURE — 74011250637 HC RX REV CODE- 250/637: Performed by: INTERNAL MEDICINE

## 2022-12-19 RX ADMIN — Medication 30 UNITS: at 08:51

## 2022-12-19 RX ADMIN — Medication 500 MCG: at 08:51

## 2022-12-19 RX ADMIN — FUROSEMIDE 40 MG: 40 TABLET ORAL at 08:51

## 2022-12-19 RX ADMIN — ATORVASTATIN CALCIUM 40 MG: 40 TABLET, FILM COATED ORAL at 21:15

## 2022-12-19 RX ADMIN — Medication 2 UNITS: at 12:39

## 2022-12-19 RX ADMIN — FOLIC ACID 1 MG: 1 TABLET ORAL at 08:51

## 2022-12-19 RX ADMIN — Medication 1 TABLET: at 08:50

## 2022-12-19 RX ADMIN — ACETAMINOPHEN 650 MG: 325 TABLET, FILM COATED ORAL at 08:58

## 2022-12-19 RX ADMIN — MONTELUKAST 10 MG: 10 TABLET, FILM COATED ORAL at 08:50

## 2022-12-19 RX ADMIN — GUAIFENESIN AND DEXTROMETHORPHAN 5 ML: 100; 10 SYRUP ORAL at 09:03

## 2022-12-19 RX ADMIN — HYDROCODONE BITARTRATE AND ACETAMINOPHEN 1 TABLET: 5; 325 TABLET ORAL at 13:54

## 2022-12-19 RX ADMIN — HYDROCODONE BITARTRATE AND ACETAMINOPHEN 1 TABLET: 5; 325 TABLET ORAL at 21:15

## 2022-12-19 RX ADMIN — CLOPIDOGREL BISULFATE 75 MG: 75 TABLET ORAL at 08:50

## 2022-12-19 RX ADMIN — POTASSIUM CHLORIDE 20 MEQ: 1500 TABLET, EXTENDED RELEASE ORAL at 08:51

## 2022-12-19 RX ADMIN — ZOLPIDEM TARTRATE 5 MG: 5 TABLET ORAL at 21:15

## 2022-12-19 RX ADMIN — ASPIRIN 81 MG: 81 TABLET, COATED ORAL at 08:51

## 2022-12-19 RX ADMIN — MIDODRINE HYDROCHLORIDE 5 MG: 5 TABLET ORAL at 08:51

## 2022-12-19 RX ADMIN — MIDODRINE HYDROCHLORIDE 5 MG: 5 TABLET ORAL at 12:40

## 2022-12-19 RX ADMIN — GUAIFENESIN AND DEXTROMETHORPHAN 5 ML: 100; 10 SYRUP ORAL at 21:15

## 2022-12-19 RX ADMIN — MIDODRINE HYDROCHLORIDE 5 MG: 5 TABLET ORAL at 18:08

## 2022-12-19 RX ADMIN — Medication 1 CAPSULE: at 08:50

## 2022-12-19 RX ADMIN — FLUTICASONE PROPIONATE 2 SPRAY: 50 SPRAY, METERED NASAL at 09:04

## 2022-12-19 NOTE — REHAB NOTE
[facility] PHYSICAL REHABILITATION  [address] [place] [address]  OVERALL PLAN OF CARE    Name: Annie Kong CSN: [id number]   Age: 79 y.o. MRN: [id number]   Sex: female Admit Date: 12/15/2022     Primary rehabilitation diagnosis  Impaired mobility and ADLs in the setting of coronary artery disease and patient is status post CABG x2 and aortic valve replacement     Other medical issues  Severe AS, status post aortic valve replacement  Peripheral arterial disease  Type 2 diabetes  Hypertension  Chronic anemia  History of tobacco use  Chronic kidney disease stage II      ANTICIPATED INTERVENTIONS THAT SUPPORT THE MEDICAL NECESSITY OF THIS ADMISSION:    I. Physical Therapy              A. Intensity: 1-2 hour per day              B. Frequency: 5 times per week              C. Duration: 10 days              D. Short Term Goals:    1. Patient will move from supine to sit and sit to supine  in bed with modified independence. 2.  Patient will transfer from bed to chair and chair to bed with modified independence using the least restrictive device. 3.  Patient will perform sit to stand with modified independence. 4.  Patient will ambulate with modified independence for 150 feet with the least restrictive device. 5.  Patient will ascend/descend 4 stairs with B handrail(s) with modified independence. F. Interventions: Pt would benefit from skilled physical therapy in order to improve independent functional mobility within the home. Interventions may include range of motion (AROM, PROM B LE/trunk), motor function (B LE/trunk strengthening/coordination), activity tolerance (vitals, oxygen saturation levels), bed mobility training, balance activities, gait training (progressive ambulation program), wheelchair management and functional transfer training.  Patient would also benefit from concurrent and group therapy sessions to promote increased participation in skilled therapy interventions and to provide opportunities for increased social interaction. II. Occupational Therapy              A. Intensity: 1-2 hour per day              B. Frequency: 5 times per week              C. Duration: 10 days              D. Short Term Goals:    1. Pt will perform self-feeding with setup. 2. Pt will perform grooming with setup. 3. Pt will perform UB bathing with SBA. 4. Pt will perform LB bathing with Edith. 5. Pt will perform tub/shower transfer with CGA. 6. Pt will perform UB dressing with SBA. 7. Pt will perform LB dressing with modA. 8. Pt will perform toileting task with CGA. 9. Pt will perform toilet transfer with CGA. E. Long Term Goals:    1. Pt will perform self-feeding with Bonita. 2. Pt will perform grooming with Bonita. 3. Pt will perform UB bathing with supervision. 4. Pt will perform LB bathing with supervision. 5. Pt will perform tub/shower transfer with supervision. 6. Pt will perform UB dressing with Bonita. 7. Pt will perform LB dressing with Bonita. 8. Pt will perform toileting task with Bonita. 9. Pt will perform toilet transfer with Bonita. F. Interventions:   Pt would benefit from skilled occupational therapy in order to improve independent functional mobility/ADLs,/IADLs within the home. Interventions may include range of motion (AROM, PROM B UE), motor function (B UE/ strengthening/coordination), activity tolerance (vitals, oxygen saturation levels), balance training, ADL/IADL training and functional transfer training. PHYSICIAN'S ASSESSMENT OF FINDINGS:    Are the established goals sufficient for achieving the optimal level of function? [x]  Yes      []  No    What changes would you recommend to the goals as written? None      Are the interventions noted sufficient for achieving the optimal level of function? [x]  Yes      []  No    What changes would you recommend to the interventions noted?  If therapy staff is unable to provide 3 hr of total therapy per day in 5 days due to medical issues or decreased patient tolerance, may modify treatment schedule to 15 hr/week.       Estimated length of stay:10 days      Medical rehabilitation prognosis:    []  Excellent     [x]  Good     []  Fair     []  Guarded       Discharge Destination:     [x]  Home     []  [address]     []  [place] [facility]     []  [facility]     []  [place]     []  Other:       Signed:    Uzma Eid MD      December 19, 2022

## 2022-12-19 NOTE — ROUTINE PROCESS
0800  Pt. Awake sitting up in bed no change in assessment. 0930 pt. Sitting up in chair eating breakfast.   1200 with therapy. 1330 able to transfer from bed to chair with min. Assist.   1500 no change in assessment. 1800 Pt. Sitting up in chair eating dinner.

## 2022-12-19 NOTE — PROGRESS NOTES
[facility] Hospitalist Group  Progress Note    Patient: Sidney Nieves Age: 79 y.o. : 1955 MR#: [id number] SSN: [id number]  Date/Time: 2022    Subjective:     Patient is sitting in a chair in no apparent distress, awake and alert    Assessment/Plan:     Primary rehabilitation diagnosis  Impaired mobility and ADLs in the setting of coronary artery disease and patient is status post CABG x2 and aortic valve replacement     Other medical issues  Severe AS, status post aortic valve replacement  Peripheral arterial disease  Type 2 diabetes  H/o Hypertension  Chronic anemia  History of tobacco use  Chronic kidney disease stage II    Plan  Continue aspirin Plavix and statin  Continue Lasix  Continue Lantus and sliding scale insulin  On midodrine, wean  Monitor labs  Physical therapy and Occupational Therapy  Discussed with patient    Case discussed with:  [x]Patient  []Family  []Nursing  []Case Management  DVT Prophylaxis:  []Lovenox  []Hep SQ  [x]SCDs  []Coumadin   []On Heparin gtt    Objective:   VS: Visit Vitals  /71 (BP 1 Location: Left upper arm, BP Patient Position: Sitting)   Pulse 80   Temp 97.2 °F (36.2 °C)   Resp 18   Ht 5' 11\" (1.803 m)   SpO2 92%   Breastfeeding Unknown   BMI 42.87 kg/m²      Tmax/24hrs: Temp (24hrs), Av.6 °F (36.4 °C), Min:97.2 °F (36.2 °C), Max:98.4 °F (36.9 °C)    Input/Output:   Intake/Output Summary (Last 24 hours) at 2022 1746  Last data filed at 2022 1333  Gross per 24 hour   Intake 1320 ml   Output 0 ml   Net 1320 ml       General:  Awake, alert wearing sternal harness  Cardiovascular:  S1S2+, RRR  Pulmonary: Decreased breath sounds bilateral bases  GI:  Soft, BS+, NT, ND  Extremities:  + edema      Functional Progress:    PHYSICAL THERAPY    ON ADMISSION MOST RECENT   Wheelchair Mobility/Management  Able to Propel (ft): 2 feet  Functional Level: 1  Curbs/Ramps Assist Required (FIM Score): 0 (Not tested)  Wheelchair Setup Assist Required : 3 (Moderate assistance)  Wheelchair Management: Manages left brake, Manages right brake Wheelchair Mobility/Management  Able to Propel (ft): 112 feet (with moderate assistance utilizing B UEs and B LEs)  Functional Level: 1 (min A for steering; pt attempting to use B LE's and only slight use of UE's due to sternal precautions. difficulty reaching floor with LE's)  Curbs/Ramps Assist Required (FIM Score): 0 (Not tested)  Wheelchair Setup Assist Required : 3 (Moderate assistance)  Wheelchair Management: Manages left brake, Manages right brake     Gait  Amount of Assistance: 4 (Minimal assistance) (CGA/Min A)  Distance (ft): 42 Feet (ft)  Assistive Device: Walker, rolling, Gait belt Gait  Amount of Assistance: 5 (Supervision/setup)  Distance (ft): 75 Feet (ft)  Assistive Device: Walker, rolling     Balance-Sitting/Standing  Sitting - Static: Good (unsupported)  Sitting - Dynamic: Good (unsupported)  Standing - Static: Fair  Standing - Dynamic : Impaired Balance-Sitting/Standing  Sitting - Static: Good (unsupported)  Sitting - Dynamic: Good (unsupported)  Standing - Static: Fair  Standing - Dynamic : Impaired     Bed/Mat Mobility  Rolling Right : 0 (Not tested) (pt declined)  Rolling Left : 0 (Not tested) (pt declined)  Supine to Sit : 5 (Supervision)  Sit to Supine : 5 (Supervision) Bed/Mat Mobility  Rolling Right : 0 (Not tested) (pt declined)  Rolling Left : 0 (Not tested) (pt declined)  Supine to Sit : 5 (Supervision)  Sit to Supine : 5 (Supervision)     Transfers  Transfer Type: Other  Other: stand step without AD  Transfer Assistance : 4 (Minimal assistance)  Sit to Stand Assistance: Minimal assistance  Car Transfers: Not tested (pt declined)  Car Type: Car Transfer Trainer Transfers  Transfer Type:  Other  Other: Stand Step with RW  Transfer Assistance : 4 (Contact guard assistance)  Sit to Stand Assistance: Contact guard assistance (utilizing heart hugger from w/c height)  Car Transfers: Not tested  Car Type: Car Transfer Trainer     Steps or Stairs  Steps/Stairs Ambulated (#): 0  Level of Assist : 0 (Not tested) (Pt declined)  Rail Use: None Steps or Stairs  Steps/Stairs Ambulated (#): 4  Level of Assist : 5 (Supervision/setup)  Rail Use: Both         Functional Progress:    OCCUPATIONAL THERAPY    ON ADMISSION MOST RECENT   Eating  Functional Level: 5   Eating  Functional Level: 5     Grooming  Functional Level: 5   Grooming  Functional Level: 5     Bathing  Functional Level: 3   Bathing  Functional Level: 3     Upper Body Dressing  Functional Level: 4   Upper Body Dressing  Functional Level: 4     Lower Body Dressing  Functional Level: 2   Lower Body Dressing  Functional Level: 2     Toileting  Functional Level: 4   Toileting  Functional Level: 5 (SBA)     Toilet Transfers  Toilet Transfer Score: 4   Toilet Transfers  Toilet Transfer Score: 5     Tub /Shower Transfers  Tub/Shower Transfer Score: 0   Tub/Shower Transfers  Tub/Shower Transfer Score: 0       Legend:   7 - Independent   6 - Modified Independent   5 - Standby Assistance / Supervision / Set-up   4 - Minimum Assistance / Contact Guard Assistance   3 - Moderate Assistance   2 - Maximum Assistance   1 - Total Assistance / Dependent         Labs:    Recent Results (from the past 24 hour(s))   GLUCOSE, POC    Collection Time: 12/18/22  9:18 PM   Result Value Ref Range    Glucose (POC) 139 (H) 70 - 110 mg/dL   GLUCOSE, POC    Collection Time: 12/19/22  7:57 AM   Result Value Ref Range    Glucose (POC) 116 (H) 70 - 110 mg/dL   GLUCOSE, POC    Collection Time: 12/19/22 11:23 AM   Result Value Ref Range    Glucose (POC) 150 (H) 70 - 110 mg/dL   GLUCOSE, POC    Collection Time: 12/19/22  4:39 PM   Result Value Ref Range    Glucose (POC) 141 (H) 70 - 110 mg/dL     Additional Data Reviewed:      Dragon medical dictation software was used for portions of this report. Unintended errors may occur.       Signed By: Izzy Barba MD     December 19, 2022

## 2022-12-19 NOTE — PROGRESS NOTES
Problem: Self Care Deficits Care Plan (Adult)  Goal: *Therapy Goal (Edit Goal, Insert Text)  Description: Occupational Therapy Goals   Long Term Goals  Initiated 22 and to be accomplished within 2 week(s)  1. Pt will perform self-feeding with Bonita. 2. Pt will perform grooming with Bonita. 3. Pt will perform UB bathing with supervision. 4. Pt will perform LB bathing with supervision. 5. Pt will perform tub/shower transfer with supervision. 6. Pt will perform UB dressing with Bonita. 7. Pt will perform LB dressing with Bonita. 8. Pt will perform toileting task with Bonita. 9. Pt will perform toilet transfer with Bonita. Short Term Goals   Initiated 22 and to be accomplished within 7 day(s)  1. Pt will perform self-feeding with setup. 2. Pt will perform grooming with setup. 3. Pt will perform UB bathing with SBA. 4. Pt will perform LB bathing with Edith. 5. Pt will perform tub/shower transfer with CGA. 6. Pt will perform UB dressing with SBA. 7. Pt will perform LB dressing with modA. 8. Pt will perform toileting task with CGA. 9. Pt will perform toilet transfer with CGA. Outcome: Progressing Towards Goal  Goal: Interventions  Outcome: Progressing Towards Goal   Occupational Therapy TREATMENT    Patient: Gilma Khan   79 y.o. Patient identified with name and : yes    Date: 2022    First Tx Session  Time In: 1146  Time Out[de-identified] 2292    Second Tx Session  Time In: 1330  Time Out[de-identified] 1430    Diagnosis: CAD (coronary artery disease) [I25.10]   Precautions: Sternal, Fall  Chart, occupational therapy assessment, plan of care, and goals were reviewed. Pain:  Pt reports 5/10 pain or discomfort prior to treatment.  (chest)  Pt reports -/10 pain or discomfort post treatment.    Intervention Provided: RN notified and RN administered pain medication      SUBJECTIVE:   Patient stated My knees are shaking \"That was a good workout\"    OBJECTIVE DATA SUMMARY:     THERAPEUTIC ACTIVITY Daily Assessment    Pt engaged in BUE functional reach activity in standing at table top with 1# wrist weights while grasping a card and matching it to card on board. Pt demonstrated ability to complete task with 3 standing trials and standing tolerance of 1 minute 32 sec., 1 minute 15 sec., and 2 minutes. With SBA. Pt engaged in BUE functional reach activity overhead in standing which involved grasping clothespins of various resistance and securing to vertical yardstick. Pt demonstrated some difficulty grasping blue and black clothespins due to increased resistance. Pt demonstrated standing tolerance of 1 minute 16 s., one minute 43 s., 1 minute 30 s. And 15 sec. With SBA due to fatigue. THERAPEUTIC EXERCISE Daily Assessment    Pt performed BUE strengthening with 2# free weight in 3 directions (bicep curls, abduction/adduction, supination/pronation, wrist flexion/extension, IR/ER) 7x10 reps, 3 sets with RB's. TOILETING Daily Assessment    Functional Level: 5 (SBA)  Comments: Pt performed all toileting tasks with FWW and BSC with SBA for safety. MOBILITY/TRANSFERS Daily Assessment    Toilet Transfer Score: 5  Comments: Pt performed toilet transfer with FWW and BSC with SBA. ASSESSMENT:  Pt is improving standing tolerance with functional tasks and SBA. Pt is increasing BUE strength and maintaining precautions however demonstrates impaired activity tolerance for functional tasks. OTR reviewed safety precautions with pt re: showering for self cares tomorrow. Progression toward goals:  []          Improving appropriately and progressing toward goals  []          Improving slowly and progressing toward goals  []          Not making progress toward goals and plan of care will be adjusted     PLAN:  Patient continues to benefit from skilled intervention to address the above impairments. Continue treatment per established plan of care.   Discharge Recommendations: Home Health  Further Equipment Recommendations for Discharge: bedside commode and transfer bench     Activity Tolerance:  fair      Estimated LOS:TBD    Entered Differentiated Treatment minutes: Yes      Please refer to the flowsheet for vital signs taken during this treatment. After treatment:   [x]  Patient left in no apparent distress sitting up in chair   []  Patient left in no apparent distress in bed  [x]  Call bell left within reach  [x]  Nursing notified  []  Caregiver present  []  Bed alarm activated    COMMUNICATION/EDUCATION:   [x] Home safety education was provided and the patient/caregiver indicated understanding. [x] Patient/family have participated as able in goal setting and plan of care. [x] Patient/family agree to work toward stated goals and plan of care. [] Patient understands intent and goals of therapy, but is neutral about his/her participation. [] Patient is unable to participate in goal setting and plan of care.       Brenda Matthews, OT

## 2022-12-19 NOTE — PROGRESS NOTES
Problem: Pressure Injury - Risk of  Goal: *Prevention of pressure injury  Description: Document Nas Scale and appropriate interventions in the flowsheet. Outcome: Progressing Towards Goal  Note: Pressure Injury Interventions:  Sensory Interventions: Assess changes in LOC, Chair cushion, Check visual cues for pain, Minimize linen layers    Moisture Interventions: Absorbent underpads, Minimize layers, Moisture barrier    Activity Interventions: Pressure redistribution bed/mattress(bed type), Chair cushion, Increase time out of bed    Mobility Interventions: Chair cushion, Float heels, HOB 30 degrees or less, Pressure redistribution bed/mattress (bed type)    Nutrition Interventions: Document food/fluid/supplement intake    Friction and Shear Interventions: HOB 30 degrees or less, Lift sheet, Foam dressings/transparent film/skin sealants, Minimize layers, Apply protective barrier, creams and emollients                Problem: Patient Education: Go to Patient Education Activity  Goal: Patient/Family Education  Outcome: Progressing Towards Goal     Problem: Falls - Risk of  Goal: *Absence of Falls  Description: Document Yanet Fall Risk and appropriate interventions in the flowsheet.   Outcome: Progressing Towards Goal  Note: Fall Risk Interventions:  Mobility Interventions: Assess mobility with egress test, Patient to call before getting OOB, Utilize walker, cane, or other assistive device    Mentation Interventions: Bed/chair exit alarm, Door open when patient unattended, Room close to nurse's station    Medication Interventions: Bed/chair exit alarm, Teach patient to arise slowly, Patient to call before getting OOB    Elimination Interventions: Bed/chair exit alarm, Call light in reach, Patient to call for help with toileting needs    History of Falls Interventions: Bed/chair exit alarm, Door open when patient unattended, Room close to nurse's station         Problem: Patient Education: Go to Patient Education Activity  Goal: Patient/Family Education  Outcome: Progressing Towards Goal

## 2022-12-19 NOTE — ROUTINE PROCESS
SHIFT CHANGE NOTE FOR [facility]    Bedside and Verbal shift change report given to Farrukh Watesr RN (oncoming nurse) by Hugo Alonzo RN (offgoing nurse). Report included the following information SBAR, Kardex, MAR and Recent Results.     Situation:   Code Status: Full Code   Hospital Day: 3   Problem List:   Hospital Problems  Never Reviewed            Codes Class Noted POA    CAD (coronary artery disease) ICD-10-CM: I25.10  ICD-9-CM: 414.00  12/5/2022 Unknown       Background:   Past Medical History:   Past Medical History:   Diagnosis Date    Aortic stenosis 11/28/2022    Coronary artery disease involving native coronary artery of native heart 11/28/2022    Primary hypertension 11/28/2022    S/P AVR (aortic valve replacement) and aortoplasty 12/7/2022    S/p CABG x2 (LIMA-LAD, rSVG-OM), AVR 23 mm Inspiris with aortic root enlargement - Dr. Manuel Cruz    S/P CABG x 2 12/7/2022    S/p CABG x2 (LIMA-LAD, rSVG-OM), AVR 23 mm Inspiris with aortic root enlargement - Dr. Manuel Cruz    Type 2 diabetes mellitus, without long-term current use of insulin ([facility] [address] 11/28/2022        Assessment:  Changes in Assessment throughout shift: No change to previous assessment    Patient has a central line: no Reasons if yes: na  Insertion date:na Last dressing date:na  Patient has Villagomez Cath: no Reasons if yes: na   Insertion date:na  Shift villagomez care completed: NO    Last Vitals:     Vitals:    12/17/22 2007 12/18/22 0715 12/18/22 1200 12/18/22 1542   BP: (!) 182/84 130/70 (!) 133/55 (!) 150/64   Pulse: 90 81 85 85   Resp: 20 21  20   Temp: 97.7 °F (36.5 °C) 97.7 °F (36.5 °C)  98.3 °F (36.8 °C)   SpO2: 95% 93%  94%   Height:           PAIN    Pain Assessment    Pain Intensity 1: 0 (12/18/22 1638) Pain Intensity 1: 2 (12/29/14 1105)    Pain Location 1: Rib cage Pain Location 1: Abdomen    Pain Intervention(s) 1: Medication (see MAR) Pain Intervention(s) 1: Medication (see MAR)  Patient Stated Pain Goal: 0 Patient Stated Pain Goal: 0  Intervention effective: yes  Other actions taken for pain:      Skin Assessment  Skin color    Condition/Temperature    Integrity Skin Integrity: Incision (comment)  Turgor    Weekly Pressure Ulcer Documentation  Pressure  Injury Documentation: No Pressure Injury Noted-Pressure Ulcer Prevention Initiated  Wound Prevention & Protection Methods  Orientation of wound Orientation of Wound Prevention: Posterior  Location of Prevention Location of Wound Prevention: Sacrum/Coccyx  Dressing Present Dressing Present : Yes  Dressing Status Dressing Status: Changed, Intact  Wound Offloading Wound Offloading (Prevention Methods): Repositioning    INTAKE/OUPUT  Date 12/17/22 1900 - 12/18/22 0659 12/18/22 0700 - 12/19/22 0659   Shift 2441-4853 24 Hour Total 1688-6833 6483-4815 24 Hour Total   INTAKE   P.O. 240 720 480  480     P. O. 240 720 480  480   Shift Total 240 720 480  480   OUTPUT   Urine          Urine Occurrence(s) 4 x 8 x 3 x  3 x   Emesis/NG output          Emesis Occurrence(s) 0 x 0 x      Stool          Stool Occurrence(s) 3 x 6 x 3 x  3 x   Shift Total         720 480  480   Weight (kg)              Recommendations:  Patient needs and requests: na    Pending tests/procedures: na     Functional Level/Equipment:   / Wheelchair    Fall Precautions:   Fall risk precautions were reinforced with the patient; she was instructed to call for help prior to getting up. The following fall risk precautions were continued: bed/ chair alarms, door signage, yellow bracelet and socks as well as update of the Marcela Fothergill tool in the patient's room. Yanet Score:      HEALS Safety Check    A safety check occurred in the patient's room between off going nurse and oncoming nurse listed above.     The safety check included the below items  Area Items   H  High Alert Medications Verify all high alert medication drips (heparin, PCA, etc.)   E  Equipment Suction is set up for ALL patients (with yanker)  Red plugs utilized for all equipment (IV pumps, etc.)  WOWs wiped down at end of shift. Room stocked with oxygen, suction, and other unit-specific supplies   A  Alarms Bed alarm is set for fall risk patients  Ensure chair alarm is in place and activated if patient is up in a chair   L  Lines Check IV for any infiltration  Chapa bag is empty if patient has a Chapa   Tubing and IV bags are labeled   S  Safety   Room is clean, patient is clean, and equipment is clean. Hallways are clear from equipment besides carts. Fall bracelet on for fall risk patients  Ensure room is clear and free of clutter  Suction is set up for ALL patients (with nora)  Hallways are clear from equipment besides carts.    Isolation precautions followed, supplies available outside room, sign posted     Sg Guzman RN

## 2022-12-19 NOTE — ROUTINE PROCESS
SHIFT CHANGE NOTE FOR [facility]    Bedside and Verbal shift change report given to Lissette Christiansen [place] (oncoming nurse) by Lazaro Nino RN (offgoing nurse). Report included the following information SBAR, Kardex, MAR and Recent Results.     Situation:   Code Status: Full Code   Hospital Day: 4   Problem List:   Hospital Problems  Never Reviewed            Codes Class Noted POA    CAD (coronary artery disease) ICD-10-CM: I25.10  ICD-9-CM: 414.00  12/5/2022 Unknown           Background:   Past Medical History:   Past Medical History:   Diagnosis Date    Aortic stenosis 11/28/2022    Coronary artery disease involving native coronary artery of native heart 11/28/2022    Primary hypertension 11/28/2022    S/P AVR (aortic valve replacement) and aortoplasty 12/7/2022    S/p CABG x2 (LIMA-LAD, rSVG-OM), AVR 23 mm Inspiris with aortic root enlargement - Dr. Og Watkins    S/P CABG x 2 12/7/2022    S/p CABG x2 (LIMA-LAD, rSVG-OM), AVR 23 mm Inspiris with aortic root enlargement - Dr. gO Watkins    Type 2 diabetes mellitus, without long-term current use of insulin ([facility] [address] 11/28/2022        Assessment:  Changes in Assessment throughout shift: No change to previous assessment    Patient has a central line: no Reasons if yes: na  Insertion date:na Last dressing date:na  Patient has Villagomez Cath: no Reasons if yes: na   Insertion date:na  Shift villagomez care completed: NO    Last Vitals:     Vitals:    12/18/22 1542 12/18/22 1937 12/19/22 0753 12/19/22 1637   BP: (!) 150/64 131/60 110/60 119/71   Pulse: 85 83 82 80   Resp: 20 18 18 18   Temp: 98.3 °F (36.8 °C) 98.4 °F (36.9 °C) 97.2 °F (36.2 °C) 97.2 °F (36.2 °C)   SpO2: 94% 93% 93% 92%   Height:           PAIN    Pain Assessment    Pain Intensity 1: 0 (12/19/22 1637) Pain Intensity 1: 2 (12/29/14 1105)    Pain Location 1: Back, Arm, Chest Pain Location 1: Abdomen    Pain Intervention(s) 1: Medication (see MAR) Pain Intervention(s) 1: Medication (see MAR)  Patient Stated Pain Goal: 0 Patient Stated Pain Goal: 0  Intervention effective: yes  Other actions taken for pain: Medication (see MAR)    Skin Assessment  Skin color    Condition/Temperature    Integrity Skin Integrity: Incision (comment), Wound (add Wound LDA)  Turgor    Weekly Pressure Ulcer Documentation  Pressure  Injury Documentation: Pressure Injury Noted-See Wound LDA to Document  Wound Prevention & Protection Methods  Orientation of wound Orientation of Wound Prevention: Posterior  Location of Prevention Location of Wound Prevention: Sacrum/Coccyx  Dressing Present Dressing Present : Yes  Dressing Status Dressing Status: Changed, Intact  Wound Offloading Wound Offloading (Prevention Methods): Bed, pressure reduction mattress    INTAKE/OUPUT  Date 12/18/22 0700 - 12/19/22 0659 12/19/22 0700 - 12/20/22 0659   Shift 1845-2681 3015-6877 24 Hour Total 1377-1648 3830-9188 24 Hour Total   INTAKE   P.O. 480   1440     P. O. 480   1440   Shift Total 480   1440   OUTPUT   Urine  0 0        Urine Voided  0 0        Urine Occurrence(s) 3 x 4 x 7 x 5 x  5 x   Stool           Stool Occurrence(s) 3 x 1 x 4 x 3 x  3 x   Shift Total  0 0         1440   Weight (kg)             Recommendations:  Patient needs and requests: na    Pending tests/procedures: na     Functional Level/Equipment: Partial (one person) / Livier Trejo    Fall Precautions:   Fall risk precautions were reinforced with the patient; she was instructed to call for help prior to getting up. The following fall risk precautions were continued: bed/ chair alarms, door signage, yellow bracelet and socks as well as update of the [employer] tool in the patient's room. Yanet Score: 3    HEALS Safety Check    A safety check occurred in the patient's room between off going nurse and oncoming nurse listed above.     The safety check included the below items  Area Items   H  High Alert Medications Verify all high alert medication drips (heparin, PCA, etc.)   E  Equipment Suction is set up for ALL patients (with nora)  Red plugs utilized for all equipment (IV pumps, etc.)  WOWs wiped down at end of shift. Room stocked with oxygen, suction, and other unit-specific supplies   A  Alarms Bed alarm is set for fall risk patients  Ensure chair alarm is in place and activated if patient is up in a chair   L  Lines Check IV for any infiltration  Chapa bag is empty if patient has a Chapa   Tubing and IV bags are labeled   S  Safety   Room is clean, patient is clean, and equipment is clean. Hallways are clear from equipment besides carts. Fall bracelet on for fall risk patients  Ensure room is clear and free of clutter  Suction is set up for ALL patients (with nora)  Hallways are clear from equipment besides carts.    Isolation precautions followed, supplies available outside room, sign posted     Ramesh Lai RN

## 2022-12-19 NOTE — PROGRESS NOTES
Pt is a 79year old female admitted to ARU for CAD. Pt is alert and oriented, alone in the room. Pt states that she lives alone in a mobile home with 4 steps to enter and a tub shower. Pt states that prior to admission she was able to self care with the use of a shower chair in the home and a rollator or cane in the community. Pt states that she has no history with DME, home care, outpatient therapy or SNF. Pt states that she does not have a POA and notes that her sister, Sree Campo ([phone]), is her [facility] contact. Pt states that her sister and friend, Elis Odonnell, will be her helpers at [employer]. Pt states that her sister is available for caregiver education tomorrow at 10am. Pt declines sw offer to call sister and states that she will reach out. Pt states that she sees Dr Lorena Mccabe as her PCP and fills her medication at [facility]. Pt states understanding of dc process and agrees to written scripts that she can take to a community pharmacy     Pt states that she has received 5 doses of the COVID vaccines/boosters. Pt confirms her insurance as medicare and . Pt states the  coverage is from her spouse's 30 years of service in the [employer]. Pt notes she believes her spouse had a disability rating through the [place] but is unsure. Sw encouraged pt to follow up as she may be able to benefit from services/waivers from the [place]. Sw will follow.

## 2022-12-19 NOTE — REHAB NOTE
Wound Prevention Checklist    Patient: Rustam Wong (83 y.o. female)  Date: 12/19/2022  Diagnosis: CAD (coronary artery disease) [I25.10] <principal problem not specified>    Precautions: Sternal, Fall       []  Heel prevention boots placed on patient    []  Patient turned q2h during shift    []  Lift team ordered    []  Patient on Dendron bed/Specialty bed    []  Each Wound is documented during shift (Stage, Color, drainage, odor, measurements, and dressings)    []  Dual skin check done with Aba Galarza Γεώργ[address]shayy [address], RN

## 2022-12-19 NOTE — PROGRESS NOTES
Problem: Mobility Impaired (Adult and Pediatric)  Goal: *Acute Goals and Plan of Care (Insert Text)  Description: Physical Therapy Short Term Goals  Initiated 2022 and to be accomplished within 7-10 day(s) 2022  1. Patient will move from supine to sit and sit to supine  in bed with modified independence. 2.  Patient will transfer from bed to chair and chair to bed with modified independence using the least restrictive device. 3.  Patient will perform sit to stand with modified independence. 4.  Patient will ambulate with modified independence for 150 feet with the least restrictive device. 5.  Patient will ascend/descend 4 stairs with B handrail(s) with modified independence. Outcome: Progressing Towards Goal    PHYSICAL THERAPY TREATMENT    Patient: Sissy Watkins (55 y.o. female)  Date: 2022  Diagnosis: CAD (coronary artery disease) [I25.10]   Precautions: Sternal, Fall  Chart, physical therapy assessment, plan of care and goals were reviewed. Time In:0930  Time Out:1100  Entered Differentiated Treatment minutes: Yes    Patient seen for: Balance activities;Gait training; Therapeutic exercise;Patient education; Wheelchair mobility;Transfer training    Pain:  Pt pain was reported as no c/o pain pre-treatment. Pt pain was reported as no c/o pain post-treatment. Intervention: N/A    Patient identified with name and : yes    SUBJECTIVE:      Pt reports, \"I have a team that will rotate to help me,\" re: planning for safe d/c home. Pt reports she is wishing to d/c home as soon as possible expressing feelings of \"frustration,\" and \"depression. \"  PT educated pt re: POC and offered conversation with unit  to facilitate family education opportunity in preparation for safe d/c home. Unit  notified and aware. OBJECTIVE DATA SUMMARY:    Objective:       TRANSFERS Daily Assessment     Transfer Type:  Other  Other: Stand Step with RW  Transfer Assistance : 4 (Contact guard assistance)  Sit to Stand Assistance: Contact guard assistance (utilizing heart hugger from w/c height)  Pt requires maximal verbal reminders at times not utilizing heart hugger and pushing up from surfaces with B UEs. GAIT Daily Assessment    Gait Description (WDL) Exceptions to WDL    Gait Abnormalities Decreased step clearance (Forward Flexed Posture)    Assistive device Walker, rolling    Ambulation assistance - level surface 5 (Supervision/setup)    Distance 75 Feet (ft) x 2 trials with standing rest break in between (challenging distance and endurance)  12 Feet x 2 trials to and from steps  15 Feet x 2 trials in room to and from bathroom  18 Feet x 2 trials in gym to and from bathroom    Ambulation assistance- uneven surface  NT    Comments Pt ambulates with forward flexed posture requiring maximal verbal cues for upright posture, forward gaze, and decreased B UE weightbearing on RW. STEPS/STAIRS Daily Assessment     Steps/Stairs ambulated 4    Assistance Required 5 (Supervision/setup)    Rail Use Both    Comments  Pt requires supervision for safety negotiating stairs with step to step pattern with moderate verbal cuing for pushing through B LEs to ascend stairs to avoid pulling on handrails. Curbs/Ramps  NT        BALANCE Daily Assessment     Sitting - Static: Good (unsupported)  Sitting - Dynamic: Good (unsupported)  Standing - Static: Fair  Standing - Dynamic : Impaired        WHEELCHAIR MOBILITY Daily Assessment     Able to Propel (ft): 112 feet (with moderate assistance utilizing B UEs and B LEs)        ASSESSMENT:  Pt is progressing with functional mobility requiring decreased physical assistance overall. However, pt demonstrates decreased attention to safety concerns and precautions with mobility requiring maximal reminders for maintaining sternal precautions with inconsistent follow through.   Progression toward goals:  []      Improving appropriately and progressing toward goals  [x]      Improving slowly and progressing toward goals  []      Not making progress toward goals and plan of care will be adjusted      PLAN:  Patient continues to benefit from skilled intervention to address the above impairments. Continue treatment per established plan of care. Discharge Recommendations:  Home Physical Therapy to progress to Cardiac Rehabilitation  Further Equipment Recommendations for Discharge:  gait belt, rolling walker      Estimated Discharge Date: 12/21/2022    Activity Tolerance:   Fair - pt requires multiple seated rest breaks and verbal cuing for deep breathing. Please refer to the flowsheet for vital signs taken during this treatment.     After treatment:   [] Patient left in no apparent distress in bed  [x] Patient left in no apparent distress sitting up in chair  [] Patient left in no apparent distress in w/c mobilizing under own power  [] Patient left in no apparent distress dining area  [] Patient left in no apparent distress mobilizing under own power  [x] Call bell left within reach  [] Nursing notified  [] Caregiver present  [] Bed alarm activated   [x] Chair alarm activated      Zeina Smith PT, DPT  12/19/2022

## 2022-12-19 NOTE — PROGRESS NOTES
SHIFT CHANGE NOTE FOR [facility]    Bedside and Verbal shift change report given to Lizette Hutton RN (oncoming nurse) by Link Ontiveros RN (offgoing nurse). Report included the following information SBAR, Kardex, MAR and Recent Results. Situation:   Code Status: Full Code   Hospital Day: 4   Problem List:   Hospital Problems  Never Reviewed            Codes Class Noted POA    CAD (coronary artery disease) ICD-10-CM: I25.10  ICD-9-CM: 414.00  12/5/2022 Unknown           Background:   Past Medical History:   Past Medical History:   Diagnosis Date    Aortic stenosis 11/28/2022    Coronary artery disease involving native coronary artery of native heart 11/28/2022    Primary hypertension 11/28/2022    S/P AVR (aortic valve replacement) and aortoplasty 12/7/2022    S/p CABG x2 (LIMA-LAD, rSVG-OM), AVR 23 mm Inspiris with aortic root enlargement - Dr. Salvador Li    S/P CABG x 2 12/7/2022    S/p CABG x2 (LIMA-LAD, rSVG-OM), AVR 23 mm Inspiris with aortic root enlargement - Dr. Salvador Li    Type 2 diabetes mellitus, without long-term current use of insulin ([facility] [address] 11/28/2022        Assessment:  Changes in Assessment throughout shift: No change to previous assessment    Patient has a central line: no Reasons if yes: Insertion date: Last dressing date:  Patient has Villagomez Cath: no Reasons if yes:     Insertion date:  Shift villagomez care completed:     Last Vitals:     Vitals:    12/18/22 0715 12/18/22 1200 12/18/22 1542 12/18/22 1937   BP: 130/70 (!) 133/55 (!) 150/64 131/60   Pulse: 81 85 85 83   Resp: 21 20 18   Temp: 97.7 °F (36.5 °C)  98.3 °F (36.8 °C) 98.4 °F (36.9 °C)   SpO2: 93%  94% 93%   Height:           PAIN    Pain Assessment    Pain Intensity 1: 0 (12/19/22 0522) Pain Intensity 1: 2 (12/29/14 1105)    Pain Location 1: Incisional Pain Location 1: Abdomen    Pain Intervention(s) 1: Repositioned, Rest Pain Intervention(s) 1: Medication (see MAR)  Patient Stated Pain Goal: 0 Patient Stated Pain Goal: 0  Intervention effective: yes  Other actions taken for pain: Repositioned; Rest    Skin Assessment  Skin color    Condition/Temperature    Integrity Skin Integrity: Incision (comment), Wound (add Wound LDA)  Turgor    Weekly Pressure Ulcer Documentation  Pressure  Injury Documentation: Pressure Injury Noted-See Wound LDA to Document  Wound Prevention & Protection Methods  Orientation of wound Orientation of Wound Prevention: Posterior  Location of Prevention Location of Wound Prevention: Sacrum/Coccyx  Dressing Present Dressing Present : Yes  Dressing Status Dressing Status: Changed, Intact  Wound Offloading Wound Offloading (Prevention Methods): Bed, pressure reduction mattress    INTAKE/OUPUT  Date 12/18/22 0700 - 12/19/22 0659 12/19/22 0700 - 12/20/22 0659   Shift 0289-9806 6475-8403 24 Hour Total 4481-3631 0724-2644 24 Hour Total   INTAKE   P.O. 480 360 840        P. O. 480 360 840      Shift Total 480 360 840      OUTPUT   Urine  0 0        Urine Voided  0 0        Urine Occurrence(s) 3 x 3 x 6 x      Stool           Stool Occurrence(s) 3 x 1 x 4 x      Shift Total  0 0       360 840      Weight (kg)             Recommendations:  Patient needs and requests: none at this time    Pending tests/procedures: none at this time     Functional Level/Equipment: Partial (one person) /      Fall Precautions:   Fall risk precautions were reinforced with the patient; she was instructed to call for help prior to getting up. The following fall risk precautions were continued: bed/ chair alarms, door signage, yellow bracelet and socks as well as update of the [employer] tool in the patient's room. Yanet Score: 3    HEALS Safety Check    A safety check occurred in the patient's room between off going nurse and oncoming nurse listed above.     The safety check included the below items  Area Items   H  High Alert Medications Verify all high alert medication drips (heparin, PCA, etc.)   E  Equipment Suction is set up for ALL patients (with nora)  Red plugs utilized for all equipment (IV pumps, etc.)  WOWs wiped down at end of shift. Room stocked with oxygen, suction, and other unit-specific supplies   A  Alarms Bed alarm is set for fall risk patients  Ensure chair alarm is in place and activated if patient is up in a chair   L  Lines Check IV for any infiltration  [place] bag is empty if patient has a Chapa   Tubing and IV bags are labeled   S  Safety   Room is clean, patient is clean, and equipment is clean. Hallways are clear from equipment besides carts. Fall bracelet on for fall risk patients  Ensure room is clear and free of clutter  Suction is set up for ALL patients (with nora)  Hallways are clear from equipment besides carts.    Isolation precautions followed, supplies available outside room, sign posted     Ginette Mueller RN

## 2022-12-20 ENCOUNTER — APPOINTMENT (OUTPATIENT)
Dept: GENERAL RADIOLOGY | Age: 67
End: 2022-12-20
Attending: PHYSICIAN ASSISTANT
Payer: MEDICARE

## 2022-12-20 LAB
ANION GAP SERPL CALC-SCNC: 7 MMOL/L (ref 3–18)
BASOPHILS # BLD: 0.1 K/UL (ref 0–0.1)
BASOPHILS NFR BLD: 1 % (ref 0–2)
BUN SERPL-MCNC: 25 MG/DL (ref 7–18)
BUN/CREAT SERPL: 23 (ref 12–20)
CALCIUM SERPL-MCNC: 8.3 MG/DL (ref 8.5–10.1)
CHLORIDE SERPL-SCNC: 105 MMOL/L (ref 100–111)
CO2 SERPL-SCNC: 25 MMOL/L (ref 21–32)
CREAT SERPL-MCNC: 1.1 MG/DL (ref 0.6–1.3)
DIFFERENTIAL METHOD BLD: ABNORMAL
EOSINOPHIL # BLD: 0.6 K/UL (ref 0–0.4)
EOSINOPHIL NFR BLD: 6 % (ref 0–5)
ERYTHROCYTE [DISTWIDTH] IN BLOOD BY AUTOMATED COUNT: 18.4 % (ref 11.6–14.5)
GLUCOSE BLD STRIP.AUTO-MCNC: 127 MG/DL (ref 70–110)
GLUCOSE BLD STRIP.AUTO-MCNC: 143 MG/DL (ref 70–110)
GLUCOSE BLD STRIP.AUTO-MCNC: 144 MG/DL (ref 70–110)
GLUCOSE BLD STRIP.AUTO-MCNC: 182 MG/DL (ref 70–110)
GLUCOSE SERPL-MCNC: 99 MG/DL (ref 74–99)
HCT VFR BLD AUTO: 31.7 % (ref 35–45)
HGB BLD-MCNC: 10 G/DL (ref 12–16)
IMM GRANULOCYTES # BLD AUTO: 0.1 K/UL (ref 0–0.04)
IMM GRANULOCYTES NFR BLD AUTO: 1 % (ref 0–0.5)
LYMPHOCYTES # BLD: 2.1 K/UL (ref 0.9–3.6)
LYMPHOCYTES NFR BLD: 20 % (ref 21–52)
MCH RBC QN AUTO: 30.1 PG (ref 24–34)
MCHC RBC AUTO-ENTMCNC: 31.5 G/DL (ref 31–37)
MCV RBC AUTO: 95.5 FL (ref 78–100)
MONOCYTES # BLD: 0.8 K/UL (ref 0.05–1.2)
MONOCYTES NFR BLD: 8 % (ref 3–10)
NEUTS SEG # BLD: 6.5 K/UL (ref 1.8–8)
NEUTS SEG NFR BLD: 64 % (ref 40–73)
NRBC # BLD: 0 K/UL (ref 0–0.01)
NRBC BLD-RTO: 0 PER 100 WBC
PLATELET # BLD AUTO: 345 K/UL (ref 135–420)
PMV BLD AUTO: 9.4 FL (ref 9.2–11.8)
POTASSIUM SERPL-SCNC: 4.5 MMOL/L (ref 3.5–5.5)
RBC # BLD AUTO: 3.32 M/UL (ref 4.2–5.3)
SODIUM SERPL-SCNC: 137 MMOL/L (ref 136–145)
WBC # BLD AUTO: 10.1 K/UL (ref 4.6–13.2)

## 2022-12-20 PROCEDURE — 74011636637 HC RX REV CODE- 636/637: Performed by: EMERGENCY MEDICINE

## 2022-12-20 PROCEDURE — 36415 COLL VENOUS BLD VENIPUNCTURE: CPT

## 2022-12-20 PROCEDURE — 97110 THERAPEUTIC EXERCISES: CPT

## 2022-12-20 PROCEDURE — 71046 X-RAY EXAM CHEST 2 VIEWS: CPT

## 2022-12-20 PROCEDURE — 97530 THERAPEUTIC ACTIVITIES: CPT

## 2022-12-20 PROCEDURE — 82962 GLUCOSE BLOOD TEST: CPT

## 2022-12-20 PROCEDURE — 2709999900 HC NON-CHARGEABLE SUPPLY

## 2022-12-20 PROCEDURE — 74011250637 HC RX REV CODE- 250/637: Performed by: PHYSICIAN ASSISTANT

## 2022-12-20 PROCEDURE — 99232 SBSQ HOSP IP/OBS MODERATE 35: CPT | Performed by: FAMILY MEDICINE

## 2022-12-20 PROCEDURE — 85025 COMPLETE CBC W/AUTO DIFF WBC: CPT

## 2022-12-20 PROCEDURE — 80048 BASIC METABOLIC PNL TOTAL CA: CPT

## 2022-12-20 PROCEDURE — 74011250637 HC RX REV CODE- 250/637: Performed by: FAMILY MEDICINE

## 2022-12-20 PROCEDURE — 97535 SELF CARE MNGMENT TRAINING: CPT

## 2022-12-20 PROCEDURE — BW03ZZZ PLAIN RADIOGRAPHY OF CHEST: ICD-10-PCS | Performed by: EMERGENCY MEDICINE

## 2022-12-20 PROCEDURE — 74011250637 HC RX REV CODE- 250/637: Performed by: INTERNAL MEDICINE

## 2022-12-20 PROCEDURE — 74011250637 HC RX REV CODE- 250/637: Performed by: EMERGENCY MEDICINE

## 2022-12-20 PROCEDURE — 77030012890

## 2022-12-20 PROCEDURE — 65310000000 HC RM PRIVATE REHAB

## 2022-12-20 PROCEDURE — 97116 GAIT TRAINING THERAPY: CPT

## 2022-12-20 RX ORDER — SULFAMETHOXAZOLE AND TRIMETHOPRIM 800; 160 MG/1; MG/1
1 TABLET ORAL EVERY 12 HOURS
Qty: 14 TABLET | Refills: 0 | Status: SHIPPED | OUTPATIENT
Start: 2022-12-20 | End: 2022-12-27

## 2022-12-20 RX ORDER — ASPIRIN 81 MG/1
81 TABLET ORAL DAILY
Qty: 30 TABLET | Refills: 2 | Status: SHIPPED
Start: 2022-12-20 | End: 2022-12-29 | Stop reason: SDUPTHER

## 2022-12-20 RX ORDER — LANOLIN ALCOHOL/MO/W.PET/CERES
500 CREAM (GRAM) TOPICAL DAILY
Qty: 30 TABLET | Refills: 0 | Status: SHIPPED | OUTPATIENT
Start: 2022-12-21

## 2022-12-20 RX ORDER — ACETAMINOPHEN 325 MG/1
650 TABLET ORAL
Qty: 30 TABLET | Refills: 0 | Status: SHIPPED
Start: 2022-12-20 | End: 2022-12-21 | Stop reason: SDUPTHER

## 2022-12-20 RX ORDER — FEXOFENADINE HCL AND PSEUDOEPHEDRINE HCI 60; 120 MG/1; MG/1
1 TABLET, EXTENDED RELEASE ORAL EVERY 12 HOURS
Qty: 30 TABLET | Refills: 0 | Status: SHIPPED
Start: 2022-12-20

## 2022-12-20 RX ORDER — SULFAMETHOXAZOLE AND TRIMETHOPRIM 800; 160 MG/1; MG/1
1 TABLET ORAL EVERY 12 HOURS
Status: DISCONTINUED | OUTPATIENT
Start: 2022-12-20 | End: 2022-12-21 | Stop reason: HOSPADM

## 2022-12-20 RX ORDER — MIDODRINE HYDROCHLORIDE 5 MG/1
5 TABLET ORAL
Qty: 90 TABLET | Refills: 0 | Status: SHIPPED | OUTPATIENT
Start: 2022-12-21 | End: 2022-12-21

## 2022-12-20 RX ORDER — FOLIC ACID 1 MG/1
1 TABLET ORAL DAILY
Qty: 30 TABLET | Refills: 0 | Status: SHIPPED | OUTPATIENT
Start: 2022-12-21

## 2022-12-20 RX ADMIN — ZOLPIDEM TARTRATE 5 MG: 5 TABLET ORAL at 20:02

## 2022-12-20 RX ADMIN — MONTELUKAST 10 MG: 10 TABLET, FILM COATED ORAL at 09:11

## 2022-12-20 RX ADMIN — HYDROCODONE BITARTRATE AND ACETAMINOPHEN 1 TABLET: 5; 325 TABLET ORAL at 20:02

## 2022-12-20 RX ADMIN — ATORVASTATIN CALCIUM 40 MG: 40 TABLET, FILM COATED ORAL at 22:00

## 2022-12-20 RX ADMIN — Medication 500 MCG: at 09:12

## 2022-12-20 RX ADMIN — GUAIFENESIN AND DEXTROMETHORPHAN 5 ML: 100; 10 SYRUP ORAL at 20:02

## 2022-12-20 RX ADMIN — FLUTICASONE PROPIONATE 2 SPRAY: 50 SPRAY, METERED NASAL at 09:18

## 2022-12-20 RX ADMIN — FOLIC ACID 1 MG: 1 TABLET ORAL at 09:13

## 2022-12-20 RX ADMIN — Medication 30 UNITS: at 09:10

## 2022-12-20 RX ADMIN — SULFAMETHOXAZOLE AND TRIMETHOPRIM 1 TABLET: 800; 160 TABLET ORAL at 22:11

## 2022-12-20 RX ADMIN — Medication 1 TABLET: at 09:13

## 2022-12-20 RX ADMIN — POTASSIUM CHLORIDE 20 MEQ: 1500 TABLET, EXTENDED RELEASE ORAL at 09:12

## 2022-12-20 RX ADMIN — ASPIRIN 81 MG: 81 TABLET, COATED ORAL at 09:13

## 2022-12-20 RX ADMIN — Medication 0.2 UNITS: at 22:11

## 2022-12-20 RX ADMIN — Medication 1 CAPSULE: at 09:13

## 2022-12-20 RX ADMIN — CLOPIDOGREL BISULFATE 75 MG: 75 TABLET ORAL at 09:12

## 2022-12-20 RX ADMIN — FUROSEMIDE 40 MG: 40 TABLET ORAL at 09:13

## 2022-12-20 RX ADMIN — GUAIFENESIN AND DEXTROMETHORPHAN 5 ML: 100; 10 SYRUP ORAL at 11:49

## 2022-12-20 NOTE — PROGRESS NOTES
[facility] PHYSICAL REHABILITATION  [address] [place] [address], [address]   INPATIENT REHABILITATION DAILY PROGRESS NOTE     Date: 12/20/2022    Name: Jacquie Otto Age / Sex: 79 y.o. / female   CSN: [id number] MRN: [id number]   [facility] Date: 12/15/2022 Length of Stay: 5 days       Primary Rehabilitation Diagnosis: Impaired Mobility and ADLs secondary to coronary artery disease and patient is status post CABG x2 and aortic valve replacement      Subjective:     Very eager to go home. Notes she has an appt with her PCP on 1/18 and she is not interested in moving it earlier for a hospital discharge appt any earlier. Reports she does have a follow-up appt with her cardiologist the first week of January. + discomfort of the LEFT lower extremity with redness surrounding 2 of the incision harvest sites. Has known hx of venous stasis, and she has a home SCDs. \"So my legs here are really swollen in comparison to what it would be at home\"    Jacquie Otto provided list of her medications prior to her acute hospitalization. Review of Systems:  Y  N      Y  N  [] [x]   Fever/chills                                              [] [x] Chest Pain  [] [x]  Cough                                                    [] []  Diarrhea   [] [x]  Sputum                                                  [] []  Constipation  [] [x]  SOB/THOMAS                                                [] [x]  Nausea/Vomit  [] [x]  Abd Pain                                                  [x] []  Tolerating PT  [] [x]  Dysuria                                                    [x] []   Tolerating Diet    MAR reviewed.     Assessment / Plan:     Primary rehabilitation diagnosis  Impaired mobility and ADLs in the setting of coronary artery disease and patient is status post CABG x2 and aortic valve replacement     Other medical issues  Severe AS, status post aortic valve replacement  Peripheral arterial disease  Venous stasis dermatitis - acute on chronic  LEFT lower extremity cellulitis assoc with vein harvesting incisions - new issue  Type 2 diabetes  Hypertension  Chronic anemia - Hgb 10.0 on   History of tobacco use  Chronic kidney disease stage II - Cr. 1.10 on   Hyperlipidemia    Plan  Stop compression stockings, encourage SCD use while in bed. START bactrim DS BID x 7 days for LLE cellulitis.   Continue aspirin Plavix and statin  Continue Lasix  Continue Lantus and sliding scale insulin  On midodrine, wean  Monitor labs    Acute Rehab Plan of Care:    [x] Physical Therapy     [x] Occupational Therapy     [] Speech Therapy    DVT Prophylaxis:     [] Lovenox     [] Hep SQ     [x] SCDs     [] Warfarin     [x] NOAC     [x] Aspirin     GI Prophylaxis:     [] H2B       []PPI      Case discussed with:  [x]Patient   []Family   [x]Nursing   []Case Mgmt    Objective:     Vital Signs:  Patient Vitals for the past 24 hrs:   BP Temp Pulse Resp SpO2   22 1144 127/63 -- -- -- --   22 0848 133/69 97.9 °F (36.6 °C) 86 19 93 %   22 115/60 98.7 °F (37.1 °C) 84 18 92 %   22 1637 119/71 97.2 °F (36.2 °C) 80 18 92 %      Tmax/24hrs: Temp (24hrs), Av.9 °F (36.6 °C), Min:97.2 °F (36.2 °C), Max:98.7 °F (37.1 °C)    Input/Output:   Intake/Output Summary (Last 24 hours) at 2022 1334  Last data filed at 2022 1016  Gross per 24 hour   Intake 720 ml   Output --   Net 720 ml       Physical Exam    General:  sitting in WC, NAD  Cardiovascular:  RRR, no murmur  Pulmonary:  CTAB  GI:  soft; + BS  Extremities: bilateral edema to knees; erythema on LEFT lower extremity surrounding 2 of 3 vein harvesting incisions, with evidence of serous drainage from incision just below the knee; no streaking noted   Additional:  conversant, neurologically intact    Current Medications:  Current Facility-Administered Medications   Medication Dose Route Frequency    guaiFENesin-dextromethorphan (ROBITUSSIN DM) 100-10 mg/5 mL syrup 5 mL  5 mL Oral Q6H PRN    hydrALAZINE (APRESOLINE) tablet 25 mg  25 mg Oral TID PRN    acetaminophen (TYLENOL) tablet 650 mg  650 mg Oral Q4H PRN    potassium chloride (K-DUR, KLOR-CON M20) SR tablet 20 mEq  20 mEq Oral DAILY    L. acidophilus,casei,rhamnosus (BIO-K PLUS) capsule 1 Capsule  1 Capsule Oral DAILY    aspirin delayed-release tablet 81 mg  81 mg Oral DAILY    atorvastatin (LIPITOR) tablet 40 mg  40 mg Oral QHS    clopidogreL (PLAVIX) tablet 75 mg  75 mg Oral DAILY    fluticasone propionate (FLONASE) 50 mcg/actuation nasal spray 2 Spray  2 Spray Both Nostrils DAILY    furosemide (LASIX) tablet 40 mg  40 mg Oral DAILY    HYDROcodone-acetaminophen (NORCO) 5-325 mg per tablet 1 Tablet  1 Tablet Oral Q6H PRN    insulin glargine (LANTUS) injection 30 Units  30 Units SubCUTAneous DAILY    midodrine (PROAMATINE) tablet 5 mg  5 mg Oral TID WITH MEALS    montelukast (SINGULAIR) tablet 10 mg  10 mg Oral DAILY    multivitamin, tx-iron-ca-min (THERA-M w/ IRON) tablet 1 Tablet  1 Tablet Oral DAILY    zolpidem (AMBIEN) tablet 5 mg  5 mg Oral QHS PRN    [Held by provider] docusate sodium (COLACE) capsule 100 mg  100 mg Oral BID    bisacodyL (DULCOLAX) tablet 10 mg  10 mg Oral Q48H PRN    naloxone (NARCAN) injection 0.4 mg  0.4 mg IntraVENous PRN    cyanocobalamin (VITAMIN B12) tablet 500 mcg  500 mcg Oral DAILY    folic acid (FOLVITE) tablet 1 mg  1 mg Oral DAILY    insulin lispro (HUMALOG) injection   SubCUTAneous AC&HS    glucose chewable tablet 16 g  4 Tablet Oral PRN    glucagon (GLUCAGEN) injection 1 mg  1 mg IntraMUSCular PRN    dextrose 10% infusion 0-250 mL  0-250 mL IntraVENous PRN       Allergies:   Allergies   Allergen Reactions    Albuterol Sulfate Shortness of Breath    Entex [Pseudoephedrine Tannate] Rash    Terbutaline Shortness of Breath    Terramycin [Oxytetracycline] Unknown (comments)       Lab/Data Review:  Recent Results (from the past 24 hour(s))   GLUCOSE, POC    Collection Time: 12/19/22 4:39 PM   Result Value Ref Range    Glucose (POC) 141 (H) 70 - 110 mg/dL   GLUCOSE, POC    Collection Time: 12/19/22  7:21 PM   Result Value Ref Range    Glucose (POC) 144 (H) 70 - 110 mg/dL   CBC WITH AUTOMATED DIFF    Collection Time: 12/20/22  5:08 AM   Result Value Ref Range    WBC 10.1 4.6 - 13.2 K/uL    RBC 3.32 (L) 4.20 - 5.30 M/uL    HGB 10.0 (L) 12.0 - 16.0 g/dL    HCT 31.7 (L) 35.0 - 45.0 %    MCV 95.5 78.0 - 100.0 FL    MCH 30.1 24.0 - 34.0 PG    MCHC 31.5 31.0 - 37.0 g/dL    RDW 18.4 (H) 11.6 - 14.5 %    PLATELET 559 395 - 507 K/uL    MPV 9.4 9.2 - 11.8 FL    NRBC 0.0 0  WBC    ABSOLUTE NRBC 0.00 0.00 - 0.01 K/uL    NEUTROPHILS 64 40 - 73 %    LYMPHOCYTES 20 (L) 21 - 52 %    MONOCYTES 8 3 - 10 %    EOSINOPHILS 6 (H) 0 - 5 %    BASOPHILS 1 0 - 2 %    IMMATURE GRANULOCYTES 1 (H) 0.0 - 0.5 %    ABS. NEUTROPHILS 6.5 1.8 - 8.0 K/UL    ABS. LYMPHOCYTES 2.1 0.9 - 3.6 K/UL    ABS. MONOCYTES 0.8 0.05 - 1.2 K/UL    ABS. EOSINOPHILS 0.6 (H) 0.0 - 0.4 K/UL    ABS. BASOPHILS 0.1 0.0 - 0.1 K/UL    ABS. IMM.  GRANS. 0.1 (H) 0.00 - 0.04 K/UL    DF AUTOMATED     METABOLIC PANEL, BASIC    Collection Time: 12/20/22  5:08 AM   Result Value Ref Range    Sodium 137 136 - 145 mmol/L    Potassium 4.5 3.5 - 5.5 mmol/L    Chloride 105 100 - 111 mmol/L    CO2 25 21 - 32 mmol/L    Anion gap 7 3.0 - 18 mmol/L    Glucose 99 74 - 99 mg/dL    BUN 25 (H) 7.0 - 18 MG/DL    Creatinine 1.10 0.6 - 1.3 MG/DL    BUN/Creatinine ratio 23 (H) 12 - 20      eGFR 55 (L) >60 ml/min/1.73m2    Calcium 8.3 (L) 8.5 - 10.1 MG/DL   GLUCOSE, POC    Collection Time: 12/20/22  7:43 AM   Result Value Ref Range    Glucose (POC) 127 (H) 70 - 110 mg/dL   GLUCOSE, POC    Collection Time: 12/20/22 11:42 AM   Result Value Ref Range    Glucose (POC) 143 (H) 70 - 110 mg/dL       Functional Progress:    PHYSICAL THERAPY    ON ADMISSION MOST RECENT   Wheelchair Mobility/Management  Able to Propel (ft): 2 feet  Functional Level: 1  Curbs/Ramps Assist Required (FIM Score): 0 (Not tested)  Wheelchair Setup Assist Required : 3 (Moderate assistance)  Wheelchair Management: Manages left brake, Manages right brake Wheelchair Mobility/Management  Able to Propel (ft): 112 feet (with moderate assistance utilizing B UEs and B LEs)  Functional Level: 1 (min A for steering; pt attempting to use B LE's and only slight use of UE's due to sternal precautions. difficulty reaching floor with LE's)  Curbs/Ramps Assist Required (FIM Score): 0 (Not tested)  Wheelchair Setup Assist Required : 3 (Moderate assistance)  Wheelchair Management: Manages left brake, Manages right brake     Gait  Amount of Assistance: 4 (Minimal assistance) (CGA/Min A)  Distance (ft): 42 Feet (ft)  Assistive Device: Walker, rolling, Gait belt Gait  Amount of Assistance: 5 (Supervision/setup)  Distance (ft): 150 Feet (ft)  Assistive Device: Walker, rolling     Balance-Sitting/Standing  Sitting - Static: Good (unsupported)  Sitting - Dynamic: Good (unsupported)  Standing - Static: Fair  Standing - Dynamic : Impaired Balance-Sitting/Standing  Sitting - Static: Good (unsupported)  Sitting - Dynamic: Good (unsupported)  Standing - Static: Fair  Standing - Dynamic : Impaired     Bed/Mat Mobility  Rolling Right : 0 (Not tested) (pt declined)  Rolling Left : 0 (Not tested) (pt declined)  Supine to Sit : 5 (Supervision)  Sit to Supine : 5 (Supervision) Bed/Mat Mobility  Rolling Right : 5 (Supervision)  Rolling Left : 5 (Supervision)  Supine to Sit : 5 (Supervision)  Sit to Supine : 5 (Supervision)     Transfers  Transfer Type: Other  Other: stand step without AD  Transfer Assistance : 4 (Minimal assistance)  Sit to Stand Assistance: Minimal assistance  Car Transfers: Not tested (pt declined)  Car Type: Car Transfer Trainer Transfers  Transfer Type:  Other  Other: stand step with RW  Transfer Assistance : 5 (Supervision/setup)  Sit to Stand Assistance: Supervision  Car Transfers: Minimum assistance (for managing left LE into and out of passenger side of car )  Car Type: Car Transfer Trainer     Steps or Stairs  Steps/Stairs Ambulated (#): 0  Level of Assist : 0 (Not tested) (Pt declined)  Rail Use: None Steps or Stairs  Steps/Stairs Ambulated (#): 4 (6\")  Level of Assist : 5 (Supervision/setup)  Rail Use: Both           Justification for Continued IPR Stay     Ceci To demonstrates Good progression towards established rehabilitation goals. Issues that 24 hour rehabilitation nursing is following:    [x]  Fall and safety precautions    [x]  Wound Care    [x]  Bowel and Bladder Function    [x]  Fluid Electrolyte and Nutrition Balance    []  Pain Control    [x]  Assistance with and education on in-room safety with transfers to and from the bed, wheelchair, toilet and shower. Personal Protective Equipment (N95 face mask and eye goggles) was used while interacting with the patient.       Signed:    Haily Medina MD    December 20, 2022

## 2022-12-20 NOTE — ROUTINE PROCESS
Wound Prevention Checklist    Patient: Khloe Lopez (28 y.o. female)  Date: 12/20/2022  Diagnosis: CAD (coronary artery disease) [I25.10] <principal problem not specified>    Precautions: Sternal, Fall       [x]  Heel prevention boots placed on patient    [x]  Patient turned q2h during shift    []  Lift team ordered    []  Patient on Dejon bed/Specialty bed    [x]  Each Wound is documented during shift (Stage, Color, drainage, odor, measurements, and dressings)    [x]  Dual skin check done with Johnnie Mejia RN

## 2022-12-20 NOTE — PROGRESS NOTES
Problem: Pressure Injury - Risk of  Goal: *Prevention of pressure injury  Description: Document Nas Scale and appropriate interventions in the flowsheet. Outcome: Progressing Towards Goal  Note: Pressure Injury Interventions:  Sensory Interventions: Assess changes in LOC, Assess need for specialty bed    Moisture Interventions: Apply protective barrier, creams and emollients, Assess need for specialty bed    Activity Interventions: Assess need for specialty bed, Chair cushion, Increase time out of bed    Mobility Interventions: Assess need for specialty bed, Chair cushion, Float heels    Nutrition Interventions: Document food/fluid/supplement intake, Offer support with meals,snacks and hydration    Friction and Shear Interventions: Apply protective barrier, creams and emollients, Feet elevated on foot rest, Foam dressings/transparent film/skin sealants, HOB 30 degrees or less                Problem: Patient Education: Go to Patient Education Activity  Goal: Patient/Family Education  Outcome: Progressing Towards Goal     Problem: Risk for Elopement  Goal: Patient will not exit the unit/facility without proper escort  Outcome: Progressing Towards Goal     Problem: Nutrition Deficit  Goal: *Optimize nutritional status  Outcome: Progressing Towards Goal     Problem: Patient Education: Go to Patient Education Activity  Goal: Patient/Family Education  Outcome: Progressing Towards Goal     Problem: Patient Education: Go to Patient Education Activity  Goal: Patient/Family Education  Outcome: Progressing Towards Goal     Problem: Falls - Risk of  Goal: *Absence of Falls  Description: Document Eri Cuba Fall Risk and appropriate interventions in the flowsheet.   Outcome: Progressing Towards Goal  Note: Fall Risk Interventions:  Mobility Interventions: Assess mobility with egress test    Mentation Interventions: Bed/chair exit alarm, Toileting rounds    Medication Interventions: Bed/chair exit alarm    Elimination Interventions: Bed/chair exit alarm, Call light in reach    History of Falls Interventions: Bed/chair exit alarm         Problem: Patient Education: Go to Patient Education Activity  Goal: Patient/Family Education  Outcome: Progressing Towards Goal

## 2022-12-20 NOTE — ROUTINE PROCESS
SHIFT CHANGE NOTE FOR [facility]    Bedside and Verbal shift change report given to Lissette Christiansen LPN (oncoming nurse) by Kaitlin Blount RN (offgoing nurse). Report included the following information SBAR, Kardex, MAR and Recent Results.     Situation:   Code Status: Full Code   Hospital Day: 5   Problem List:   Hospital Problems  Never Reviewed            Codes Class Noted POA    CAD (coronary artery disease) ICD-10-CM: I25.10  ICD-9-CM: 414.00  12/5/2022 Unknown           Background:   Past Medical History:   Past Medical History:   Diagnosis Date    Aortic stenosis 11/28/2022    Coronary artery disease involving native coronary artery of native heart 11/28/2022    Primary hypertension 11/28/2022    S/P AVR (aortic valve replacement) and aortoplasty 12/7/2022    S/p CABG x2 (LIMA-LAD, rSVG-OM), AVR 23 mm Inspiris with aortic root enlargement - Dr. Og Watkins    S/P CABG x 2 12/7/2022    S/p CABG x2 (LIMA-LAD, rSVG-OM), AVR 23 mm Inspiris with aortic root enlargement - Dr. Og Watkins    Type 2 diabetes mellitus, without long-term current use of insulin ([facility] [address] 11/28/2022        Assessment:  Changes in Assessment throughout shift: No change to previous assessment      Last Vitals:     Vitals:    12/19/22 1637 12/19/22 2000 12/20/22 0848 12/20/22 1144   BP: 119/71 115/60 133/69 127/63   Pulse: 80 84 86    Resp: 18 18 19    Temp: 97.2 °F (36.2 °C) 98.7 °F (37.1 °C) 97.9 °F (36.6 °C)    SpO2: 92% 92% 93%    Height:           PAIN    Pain Assessment    Pain Intensity 1: 0 (12/20/22 1200) Pain Intensity 1: 2 (12/29/14 1105)    Pain Location 1: Back, Arm, Chest Pain Location 1: Abdomen    Pain Intervention(s) 1: Medication (see MAR) Pain Intervention(s) 1: Medication (see MAR)  Patient Stated Pain Goal: 0 Patient Stated Pain Goal: 0  Intervention effective: yes  Other actions taken for pain:      Skin Assessment  Skin color    Condition/Temperature    Integrity Skin Integrity: Incision (comment)  Turgor    Weekly Pressure Ulcer Documentation Pressure  Injury Documentation: No Pressure Injury Noted-Pressure Ulcer Prevention Initiated  Wound Prevention & Protection Methods  Orientation of wound Orientation of Wound Prevention: Posterior  Location of Prevention Location of Wound Prevention: Buttocks, Sacrum/Coccyx  Dressing Present Dressing Present : Intact, not due to be changed  Dressing Status Dressing Status: Intact  Wound Offloading Wound Offloading (Prevention Methods): Pillows    INTAKE/OUPUT  Date 12/19/22 0700 - 12/20/22 0659 12/20/22 0700 - 12/21/22 0659   Shift 0700-1859 1900-0659 24 Hour Total 0700-1859 1900-0659 24 Hour Total   INTAKE   P.O. 1440  1440 480  480     P. O. 1440  1440 480  480   Shift Total 1440  1440 480  480   OUTPUT   Urine           Urine Occurrence(s) 5 x 1 x 6 x 3 x  3 x   Emesis/NG output           Emesis Occurrence(s)  0 x 0 x      Stool           Stool Occurrence(s) 3 x 1 x 4 x 1 x  1 x   Shift Total         NET 1440  1440 480  480   Weight (kg)             Recommendations:  Patient needs and requests: toileting     Pending tests/procedures:      Functional Level/Equipment: Partial (one person) / Bed (comment)    Fall Precautions:   Fall risk precautions were reinforced with the patient; she was instructed to call for help prior to getting up. The following fall risk precautions were continued: bed/ chair alarms, door signage, yellow bracelet and socks as well as update of the [employer] tool in the patient's room. Yanet Score:      HEALS Safety Check    A safety check occurred in the patient's room between off going nurse and oncoming nurse listed above. The safety check included the below items  Area Items   H  High Alert Medications Verify all high alert medication drips (heparin, PCA, etc.)   E  Equipment Suction is set up for ALL patients (with yanker)  Red plugs utilized for all equipment (IV pumps, etc.)  WOWs wiped down at end of shift.   Room stocked with oxygen, suction, and other unit-specific supplies A  Alarms Bed alarm is set for fall risk patients  Ensure chair alarm is in place and activated if patient is up in a chair   L  Lines Check IV for any infiltration  [place] bag is empty if patient has a Chapa   Tubing and IV bags are labeled   S  Safety   Room is clean, patient is clean, and equipment is clean. Hallways are clear from equipment besides carts. Fall bracelet on for fall risk patients  Ensure room is clear and free of clutter  Suction is set up for ALL patients (with nora)  Hallways are clear from equipment besides carts.    Isolation precautions followed, supplies available outside room, sign posted     Susan Cordova RN

## 2022-12-20 NOTE — PROGRESS NOTES
Problem: Mobility Impaired (Adult and Pediatric)  Goal: *Acute Goals and Plan of Care (Insert Text)  Description: Physical Therapy Short Term Goals  Initiated 2022 and to be accomplished within 7-10 day(s) 2022  1. Patient will move from supine to sit and sit to supine  in bed with modified independence. Not Met  2. Patient will transfer from bed to chair and chair to bed with modified independence using the least restrictive device. Not Met  3. Patient will perform sit to stand with modified independence. Not Met  4. Patient will ambulate with modified independence for 150 feet with the least restrictive device. Not Met  5. Patient will ascend/descend 4 stairs with B handrail(s) with modified independence. Not Met    Outcome: Progressing Towards Goal    PHYSICAL THERAPY DISCHARGE NOTE    Patient: Monica Florez (48 y.o. female)  Date: 2022  Diagnosis: CAD (coronary artery disease) [I25.10]   Precautions: Sternal, Fall  Chart, physical therapy assessment, plan of care and goals were reviewed. Time in:1021  Time out:1035  Entered Differentiated Treatment minutes: Yes  Pt missed time due to off floor for imaging. Patient seen for: Balance activities;Gait training;Patient education; Family training;Transfer training      Time in:1133  Time out:1240  Entered Differentiated Treatment minutes: Yes    Patient seen for: Balance activities;Gait training;Patient education; Family training;Transfer training    Pain:  Pt pain was reported as c/o discomfort sitting pre-treatment. Pt pain was reported as no c/o pain post-treatment. Intervention:  Offered rest breaks and changes in position. Patient identified with name and : yes    SUBJECTIVE:     Patient asked: \"Do I have to go down there? \" Re: participating in scheduled PT treatment session. Pt requires maximal encouragement for participation and increased time to perform tasks.   Pt's friend was present during first treatment session for caregiver education. Pt left floor for testing so caregiver education was limited. Pt's friend was present and verbalized understanding of education provided and provided pt with appropriate cuing for safety. OBJECTIVE DATA SUMMARY:     GROSS ASSESSMENT Discharge Assessment 12/20/2022   AROM Generally decreased, functional   Strength Generally decreased, functional   Coordination Within functional limits   Tone Normal   Sensation Impaired   PROM  Generally decreased, functional       POSTURE Discharge Assessment 12/20/2022   Posture (WDL) Exceptions to WDL   Posture Assessment Kyphosis; Forward head;Rounded shoulders;Trunk flexion         BALANCE Discharge Assessment 12/20/2022    Sitting - Static: Good (unsupported)  Sitting - Dynamic: Good (unsupported)  Standing - Static: Fair  Standing - Dynamic : Impaired    Ability to  small object from floor:Unable to reach to floor - min assist       BED/CHAIR/WHEELCHAIR TRANSFERS Initial Assessment Discharge Assessment   Rolling Right 0 (Not tested) (pt declined) 5 (Supervision)   Rolling Left 0 (Not tested) (pt declined) 5 (Supervision)   Supine to Sit 5 (Supervision) 5 (Supervision)   Sit to Stand Minimal assistance Supervision   Sit to Supine 5 (Supervision) 5 (Supervision)   Transfer Assistance Level 4 (Minimal assistance) 5 (Supervision/setup)   Transfer Type Other Other   Comments    Pt requires supervision for safety with functional mobility with maximal verbal reminders for use of heart hugger to maintain precautions - pt inconsistently follows verbal cuing to attend to safety.    Car Transfer Not tested (pt declined) Minimum assistance (for managing left LE into and out of passenger side of car )   Car Type Car Transfer Trainer Car Transfer Trainer       [facility] MOBILITY/MANAGEMENT Initial Assessment Discharge Assessment   Able to Propel 2 feet  150 Feet   Assistance Level 1 Supervision with verbal cues for efficiency propelling with UEs and LEs. Curbs/ramps assistance required 0 (Not tested)  NT   Wheelchair set up assistance required 3 (Moderate assistance)  Supervision   Wheelchair management Manages left brake, Manages right brake  Manages B Brakes       GAIT Discharge Assessment 12/20/2022   Gait Description (WDL) Exceptions to WDL   Gait Abnormalities Decreased step clearance;Trunk sway increased       WALKING INDEPENDENCE Initial Assessment Discharge Assessment   Assistive device Walker, rolling, Gait belt Walker, rolling   Ambulation assistance - level surface 4 (Minimal assistance) (CGA/Min A) 5 (Supervision/setup)   Distance 42 Feet (ft) 150 Feet (ft), 14 Feet x 1 trial, 12 Feet x 1 trial, 6 Feet x 1 trial, 14 Feet x 1 trial and 8 Feet x 2 trials. Comments   Pt  ambulates with forward flexed posture requiring maximal cuing for upright posture and safe RW management. Ambulation assistance - unlevel surface    NT       STEPS/STAIRS Initial Assessment Discharge Assessment   Steps/Stairs ambulated 0 (Not tested) (Pt declined) 4 (6\")   Rail Use None Both   Assistance Level   5 (Supervision/setup)   Comments   Pt requires supervision for safety and balance with stair negotiation with maximal cuing for decreased reliance on B UEs. Curbs/Ramps    Minimal assistance. ASSESSMENT:  Pt has not achieved goals for modified independence due to ongoing decreased safety awareness and impaired functional strength and balance as well as pt wish to return home. Pt's friend present for care giver education and verbalized understanding of recommendation for 24 hour supervision upon d/c home for safety. LTGs: 0/5     PLAN:  Pt would benefit from continued skilled physical therapy in order to improve independent functional mobility at a home health level.  Interventions may include range of motion (AROM, PROM B LE/trunk), motor function (B LE/trunk strengthening/coordination), activity tolerance (vitals, oxygen saturation levels), bed mobility training, balance activities, gait training (progressive ambulation program), and functional transfer training. Discharge Recommendations:  Home Health to progress to Cardiac Rehab  Further Equipment Recommendations for Discharge:  N/A - pt owns RW       Activity Tolerance:   Fair- pt requires frequent rest breaks. Please refer to the flowsheet for vital signs taken during this treatment.   After treatment:   [] Patient left in no apparent distress in bed  [x] Patient left in no apparent distress sitting up in chair  [] Patient left in no apparent distress in w/c mobilizing under own power  [] Patient left in no apparent distress dining area  [] Patient left in no apparent distress mobilizing under own power  [x] Call bell left within reach  [] Nursing notified  [] Caregiver present  [] Bed alarm activated   [x] Chair alarm activated      IRF-FLORESITA Completed:  yes      Jane Garcia PT, DPT  12/20/2022

## 2022-12-20 NOTE — PROGRESS NOTES
Problem: Self Care Deficits Care Plan (Adult)  Goal: *Therapy Goal (Edit Goal, Insert Text)  Description: Occupational Therapy Goals   Long Term Goals  Initiated 22 and to be accomplished within 2 week(s)  1. Pt will perform self-feeding with Bonita. 2. Pt will perform grooming with Bonita. 3. Pt will perform UB bathing with supervision. (Goal met 22)  4. Pt will perform LB bathing with supervision. (Goal met 22)  5. Pt will perform tub/shower transfer with supervision. (Goal met 22)  6. Pt will perform UB dressing with Bonita. 7. Pt will perform LB dressing with Bonita. 8. Pt will perform toileting task with Bonita. 9. Pt will perform toilet transfer with Bonita. Short Term Goals   Initiated 22 and to be accomplished within 7 day(s)  1. Pt will perform self-feeding with setup. (Goal met 22)  2. Pt will perform grooming with setup. (Goal met 22)  3. Pt will perform UB bathing with SBA. (Goal met 22)  4. Pt will perform LB bathing with Edith. (Goal met 22)  5. Pt will perform tub/shower transfer with CGA. (Goal met 22)  6. Pt will perform UB dressing with SBA. (Goal met 22)  7. Pt will perform LB dressing with modA. (Goal met 22)  8. Pt will perform toileting task with CGA. (Goal met 22)  9.  Pt will perform toilet transfer with CGA. (Goal met 22)     Outcome: Progressing Towards Goal  Goal: Interventions  Outcome: Progressing Towards Goal   OCCUPATIONAL THERAPY DISCHARGE    Patient: Edith Lopez (4 y.o. female)  Date: 2022    First Tx Session  Time In: 0730  Time Out[de-identified] 5144    Second Tx Session  Time In: 6522  Time Out[de-identified] 2754    Primary Diagnosis: CAD (coronary artery disease) [I25.10] <principal problem not specified>    Precautions:   Sternal, Fall    Barriers to Learning/Limitations: None  Compensate with: visual, verbal, tactile, kinesthetic cues/model     Patient identified with name and :yes    SUBJECTIVE: Patient stated I have a shower chair with a back.     OBJECTIVE DATA SUMMARY:     Past Medical History:   Diagnosis Date    Aortic stenosis 11/28/2022    Coronary artery disease involving native coronary artery of native heart 11/28/2022    Primary hypertension 11/28/2022    S/P AVR (aortic valve replacement) and aortoplasty 12/7/2022    S/p CABG x2 (LIMA-LAD, rSVG-OM), AVR 23 mm Inspiris with aortic root enlargement - Dr. Arlene Mason    S/P CABG x 2 12/7/2022    S/p CABG x2 (LIMA-LAD, rSVG-OM), AVR 23 mm Inspiris with aortic root enlargement - Dr. Arlene Mason    Type 2 diabetes mellitus, without long-term current use of [facility] 11/28/2022     Past Surgical History:   Procedure Laterality Date    HX HYSTERECTOMY       Prior Level of Function/Home Situation: Independent with all ADL/IADL's, uses cane/rollator in the community and no AD in home. Pt has assistance to do grocery shopping pushing cart at grocery store. Home Situation  Home Environment: Trailer/mobile home  # Steps to Enter: 4  Rails to Enter: Yes  Hand Rails : Bilateral  One/Two Story Residence: One story  Living Alone: Yes  Support Systems: Other Family Member(s)  Patient Expects to be Discharged to[de-identified] Home  Current DME Used/Available at Home: Shower chair, Cane, straight, Walker, rolling  Tub or Shower Type: Tub/Shower combination  [x]     Right hand dominant   []     Left hand dominant        Pain:  Pt reports 0/10 pain or discomfort prior to treatment. Pt reports 0/10 pain or discomfort post treatment.    Problem List:    Decreased strength B UE  [x]     Decreased strength trunk/core  []     Decreased AROM   []     Decreased PROM  []     Decreased balance sitting  []     Decreased balance standing  [x]     Decreased endurance  [x]     Pain  []       Functional Limitations:   Decreased independence with ADL  []     Decreased independence with functional transfers  []     Decreased independence with ambulation  []     Decreased independence with IADL []       Outcome Measures:      MMT Initial Assessment   Right Upper Extremity  Left Upper Extremity    UE AROM [facility] WFL   Shoulder flexion NT due to sternal precautions NT due to sternal precautions   Shoulder extension     Shoulder ABDuction     Shoulder ADDUction     Elbow Flexion 4-/5 4-/5   Elbow Extension 4-/5 4-/5   Wrist Extension/Flexion                   MMT Discharge Assessment   Right Upper Extremity  Left Upper Extremity    UE AROM [facility]   Shoulder flexion NT due to sternal precautions NT due to sternal precautions   Shoulder extension     Shoulder ABDuction     Shoulder ADDUction     Elbow Flexion 4/5 4-/5   Elbow Extension 4/5 4-/5   Wrist Extension/Flexion              0/5 No palpable muscle contraction  1/5 Palpable muscle contraction, no joint movement  2-/5 Less than full range of motion in gravity eliminated position  2/5 Able to complete full range of motion in gravity eliminated position  2+/5 Able to initiate movement against gravity  3-/5 More than half but not full range of motion against gravity  3/5 Able to complete full range of motion against gravity  3+/5 Completes full range of motion against gravity with minimal resistance  4-/5 Completes full range of motion against gravity with minimal resistance  4/5 Completes full range of motion against gravity with moderate resistance  5/5 Completes full range of motion against gravity with maximum resistance    Coordination: WFL  Sensation: intact    FIM SCORES Initial Assessment Discharge Assessment   Eating 5 Functional Level: 5   Grooming 5 Functional Level: 5    Oral Hygiene FIM: 5    Bathing 3 Functional Level: 5  Body Parts Patient Bathed: Abdomen;Arm, left;Arm, right;Buttocks; Chest;Lower leg and foot, left; Lower leg and foot, right;Agatha area; Thigh, left; Thigh, right  Comments: Pt performed UB/LB showering with setup and supervision for safety.  Pt performed UB showering from seated position on tub bench with setup and LB showering from seated posityion with long handled sponge to wash lower BLE feet prn and washing buttocks from seated position using compensatory strategies weight shifting side to side. Upper Body Dressing 4 Functional Level: 5  Items Applied/Steps Completed: Pullover (4 steps)      Lower Body Dressing 2 Functional Level: 5 (pt doesn't wear socks at home, supervision with exception of socks.)     Comments: Pt performed LB dressing with supervision with exception of socks secondary to pt reporting I don't wear socks at home. Pt demosntrated ability to thread BLE feet through brief and pants in seated position and pulled up over buttocks at [facility] for stability prn. Toileting 4 Functional Level: 5  Comments: Pt performed toileting with FWW to [facility] with supervision/SBA and completed all toileting tasks at [facility]. Tub/Shower Transfer 0 Tub/Shower Transfer Score: 5 (supervision/SBA with FWW)  Comments: Pt performed shower transfer with SBA using FWW to tub bench     Toilet Transfer 4 Toilet Transfer Score: 5  Comments: Pt performed toilet transfer to [facility] with FWW and supervision/SBA. Comprehension 5 5   Expression 5 6   Social Interaction 5 6   Problem Solving 5 5   Memory 5 5   Please see [facility] Interdisciplinary Eval: Coordination/Balance Section for details regarding FIM score description. Activity Tolerance:   Fair    ASSESSMENT:  Pt benefits from VC's for pacing and safety with self cares and functional mobility. Pt now demonstrates improved performance with UB/LB showering however continues to require supervision/SBA with showering and shower transfer using tub transfer bench and FWW. Caregiver education performed this date with pt and pt neighbor with focus on safety and performance with self cares as well as improved safety in home environment and level of assist. OTR provided education and visual aid for BUE HEP as well as resources for recommended DME. Pt has met 9/9 STG's and 3/9 LTG's at this time. Progression toward goals:  [x]      Improving appropriately and progressing toward goals  []      Improving slowly and progressing toward goals  []      Not making progress toward goals and plan of care will be adjusted     PLAN:  Pt would benefit from continued skilled occupational therapy in order to improve ADL function and IADL with use of least restrictive device. Interventions may include range of motion (AROM, PROM B UE/trunk), motor function (B UE/trunk strengthening/coordination), activity tolerance (vitals, oxygen saturation levels), ADL, balance activities, IADL, and functional transfer training. Discharge Recommendations: Home Health  Further Equipment Recommendations for Discharge: bedside commode and transfer bench  IRF FLORESITA completed: yes  Entered Differentiated Treatment minutes: Yes       Please refer to the flow sheet for vital signs taken during this treatment. After treatment:   [x]  Patient left in no apparent distress sitting up in chair  []  Patient left in no apparent distress in bed  [x]  Call bell left within reach  [x]  Nursing notified  []  Caregiver present  []  Bed alarm activated    COMMUNICATION/EDUCATION:   Communication/Collaboration:  [x]      Home safety education was provided and the patient/caregiver indicated understanding. [x]      Patient/family have participated as able and agree with findings and recommendations. [x]      Patient is unable to participate in plan of care at this time.     Raquel Wilcox, OT  12/20/2022

## 2022-12-20 NOTE — PROGRESS NOTES
[facility]  Team Conference  Date: 12/20/2022  ACTIVE MONITORING OF CO-MORBID CONDITIONS/MANAGEMENT OF NEW MEDICAL ISSUES: CAD (coronary artery disease) [I25.10]    **Please refer to patient's electronic medical record to view the care plan and goals**  NURSING:     Making gains   Wound Care: Skin color   Condition/Temperature     Integrity Skin Integrity: Incision (comment)   Turgor     Orientation of wound Orientation of Wound Prevention: Posterior Location of Prevention Location of Wound Prevention: Buttocks, Sacrum/Coccyx   Dressing Present Dressing Present : Intact, not due to be changed  Dressing Status Dressing Status: Intact   Wound Offloading Wound Offloading (Prevention Methods): Pillows   Pain: Pain Intensity 1: 0 (12/20/22 1200) Pain Intensity 1: Pain Location 1: Back, Arm, Chest Pain Location 1:    Continent bladder and Continent bowel   Education:  Fall Risk Education and Otherinfection prevention   Lab Value Concerns:  No      Occupational Therapy: Making gains   Treatment Barriers:  Fatigue, Precautions, and Safety Awareness/Impulsivity   Treatment Areas of Focus/Interventions: Strength, ADL Training, and Other: Safety awareness      Accessibility Limitations:  na     Physical Therapy: Making gains   Treatment Barriers:  Fatigue, Precautions, Safety Awareness/Impulsivity, Medical Status, and OtherSOB on exercertion    Treatment Areas of Focus/Interventions: Balance, Strength, Transfer Training, Mobility, Gait Training, Stair Training, Improve Safety Awareness, and Neuro Muscular Re-Ed      Accessibility Limitations:na       Family/Caregiver Teaching  Complete    CMG D/C Date: 12/27  Discharge Date: 12/21    Rehabilitation goals from IPOC/Treatment plan reviewed:  No changes identified  New Goals if applicable:   na    Plan for upcoming treatment period/intervention for current barriers:  Pt will increase activity tolerance for daily tasks. , Pt will improve safety in fx tasks with reduced cues/assist., Pt will improve transfers with reduced assist., and Pt will improve ADL's with use of adaptive equipment with reduced assist.    Patient needs identified:   None identified at this time    Recommended Discharge Plan:  Home w/ 24 hour care and Continuous supervision    Follow-up Services:  Home Physical Therapy and Home Occupational Therapy    Equipment needed at discharge:   Bathroom Equipment:  Tub transfer bench and Other: randy bedside commode    Plan/Adjustments to Plan  Medical conditions exist that require a minimum of 3 times/week physician oversight and 24-hour rehabilitation nursing to manage/progress the plan of care, Functional deficits require intensive and coordinated therapies to achieve goals outlined in plan of care, 3 hours therapy 5 days/week OR 15 hours therapy over 7 days, and Continued plan of care as patient is showing progress and /or has an expectation to benefit    Patient's plan of care has been reviewed and/or adjusted. Continue treatment as outlined. Team Conference Recorder Fabricio Pruett  Date 12/20/2022    Team members participating in today's conference. Gala Aponte OT       , Fabricio Preutt, OSIEL        , and Other: Melania Zavala PTA ; Tasia Joyner RN    ===============================================================================  Patients plan of care has been reviewed and/or adjusted. Continue treatment as outlined. I certify that I have led this team conference and agree with continue POC as discussed.

## 2022-12-20 NOTE — PROGRESS NOTES
Problem: Pressure Injury - Risk of  Goal: *Prevention of pressure injury  Description: Document Nas Scale and appropriate interventions in the flowsheet. Outcome: Progressing Towards Goal  Note: Pressure Injury Interventions:  Sensory Interventions: Assess changes in LOC    Moisture Interventions: Absorbent underpads, Apply protective barrier, creams and emollients    Activity Interventions: Pressure redistribution bed/mattress(bed type), PT/OT evaluation    Mobility Interventions: Chair cushion    Nutrition Interventions: Discuss nutritional consult with provider    Friction and Shear Interventions: HOB 30 degrees or less                Problem: Patient Education: Go to Patient Education Activity  Goal: Patient/Family Education  Outcome: Progressing Towards Goal     Problem: Risk for Elopement  Goal: Patient will not exit the unit/facility without proper escort  Outcome: Progressing Towards Goal     Problem: Nutrition Deficit  Goal: *Optimize nutritional status  Outcome: Progressing Towards Goal     Problem: Patient Education: Go to Patient Education Activity  Goal: Patient/Family Education  Outcome: Progressing Towards Goal     Problem: Patient Education: Go to Patient Education Activity  Goal: Patient/Family Education  Outcome: Progressing Towards Goal     Problem: Falls - Risk of  Goal: *Absence of Falls  Description: Document Yanet Fall Risk and appropriate interventions in the flowsheet.   Outcome: Progressing Towards Goal  Note: Fall Risk Interventions:  Mobility Interventions: Assess mobility with egress test    Mentation Interventions: Bed/chair exit alarm, Toileting rounds    Medication Interventions: Bed/chair exit alarm    Elimination Interventions: Bed/chair exit alarm, Call light in reach    History of Falls Interventions: Bed/chair exit alarm         Problem: Patient Education: Go to Patient Education Activity  Goal: Patient/Family Education  Outcome: Progressing Towards Goal

## 2022-12-20 NOTE — ROUTINE PROCESS
JSHIFT CHANGE NOTE FOR [facility]    Bedside and Verbal shift change report given to [facility] [address] (oncoming nurse) by Kwaku Kemp LPN (offgoing nurse). Report included the following information SBAR, Kardex, MAR and Recent Results.     Situation:   Code Status: Full Code   Hospital Day: 5   Problem List:   Hospital Problems  Never Reviewed            Codes Class Noted POA    CAD (coronary artery disease) ICD-10-CM: I25.10  ICD-9-CM: 414.00  12/5/2022 Unknown         Background:   Past Medical History:   Past Medical History:   Diagnosis Date    Aortic stenosis 11/28/2022    Coronary artery disease involving native coronary artery of native heart 11/28/2022    Primary hypertension 11/28/2022    S/P AVR (aortic valve replacement) and aortoplasty 12/7/2022    S/p CABG x2 (LIMA-LAD, rSVG-OM), AVR 23 mm Inspiris with aortic root enlargement - Dr. Livier Trejo    S/P CABG x 2 12/7/2022    S/p CABG x2 (LIMA-LAD, rSVG-OM), AVR 23 mm Inspiris with aortic root enlargement - Dr. Livier Trejo    Type 2 diabetes mellitus, without long-term current use of insulin ([facility] [address] 11/28/2022        Assessment:  Changes in Assessment throughout shift: No change to previous assessment    Patient has a central line: no Reasons if yes: na  Insertion date:na Last dressing date:na  Patient has Villagomez Cath: no Reasons if yes: na   Insertion date:na  Shift villagomez care completed: NO    Last Vitals:     Vitals:    12/18/22 1937 12/19/22 0753 12/19/22 1637 12/19/22 2000   BP: 131/60 110/60 119/71 115/60   Pulse: 83 82 80 84   Resp: 18 18 18 18   Temp: 98.4 °F (36.9 °C) 97.2 °F (36.2 °C) 97.2 °F (36.2 °C) 98.7 °F (37.1 °C)   SpO2: 93% 93% 92% 92%   Height:           PAIN    Pain Assessment    Pain Intensity 1: 0 (12/20/22 0000) Pain Intensity 1: 2 (12/29/14 1105)    Pain Location 1: Back, Arm, Chest Pain Location 1: Abdomen    Pain Intervention(s) 1: Medication (see MAR) Pain Intervention(s) 1: Medication (see MAR)  Patient Stated Pain Goal: 0 Patient Stated Pain Goal: 0  Intervention effective: yes  Other actions taken for pain: Medication (see MAR)    Skin Assessment  Skin color    Condition/Temperature    Integrity Skin Integrity: Incision (comment), Wound (add Wound LDA)  Turgor    Weekly Pressure Ulcer Documentation  Pressure  Injury Documentation: No Pressure Injury Noted-Pressure Ulcer Prevention Initiated  Wound Prevention & Protection Methods  Orientation of wound Orientation of Wound Prevention: Posterior  Location of Prevention Location of Wound Prevention: Buttocks, Sacrum/Coccyx  Dressing Present Dressing Present : Yes, Intact, not due to be changed  Dressing Status Dressing Status: Intact  Wound Offloading Wound Offloading (Prevention Methods): Repositioning    INTAKE/OUPUT  Date 12/19/22 0700 - 12/20/22 0659 12/20/22 0700 - 12/21/22 0659   Shift 6557-0109 9112-1569 24 Hour Total 1887-2526 6415-2558 24 Hour Total   INTAKE   P.O. 1440  1440        P. O. 1440  1440      Shift Total 1440  1440      OUTPUT   Urine           Urine Occurrence(s) 5 x 1 x 6 x      Emesis/NG output           Emesis Occurrence(s)  0 x 0 x      Stool           Stool Occurrence(s) 3 x 1 x 4 x      Shift Total         NET 1440  1440      Weight (kg)               Recommendations:  Patient needs and requests: na    Pending tests/procedures: na     Functional Level/Equipment:   / Lesia Florez    Fall Precautions:   Fall risk precautions were reinforced with the patient; she was instructed to call for help prior to getting up. The following fall risk precautions were continued: bed/ chair alarms, door signage, yellow bracelet and socks as well as update of the [employer] tool in the patient's room. Yanet Score: 3    HEALS Safety Check    A safety check occurred in the patient's room between off going nurse and oncoming nurse listed above.     The safety check included the below items  Area Items   H  High Alert Medications Verify all high alert medication drips (heparin, PCA, etc.)   E  Equipment Suction is set up for ALL patients (with nora)  Red plugs utilized for all equipment (IV pumps, etc.)  WOWs wiped down at end of shift. Room stocked with oxygen, suction, and other unit-specific supplies   A  Alarms Bed alarm is set for fall risk patients  Ensure chair alarm is in place and activated if patient is up in a chair   L  Lines Check IV for any infiltration  Chapa bag is empty if patient has a Chapa   Tubing and IV bags are labeled   S  Safety   Room is clean, patient is clean, and equipment is clean. Hallways are clear from equipment besides carts. Fall bracelet on for fall risk patients  Ensure room is clear and free of clutter  Suction is set up for ALL patients (with nora)  Hallways are clear from equipment besides carts.    Isolation precautions followed, supplies available outside room, sign posted     Yesenia Frost LPN

## 2022-12-21 ENCOUNTER — HOME HEALTH ADMISSION (OUTPATIENT)
Dept: HOME HEALTH SERVICES | Facility: HOME HEALTH | Age: 67
End: 2022-12-21
Payer: MEDICARE

## 2022-12-21 VITALS
HEIGHT: 71 IN | OXYGEN SATURATION: 91 % | BODY MASS INDEX: 42.87 KG/M2 | RESPIRATION RATE: 18 BRPM | HEART RATE: 85 BPM | TEMPERATURE: 97.8 F | DIASTOLIC BLOOD PRESSURE: 72 MMHG | SYSTOLIC BLOOD PRESSURE: 114 MMHG

## 2022-12-21 DIAGNOSIS — Z95.2 S/P AVR (AORTIC VALVE REPLACEMENT): ICD-10-CM

## 2022-12-21 DIAGNOSIS — Z95.1 S/P CABG X 2: Primary | ICD-10-CM

## 2022-12-21 LAB
GLUCOSE BLD STRIP.AUTO-MCNC: 115 MG/DL (ref 70–110)
GLUCOSE BLD STRIP.AUTO-MCNC: 190 MG/DL (ref 70–110)

## 2022-12-21 PROCEDURE — 2709999900 HC NON-CHARGEABLE SUPPLY

## 2022-12-21 PROCEDURE — 99239 HOSP IP/OBS DSCHRG MGMT >30: CPT | Performed by: FAMILY MEDICINE

## 2022-12-21 PROCEDURE — 77030040392 HC DRSG OPTIFOAM MDII -A

## 2022-12-21 PROCEDURE — 74011250637 HC RX REV CODE- 250/637: Performed by: EMERGENCY MEDICINE

## 2022-12-21 PROCEDURE — 99024 POSTOP FOLLOW-UP VISIT: CPT | Performed by: PHYSICIAN ASSISTANT

## 2022-12-21 PROCEDURE — APPNB180 APP NON BILLABLE TIME > 60 MINS: Performed by: PHYSICIAN ASSISTANT

## 2022-12-21 PROCEDURE — 74011250637 HC RX REV CODE- 250/637: Performed by: INTERNAL MEDICINE

## 2022-12-21 PROCEDURE — 74011250637 HC RX REV CODE- 250/637: Performed by: FAMILY MEDICINE

## 2022-12-21 PROCEDURE — 74011636637 HC RX REV CODE- 636/637: Performed by: EMERGENCY MEDICINE

## 2022-12-21 PROCEDURE — 74011250637 HC RX REV CODE- 250/637: Performed by: PHYSICIAN ASSISTANT

## 2022-12-21 PROCEDURE — 82962 GLUCOSE BLOOD TEST: CPT

## 2022-12-21 RX ORDER — HYDROCODONE BITARTRATE AND ACETAMINOPHEN 5; 325 MG/1; MG/1
1 TABLET ORAL
Qty: 21 TABLET | Refills: 0 | Status: SHIPPED | OUTPATIENT
Start: 2022-12-21 | End: 2022-12-28

## 2022-12-21 RX ORDER — ACETAMINOPHEN 325 MG/1
325-650 TABLET ORAL
Qty: 30 TABLET | Refills: 0 | Status: SHIPPED | OUTPATIENT
Start: 2022-12-21

## 2022-12-21 RX ORDER — CLOPIDOGREL BISULFATE 75 MG/1
75 TABLET ORAL DAILY
Qty: 30 TABLET | Refills: 0 | Status: SHIPPED | OUTPATIENT
Start: 2022-12-21

## 2022-12-21 RX ADMIN — CLOPIDOGREL BISULFATE 75 MG: 75 TABLET ORAL at 09:14

## 2022-12-21 RX ADMIN — Medication 500 MCG: at 09:14

## 2022-12-21 RX ADMIN — FLUTICASONE PROPIONATE 2 SPRAY: 50 SPRAY, METERED NASAL at 09:18

## 2022-12-21 RX ADMIN — MIDODRINE HYDROCHLORIDE 5 MG: 5 TABLET ORAL at 12:16

## 2022-12-21 RX ADMIN — FOLIC ACID 1 MG: 1 TABLET ORAL at 09:14

## 2022-12-21 RX ADMIN — MIDODRINE HYDROCHLORIDE 5 MG: 5 TABLET ORAL at 09:14

## 2022-12-21 RX ADMIN — Medication 1 CAPSULE: at 09:14

## 2022-12-21 RX ADMIN — MONTELUKAST 10 MG: 10 TABLET, FILM COATED ORAL at 09:14

## 2022-12-21 RX ADMIN — Medication 30 UNITS: at 09:15

## 2022-12-21 RX ADMIN — FUROSEMIDE 40 MG: 40 TABLET ORAL at 09:14

## 2022-12-21 RX ADMIN — ASPIRIN 81 MG: 81 TABLET, COATED ORAL at 09:14

## 2022-12-21 RX ADMIN — Medication 1 TABLET: at 09:15

## 2022-12-21 RX ADMIN — POTASSIUM CHLORIDE 20 MEQ: 1500 TABLET, EXTENDED RELEASE ORAL at 09:14

## 2022-12-21 RX ADMIN — SULFAMETHOXAZOLE AND TRIMETHOPRIM 1 TABLET: 800; 160 TABLET ORAL at 09:14

## 2022-12-21 RX ADMIN — Medication 2 UNITS: at 12:16

## 2022-12-21 RX ADMIN — GUAIFENESIN AND DEXTROMETHORPHAN 5 ML: 100; 10 SYRUP ORAL at 09:27

## 2022-12-21 NOTE — ROUTINE PROCESS
0800 Pt. Awake sitting up in bed no change in assessment. 0930 Pt. Sitting up in chair eating breakfast. Reported to be ready for discharge home. 1130 pt. Verbalized understanding of discharge instructions and prescriptions waiting for family.

## 2022-12-21 NOTE — PROGRESS NOTES
[facility]        PA lat CXR with moderately large left pleural effusion. Acceptable 02 sats on RA without signif THOMAS. BLE very edematous and now L>R erythema with serous drainage from left leg incisions. Chest incisions healing well. Left leg ACE wrapped, Bactrim started by medicine, outpatient left thora ordered. CVT office will try to co-ordinate OV same day as thora. D/W Dr. Lis Santillan and Eric Solis. Patient relays understanding and is agreeable.

## 2022-12-21 NOTE — PROGRESS NOTES
Pt to dc to home today with [facility] per previous FOC. Referral placed and liaisons notified. Bedside commode ordered via [place] to be delivered to pt's home. Pt cleared to dc to home awaiting commode at the home. Pt recommended to use a tub transfer bench and states intention to await sternal healing before getting in the shower. Pt able to recount     No further needs are noted at this time.

## 2022-12-21 NOTE — PROGRESS NOTES
[facility]         Left thoracentesis, therapeutic, ordered for next week. Patient will hold plavix until after procedure.

## 2022-12-21 NOTE — DISCHARGE INSTRUCTIONS
------------------------------------------------------------------------------------------------------------    DISCHARGE INSTRUCTIONS    1. Make sure that when you request refills at the pharmacy that the refill requests are sent to your PCP (NOT to the prescriber at the [facility]) to avoid any delays in getting your medication refills. The physician at the [facility] for [address] Lash[facility] Perea will not able to order medications or refills after discharge -- Please understand that though we would like to help, it is simply not safe for our physician to order you a medication that we cannot monitor. 2. If any of the prescribed medications require a PRIOR AUTHORIZATION, contact your Primary Care Physician or specialist to EITHER complete prior authorizations and paperwork on your behalf OR prescribe an alternative medication. -- Please understand that though we would like to help, it is simply not safe for our physician to order you a medication that we cannot monitor. 3. If any of your prescriptions medications [facility] [place] [address] needs a refill (and either the dosage, frequency or instructions was not changed), kindly contact your Primary Care Physician for refills. -------------------------------------------------------------------------------------------------------------------        DISCHARGE SUMMARY from Nurse    PATIENT INSTRUCTIONS:    After general anesthesia or intravenous sedation, for 24 hours or while taking prescription Narcotics:  Limit your activities  Do not drive and operate hazardous machinery  Do not make important personal or business decisions  Do  not drink alcoholic beverages  If you have not urinated within 8 hours after discharge, please contact your surgeon on call.     Report the following to your surgeon:  Excessive pain, swelling, redness or odor of or around the surgical area  Temperature over 100.5  Nausea and vomiting lasting longer than 4 hours or if unable to take medications  Any signs of decreased circulation or nerve impairment to extremity: change in color, persistent  numbness, tingling, coldness or increase pain  Any questions    What to do at Home:  Recommended activity: Activity as tolerated, and as directed by your physician. If you experience any of the following symptoms chest pain or difficulty breathing, please follow up with the emergency dept. *  Please give a list of your current medications to your Primary Care Provider. *  Please update this list whenever your medications are discontinued, doses are      changed, or new medications (including over-the-counter products) are added. *  Please carry medication information at all times in case of emergency situations. These are general instructions for a healthy lifestyle:    No smoking/ No tobacco products/ Avoid exposure to second hand smoke  Surgeon General's Warning:  Quitting smoking now greatly reduces serious risk to your health. Obesity, smoking, and sedentary lifestyle greatly increases your risk for illness    A healthy diet, regular physical exercise & weight monitoring are important for maintaining a healthy lifestyle    You may be retaining fluid if you have a history of heart failure or if you experience any of the following symptoms:  Weight gain of 3 pounds or more overnight or 5 pounds in a week, increased swelling in our hands or feet or shortness of breath while lying flat in bed. Please call your doctor as soon as you notice any of these symptoms; do not wait until your next office visit. The discharge information has been reviewed with the patient. The patient and guardian verbalized understanding.   Discharge medications reviewed with the patient and guardian and appropriate educational materials and side effects teaching were provided. ___________________________________________________________________________________________________________________________________        Patient armband removed and shredded          MyChart Activation    Thank you for requesting access to [employer]. Please follow the instructions below to securely access and download your online medical record. [employer] allows you to send messages to your doctor, view your test results, renew your prescriptions, schedule appointments, and more. How Do I Sign Up? In your internet browser, go to [email or web address]  Click on the First Time User? Click Here link in the Sign In box. You will be redirect to the New Member Sign Up page. Enter your [employer]covery Access Code exactly as it appears below. You will not need to use this code after youve completed the sign-up process. If you do not sign up before the expiration date, you must request a new code. MyCharAll Web Leads Access Code: Activation code not generated  Current [employer]covery Status: Active (This is the date your Moodsnapt access code will )    Enter the last four digits of your Social Security Number (xxxx) and Date of Birth (mm/dd/yyyy) as indicated and click Submit. You will be taken to the next sign-up page. Create a Xcovery ID. This will be your Xcovery login ID and cannot be changed, so think of one that is secure and easy to remember. Create a [employer]covery password. You can change your password at any time. Enter your Password Reset Question and Answer. This can be used at a later time if you forget your password. Enter your e-mail address. You will receive e-mail notification when new information is available in [address] Click Sign Up. You can now view and download portions of your medical record. Click the [place] link to download a portable copy of your medical information.     Additional Information    If you have questions, please visit the Frequently Asked Questions section of the MyChart website at [email or web address] Remember, [email or web address] is NOT to be used for urgent needs. For medical emergencies, dial 911.

## 2022-12-21 NOTE — PROGRESS NOTES
INTERDISCIPLINARY TEAM FOLLOW-UP  Patient: Alicia Courtney (5 y.o. female)  Date: 12/21/2022  Diagnosis: CAD (coronary artery disease) [I25.10] <principal problem not specified>      Precautions: Sternal, Fall    Met with patient to discuss the findings from 12/21/2022 Team Conference.     Patient requested further information and/or had questions:   No   valerie       Hope Neighbours

## 2022-12-21 NOTE — ROUTINE PROCESS
[facility]    Bedside and Verbal shift change report given to [address] (oncoming nurse) by Heike Dela Cruz LPN (offgoing nurse). Report included the following information SBAR, Kardex, MAR and Recent Results.     Situation:   Code Status: Full Code   Hospital Day: 6   Problem List:   Hospital Problems  Never Reviewed            Codes Class Noted POA    CAD (coronary artery disease) ICD-10-CM: I25.10  ICD-9-CM: 414.00  12/5/2022 Unknown         Background:   Past Medical History:   Past Medical History:   Diagnosis Date    Aortic stenosis 11/28/2022    Coronary artery disease involving native coronary artery of native heart 11/28/2022    Primary hypertension 11/28/2022    S/P AVR (aortic valve replacement) and aortoplasty 12/7/2022    S/p CABG x2 (LIMA-LAD, rSVG-OM), AVR 23 mm Inspiris with aortic root enlargement - Dr. Mitchell Covert    S/P CABG x 2 12/7/2022    S/p CABG x2 (LIMA-LAD, rSVG-OM), AVR 23 mm Inspiris with aortic root enlargement - Dr. Mitchell Covert    [facility] mellitus[facility] 11/28/2022        Assessment:  Changes in Assessment throughout shift: No change to previous assessment      Last Vitals:     Vitals:    12/20/22 0848 12/20/22 1144 12/20/22 1652 12/20/22 2012   BP: 133/69 127/63 128/64 127/60   Pulse: 86  86 86   Resp: 19 19 20   Temp: 97.9 °F (36.6 °C)  98.6 °F (37 °C) 97.2 °F (36.2 °C)   SpO2: 93%  92% 93%   Height:           PAIN    Pain Assessment    Pain Intensity 1: 0 (12/20/22 2012) Pain Intensity 1: 2 (12/29/14 1105)    Pain Location 1: Back, Arm, Chest Pain Location 1: Abdomen    Pain Intervention(s) 1: Medication (see MAR) Pain Intervention(s) 1: Medication (see MAR)  Patient Stated Pain Goal: 0 Patient Stated Pain Goal: 0  Intervention effective: yes  Other actions taken for pain:      Skin Assessment  Skin color    Condition/Temperature    Integrity Skin Integrity: Incision (comment)  Turgor    Weekly Pressure Ulcer Documentation Pressure  Injury Documentation: Pressure Injury Noted-See Wound LDA to Document  Wound Prevention & Protection Methods  Orientation of wound Orientation of Wound Prevention: Posterior  Location of Prevention Location of Wound Prevention: Sacrum/Coccyx  Dressing Present Dressing Present : Yes, Intact, not due to be changed  Dressing Status Dressing Status: Intact, Changed  Wound Offloading Wound Offloading (Prevention Methods): Repositioning    INTAKE/OUPUT  Date 12/20/22 0700 - 12/21/22 0659 12/21/22 0700 - 12/22/22 0659   Shift 0700-1859 1900-0659 24 Hour Total 0700-1859 1900-0659 24 Hour Total   INTAKE   P.O. 720  720        P. O. 720  720      Shift Total 720  720      OUTPUT   Urine           Urine Occurrence(s) 3 x 1 x 4 x      Emesis/NG output           Emesis Occurrence(s)  0 x 0 x      Stool           Stool Occurrence(s) 1 x 0 x 1 x      Shift Total           720      Weight (kg)               Recommendations:  Patient needs and requests: toileting     Pending tests/procedures:      Functional Level/Equipment: Partial (one person) / Wheelchair    Fall Precautions:   Fall risk precautions were reinforced with the patient; she was instructed to call for help prior to getting up. The following fall risk precautions were continued: bed/ chair alarms, door signage, yellow bracelet and socks as well as update of the Nika Nap tool in the patient's room. Yanet Score: 3    HEALS Safety Check    A safety check occurred in the patient's room between off going nurse and oncoming nurse listed above. The safety check included the below items  Area Items   H  High Alert Medications Verify all high alert medication drips (heparin, PCA, etc.)   E  Equipment Suction is set up for ALL patients (with yanker)  Red plugs utilized for all equipment (IV pumps, etc.)  WOWs wiped down at end of shift.   Room stocked with oxygen, suction, and other unit-specific supplies   A  Alarms Bed alarm is set for fall risk patients  Ensure chair alarm is in place and activated if patient is up in a chair   L  Lines Check IV for any infiltration  Chapa bag is empty if patient has a Chapa   Tubing and IV bags are labeled   S  Safety   Room is clean, patient is clean, and equipment is clean. Hallways are clear from equipment besides carts. Fall bracelet on for fall risk patients  Ensure room is clear and free of clutter  Suction is set up for ALL patients (with yanker)  Hallways are clear from equipment besides carts.    Isolation precautions followed, supplies available outside room, sign posted     Haritha Avina LPN

## 2022-12-21 NOTE — DISCHARGE SUMMARY
[address]  [address] [place] [address], [address]     INPATIENT REHABILITATION  DISCHARGE SUMMARY    Name: Sidney Nieves MRN: [id number]   Age / Sex: 79 y.o. / female CSN: [id number]   YOB: 1955 Length of Stay: 6 days   Admit Date: 12/15/2022 Discharge Date: 12/21/2022       PRIMARY CARE PHYSICIAN: Dennis Mercedes MD      DISCHARGE DIAGNOSES:    Primary rehabilitation diagnosis  Impaired mobility and ADLs in the setting of coronary artery disease and patient is status post CABG x2 and aortic valve replacement     Other medical issues  Severe AS, status post aortic valve replacement  Peripheral arterial disease  Venous stasis dermatitis   LEFT lower extremity cellulitis assoc with vein harvesting incisions (12/20/2022)  Type 2 diabetes  Hypertension  Chronic anemia   History of tobacco use  Chronic kidney disease stage II  Hyperlipidemia    CONSULTS CALLED: nutrition (12/16/2022)      PROCEDURES DONE: none      BRIEF HISTORY:     Sidney Nieves is a 79 y.o. female with a history of CAD, aortic stenosis, diabetes, CKD and hypertension who initially presented on 12/5/2022 for elective cardiac catheterization and was found to have ostial LAD disease as well as severe aortic stenosis. Cardiothoracic surgery was consulted. on 12/8/2022 patient subsequently underwent coronary artery bypass graft x2, with vein harvest from LEFT lower extremity and aortic valve replacement. Postoperatively patient remained stable. PT and OT saw the patient and was felt that patient may benefit from transitioning to the inpatient rehab. COURSE IN THE HOSPITAL: Upon admission to the [facility], the patient underwent physical therapy, occupational therapy and speech therapy. The patient was able to actively participate in the rehabilitation activities and progressed well. On discharge, the patient was able to perform the following activities:    1. Occupational Therapy    ON ADMISSION ON DISCHARGE   Eating  Functional Level: 5   Eating  Functional Level: 5     Grooming  Functional Level: 5   Grooming  Functional Level: 5     Bathing  Functional Level: 3   Bathing  Functional Level: 5     Upper Body Dressing  Functional Level: 4   Upper Body Dressing  Functional Level: 5     Lower Body Dressing  Functional Level: 2   Lower Body Dressing  Functional Level: 5 (pt doesn't wear socks at home, supervision with exception of socks.)     Toileting  Functional Level: 4   Toileting  Functional Level: 5     Toilet Transfers  Toilet Transfer Score: 4   Toilet Transfers  Toilet Transfer Score: 5     Tub /Shower Transfers  Tub/Shower Transfer Score: 0   Tub/Shower Transfers  Tub/Shower Transfer Score: 5 (supervision/SBA with FWW)       2. Physical Therapy    ON ADMISSION ON DISCHARGE   Wheelchair Mobility/Management  Able to Propel (ft): 2 feet  Functional Level: 1  Curbs/Ramps Assist Required (FIM Score): 0 (Not tested)  Wheelchair Setup Assist Required : 3 (Moderate assistance)  Wheelchair Management: Manages left brake, Manages right brake Wheelchair Mobility/Management  Able to Propel (ft): 112 feet (with moderate assistance utilizing B UEs and B LEs)  Functional Level: 1 (min A for steering; pt attempting to use B LE's and only slight use of UE's due to sternal precautions.  difficulty reaching floor with LE's)  Curbs/Ramps Assist Required (FIM Score): 0 (Not tested)  Wheelchair Setup Assist Required : 3 (Moderate assistance)  Wheelchair Management: Manages left brake, Manages right brake     Gait  Amount of Assistance: 4 (Minimal assistance) (CGA/Min A)  Distance (ft): 42 Feet (ft)  Assistive Device: Walker, rolling, Gait belt Gait  Amount of Assistance: 5 (Supervision/setup)  Distance (ft): 150 Feet (ft)  Assistive Device: Walker, rolling     Balance-Sitting/Standing  Sitting - Static: Good (unsupported)  Sitting - Dynamic: Good (unsupported)  Standing - Static: Fair  Standing - Dynamic : Impaired Balance-Sitting/Standing  Sitting - Static: Good (unsupported)  Sitting - Dynamic: Good (unsupported)  Standing - Static: Fair  Standing - Dynamic : Impaired     Bed/Mat Mobility  Rolling Right : 0 (Not tested) (pt declined)  Rolling Left : 0 (Not tested) (pt declined)  Supine to Sit : 5 (Supervision)  Sit to Supine : 5 (Supervision) Bed/Mat Mobility  Rolling Right : 5 (Supervision)  Rolling Left : 5 (Supervision)  Supine to Sit : 5 (Supervision)  Sit to Supine : 5 (Supervision)     Transfers  Transfer Type: Other  Other: stand step without AD  Transfer Assistance : 4 (Minimal assistance)  Sit to Stand Assistance: Minimal assistance  Car Transfers: Not tested (pt declined)  Car Type: Car Transfer Trainer Transfers  Transfer Type:  Other  Other: stand step with RW  Transfer Assistance : 5 (Supervision/setup)  Sit to Stand Assistance: Supervision  Car Transfers: Minimum assistance (for managing left LE into and out of passenger side of car )  Car Type: Car Transfer Trainer     Steps or Stairs  Steps/Stairs Ambulated (#): 0  Level of Assist : 0 (Not tested) (Pt declined)  Rail Use: None Steps or Stairs  Steps/Stairs Ambulated (#): 4 (6\")  Level of Assist : 5 (Supervision/setup)  Rail Use: Both       3. Speech and Language Pathology    ON ADMISSION ON DISCHARGE   Comprehension (Native Language)  Primary Mode of Comprehension: Auditory  Score: 5 Comprehension (Native Language)  Primary Mode of Comprehension: Auditory  Score: 5     Expression (Native Language)  Primary Mode of Expression: Verbal  Score: 5   Expression (Native Language)  Primary Mode of Expression: Verbal  Score: 5     Social Interaction/Pragmatics  Score: 5 Social Interaction/Pragmatics  Score: 5     Problem Solving  Score: 5   Problem Solving  Score: 5     Memory  Score: 5 Memory  Score: 5       Legend:   7 - Independent   6 - Modified Independent   5 - [address] / Supervision / Set-up   4 - Minimum Assistance / Contact Guard Assistance   3 - Moderate Assistance   2 - Maximum Assistance   1 - Total Assistance / Dependent       ACUTE MEDICAL ISSUES ADDRESSED IN INPATIENT REHABILITATION FACILITY:     Primary rehabilitation diagnosis  Impaired mobility and ADLs in the setting of coronary artery disease and patient is status post CABG x2 and aortic valve replacement     Other medical issues  Severe AS, status post aortic valve replacement  Peripheral arterial disease  Venous stasis dermatitis - acute on chronic  LEFT lower extremity cellulitis assoc with vein harvesting incisions - dx 12/20/2022  Type 2 diabetes  Hypertension  Chronic anemia - Hgb 10.0 on 12/20  History of tobacco use  Chronic kidney disease stage II - Cr. 1.10 on 12/20  Hyperlipidemia     Course in IPR:  Aspirin, plavix, statin, lasix were continued. Midodrine was weaned off. Lantus & SSI was used for glycemic control, however switched back to outpatient meds upon discharge. On 12/20, bactrim DS BID x 7 days was started for the LLE cellulitis. Strongly encouraged use of lymphedema compression at home. On discharge, erythema was improving. MEDICATIONS ON DISCHARGE:    Current Discharge Medication List        START taking these medications    Details   acetaminophen (TYLENOL) 325 mg tablet Take 2 Tablets by mouth every four (4) hours as needed for Pain or Fever. Indications: pain  Qty: 30 Tablet, Refills: 0  Start date: 12/20/2022      cyanocobalamin (VITAMIN B12) 500 mcg tablet Take 1 Tablet by mouth daily. Qty: 30 Tablet, Refills: 0  Start date: 36/62/5433      folic acid (FOLVITE) 1 mg tablet Take 1 Tablet by mouth daily. Qty: 30 Tablet, Refills: 0  Start date: 12/21/2022      trimethoprim-sulfamethoxazole (BACTRIM DS, SEPTRA DS) 160-800 mg per tablet Take 1 Tablet by mouth every twelve (12) hours for 14 doses.   Qty: 14 Tablet, Refills: 0  Start date: 12/20/2022, End date: 12/27/2022           CONTINUE these medications which have CHANGED    Details   aspirin delayed-release 81 mg tablet Take 1 Tablet by mouth daily. Qty: 30 Tablet, Refills: 2  Start date: 12/20/2022      fexofenadine-pseudoephedrine (ALLEGRA-D)  mg Tb12 Take 1 Tablet by mouth every twelve (12) hours. Qty: 30 Tablet, Refills: 0  Start date: 12/20/2022           CONTINUE these medications which have NOT CHANGED    Details   clopidogreL (PLAVIX) 75 mg tab Take 1 Tablet by mouth daily. Qty: 30 Tablet, Refills: 2  Start date: 12/16/2022      multivitamin, tx-iron-ca-min (THERA-M w/ IRON) 9 mg iron-400 mcg tab tablet Take 1 Tablet by mouth daily. Qty: 30 Tablet, Refills: 2  Start date: 12/16/2022      furosemide (LASIX) 40 mg tablet Take 1 Tablet by mouth daily. Qty: 7 Tablet, Refills: 0  Start date: 12/15/2022      potassium chloride (K-DUR, KLOR-CON M20) 20 mEq tablet Take 1 Tablet by mouth daily. Qty: 7 Tablet, Refills: 0  Start date: 12/15/2022      azelastine (ASTELIN) 137 mcg (0.1 %) nasal spray 1 Spray two (2) times a day. Use in each nostril as directed      fluticasone propionate (FLONASE) 50 mcg/actuation nasal spray 2 Sprays by Both Nostrils route daily. empagliflozin (JARDIANCE) 25 mg tablet Take  by mouth daily. metFORMIN (GLUCOPHAGE) 500 mg tablet Take 500 mg by mouth two (2) times daily (with meals). montelukast (SINGULAIR) 10 mg tablet Take 10 mg by mouth in the morning. atorvastatin (LIPITOR) 40 mg tablet Take 40 mg by mouth in the morning. dulaglutide (Trulicity) 3 OG/9.2 mL pnij by SubCUTAneous route. multivitamin (ONE A DAY) tablet Take 1 Tablet by mouth daily.            STOP taking these medications       HYDROcodone-acetaminophen (NORCO) 5-325 mg per tablet Comments:   Reason for Stopping:         midodrine (PROAMATINE) 5 mg tablet Comments:   Reason for Stopping:         insulin glargine (LANTUS) 100 unit/mL injection Comments:   Reason for Stopping:         zolpidem (AMBIEN) 5 mg tablet Comments:   Reason for Stopping:         docusate sodium (COLACE) 100 mg capsule Comments:   Reason for Stopping:         chlorthalidone (HYGROTON) 25 mg tablet Comments:   Reason for Stopping:                 DISCHARGE VITAL SIGNS:  Visit Vitals  /72 (BP 1 Location: Left upper arm, BP Patient Position: Sitting)   Pulse 85   Temp 97.8 °F (36.6 °C)   Resp 18   Ht 5' 11\" (1.803 m)   SpO2 91%   Breastfeeding Unknown   BMI 42.87 kg/m²       DISCHARGE PHYSICAL EXAMINATION:  General:  sitting in WC, NAD  Cardiovascular:  RRR, no murmur  Pulmonary:  CTAB  GI:  soft; + BS  Extremities: bilateral edema to knees; decreased erythema on LEFT lower extremity surrounding 2 of 3 vein harvesting incisions; no evidence of lymphangitis  Additional:  conversant, neurologically intact    CONDITION ON DISCHARGE: Stable. DISPOSITION:  Patient clinically improved and was discharged home with outpatient physical therapy, occupational therapy. FOLLOW-UP RECOMMENDATIONS:   Follow-up Information       Follow up With Specialties Details Why Contact Info    Maddie Vazquez MD Family Medicine Follow up  [address]      Leodan Rodriguez MD Cardiothoracic Surgery Schedule an appointment as soon as possible for a visit in 1 week(s) For wound re-check (cellulitis leg where vein was harvested) [address] [facility] [id number] [phone]            Future Appointments   Date Time Provider Nate Quinonez   12/29/2022  9:00  [facility] [address] [facility]   12/29/2022  1:30 PM Boom Sam, PA-C CVTS BS AMB         OTHER INSTRUCTIONS:  1. Diet: regular, 4 carb choices  2. Activity. As tolerated. 3. Safety / fall precautions. TIME SPENT ON DISCHARGE ACTIVITIES: 39 minutes.       Signed:  Tal Skelton MD    12/21/2022

## 2022-12-21 NOTE — HOME CARE
Received home health referral for [facility] for SN,PT,OT (s/p CABG 12/7) from ARU ; Discharge order noted for today; spoke to patient in room,Verified demographics,explained [place] services and answered all questions ,patient provided with [facility] contact card ; Patient states her best friend ( Lotus) and her sister Kiera Otto) will both be assisting and helping her after discharge from ARU ; Patient states she has DME: RW, Rollator, cane and glucometer ; per ARU  ,states that a [facility] was ordered thru [address]; [place] referral processed to [facility] central Intake and scheduling. Spoke to CVT PA Robbie Riley) concerning signing [place] orders and POC ,states  CVT MD and PA's will sign HH orders and POT. [facility] will follow. MICKI GERARD.

## 2022-12-23 ENCOUNTER — HOME CARE VISIT (OUTPATIENT)
Dept: SCHEDULING | Facility: HOME HEALTH | Age: 67
End: 2022-12-23
Payer: MEDICARE

## 2022-12-23 VITALS
DIASTOLIC BLOOD PRESSURE: 70 MMHG | HEART RATE: 88 BPM | OXYGEN SATURATION: 94 % | SYSTOLIC BLOOD PRESSURE: 150 MMHG | RESPIRATION RATE: 20 BRPM | TEMPERATURE: 97.5 F

## 2022-12-23 PROCEDURE — G0299 HHS/HOSPICE OF RN EA 15 MIN: HCPCS

## 2022-12-23 PROCEDURE — 400018 HH-NO PAY CLAIM PROCEDURE

## 2022-12-23 NOTE — HOME HEALTH
Skilled services/Home bound verification:     Skilled Reason for admission/summary of clinical condition:   CAD,s/p CABG x2 & AVR. spoke Miah Leon [address] and would like LLE cleansed with NS apply DSD over incision/blister/Kerlix and wrap with ace wrap to reduce edema promote incision healing. RLE apply ace wrap for compression. This patient is homebound for the following reasons Requires considerable and taxing effort to leave the home  and Requires the assistance of 1 or more persons to leave the home . Caregiver: friend. Caregiver assists with transportation meal prep and ADLS when available. Medications reconciled and all medications are available in the home this visit. The following education was provided regarding medications: Miah CONNELL said don't take Lisinopril but can take joint health. will discuss beta blocker at next OV. was able to verbalized understanding and repeat. Medications  are to early to assess at this time. High risk medication teaching regarding anticoagulants, hyperglycemic agents or opoid narcotics performed (specify) norco: . Opoid risk and education: Tolerance-meaning you might need to take more of a medication for the same pain relief  Physical dependence-meaning you have symptoms of withdrawal when a medication is stopped  Increased sensitivity to pain  Constipation Nausea, vomiting, and dry mouth Sleepiness and dizziness  Confusion  Depression Low levels of testosterone that can result in lower sex drive, energy, and strength /Itching and sweating  verbalized understanding  ,    Miah CONNELL notified of any discrepancies/look a like medications/medication interactions see above    Home health supplies by type and quantity ordered/delivered this visit include: wound care supplies ordered and in the home. Patient education provided this visit to include: Reviewed fall precautions and safety:dangle, uses assistive device at all times, non skid foot wear,and night light. Verbalized understanding. Patient level of understanding of education provided:  see intervention tab for level of understanding. Sharps Education Provided: Clinician instructed patient/CG on proper disposal of sharps: Containers should be made of hard plastic, be puncture-resistant and leakproof,   such as a laundry detergent or bleach bottle.  When the container is ¾ full, it should be sealed with tape and labeled   DO NOT RECYCLE prior to discarding in the regular trash. verbalized understanding and repeat back    Patient response to procedure performed:  tolerated without any complaints of pain. Home exercise program/Homework provided: CONT ALL MEDICATIONS AS PRESCRIBED, DIET AS PRESCRIBED, IMPLEMENT FALL/INFECTION CONTROL/PRESSURE ULCER INTERVENTIONS,MONITOR FOR ABNORMAL S/SX/ infection/CAD/DM (listed in intervention tab) REPORT TO MD IMMEDIATELY, AND PERFORM DAILY WEIGHTS. KEEP ALL FOLLOW UP APPTS AS PRESCRIBED. READ ALL TEACHING PACKETS FOR EDUCATION OF DISEASE PROCESS & TO PREVENT COMPLICATIONS. Pt/Caregiver instructed on plan of care and are agreeable to plan of care at this time. Physician Dr Alyssa Rapp or Dr Andre Estrella notified of patient admission to home health and plan of care including anticipated frequency of 1 day 5, 1 wk 3 and treatments/interventions/modalities of  CAD,s/p CABG x2 & AVR. Discharge planning discussed with patient and caregiver. Discharge planning as follows: Will discharge when all  CAD,s/p CABG x2 & AVR disease process, complication and dietary goals are met and understands all medication purpose/frequencies/side effects. Nathan Perkins Pt/Caregiver did verbalize understanding of discharge planning. Next MD appointment 12/29/2022 (date) with Lowell CONNELL. Patient/caregiver encouraged/instructed to keep appointment as lack of follow through with physician appointment could result in discontinuation of home care services for non-compliance.

## 2022-12-24 ENCOUNTER — HOME CARE VISIT (OUTPATIENT)
Dept: HOME HEALTH SERVICES | Facility: HOME HEALTH | Age: 67
End: 2022-12-24
Payer: MEDICARE

## 2022-12-24 ENCOUNTER — HOME CARE VISIT (OUTPATIENT)
Dept: SCHEDULING | Facility: HOME HEALTH | Age: 67
End: 2022-12-24
Payer: MEDICARE

## 2022-12-24 PROCEDURE — G0299 HHS/HOSPICE OF RN EA 15 MIN: HCPCS

## 2022-12-25 ENCOUNTER — HOME CARE VISIT (OUTPATIENT)
Dept: SCHEDULING | Facility: HOME HEALTH | Age: 67
End: 2022-12-25
Payer: MEDICARE

## 2022-12-25 PROCEDURE — G0299 HHS/HOSPICE OF RN EA 15 MIN: HCPCS

## 2022-12-26 ENCOUNTER — HOME CARE VISIT (OUTPATIENT)
Dept: SCHEDULING | Facility: HOME HEALTH | Age: 67
End: 2022-12-26
Payer: MEDICARE

## 2022-12-26 ENCOUNTER — HOME CARE VISIT (OUTPATIENT)
Dept: HOME HEALTH SERVICES | Facility: HOME HEALTH | Age: 67
End: 2022-12-26
Payer: MEDICARE

## 2022-12-26 VITALS
SYSTOLIC BLOOD PRESSURE: 132 MMHG | HEART RATE: 74 BPM | SYSTOLIC BLOOD PRESSURE: 124 MMHG | DIASTOLIC BLOOD PRESSURE: 70 MMHG | TEMPERATURE: 97.8 F | RESPIRATION RATE: 14 BRPM | TEMPERATURE: 98.4 F | RESPIRATION RATE: 16 BRPM | OXYGEN SATURATION: 97 % | HEART RATE: 72 BPM | DIASTOLIC BLOOD PRESSURE: 82 MMHG | OXYGEN SATURATION: 96 %

## 2022-12-26 VITALS
TEMPERATURE: 97.8 F | RESPIRATION RATE: 18 BRPM | DIASTOLIC BLOOD PRESSURE: 62 MMHG | HEART RATE: 83 BPM | SYSTOLIC BLOOD PRESSURE: 118 MMHG | OXYGEN SATURATION: 91 %

## 2022-12-26 PROCEDURE — G0300 HHS/HOSPICE OF LPN EA 15 MIN: HCPCS

## 2022-12-26 NOTE — HOME HEALTH
Caregiver involvement: Sister is caregiver she is available most days and some evenings, she  Assists with ADLs, Medication management, Transportation to appointments, Meal prep and assists with ambulation. Medications reconciled and all medications are available in the home this visit. Medications  are effective at this time. Home health supplies by type and quantity ordered/delivered this visit include: n/a    Patient education provided this visit: I also discussed with patient and caregiver that sternal and calf incisions are healing well with no s/s of infection present. Pt aware to leave incisions open to air and to avoid rubbing incisions. patient made aware to monitor for s/s of infection increased swelling, increased redness around site, increased pain, foul smelling drainage, fever] aware who to report to/when. Leg dressings to stay dry and intact. pt instructed to follow with diabetic diet- monitoring sugar intake, limiting foods with high sugar content. MEDICATION ADHERENCE IS AN IMPORTANT COMPONENT OF HTN MANAGEMENT. PATIENT STATES THE IMPORTANCE TO TAKE HTN MEDS SAME TIME EACH DAY. Continue a heart Healthy ADA  diet. Watch out for high sodium foods, read labels. Observe for signs of infection. Monitor B/P, take meds as ordered and f/u with PCP. Read labels of foods, stay away from high sodium canned foods and processed meats. Progress toward goals: ambulating with walker, lungs cta, left lower leg with blister is burst and no drainage, and right leg with no drainage. Incisions all look good without drainage. Home exercise program: Patient is a fall risk, went over the need of having someone with him when ambulating, keep hallways and living areas free of clutter and throw rugs. Discussed s/s of infection to monitor for, s/s of UTI, who to report to/when. Instructed cg to notify staff/md/seek tx if complications occur. Patient instructed to maintain clear pathways in home and to minimize clutter to prevent falls from occurring/minimize fall potential.   Patient needing a well balanced diet with the 5 food groups, patient to increase fiber in diet, fresh fruits and vegetables, whole grains and increase water intake. Maintain healthy low sodium ADA diet, Continue to monitor B/P and Blood sugars, Observe for signs of infection. Work with PT to increase strength and f/u with PCP. :I went over the importance of taking all prescriptions as ordered. I discussed how to avoid extra sodium in her diet. We discussed the signs of infection and when to call MD.  We discussed the high risk for falls and ways to prevent falls in the future. We discussed taking B/P daily and keeping a log. We also discussed the need of a heart healthy diet, and the need to change positions frequently. Gaining strength with PT for increased mobility. Continued need for the following skills: Nursing, Physical Therapy and Occupational Therapy    The following discharge planning was discussed with the pt/caregiver: Will discharge from nursing when education is complete and patient is medically stable.

## 2022-12-26 NOTE — HOME HEALTH
Caregiver involvement: Sister is caregiver she is available most days and some evenings, she  Assists with ADLs, Medication management, Transportation to appointments, Meal prep and assists with ambulation. Medications reconciled and all medications are available in the home this visit. Medications  are effective at this time. Home health supplies by type and quantity ordered/delivered this visit include: n/a    Patient education provided this visit: I also discussed with patient and caregiver that sternal and calf incisions are healing well with no s/s of infection present. Pt aware to leave incisions open to air and to avoid rubbing incisions. patient made aware to monitor for s/s of infection increased swelling, increased redness around site, increased pain, foul smelling drainage, fever] aware who to report to/when. Leg dressings to stay dry and intact. pt instructed to follow with diabetic diet- monitoring sugar intake, limiting foods with high sugar content. MEDICATION ADHERENCE IS AN IMPORTANT COMPONENT OF HTN MANAGEMENT. PATIENT STATES THE IMPORTANCE TO TAKE HTN MEDS SAME TIME EACH DAY. Continue a heart Healthy ADA  diet. Watch out for high sodium foods, read labels. Observe for signs of infection. Monitor B/P, take meds as ordered and f/u with PCP. Read labels of foods, stay away from high sodium canned foods and processed meats. Progress toward goals: ambulating with walker, lungs cta, left lower leg with partially bursted blister, and right leg with no drainage. Incisions all look good without drainage. Home exercise program: Patient is a fall risk, went over the need of having someone with him when ambulating, keep hallways and living areas free of clutter and throw rugs. Discussed s/s of infection to monitor for, s/s of UTI, who to report to/when. Instructed cg to notify staff/md/seek tx if complications occur. Patient instructed to maintain clear pathways in home and to minimize clutter to prevent falls from occurring/minimize fall potential.   Patient needing a well balanced diet with the 5 food groups, patient to increase fiber in diet, fresh fruits and vegetables, whole grains and increase water intake. Maintain healthy low sodium ADA diet, Continue to monitor B/P and Blood sugars, Observe for signs of infection. Work with PT to increase strength and f/u with PCP. :I went over the importance of taking all prescriptions as ordered. I discussed how to avoid extra sodium in her diet. We discussed the signs of infection and when to call MD.  We discussed the high risk for falls and ways to prevent falls in the future. We discussed taking B/P daily and keeping a log. We also discussed the need of a heart healthy diet, and the need to change positions frequently. Gaining strength with PT for increased mobility. Continued need for the following skills: Nursing, Physical Therapy and Occupational Therapy    The following discharge planning was discussed with the pt/caregiver: Will discharge from nursing when education is complete and patient is medically stable.

## 2022-12-27 ENCOUNTER — HOME CARE VISIT (OUTPATIENT)
Dept: SCHEDULING | Facility: HOME HEALTH | Age: 67
End: 2022-12-27
Payer: MEDICARE

## 2022-12-27 ENCOUNTER — HOME CARE VISIT (OUTPATIENT)
Dept: HOME HEALTH SERVICES | Facility: HOME HEALTH | Age: 67
End: 2022-12-27
Payer: MEDICARE

## 2022-12-27 VITALS
SYSTOLIC BLOOD PRESSURE: 122 MMHG | TEMPERATURE: 97.5 F | DIASTOLIC BLOOD PRESSURE: 62 MMHG | HEART RATE: 84 BPM | RESPIRATION RATE: 18 BRPM | OXYGEN SATURATION: 93 %

## 2022-12-27 PROCEDURE — G0151 HHCP-SERV OF PT,EA 15 MIN: HCPCS

## 2022-12-27 NOTE — Clinical Note
discussed PLOC with LPTA working on strength training. B MARIO. Work on transfer and stair training. Work on gait training on flat and uneven surfaces with the least restrictive A DEV. Pt does have sternal precautions.

## 2022-12-27 NOTE — HOME HEALTH
Skilled reason for visit: Surgical Incision Assessment and Care, Wound Care, Coronary Artery Disease, Depression, Anxiety, and Diabetes    Caregiver involvement: Patient has friends and a sister that assist her during her recovery period and will help her with meals, take her to her medical appointments,  medications, and assist with toileting and bathing. Medications reviewed and all medications ARE available in the home this visit. The following education was provided regarding medications:Lipitor-Patient/Caregiver was educated on this medication and the purpose of it. Patient/Caregiver verbalized understanding. MD notified of any discrepancies/look a-like medications/medication interactions: NA    Medications are EFFECTIVE at this time. Home health supplies by type and quantity ordered/delivered this visit include: NA    Patient education provided this visit: Patient was educated on items checked in interventions tab. Sharps education provided: Clinician instructed patient/CG on proper disposal of sharps: Containers should be made of hard plastic, be puncture-resistant and leak proof, such as a laundry detergent or bleach bottle.  When the container is ¾ full, it should be sealed with tape and labeled DO NOT RECYCLE prior to discarding in the regular trash.      Patient level of understanding of education provided: Patient verbalized all understanding in interventions tab. Skilled Care Performed this visit: Wound Care, Medication Education and Vital Sign Check    Patient response to procedure performed:  Patient tolerated vital assessment well and no facial grimaces or complaints of pain or discomfort. Patient was sitting on couch answering question that this nurse was asking her.     Agency Progress toward goals:  Agency staff is progressing well with patient as patient verbalizes being able to better manage her disease processes with the education received from home care staff. Patient's Progress towards personal goals:  Progressing towards all goals in interventions tab. Home exercise program: Patient will be sure to elevate bilateral lower extremities to help decrease edema. Continued need for the following skills: Nursing will continue to follow patient until wound is healed and education is understood and able to be verbalized by patient/caregiver. Plan for next visit:  Continue to educate, assess patient surgical incision, assess vitals, and review medications    Patient and/or caregiver notified and agrees to changes in the Plan of Care: NA    The following discharge planning was discussed with the pt/caregiver:when patient reaches goals and medication is managed, and disease processes are understood patient agrees and understand that discharge will take place.

## 2022-12-27 NOTE — HOME HEALTH
PMHx:  Aortic stenosis 11/28/2022    Coronary artery disease involving native coronary artery of native heart 11/28/2022    Primary hypertension 11/28/2022    S/P AVR (aortic valve replacement) and aortoplasty 12/7/2022      S/p CABG x2 (LIMA-LAD, rSVG-OM), AVR 23 mm Inspiris with aortic root enlargement - Dr. Alanis Faye S/P C ABG x 2 12/7/2022      S/p CABG x2 (LIMA-LAD, rSVG-OM), AVR 23 mm Inspiris with aortic root enlargement - Dr. Nolvia Villanueva    Type 2 diabetes mellitus, without long-term current use of insulin ([facility] [address] 11/28/2022     SUBJECTIVE:I am having a procedure done this thrurs to remove fluid from L lung thoracentesis, Pt notes that it is an outpatient procedure so she will be home that same day   LIVING SITUATION: Pt lives alone in a mobile home with 3 steps to enter with 2 HR. Pt has friends and family to assist with care   REQUIRES CAREGIVER ASSISTANCE FOR: transportation, ADLS,IADLS   PLOF:. PT was amb without A DEV in home and uses a rollator or cane in public. Malachi Jackson Pt was I with dressing and bathing   MEDICATIONS REVIEWED AND RECONCILED: no changes   NEXT MD APPT: appt 12/29 [facility]  cardiac surgeon PA,  1/3 Dr Beverly Epley cardiologist  /18 Dr Segundo Sheehan PCP   EQUIPMENT: RW, SPC 3 in 1 commode, shower chair   ROM:B [facility] for pt size  STRENGTH: R hip flexors 3/5 R quads and hamstrings +3/5 R DF/PF 5/5  L hip flexors -3/5 L quads and hamstrings +3/5 L DF/PF 5/5   WOUNDS: stenal incison clean dry and intact. L medial leg incsion open to air scabing along incision line. Pt has ace wraps on B calves. +2 pitting edema noted L LE +1 pitting edema note R LE   BED MOBILITY:Pt sleeps in recliner chair with B feet elevated   TRANSFERS:sit to stand from couch with SBA increaesd time needed to complete task . Pt has a wide CHIOMA on all transfers .  Min vc needed for safety as pt tends to leave walker prematurely and sit down without A DEV   GAIT: Pt amb 20 ft x 2 with RW with reciprocal gait pattern with SBA vc needed for dyana as pt amb very quickly. Vc needed to decreased dependence on pushing down with BUE on walker O2 sat after amb with 88% with HR of 92 pt took  2 min of pursed lip breating to increase O2 to 93% with HR of 84  STAIRS:NA   BALANCE: Pt scored 19/28 on Tinetti Balance Assessment placing pt at med risk for falls. PATIENT RESPONE TO TX: Pt pain level remained the same throguhout tx session   PATIENT LEVEL OF UNDERSTANDING OF EDUCATION PROVIDED Pt demonstrated and verbalzied use of RW at all times when amb for safety   PATIENT EDUCATION PROVIDED THIS VISIT: safety, HEP, walking, deep breathing, Turned the wheels of RW inward so that it would fit through hallways. Educated pt to change positon every HR for pressure relief. Educated pt to elevate B LE throughout the day to assist with swelling. Reviewed sternal precautions with pt and educated pt to wear stenal harness at all times   HEP consisting of:  1. Walking every hour during the day with RW   2. B LE  seated hip flexion, LAQS, ,AP 3 daily x 10   Written HEP issued, patient/caregiver verbalized understanding. CONTINUED NEED FOR THE FOLLOWING SKILLS: HH PT is medically necessary to  address pain, decreased decreased strength, increased swelling, impaired bed mobility, decreased independence with functional transfers, impaired gait, impaired stair negotiation, and impaired balance in order to improve functional independence, quality of life, return to PLOF, and reduce the risk for falls. PLAN: Pt will be seen to establish and upgrade HEP. Gait training with  the least restrictive A DEV on flat and uneven surfaces. Bed mobility training, transfer training. Stair training. Dynamic standing balance re -education. Reinforece general safety precautions. DISCHARGE PLANNING DISCUSSED: Discharge to self and family under MD supervision once all goals have been met or patient has reached max potential. Patient/caregiver verbalized understanding.

## 2022-12-27 NOTE — Clinical Note
Therapy Functional Score Assessment  Question   Score   Grooming  2       Upper Dressing 2     Lower Dressing 3   Bathing  5   Toilet Transfer  2   Transfer  2       Ambulation  3   Dyspnea                     2       Pain Interfering with activity 4  Est number therapy visits      7

## 2022-12-29 ENCOUNTER — HOSPITAL ENCOUNTER (OUTPATIENT)
Dept: ULTRASOUND IMAGING | Age: 67
Discharge: HOME | End: 2022-12-29
Attending: RADIOLOGY | Admitting: RADIOLOGY
Payer: MEDICARE

## 2022-12-29 ENCOUNTER — OFFICE VISIT (OUTPATIENT)
Dept: CARDIOTHORACIC SURGERY | Age: 67
End: 2022-12-29
Payer: MEDICARE

## 2022-12-29 ENCOUNTER — APPOINTMENT (OUTPATIENT)
Dept: GENERAL RADIOLOGY | Age: 67
End: 2022-12-29
Attending: RADIOLOGY
Payer: MEDICARE

## 2022-12-29 VITALS
OXYGEN SATURATION: 97 % | HEIGHT: 71 IN | DIASTOLIC BLOOD PRESSURE: 62 MMHG | HEART RATE: 79 BPM | SYSTOLIC BLOOD PRESSURE: 128 MMHG | TEMPERATURE: 98 F | WEIGHT: 293 LBS | BODY MASS INDEX: 41.02 KG/M2

## 2022-12-29 VITALS
HEART RATE: 78 BPM | HEIGHT: 71 IN | BODY MASS INDEX: 41.02 KG/M2 | OXYGEN SATURATION: 98 % | TEMPERATURE: 97.9 F | SYSTOLIC BLOOD PRESSURE: 129 MMHG | WEIGHT: 293 LBS | RESPIRATION RATE: 26 BRPM | DIASTOLIC BLOOD PRESSURE: 51 MMHG

## 2022-12-29 DIAGNOSIS — Z95.1 S/P CABG X 2: ICD-10-CM

## 2022-12-29 DIAGNOSIS — Z95.2 S/P AVR (AORTIC VALVE REPLACEMENT) AND AORTOPLASTY: ICD-10-CM

## 2022-12-29 DIAGNOSIS — J90 PLEURAL EFFUSION ON LEFT: Primary | ICD-10-CM

## 2022-12-29 DIAGNOSIS — Z95.2 S/P AVR (AORTIC VALVE REPLACEMENT): ICD-10-CM

## 2022-12-29 LAB — GLUCOSE BLD STRIP.AUTO-MCNC: 118 MG/DL (ref 70–110)

## 2022-12-29 PROCEDURE — 74011250636 HC RX REV CODE- 250/636: Performed by: RADIOLOGY

## 2022-12-29 PROCEDURE — 82962 GLUCOSE BLOOD TEST: CPT

## 2022-12-29 PROCEDURE — 71045 X-RAY EXAM CHEST 1 VIEW: CPT

## 2022-12-29 PROCEDURE — 99024 POSTOP FOLLOW-UP VISIT: CPT | Performed by: PHYSICIAN ASSISTANT

## 2022-12-29 PROCEDURE — 32555 ASPIRATE PLEURA W/ IMAGING: CPT

## 2022-12-29 RX ORDER — METOPROLOL TARTRATE 25 MG/1
12.5 TABLET, FILM COATED ORAL 2 TIMES DAILY
Qty: 30 TABLET | Refills: 2 | Status: SHIPPED | OUTPATIENT
Start: 2022-12-29

## 2022-12-29 RX ORDER — FUROSEMIDE 40 MG/1
40 TABLET ORAL 2 TIMES DAILY
Qty: 30 TABLET | Refills: 0 | Status: SHIPPED | OUTPATIENT
Start: 2022-12-29

## 2022-12-29 RX ORDER — POTASSIUM CHLORIDE 20 MEQ/1
20 TABLET, EXTENDED RELEASE ORAL 2 TIMES DAILY
Qty: 30 TABLET | Refills: 0 | Status: SHIPPED | OUTPATIENT
Start: 2022-12-29

## 2022-12-29 RX ORDER — ASPIRIN 81 MG/1
81 TABLET ORAL DAILY
Qty: 30 TABLET | Refills: 2
Start: 2022-12-29

## 2022-12-29 RX ORDER — SODIUM CHLORIDE 9 MG/ML
20 INJECTION, SOLUTION INTRAVENOUS CONTINUOUS
Status: DISCONTINUED | OUTPATIENT
Start: 2022-12-29 | End: 2022-12-29 | Stop reason: HOSPADM

## 2022-12-29 RX ADMIN — SODIUM CHLORIDE 20 ML/HR: 9 INJECTION, SOLUTION INTRAVENOUS at 08:11

## 2022-12-29 NOTE — PROGRESS NOTES
CARDIAC SURGERY FOLLOW-UP NOTE    12/29/2022    Subjective:   Yandy Matias returns for a follow-up visit following CABG x 2, AVR (t), aortic root enlargement on 12/7/22 by Dr. Salvador Li. Assessment:  Overall she is doing ok following open-heart surgery. She still has significant edema  + residual mod LEFT effusion after thoracentesis today     Plans / Recommendations:   Ok to shower. Soap and water no creams or ointments. Maintain sternal precautions. Wear heart hugger. Wear surgical or other comfortable bra. Cover draining wounds. Non-draining wounds open to air. ACE wrap for leg swelling. Schedule repeat thoracentesis  Increase Lasix and potassium to twice daily. Continue to hold PLavix until next thoracentesis. Resume aspirin  BMP at next visit on same day as thoracentesis    Follow-up with Primary Care Provider and Cardiologist in the future as directed. No heavy lifting, and wear sternal harness all the time. Patient verbalizes understanding and agreement with the plan. Sumi Castelan PA-C  [facility]  [phone]  _______________________________________________________________________________________________________________________________________________________  Subjective:  She feels well. Angina is absent. Shortness of breath is present but improved some after thoracentesis today  Sternal pain is absent. Stamina is slowly improving. Eating well. Bowel movements regular and thin. Current medications include:  Current Outpatient Medications   Medication Sig Dispense Refill    metoprolol tartrate (LOPRESSOR) 25 mg tablet Take 0.5 Tablets by mouth two (2) times a day. 30 Tablet 2    aspirin delayed-release 81 mg tablet Take 1 Tablet by mouth daily. 30 Tablet 2    furosemide (LASIX) 40 mg tablet Take 1 Tablet by mouth two (2) times a day.  30 Tablet 0    potassium chloride (K-DUR, KLOR-CON M20) 20 mEq tablet Take 1 Tablet by mouth two (2) times a day. 30 Tablet 0    acetaminophen (TYLENOL) 650 mg TbER Take 650 mg by mouth every eight (8) hours as needed for Fever or Pain. acetaminophen (TYLENOL) 500 mg tablet Take 500 mg by mouth every six (6) hours as needed for Fever or Pain. USES DIFFERENT DOSES OF TYLENOL DEPENING ON TYPE OF PAIN. dextromethorphan-guaiFENesin 60-1,200 mg Tb12 Take 1 Tablet by mouth every twelve (12) hours as needed for Congestion or Cough. Omeprazole delayed release (PRILOSEC D/R) 20 mg tablet Take 20 mg by mouth daily. cartilage/collagen/bor/hyalur (JOINT HEALTH PO) Take 1 Tablet by mouth daily. acetaminophen (TYLENOL) 325 mg tablet Take 1-2 Tablets by mouth every four (4) hours as needed for Pain or Fever. Indications: pain 30 Tablet 0    cyanocobalamin (VITAMIN B12) 500 mcg tablet Take 1 Tablet by mouth daily. 30 Tablet 0    folic acid (FOLVITE) 1 mg tablet Take 1 Tablet by mouth daily. 30 Tablet 0    fexofenadine-pseudoephedrine (ALLEGRA-D)  mg Tb12 Take 1 Tablet by mouth every twelve (12) hours. 30 Tablet 0    azelastine (ASTELIN) 137 mcg (0.1 %) nasal spray 1 [place] by Both Nostrils route Every morning. Use in each nostril as directed      fluticasone propionate (FLONASE) 50 mcg/actuation nasal spray 2 Sprays by Both Nostrils route nightly. empagliflozin (JARDIANCE) 25 mg tablet Take  by mouth daily. metFORMIN (GLUCOPHAGE) 500 mg tablet Take 1,000 mg by mouth two (2) times daily (with meals). montelukast (SINGULAIR) 10 mg tablet Take 10 mg by mouth every evening. atorvastatin (LIPITOR) 40 mg tablet Take 40 mg by mouth in the morning. dulaglutide (Trulicity) 3 SZ/3.6 mL pnij 1 Dose by SubCUTAneous route every seven (7) days. SUNDAY NIGHT      multivitamin (ONE A DAY) tablet Take 1 Tablet by mouth daily. lisinopriL (PRINIVIL, ZESTRIL) 20 mg tablet Take 20 mg by mouth daily.  (Patient not taking: Reported on 12/29/2022)      clopidogreL (PLAVIX) 75 mg tab Take 1 Tablet by mouth daily. (Patient not taking: Reported on 12/29/2022) 30 Tablet 0    multivitamin, tx-iron-ca-min (THERA-M w/ IRON) 9 mg iron-400 mcg tab tablet Take 1 Tablet by mouth daily. (Patient not taking: Reported on 12/29/2022) 30 Tablet 2       Objective:   Vital signs:    BP Readings from Last 3 Encounters:   12/29/22 128/62   12/29/22 (!) 129/51   12/27/22 122/62     Wt Readings from Last 3 Encounters:   12/29/22 138.8 kg (306 lb)   12/29/22 138.9 kg (306 lb 3.2 oz)   12/15/22 139.4 kg (307 lb 6.4 oz)     @TMAX(24)@  Wt Readings from Last 3 Encounters:   12/29/22 138.8 kg (306 lb)   12/29/22 138.9 kg (306 lb 3.2 oz)   12/15/22 139.4 kg (307 lb 6.4 oz)     Ht Readings from Last 3 Encounters:   12/29/22 5' 11\" (1.803 m)   12/29/22 5' 11\" (1.803 m)   12/16/22 5' 11\" (1.803 m)     Respiratory exam: clear to auscultation bilaterally, diminished breath sounds L base. Cardiac exam: regular rate and rhythm   Sternum: stable. Sternal wound: clean, dry, healing. Leg wounds: clean, dry, healing. + b/l LE blisters on anterior shins  Pedal edema: marked, unchanged.     CXR with left effusion

## 2022-12-29 NOTE — H&P
OUTPATIENT HISTORY AND PHYSICAL  Today 12/29/2022     Indication/Symptoms:   Reno Forte is a 79 y.o. female with left pleural effusion who presents for an US-guided left thoracentesis    Current Meds:    Prior to Admission medications    Medication Sig Start Date End Date Taking? Authorizing Provider   acetaminophen (Tylenol 8 Hour) 650 mg TbER Take 650 mg by mouth every eight (8) hours as needed for Fever or Pain. Provider, Historical   acetaminophen (TYLENOL) 500 mg tablet Take 500 mg by mouth every six (6) hours as needed for Fever or Pain. USES DIFFERENT DOSES OF TYLENOL DEPENING ON TYPE OF PAIN. Provider, Historical   dextromethorphan-guaiFENesin (Mucinex DM) 60-1,200 mg Tb12 Take 1 Tablet by mouth every twelve (12) hours as needed for Congestion or Cough. Provider, Historical   Omeprazole delayed release (PRILOSEC D/R) 20 mg tablet Take 20 mg by mouth daily. Provider, Historical   lisinopriL (PRINIVIL, ZESTRIL) 20 mg tablet Take 20 mg by mouth daily. Provider, Historical   cartilage/collagen/bor/hyalur (JOINT HEALTH PO) Take 1 Tablet by mouth daily. Provider, Historical   HYDROcodone-acetaminophen (NORCO) 5-325 mg per tablet Take 1 Tablet by mouth every eight (8) hours as needed for Pain (severe pain) for up to 7 days. Max Daily Amount: 3 Tablets. 12/21/22 12/28/22  Sarah Martinez MD   acetaminophen (TYLENOL) 325 mg tablet Take 1-2 Tablets by mouth every four (4) hours as needed for Pain or Fever. Indications: pain 12/21/22   Kendra Narvaez MD   clopidogreL (PLAVIX) 75 mg tab Take 1 Tablet by mouth daily. 12/21/22   Sarah Martinez MD   aspirin delayed-release 81 mg tablet Take 1 Tablet by mouth daily. 12/20/22   Kendra Narvaez MD   cyanocobalamin (VITAMIN B12) 500 mcg tablet Take 1 Tablet by mouth daily. 12/21/22   Sarah Martinez MD   folic acid (FOLVITE) 1 mg tablet Take 1 Tablet by mouth daily.  12/21/22   Sarah Martinez MD   fexofenadine-pseudoephedrine (ALLEGRA-D)  mg Tb12 Take 1 Tablet by mouth every twelve (12) hours. 12/20/22   Lois Maldonado MD   multivitamin, tx-iron-ca-min (THERA-M w/ IRON) 9 mg iron-400 mcg tab tablet Take 1 Tablet by mouth daily. 12/16/22   Candelaria Sam PA-C   furosemide (LASIX) 40 mg tablet Take 1 Tablet by mouth daily. 12/15/22   Christiano Sam PA-C   potassium chloride (K-DUR, KLOR-CON M20) 20 mEq tablet Take 1 Tablet by mouth daily. 12/15/22   Candelaria Sam PA-C   azelastine (ASTELIN) 137 mcg (0.1 %) nasal spray 1 [place] by Both Nostrils route Every morning. Use in each nostril as directed    Provider, Historical   fluticasone propionate (FLONASE) 50 mcg/actuation nasal spray 2 Sprays by Both Nostrils route nightly. Provider, Historical   empagliflozin (JARDIANCE) 25 mg tablet Take  by mouth daily. Provider, Historical   metFORMIN (GLUCOPHAGE) 500 mg tablet Take 1,000 mg by mouth two (2) times daily (with meals). Provider, Historical   montelukast (SINGULAIR) 10 mg tablet Take 10 mg by mouth every evening. Provider, Historical   atorvastatin (LIPITOR) 40 mg tablet Take 40 mg by mouth in the morning. Provider, Historical   dulaglutide (Trulicity) 3 KW/1.1 mL pnij 1 Dose by SubCUTAneous route every seven (7) days. YOLANDA NIGHT    Provider, Historical   multivitamin (ONE A DAY) tablet Take 1 Tablet by mouth daily. Other, MD Ayden       Allergies:       Allergies   Allergen Reactions    Albuterol Sulfate Shortness of Breath    Entex [Pseudoephedrine Tannate] Rash    Terbutaline Shortness of Breath    Terramycin [Oxytetracycline] Unknown (comments)       Comorbid Conditions:    Past Medical History:   Diagnosis Date    Aortic stenosis 11/28/2022    Coronary artery disease involving native coronary artery of native heart 11/28/2022    Primary hypertension 11/28/2022    S/P AVR (aortic valve replacement) and aortoplasty 12/7/2022    S/p CABG x2 (LIMA-LAD, rSVG-OM), AVR 23 mm Inspiris with aortic root enlargement - Dr. Christina Daly    S/P CABG x 2 12/7/2022    S/p CABG x2 (LIMA-LAD, rSVG-OM), AVR 23 mm Inspiris with aortic root enlargement - Dr. Christina Daly    Type 2 diabetes mellitus, without long-term current use of insulin ([email or web address] 11/28/2022          Past Surgical History:   Procedure Laterality Date    HX HYSTERECTOMY       Data:    Visit Vitals  /61 (BP 1 Location: Right upper arm, BP Patient Position: At rest;Semi fowlers)   Pulse 91   Temp 98.1 °F (36.7 °C)   Resp 20   Ht 5' 11\" (1.803 m)   Wt 138.9 kg (306 lb 3.2 oz)   SpO2 94%   BMI 42.71 kg/m²   :  No results for input(s): PLT, PLTEXT in the last 72 hours. No lab exists for component:  HCT  No results for input(s): INR, APTT, INREXT in the last 72 hours. No lab exists for component: PT    The H & P and/or progress notes and any available imaging were reviewed. The risks, indications and possible alternatives to the procedure, including doing nothing, were discussed and informed consent was obtained. Physical Exam:      Mental status:   Alert and oriented. Heart:   Regular rate. Lungs:  Normal respiratory effort. The patient is an appropriate candidate to undergo the planned procedure.      KO Rosenberg

## 2022-12-29 NOTE — PROCEDURES
RADIOLOGY POST THORACENTESIS NOTE     December 29, 2022       9:28 AM     :  Ruth Wilson PA-C    Assistant:  None. Attending: Dr. Danae Rajput    Procedure:  US-guided left thoracentesis     Pre-operative Diagnosis: Pleural effusion    Post-operative Diagnosis: same    Procedure Details: Informed consent obtained. Under ultrasound guidance, the largest pocket of fluid collection was identified. Percutaneous access was achieved using a posterior intercostal approach with a one step needle. 1% lidocaine was utilized for local anesthesia. The 5 [place] sheathed needle connected to manually controlled vacuum bottle. 1.2 L of dionicio fluid removed. Images saved in PACS. Complications:  No immediate. Specimens: No    Estimated blood Loss: Minimal               Condition: Stable. Impression:  Successful left thoracentesis. Moderate residual left pleural effusion visualized on ultrasound post procedure    Plan: Post procedure chest xray pending.       KO Patrick

## 2022-12-29 NOTE — PATIENT INSTRUCTIONS
Ok to shower. Soap and water no creams or ointments. Maintain sternal precautions. Wear heart hugger. Wear surgical or other comfortable bra. Cover draining wounds. Non-draining wounds open to air. ACE wrap for leg swelling. Schedule repeat thoracentesis  Increase Lasix and potassium to twice daily. Continue to hold PLavix until next thoracentesis.   Resume aspirin  BMP at next visit on same day as thoracentesis

## 2022-12-29 NOTE — PROGRESS NOTES
Yudy Faria presents today for   Chief Complaint   Patient presents with    Post OP Follow Up     S/P CABG       Yudy Faria preferred language for health care discussion is english/other. Is someone accompanying this pt? No    Is the patient using any DME equipment during OV?  Cane    Vitals:    12/29/22 1157   BP: 128/62   Pulse: 79   Temp: 98 °F (36.7 °C)   TempSrc: Temporal   SpO2: 97%   Weight: 138.8 kg (306 lb)   Height: 5' 11\" (1.803 m)

## 2023-01-04 ENCOUNTER — HOME CARE VISIT (OUTPATIENT)
Dept: HOME HEALTH SERVICES | Facility: HOME HEALTH | Age: 68
End: 2023-01-04
Payer: MEDICARE

## 2023-01-04 ENCOUNTER — OFFICE VISIT (OUTPATIENT)
Dept: CARDIOTHORACIC SURGERY | Age: 68
End: 2023-01-04
Payer: MEDICARE

## 2023-01-04 ENCOUNTER — APPOINTMENT (OUTPATIENT)
Dept: GENERAL RADIOLOGY | Age: 68
End: 2023-01-04
Attending: RADIOLOGY
Payer: MEDICARE

## 2023-01-04 ENCOUNTER — HOSPITAL ENCOUNTER (OUTPATIENT)
Dept: ULTRASOUND IMAGING | Age: 68
Discharge: HOME | End: 2023-01-04
Attending: RADIOLOGY | Admitting: RADIOLOGY
Payer: MEDICARE

## 2023-01-04 VITALS
OXYGEN SATURATION: 95 % | HEART RATE: 86 BPM | TEMPERATURE: 97.3 F | BODY MASS INDEX: 41.02 KG/M2 | HEIGHT: 71 IN | WEIGHT: 293 LBS | SYSTOLIC BLOOD PRESSURE: 138 MMHG | DIASTOLIC BLOOD PRESSURE: 64 MMHG

## 2023-01-04 VITALS
HEART RATE: 83 BPM | RESPIRATION RATE: 17 BRPM | TEMPERATURE: 97.1 F | OXYGEN SATURATION: 95 % | DIASTOLIC BLOOD PRESSURE: 71 MMHG | SYSTOLIC BLOOD PRESSURE: 118 MMHG

## 2023-01-04 DIAGNOSIS — J90 PLEURAL EFFUSION ON LEFT: ICD-10-CM

## 2023-01-04 DIAGNOSIS — I87.2 VENOUS STASIS ULCER OF LEFT CALF LIMITED TO BREAKDOWN OF SKIN WITHOUT VARICOSE VEINS (HCC): ICD-10-CM

## 2023-01-04 DIAGNOSIS — Z95.1 S/P CABG X 2: Primary | ICD-10-CM

## 2023-01-04 DIAGNOSIS — L97.221 VENOUS STASIS ULCER OF LEFT CALF LIMITED TO BREAKDOWN OF SKIN WITHOUT VARICOSE VEINS (HCC): ICD-10-CM

## 2023-01-04 DIAGNOSIS — Z95.2 S/P AVR (AORTIC VALVE REPLACEMENT) AND AORTOPLASTY: ICD-10-CM

## 2023-01-04 LAB — GLUCOSE BLD STRIP.AUTO-MCNC: 132 MG/DL (ref 70–110)

## 2023-01-04 PROCEDURE — 71045 X-RAY EXAM CHEST 1 VIEW: CPT

## 2023-01-04 PROCEDURE — MED10821 BANDAGE,GAUZE,4.5"X4.1YD,STERILE,LF

## 2023-01-04 PROCEDURE — A4216 STERILE WATER/SALINE, 10 ML: HCPCS

## 2023-01-04 PROCEDURE — 82962 GLUCOSE BLOOD TEST: CPT

## 2023-01-04 PROCEDURE — 99024 POSTOP FOLLOW-UP VISIT: CPT | Performed by: PHYSICIAN ASSISTANT

## 2023-01-04 PROCEDURE — A6449 LT COMPRES BAND >=3" <5"/YD: HCPCS

## 2023-01-04 PROCEDURE — A6252 ABSORPT DRG >16 <=48 W/O BDR: HCPCS

## 2023-01-04 PROCEDURE — 74011250636 HC RX REV CODE- 250/636: Performed by: RADIOLOGY

## 2023-01-04 PROCEDURE — A6212 FOAM DRG <=16 SQ IN W/BORDER: HCPCS

## 2023-01-04 PROCEDURE — 32555 ASPIRATE PLEURA W/ IMAGING: CPT

## 2023-01-04 RX ORDER — CEPHALEXIN 500 MG/1
500 CAPSULE ORAL 4 TIMES DAILY
Qty: 40 CAPSULE | Refills: 0 | Status: SHIPPED | OUTPATIENT
Start: 2023-01-04 | End: 2023-01-14

## 2023-01-04 RX ORDER — METOPROLOL TARTRATE 25 MG/1
25 TABLET, FILM COATED ORAL 2 TIMES DAILY
Qty: 60 TABLET | Refills: 2 | Status: SHIPPED | OUTPATIENT
Start: 2023-01-04

## 2023-01-04 RX ORDER — POTASSIUM CHLORIDE 20 MEQ/1
20 TABLET, EXTENDED RELEASE ORAL 3 TIMES DAILY
Qty: 90 TABLET | Refills: 2 | Status: SHIPPED | OUTPATIENT
Start: 2023-01-04

## 2023-01-04 RX ORDER — SODIUM CHLORIDE 9 MG/ML
20 INJECTION, SOLUTION INTRAVENOUS CONTINUOUS
Status: DISCONTINUED | OUTPATIENT
Start: 2023-01-04 | End: 2023-01-04 | Stop reason: HOSPADM

## 2023-01-04 RX ADMIN — SODIUM CHLORIDE 20 ML/HR: 9 INJECTION, SOLUTION INTRAVENOUS at 09:51

## 2023-01-04 NOTE — PROGRESS NOTES
[facility] Progress Note          Today's date: 1/4/2023    Interval History:     Patient is status post CABG x2 with AVR and aortic root enlargement on 12/7/2022. She was discharged from acute care rehab on 12/21/2022. She was discharged with Ace wrap's to her legs and Bactrim by the rehab physician for superficial cellulitis of her leg. She is progressing fairly well at home. She has good pain control. She is tolerating her diet and moving her bowels. Her Lasix and potassium were increased by cardiology. She reports her leg edema has decreased. She continues to periodically bandage and Ace wrap her legs with the assistance of her friend although home health has not seen her since 12/26. She had a therapeutic thoracentesis today for 1.5 L. Her follow-up chest x-ray is without pneumothorax and with nice resolution of pleural effusion. Assessment:     Satisfactory progress after CABG x2, AVR and aortic root enlargement. Normal ejection fraction but diastolic dysfunction, moderate  Bilateral medical venous disease with new left lower leg venous ulcers  Severe lower extremity peripheral edema  Superficial cellulitis left leg not associated with vein harvest.  See pictures below  Diabetes mellitus  Hypertension  Chronic kidney disease  Hyperlipidemia        Plan:     1. Lopressor increased to 25 mg twice daily. Continue other medications. Medication reconciliation performed. 2.  Left leg bandaged and Ace wrapped. Bandage supplies provided to patient. Patient to ensure home health nursing sees her ASAP. Wound care consult placed. Keflex ordered. 3.  Cardiac rehab referral placed  4. Follow-up with cardiology and PCP      Subjective:     Chief Complaint: Status post CABG, AVR      Objective:     Admission Weight: Last Weight   Weight: 136.5 kg (301 lb) Weight: 136.5 kg (301 lb)     No flowsheet data found.      Visit Vitals  /64 (BP 1 Location: Right arm, BP Patient Position: Sitting, BP Cuff Size: Adult)   Pulse 86   Temp 97.3 °F (36.3 °C) (Temporal)   Ht 5' 11\" (1.803 m)   Wt 136.5 kg (301 lb)   SpO2 95%   BMI 41.98 kg/m²       BP Readings from Last 3 Encounters:   01/04/23 118/71   01/04/23 138/64   12/29/22 128/62     Wt Readings from Last 3 Encounters:   01/04/23 136.5 kg (301 lb)   12/29/22 138.8 kg (306 lb)   12/29/22 138.9 kg (306 lb 3.2 oz)       Physical Exam:  General: Nontoxic and well-appearing. No apparent distress  Neck: No JVD or tracheal deviation. Lungs: Clear to auscultation without wheezes, rales or rhonchi. Chest: Incisions healing well. Sternum is stable. Heart: Regular rate and rhythm  Abdomen: Obese. Not distended. Soft and nontender. Extremities: Improved but still with severe lower extremity edema. Dry, erythematous skin. A medial and lateral superficial ulcer, malodorous. Serous appearing slough on bandage. Neuro: Moves all extremities x4 strong and equal.               RADIOLOGY:   (independently reviewed) no pneumothorax. Improved left pleural effusion      Johnathon Corbett PA-C    PLEASE NOTE:  This document has been produced using voice recognition software. Unrecognized errors in transcription may be present. NOTE TO PATIENT:  The purpose of this note is to communicate optimally with the other providers involved in your care. It is written using standard medical terminology. If you have questions regarding details of the note please call my office at [phone] and make an appointment to discuss your concerns.

## 2023-01-04 NOTE — DISCHARGE INSTRUCTIONS
Thoracentesis: What to Expect at Home  Your Recovery  Thoracentesis (say \"fkxq-fq-nqg-DIDIER-sis\") is a procedure to remove fluid from the space between the lungs and the chest wall (pleural space). This procedure may also be called a \"chest tap. \" It's normal to have a small amount of fluid in the pleural space. But too much fluid can build up because of problems such as infection, heart failure, or lung cancer. The procedure may have been done to help with shortness of breath and pain caused by the fluid buildup. Or you may have had this procedure so the doctor could test the fluid to find the cause of the buildup. Your chest may be sore where the doctor put the needle or catheter into your skin (the puncture site). This usually gets better after a day or two. You can go back to work or your normal activities as soon as you feel up to it. If a large amount of pleural fluid was removed during the procedure, you will probably be able to breathe more easily. If more pleural fluid collects and needs to be removed, another thoracentesis may be done later. If the doctor sent the fluid to a lab for testing, it may take several days to get the results. The doctor or nurse will discuss the results with you. This care sheet gives you a general idea about how long it will take for you to recover. But each person recovers at a different pace. Follow the steps below to feel better as quickly as possible. How can you care for yourself at home? Activity    Rest when you feel tired. Getting enough sleep will help you recover. Avoid strenuous activities, such as bicycle riding, jogging, weight lifting, or aerobic exercise, until your doctor says it is okay. You may shower. Do not take a bath until the puncture site has healed, or until your doctor tells you it is okay. Ask your doctor when you can drive again. You may need to take 1 or 2 days off from work.  It depends on the type of work you do and how you feel.   Diet    You can eat your normal diet. Drink plenty of fluids (unless your doctor tells you not to). Medicines    Your doctor will tell you if and when you can restart your medicines. He or she will also give you instructions about taking any new medicines. If you stopped taking aspirin or some other blood thinner, your doctor will tell you when to start taking it again. Be safe with medicines. Take pain medicines exactly as directed. If the doctor gave you a prescription medicine for pain, take it as prescribed. If you are not taking a prescription pain medicine, ask your doctor if you can take an over-the-counter medicine. Do not take two or more pain medicines at the same time unless the doctor told you to. Many pain medicines have acetaminophen, which is Tylenol. Too much acetaminophen (Tylenol) can be harmful. If you think your pain medicine is making you sick to your stomach: Take your medicine after meals (unless your doctor has told you not to). Ask your doctor for a different pain medicine. If your doctor prescribed antibiotics, take them as directed. Do not stop taking them just because you feel better. You need to take the full course of antibiotics. Care of the puncture site    Wash the area daily with warm, soapy water, and pat it dry. Don't use hydrogen peroxide or alcohol, which may delay healing. You may cover the area with a gauze bandage if it weeps or rubs against clothing. Change the bandage every day. Keep the area clean and dry. Follow-up care is a key part of your treatment and safety. Be sure to make and go to all appointments, and call your doctor if you are having problems. It's also a good idea to know your test results and keep a list of the medicines you take. When should you call for help? Call 911 anytime you think you may need emergency care. For example, call if:    You passed out (lost consciousness).      You have severe trouble breathing. You have sudden chest pain and shortness of breath, or you cough up blood. Call your doctor now or seek immediate medical care if:    You have shortness of breath that is new or getting worse. You have new or worse pain in your chest, especially when you take a deep breath. You are sick to your stomach or cannot keep fluids down. You have a fever over 100°F.     Bright red blood has soaked through the bandage over your puncture site. You have signs of infection, such as: Increased pain, swelling, warmth, or redness. Red streaks leading from the puncture site. Pus draining from the puncture site. Swollen lymph nodes in your neck, armpits, or groin. A fever. You cough up a lot more mucus than normal, or your mucus changes color. Watch closely for changes in your health, and be sure to contact your doctor if you have any problems. Where can you learn more? Go to http://www.gray.com/  Enter Q755 in the search box to learn more about \"Thoracentesis: What to Expect at Home. \"  Current as of: March 9, 2022               Content Version: 13.4  © 3971-7549 [employer]. Care instructions adapted under license by [employer] (which disclaims liability or warranty for this information). If you have questions about a medical condition or this instruction, always ask your healthcare professional. [place] [address] any warranty or liability for your use of this information.

## 2023-01-04 NOTE — H&P
History and Physical    Patient: Coby Galvez           Sex: female       DOA: 1/4/2023  YOB: 1955      Age:  79 y.o.     LOS:  LOS: 0 days        HPI:     Coby Galvez is a 79 y.o. female s/p AVR abd CABG x 2 12/7/22 who presents for left thoracentesis. Past Medical History:   Diagnosis Date    Aortic stenosis 11/28/2022    Coronary artery disease involving native coronary artery of native heart 11/28/2022    Primary hypertension 11/28/2022    S/P AVR (aortic valve replacement) and aortoplasty 12/7/2022    S/p CABG x2 (LIMA-LAD, rSVG-OM), AVR 23 mm Inspiris with aortic root enlargement - Dr. Marifer Robles    S/P CABG x 2 12/7/2022    S/p CABG x2 (LIMA-LAD, rSVG-OM), AVR 23 mm Inspiris with aortic root enlargement - Dr. Marifer Robles    Type 2 diabetes mellitus, without long-term current use of insulin ([facility] 11/28/2022       Past Surgical History:   Procedure Laterality Date    HX HYSTERECTOMY         No family history on file. Social History     Socioeconomic History    Marital status:    Tobacco Use    Smoking status: Heavy Smoker     Packs/day: 1.50     Types: Cigarettes   Substance and Sexual Activity    Alcohol use: Yes     Comment: 1    Drug use: No       Prior to Admission medications    Medication Sig Start Date End Date Taking? Authorizing Provider   metoprolol tartrate (LOPRESSOR) 25 mg tablet Take 0.5 Tablets by mouth two (2) times a day. 12/29/22   Myesha Sam PA-C   aspirin delayed-release 81 mg tablet Take 1 Tablet by mouth daily. 12/29/22   Myesha Sam PA-C   furosemide (LASIX) 40 mg tablet Take 1 Tablet by mouth two (2) times a day. 12/29/22   Brice Sam PA-C   potassium chloride (K-DUR, KLOR-CON M20) 20 mEq tablet Take 1 Tablet by mouth two (2) times a day. 12/29/22   Myesha Sam PA-C   acetaminophen (TYLENOL) 650 mg TbER Take 650 mg by mouth every eight (8) hours as needed for Fever or Pain.     Provider, Historical   acetaminophen (TYLENOL) 500 mg tablet Take 500 mg by mouth every six (6) hours as needed for Fever or Pain. USES DIFFERENT DOSES OF TYLENOL DEPENING ON TYPE OF PAIN. Provider, Historical   dextromethorphan-guaiFENesin 60-1,200 mg Tb12 Take 1 Tablet by mouth every twelve (12) hours as needed for Congestion or Cough. Provider, Historical   Omeprazole delayed release (PRILOSEC D/R) 20 mg tablet Take 20 mg by mouth daily. Provider, Historical   lisinopriL (PRINIVIL, ZESTRIL) 20 mg tablet Take 20 mg by mouth daily. Patient not taking: Reported on 12/29/2022    Provider, Historical   cartilage/collagen/bor/hyalur (JOINT HEALTH PO) Take 1 Tablet by mouth daily. Provider, Historical   acetaminophen (TYLENOL) 325 mg tablet Take 1-2 Tablets by mouth every four (4) hours as needed for Pain or Fever. Indications: pain 12/21/22   Augusta Narvaez MD   clopidogreL (PLAVIX) 75 mg tab Take 1 Tablet by mouth daily. Patient not taking: Reported on 12/29/2022 12/21/22   Cecilia Gray MD   cyanocobalamin (VITAMIN B12) 500 mcg tablet Take 1 Tablet by mouth daily. 12/21/22   Cecilia Gray MD   folic acid (FOLVITE) 1 mg tablet Take 1 Tablet by mouth daily. 12/21/22   Augusta Narvaez MD   fexofenadine-pseudoephedrine (ALLEGRA-D)  mg Tb12 Take 1 Tablet by mouth every twelve (12) hours. 12/20/22   Cecilia Gray MD   multivitamin, tx-iron-ca-min (THERA-M w/ IRON) 9 mg iron-400 mcg tab tablet Take 1 Tablet by mouth daily. Patient not taking: Reported on 12/29/2022 12/16/22   Laura Vasques PA-C   azelastine (ASTELIN) 137 mcg (0.1 %) nasal spray 1 [place] by Both Nostrils route Every morning. Use in each nostril as directed    Provider, Historical   fluticasone propionate (FLONASE) 50 mcg/actuation nasal spray 2 Sprays by Both Nostrils route nightly. Provider, Historical   empagliflozin (JARDIANCE) 25 mg tablet Take  by mouth daily.     Provider, Historical   metFORMIN (GLUCOPHAGE) 500 mg tablet Take 1,000 mg by mouth two (2) times daily (with meals). Provider, Historical   montelukast (SINGULAIR) 10 mg tablet Take 10 mg by mouth every evening. Provider, Historical   atorvastatin (LIPITOR) 40 mg tablet Take 40 mg by mouth in the morning. Provider, Historical   dulaglutide (Trulicity) 3 UX/2.5 mL pnij 1 Dose by SubCUTAneous route every seven (7) days. SUNDAY NIGHT    Provider, Historical   multivitamin (ONE A DAY) tablet Take 1 Tablet by mouth daily. Other, MD Ayden       Allergies   Allergen Reactions    Albuterol Sulfate Shortness of Breath    Entex [Pseudoephedrine Tannate] Rash    Terbutaline Shortness of Breath    Terramycin [Oxytetracycline] Unknown (comments)       Physical Exam:      Visit Vitals  /77   Pulse 82   Temp 97.4 °F (36.3 °C)   Resp 18   SpO2 95%     Physical Exam:  General: A&O x 4, NAD  Heart: RRR  Lungs: Normal work of breathing on room air    Labs Reviewed: All lab results for the last 24 hours reviewed.     Assessment/Plan     The patient is an appropriate candidate to undergo the planned procedure     Jennifer Boston PA-C

## 2023-01-04 NOTE — PATIENT INSTRUCTIONS
Ace wrap legs ankle to knee. Elevate legs when possible. Wound care referral placed. Start Keflex, 1 tab 4 times a day until gone. Okay to increase lifting weight and stop using sternal harness. Cardiac rehab referral placed. Follow-up with PCP and cardiology as scheduled. Contact our service with any issues or concerns with incisions.

## 2023-01-04 NOTE — PROCEDURES
RADIOLOGY POST THORACENTESIS NOTE     January 4, 2023       12:26 PM     :  Jesse Thorne PA-C    Assistant:  None. Attending: Dr. Katharine Cueva    Procedure:  US-guided left thoracentesis     Pre-operative Diagnosis: Pleural effusion    Post-operative Diagnosis: same    Procedure Details: Informed consent obtained. Under ultrasound guidance, the largest pocket of fluid collection was identified. Percutaneous access was achieved using a posterior intercostal approach with a one step needle. 1% lidocaine was utilized for local anesthesia. The 5 [place] sheathed needle connected to manually controlled vacuum bottle. 1.5L of clear yellow fluid removed. Small effusion remaining post-procedure. Images saved in PACS. Complications:  No immediate. Specimens: None    Estimated blood Loss: Minimal               Condition: Stable. Impression:  Successful left thoracentesis. Plan: Post procedure chest xray pending.       Ivonne Lan PA-C

## 2023-01-05 ENCOUNTER — HOME CARE VISIT (OUTPATIENT)
Dept: SCHEDULING | Facility: HOME HEALTH | Age: 68
End: 2023-01-05
Payer: MEDICARE

## 2023-01-05 VITALS
OXYGEN SATURATION: 93 % | SYSTOLIC BLOOD PRESSURE: 130 MMHG | RESPIRATION RATE: 18 BRPM | DIASTOLIC BLOOD PRESSURE: 70 MMHG | HEART RATE: 83 BPM

## 2023-01-05 PROCEDURE — G0157 HHC PT ASSISTANT EA 15: HCPCS

## 2023-01-05 PROCEDURE — G0300 HHS/HOSPICE OF LPN EA 15 MIN: HCPCS

## 2023-01-05 NOTE — HOME HEALTH
Skilled reason for visit: Wound Care Treatment  and education    Caregiver involvement: Pt independent with care with the exception of wound treatment due to location       Medications reviewed and all medications are available in the home this visit. The following education was provided regarding medications:  LESLIE ASKEW notified of any discrepancies/look a-like medications/medication interactions: LESLIE  Medications are effecrtive at this time. Home health supplies by type and quantity ordered/delivered this visit include:  Supplies available   Patient education provided this visit:  Reviewed to reported any redness, swelling, warmth, fever, chills, increased heart/respiratory rate, uncontrolled pain/tenderness, drainage:green/yellow/brown, or odor to MD immediately. Pt was released from her chest hugger yesterday. Sternum incison is healed. Wound on left leg has mild weeping. MD office wrapped yesterday nurse completed today. Right legs wound has good granulated tissue and was left open to air. Sharps education provided: Clinician instructed patient/CG on proper disposal of sharps: Containers should be made of hard plastic, be puncture-resistant and leakproof,   such as a laundry detergent or bleach bottle.  When the container is ¾ full, it should be sealed with tape and labeled   DO NOT RECYCLE prior to discarding in the regular trash.      Patient level of understanding of education provided: Kirsten Castillo all understanding to above education    Skilled Care Performed this visit: Education and Wound Care    Patient response to procedure performed: Minimal pain during dressing change     Agency Progress toward goals: Progressing toward interventions above      Patient's Progress towards personal goals: when patient reaches goals and medication is managed, and disease processes are understood patient agrees and understand that discharge will take place.

## 2023-01-06 ENCOUNTER — HOME CARE VISIT (OUTPATIENT)
Dept: SCHEDULING | Facility: HOME HEALTH | Age: 68
End: 2023-01-06
Payer: MEDICARE

## 2023-01-06 PROCEDURE — G0157 HHC PT ASSISTANT EA 15: HCPCS

## 2023-01-06 PROCEDURE — G0152 HHCP-SERV OF OT,EA 15 MIN: HCPCS

## 2023-01-06 NOTE — Clinical Note
thanks for the update  ----- Message -----  From: Jennifer Feliciano, OT  Sent: 1/9/2023   6:51 AM EST  To: Jim Deleon MD, Yinka Samuel RN, *      Occupational Therapy Evaluation    1w1    Ms. Ava Stafford is ambulating with her RW for safety. She declines to perform a tub transfer today but reports that she sits on her bench and lifts legs over edge of tub before standing. She has a shower seat in her tub for energy conservation. Suggested placing hands flat on wall for balance and then stepping into tub by flexing each knee to clear foot over edge of tub for increased safety. Provided demonstration to pt. She states that this looks easier and she is agreeable to try next time, just not today. Ms. Ava Stafford was able to perform sit <> stand from her elevated toilet seat and reports that she sometimes performs sponge bathing while seated on this seat instead of the shower. She denies having difficulty with toilet hygiene or clothing management. Ms. Ava Stafford is able to dress self while seated but states that is requires increased time to dress her LB to include don/doff socks. Discussed role of skilled OT in the home to improve safety and independence with ADL and IADL tasks. At this time, pt politely declines need for further skilled OT. She reports that she performs her own ADL and light IADL such as reheating meals.   Will d/c OT at this time per pt request.

## 2023-01-08 VITALS
DIASTOLIC BLOOD PRESSURE: 70 MMHG | RESPIRATION RATE: 17 BRPM | OXYGEN SATURATION: 95 % | SYSTOLIC BLOOD PRESSURE: 138 MMHG | TEMPERATURE: 98.3 F | HEART RATE: 83 BPM

## 2023-01-08 NOTE — HOME HEALTH
Caregiver involvement: Nithin (friend) lives locally and visits often to provide emotional support and assist with groceries. Medications reviewed and all medications are available in the home this visit. The following education was provided regarding medications, medication interactions, and look alike medications (specify): Continue as directed by MD.    Medications  are effective at this time. Patient education provided this visit: see ADL note    Sharps education provided:  Clinician instructed patient/CG on proper disposal of sharps: Containers should be made of hard plastic, be puncture-resistant and leakproof,   such as a laundry detergent or bleach bottle.  When the container is ¾ full, it should be sealed with tape and labeled   DO NOT RECYCLE prior to discarding in the regular trash.        Patient level of understanding of education provided: see ADL note    Skilled Care Performed this visit: Completed OT evaluation and assessment for safety with ADL and mobility. Patient response to procedure performed:  Pt had a positive reponse to therapy. She denies SOB or increase in pain during participation. Patient's Progress towards personal goals: Ms. James Lindsay has good rehab potential to return to her independent PLOF with ADL and mobility. She is currently distant S/S for all ADL and IADL at this time.      Home exercise program: na    Continued need for the following skills: Nursing and Physical Therapy    Discharge Plans:  1w1 with plans to discharge to self per pt request.    List of Comorbidities:   Aortic stenosis 11/28/2022   Coronary artery disease involving native coronary artery of native heart 11/28/2022   Primary hypertension 11/28/2022   S/P AVR (aortic valve replacement) and aortoplasty 12/7/2022  S/p CABG x2 (LIMA-LAD, rSVG-OM), AVR 23 mm Inspiris with aortic root enlargement - Dr. Gómez Shine S/P C ABG x 2 12/7/2022  S/p CABG x2 (LIMA-LAD, rSVG-OM), AVR 23 mm Inspiris with aortic root enlargement - Dr. Mariana Arenas   Type 2 diabetes mellitus, without long-term current use of insulin ([facility] 11/28/2022     FTF verified pending completion fields 22/26, 12/22/22 TS, completed 1/5/23 TS              Inpatient Notes         History of the Present Illness: This is a 60-year-old female with a history of severe aortic stenosis and diabetes and hypertension who presented for elective cardiac catheterization and was found to have ostial LAD disease. Cardiothoracic surgery was consulted. Patient also had severe aortic stenosis. Patient subsequently underwent coronary artery bypass graft x2 and aortic valve replacement. Postoperatively patient remained stable. For full details regarding hospital course please refer to chart. PT and OT saw the patient and was felt that patient may benefit from transitioning to the inpatient rehab. Patient was transitioned to the inpatient rehab at [facility] where I saw the patient for the first time today. Patient is sitting in a chair in no apparent distress. Patient is awake and alert and follows commands. No history of any fever or chills. No history of any nausea vomiting or abdominal pain. No history of any shortness of breath or cough. Patient is wearing the sternal harness. No history of any urinary complaints or rectal bleeding. No history of any black or dark-colored stools. Patient does have lower extremity edema. Patient states she has been having some loose bowel movements but not watery. No history of any focal weakness or visual changes. Patient does complain of some headaches occasionally. Other medical issues  Severe AS, status post aortic valve replacement  Peripheral arterial disease  Venous stasis dermatitis - acute on chronic  LEFT lower extremity cellulitis assoc with vein harvesting incisions - new issue  Type 2 diabetes  Hypertension  Chronic anemia - Hgb 10.0 on 12/20  History of tobacco use  Chronic kidney disease stage II - Cr. 1.10 on 12 /20  Hyperlipidemia   12/21/22 note:  [facility]   PA lat CXR with moderately large left pleural effusion. Acceptable 02 sats on RA without signif THOMAS. BLE very edematous and now L>R erythema with serous drainage from left leg incisions. Chest incisions healing well. Left leg ACE wrapped, Bactrim started by medicine, outpatient left thora ordered. CVT office will try to co-ordinate OV blair e ab as rogelio. D/W Dr. Joaquim Yanez and Eamon Arguelles. Patient relays understanding and is agreeable.                 DME         Front wheeled walker  Four wheeled walker  3-in-1 [id number] [facility]

## 2023-01-09 NOTE — CASE COMMUNICATION
Occupational Therapy Evaluation    1w1    Ms. Jcarlos Abbott is ambulating with her RW for safety. She declines to perform a tub transfer today but reports that she sits on her bench and lifts legs over edge of tub before standing. She has a shower seat in her tub for energy conservation. Suggested placing hands flat on wall for balance and then stepping into tub by flexing each knee to clear foot over edge of tub for increased safety. Provided demonstration to pt. She states that this looks easier and she is agreeable to try next time, just not today. Ms. Jcarlos Abbott was able to perform sit <> stand from her elevated toilet seat and reports that she sometimes performs sponge bathing while seated on this seat instead of the shower. She denies having difficulty with toilet hygiene or clothing management. Ms. Jcarlos Abbott is able to dress self while seated but state s that is requires increased time to dress her LB to include don/doff socks. Discussed role of skilled OT in the home to improve safety and independence with ADL and IADL tasks. At this time, pt politely declines need for further skilled OT. She reports that she performs her own ADL and light IADL such as reheating meals.   Will d/c OT at this time per pt request.

## 2023-01-10 ENCOUNTER — HOME CARE VISIT (OUTPATIENT)
Dept: SCHEDULING | Facility: HOME HEALTH | Age: 68
End: 2023-01-10
Payer: MEDICARE

## 2023-01-10 VITALS
SYSTOLIC BLOOD PRESSURE: 141 MMHG | OXYGEN SATURATION: 96 % | DIASTOLIC BLOOD PRESSURE: 74 MMHG | HEART RATE: 88 BPM | RESPIRATION RATE: 16 BRPM | TEMPERATURE: 98.2 F

## 2023-01-10 PROCEDURE — G0157 HHC PT ASSISTANT EA 15: HCPCS

## 2023-01-10 NOTE — HOME HEALTH
SUBJECTIVE: Patient reported feeling sinus cold/HA #2/10 this afternoon. CAREGIVER INVOLVEMENT/ASSISTANCE NEEDED FOR: Patient's sister, Carrington Dao, assists with transportation. HOME HEALTH SUPPLIES BY TYPE AND QUANTITY ORDERED/DELIVERED THIS VISIT INCLUDE: None needed for this visit. OBJECTIVE:  See interventions. PATIENT RESPONSE TO TREATMENT: Patient gets short of breath easily but SpO2=92% and XZ=256 bpm after walking and SpO2=93% and 99 bpm after exercising. PATIENT LEVEL OF UNDERSTANDING OF EDUCATION PROVIDED: Patient verbalized understanding on sign/symptoms of infection, diabetic foot care, pain management techniques, fall prevention,   ASSESSMENT OF PROGRESS TOWARD GOALS: Patient is progressing toward goals for gait and transfers. Pt previously needed SBA to ambulate safely but progressed to close S for gait training today. Patient ambulated with decreased dyana, decreased foot clearance, and forward flexed trunk. Gave multiple vc's for pt to lift B foot higher to clear floor safely and to maintain erect posture. Pt needs reinforcement for safety with gait and to improve posture. Pt also previously needed SBA to perform sit < > stand transfers but progressed to close S for transfer training today. Gave vc's for pt to lean forward and to push off using B UE to stand up. Pt needs reinforcement to improve transfer technique. In addition, pt demonstrated good knowledge of HEP and is compliant with follow-up. Discussed with patient/CG plan to discharge pt from [facility] PT services next week. Pt/CG verbalized understanding and is in agreement with discharge plan. CONTINUED NEED FOR THE FOLLOWING SKILLS:  Gait Training, Stairs Training, Transfer Training, [employer], Balance Training, and Therapeutic Exercises. PLAN FOR NEXT VISIT: Patient will be seen 1w1 2w1 to increase safety with gait, to improve transfer technique, and to increase strength of B LE.   DISCHARGE PLANNING DISCUSSED WITH PATIENT/CAREGIVER: Discharge patient to family with MD supervision when all goals are met. Pt/Caregiver did verbalize understanding of discharge planning.

## 2023-01-11 ENCOUNTER — HOME CARE VISIT (OUTPATIENT)
Dept: SCHEDULING | Facility: HOME HEALTH | Age: 68
End: 2023-01-11
Payer: MEDICARE

## 2023-01-11 VITALS
HEART RATE: 87 BPM | TEMPERATURE: 97.6 F | SYSTOLIC BLOOD PRESSURE: 132 MMHG | WEIGHT: 285 LBS | BODY MASS INDEX: 39.75 KG/M2 | DIASTOLIC BLOOD PRESSURE: 78 MMHG | OXYGEN SATURATION: 94 % | RESPIRATION RATE: 16 BRPM

## 2023-01-11 PROCEDURE — G0300 HHS/HOSPICE OF LPN EA 15 MIN: HCPCS

## 2023-01-12 ENCOUNTER — HOME CARE VISIT (OUTPATIENT)
Dept: HOME HEALTH SERVICES | Facility: HOME HEALTH | Age: 68
End: 2023-01-12
Payer: MEDICARE

## 2023-01-12 NOTE — HOME HEALTH
Skilled reason for visit: Surgical Incision Assessment and Care, Wound Care, Coronary Artery Disease, Depression, Anxiety, and Diabetes    Caregiver involvement: Patient has friends and a sister that assist her during her recovery period and will help her with meals, take her to her medical appointments,  medications, and assist with toileting and bathing. Medications reviewed and all medications ARE available in the home this visit. The following education was provided regarding medications: Keflex-Patient/Caregiver was educated on this medication and the purpose of it. Patient/Caregiver verbalized understanding. MD notified of any discrepancies/look a-like medications/medication interactions: NA    Medications are EFFECTIVE at this time. Home health supplies by type and quantity ordered/delivered this visit include: NA    Patient education provided this visit: Patient was educated on items checked in interventions tab. Sharps education provided: Clinician instructed patient/CG on proper disposal of sharps: Containers should be made of hard plastic, be puncture-resistant and leak proof, such as a laundry detergent or bleach bottle. When the container is ¾ full, it should be sealed with tape and labeled DO NOT RECYCLE prior to discarding in the regular trash. Patient level of understanding of education provided: Patient verbalized all understanding in interventions tab. Skilled Care Performed this visit: Wound Care, Medication Education and Vital Sign Check    Patient response to procedure performed:  Patient tolerated vital assessment well and no facial grimaces or complaints of pain or discomfort. Patient was sitting on couch answering question that this nurse was asking her.     Agency Progress toward goals:  Agency staff is progressing well with patient as patient verbalizes being able to better manage her disease processes with the education received from home care staff. Patient's Progress towards personal goals:  Progressing towards all goals in interventions tab. Home exercise program: Patient will continue to weigh herself every morning, check her blood pressure and blood sugar, and record all readings in a notebook and report to MD any abnormal values. Patient refused to have bilateral lower extremities wrapped for weeping edema and states she rather pat them when they weep, as she states they are drying out and scabbing over better with the air getting to them. Continued need for the following skills: Nursing will continue to follow patient until wound is healed and education is understood and able to be verbalized by patient/caregiver. Plan for next visit:  Continue to educate, assess patient surgical incision, assess vitals, and review medications    Patient and/or caregiver notified and agrees to changes in the Plan of Care: NA    The following discharge planning was discussed with the pt/caregiver:when patient reaches goals and medication is managed, and disease processes are understood patient agrees and understand that discharge will take place.

## 2023-01-13 ENCOUNTER — HOME CARE VISIT (OUTPATIENT)
Dept: SCHEDULING | Facility: HOME HEALTH | Age: 68
End: 2023-01-13
Payer: MEDICARE

## 2023-01-13 PROCEDURE — G0157 HHC PT ASSISTANT EA 15: HCPCS

## 2023-01-13 NOTE — HOME HEALTH
SUBJECTIVE: Patient had no c/o pain from her chest this morning. She was waiting on hold with [facility] upon LPTA arrival.  CAREGIVER INVOLVEMENT/ASSISTANCE NEEDED FOR: Patient's sister, Amisha Connors, assists with transportation. HOME HEALTH SUPPLIES BY TYPE AND QUANTITY ORDERED/DELIVERED THIS VISIT INCLUDE: None needed for this visit. OBJECTIVE:  See interventions. PATIENT RESPONSE TO TREATMENT: Patient gets short of breath easily O2 and HR stay WNL. PATIENT LEVEL OF UNDERSTANDING OF EDUCATION PROVIDED: Patient verbalized understanding on sign/symptoms of infection, diabetic foot care, pain management techniques, fall prevention,   ASSESSMENT OF PROGRESS TOWARD GOALS: Patient is progressing toward goals for gait and transfers. Pt previously needed close S to ambulate safely but progressed to Supervision for gait training today. Patient ambulated with decreased dyana and decreased foot clearance. Gave multiple vc's for pt to lift B foot higher to clear floor safely and to take deeper breaths. Pt demonstrated increased foot clearance after giving verbal cues. Pt also previously needed close S to perform sit < > stand transfers but progressed to Mod I for transfer training today from different level surfaces. Pt demonstrated good technique and improved ability to perform transfers independently. Pt did not perform car transfer today due to refusing to attempt stairs. In addition, reinforced compliance with HEP. Discussed with patient/CG plan to discharge pt from [facility] next week. Pt/CG verbalized understanding and is in agreement with discharge plan. CONTINUED NEED FOR THE FOLLOWING SKILLS:  Gait Training, Stairs Training, Transfer Training, [employer], Balance Training, and Therapeutic Exercises. PLAN FOR NEXT VISIT: Patient will be seen 2w1 to increase safety with gait, to improve transfer technique, and to increase strength of B LE.   DISCHARGE PLANNING DISCUSSED WITH PATIENT/CAREGIVER: Discharge patient to family with MD supervision when all goals are met. Pt/Caregiver did verbalize understanding of discharge planning.

## 2023-01-15 VITALS
SYSTOLIC BLOOD PRESSURE: 120 MMHG | DIASTOLIC BLOOD PRESSURE: 58 MMHG | TEMPERATURE: 98.5 F | RESPIRATION RATE: 18 BRPM | OXYGEN SATURATION: 94 % | SYSTOLIC BLOOD PRESSURE: 118 MMHG | TEMPERATURE: 98.9 F | HEART RATE: 91 BPM | DIASTOLIC BLOOD PRESSURE: 58 MMHG | HEART RATE: 90 BPM | RESPIRATION RATE: 16 BRPM | OXYGEN SATURATION: 93 %

## 2023-01-15 NOTE — HOME HEALTH
SUBJECTIVE: Patient reports she is schedule for another thoracentisis on Jan 17th and then Cardiology PA and then will see her PCP 1/18. Patient reports her \"diarrhea has restarted this afternoon. \" Despite \"feeling bad\" patient wants to participate. Instructed patient if diarrhea doesn't stop she needs to call MD-she agreed  CAREGIVER INVOLVEMENT/ASSISTANCE NEEDED FOR: Friends and family offer assist as needed  [address] ORDERED/DELIVERED THIS VISIT INCLUDE: None  OBJECTIVE:  See interventions. PATIENT RESPONSE TO TREATMENT: Good-no  increase in pain reported following treatment  PATIENT LEVEL OF UNDERSTANDING OF EDUCATION PROVIDED: Instructed patient with fall risk prevention and pain mgmt  ASSESSMENT OF PROGRESS TOWARD GOALS: Cues needed to increase/maintain upright posturing and to decrease WB on B UE-needs reinforcement. Patient sit to stand form sofa with effort, requires B UE to push up and wide CHIOMA-needs reinforcement. Instructed with sitting exercises to tie to commericals to allow for rest-maintenance of O2 SATS in mis-90's dring treatment-instructed with deep breathing exercises with good quality after instruction. CONTINUED NEED FOR THE FOLLOWING SKILLS: Patient will be seen 2 x week for Physical Therapy to continue to work toward goals within POC and HHPT is medically necessary to address  dx and clinical findings: decreased ROM, decreased strength, impaired gait, decreased ability w stair negotiation, increased swelling, decreased bed mobility, decreased transfer status, decreased endurance, decreased balance and decreased safety, increased pain in order to improve functional mobility/quality of life, decrease burden of care, reduce risk for re-hospitalization, work towards patient's personal goals of return to PLOF w decrease risk for falls.   PLAN FOR NEXT VISIT: Gait/transfer training, strengthening exercises  THE FOLLOWING DISCHARGE PLANNING WAS DISCUSSED WITH THE PATIENT/CAREGIVER: This is visit number 2  out of  7  planned visits.   NEXT MD APPT:Dr Rayo on 1/12, Monday the 9th has to go in for thoracentisis

## 2023-01-16 ENCOUNTER — HOME CARE VISIT (OUTPATIENT)
Dept: SCHEDULING | Facility: HOME HEALTH | Age: 68
End: 2023-01-16
Payer: MEDICARE

## 2023-01-16 VITALS
TEMPERATURE: 98 F | RESPIRATION RATE: 16 BRPM | DIASTOLIC BLOOD PRESSURE: 75 MMHG | OXYGEN SATURATION: 95 % | SYSTOLIC BLOOD PRESSURE: 135 MMHG | HEART RATE: 80 BPM

## 2023-01-16 PROCEDURE — G0157 HHC PT ASSISTANT EA 15: HCPCS

## 2023-01-16 NOTE — Clinical Note
thanks for the update  ----- Message -----  From: Yolanda Arizmendi PTA  Sent: 1/18/2023   6:34 AM EST  To: Thomas Weir PT      Assessment and Summary of Care:  Patient's current functional status before discharge is as follows  Strength: N/T  ROM:  N/T  Bed Mobility: Patient is sleeping on her recliner sofa  Transfers: able to sit to stand without B UE to push up on first attempt  Gait/WC mobility: At best measure patient amb 150' within her home before fatigue-no longer using an AD within home-[employer]. Patient is more aware of B foot clearance and utilizes a rollator for community amb  Stairs: able to ascend/descend 3 steps with step to patterning  Mod I using B rails or cane and rail for support  Special Tests: Tinetti test 28/28  Recommendations: Patient has a script to begin Cardiac rehab once home health is finished.  She is in agreement with discharge,

## 2023-01-16 NOTE — HOME HEALTH
SUBJECTIVE:Patient reports she is feeling better and her diarrhea has stopped. CAREGIVER INVOLVEMENT/ASSISTANCE NEEDED FOR: Friends and family offer assist as needed    [address] ORDERED/DELIVERED THIS VISIT INCLUDE: None    OBJECTIVE:  See interventions. PATIENT RESPONSE TO TREATMENT: Good-no  increase in pain reported following treatment    PATIENT LEVEL OF UNDERSTANDING OF EDUCATION PROVIDED: Instructed patient with fall risk prevention, pain mgmt, and is able to teach back diabetic foot care and pressure relief    ASSESSMENT OF PROGRESS TOWARD GOALS: Instructed with standing exercises at kitchen counter with high stool behind her for rests-needed a rest after each exercises set-O2 SAT remained in mid 90's and cues for breathing exercises if SOB. Cues needed for maintenance of upright psoturing and to \"look up\" to decrease fall risk during gait-needs reinforcement. CONTINUED NEED FOR THE FOLLOWING SKILLS: Patient will be seen 2 x week for Physical Therapy to continue to work toward goals within POC and HHPT is medically necessary to address  dx and clinical findings: decreased ROM, decreased strength, impaired gait, decreased ability w stair negotiation, increased swelling, decreased bed mobility, decreased transfer status, decreased endurance, decreased balance and decreased safety, increased pain in order to improve functional mobility/quality of life, decrease burden of care, reduce risk for re-hospitalization, work towards patient's personal goals of return to PLOF w decrease risk for falls. PLAN FOR NEXT VISIT: Gait/transfer training, strengthening exercises    THE FOLLOWING DISCHARGE PLANNING WAS DISCUSSED WITH THE PATIENT/CAREGIVER: This is visit number 3  out of  7  planned visits.     NEXT MD APPT:Dr Rayo on 1/12, Monday the 9th has to go in for thoracentisis

## 2023-01-17 ENCOUNTER — APPOINTMENT (OUTPATIENT)
Dept: GENERAL RADIOLOGY | Age: 68
End: 2023-01-17
Attending: RADIOLOGY
Payer: MEDICARE

## 2023-01-17 ENCOUNTER — HOSPITAL ENCOUNTER (OUTPATIENT)
Dept: ULTRASOUND IMAGING | Age: 68
Discharge: HOME | End: 2023-01-17
Attending: RADIOLOGY | Admitting: RADIOLOGY
Payer: MEDICARE

## 2023-01-17 ENCOUNTER — OFFICE VISIT (OUTPATIENT)
Dept: CARDIOTHORACIC SURGERY | Age: 68
End: 2023-01-17
Payer: MEDICARE

## 2023-01-17 VITALS
SYSTOLIC BLOOD PRESSURE: 129 MMHG | BODY MASS INDEX: 38.78 KG/M2 | OXYGEN SATURATION: 96 % | HEIGHT: 71 IN | DIASTOLIC BLOOD PRESSURE: 60 MMHG | RESPIRATION RATE: 20 BRPM | WEIGHT: 277 LBS | HEART RATE: 89 BPM | TEMPERATURE: 97.8 F

## 2023-01-17 VITALS
WEIGHT: 277 LBS | BODY MASS INDEX: 38.78 KG/M2 | HEART RATE: 87 BPM | DIASTOLIC BLOOD PRESSURE: 64 MMHG | SYSTOLIC BLOOD PRESSURE: 129 MMHG | OXYGEN SATURATION: 96 % | TEMPERATURE: 97.9 F | HEIGHT: 71 IN

## 2023-01-17 VITALS
OXYGEN SATURATION: 93 % | HEART RATE: 91 BPM | RESPIRATION RATE: 18 BRPM | SYSTOLIC BLOOD PRESSURE: 110 MMHG | DIASTOLIC BLOOD PRESSURE: 58 MMHG | TEMPERATURE: 98 F

## 2023-01-17 DIAGNOSIS — J90 PLEURAL EFFUSION: ICD-10-CM

## 2023-01-17 DIAGNOSIS — Z95.1 S/P CABG X 2: ICD-10-CM

## 2023-01-17 DIAGNOSIS — Z95.2 S/P AVR (AORTIC VALVE REPLACEMENT) AND AORTOPLASTY: ICD-10-CM

## 2023-01-17 DIAGNOSIS — J90 PLEURAL EFFUSION ON LEFT: Primary | ICD-10-CM

## 2023-01-17 LAB
ANION GAP SERPL CALC-SCNC: 4 MMOL/L (ref 3–18)
APTT PPP: 32.2 SEC (ref 23–36.4)
BUN SERPL-MCNC: 26 MG/DL (ref 7–18)
BUN/CREAT SERPL: 17 (ref 12–20)
CALCIUM SERPL-MCNC: 8.9 MG/DL (ref 8.5–10.1)
CHLORIDE SERPL-SCNC: 102 MMOL/L (ref 100–111)
CO2 SERPL-SCNC: 31 MMOL/L (ref 21–32)
CREAT SERPL-MCNC: 1.49 MG/DL (ref 0.6–1.3)
ERYTHROCYTE [DISTWIDTH] IN BLOOD BY AUTOMATED COUNT: 16.8 % (ref 11.6–14.5)
GLUCOSE BLD STRIP.AUTO-MCNC: 116 MG/DL (ref 70–110)
GLUCOSE SERPL-MCNC: 119 MG/DL (ref 74–99)
HCT VFR BLD AUTO: 35.2 % (ref 35–45)
HGB BLD-MCNC: 11.3 G/DL (ref 12–16)
INR PPP: 1.1 (ref 0.8–1.2)
MCH RBC QN AUTO: 30.9 PG (ref 24–34)
MCHC RBC AUTO-ENTMCNC: 32.1 G/DL (ref 31–37)
MCV RBC AUTO: 96.2 FL (ref 78–100)
NRBC # BLD: 0 K/UL (ref 0–0.01)
NRBC BLD-RTO: 0 PER 100 WBC
PLATELET # BLD AUTO: 344 K/UL (ref 135–420)
PMV BLD AUTO: 9.5 FL (ref 9.2–11.8)
POTASSIUM SERPL-SCNC: 4.4 MMOL/L (ref 3.5–5.5)
PROTHROMBIN TIME: 14.1 SEC (ref 11.5–15.2)
RBC # BLD AUTO: 3.66 M/UL (ref 4.2–5.3)
SODIUM SERPL-SCNC: 137 MMOL/L (ref 136–145)
WBC # BLD AUTO: 10.4 K/UL (ref 4.6–13.2)

## 2023-01-17 PROCEDURE — 85730 THROMBOPLASTIN TIME PARTIAL: CPT

## 2023-01-17 PROCEDURE — 32555 ASPIRATE PLEURA W/ IMAGING: CPT

## 2023-01-17 PROCEDURE — 85610 PROTHROMBIN TIME: CPT

## 2023-01-17 PROCEDURE — 85027 COMPLETE CBC AUTOMATED: CPT

## 2023-01-17 PROCEDURE — 99024 POSTOP FOLLOW-UP VISIT: CPT | Performed by: PHYSICIAN ASSISTANT

## 2023-01-17 PROCEDURE — 71045 X-RAY EXAM CHEST 1 VIEW: CPT

## 2023-01-17 PROCEDURE — 82962 GLUCOSE BLOOD TEST: CPT

## 2023-01-17 PROCEDURE — 80048 BASIC METABOLIC PNL TOTAL CA: CPT

## 2023-01-17 RX ORDER — LIDOCAINE HYDROCHLORIDE 10 MG/ML
INJECTION, SOLUTION EPIDURAL; INFILTRATION; INTRACAUDAL; PERINEURAL
Status: DISCONTINUED
Start: 2023-01-17 | End: 2023-01-17 | Stop reason: HOSPADM

## 2023-01-17 RX ORDER — SODIUM CHLORIDE 9 MG/ML
20 INJECTION, SOLUTION INTRAVENOUS CONTINUOUS
Status: DISCONTINUED | OUTPATIENT
Start: 2023-01-17 | End: 2023-01-17 | Stop reason: HOSPADM

## 2023-01-17 NOTE — HOME HEALTH
Joy Brown reports she \"can get around anywhere in my house now. \"         CAREGIVER INVOLVEMENT/ASSISTANCE NEEDED FOR: Friends and family offer assist as needed         [address] ORDERED/DELIVERED THIS VISIT INCLUDE: None         OBJECTIVE:  See interventions. PATIENT RESPONSE TO TREATMENT: Good-no  increase in pain reported following treatment         PATIENT LEVEL OF UNDERSTANDING OF EDUCATION PROVIDED: Educational Goals met         ASSESSMENT OF PROGRESS TOWARD GOALS: Stair mobility: patient able to ascend/descend 3 steps with step to patterning  Mod I after instruction by return demonstration with B rails or cane and rails for support. Tinetti test 28/28. Patient amb within her home without an AD and reports she utilizes a rollator for community amb. Patient declines to amb outside today due to cold weather-\"I don't do well in the cold. \"    Assessment and Summary of Care:  Patient's current functional status before discharge is as follows  Strength: N/T  ROM:  N/T  Bed Mobility: Patient is sleeping on her recliner sofa  Transfers: able to sit to stand without B UE to push up on first attempt  Gait/WC mobility: At best measure patient amb 150' within her home before fatigue-no longer using an AD within home-[employer]. Patient is more aware of B foot clearance and utilizes a rollator for community amb  Stairs: able to ascend/descend 3 steps with step to patterning  Mod I using B rails or cane and rail for support  Special Tests: Tinetti test 28/28  Recommendations: Patient has a script to begin Cardiac rehab once home health is finished.           CONTINUED NEED FOR THE FOLLOWING SKILLS: Patient will be seen 2 x week for Physical Therapy to continue to work toward goals within POC and HHPT is medically necessary to address  dx and clinical findings: decreased ROM, decreased strength, impaired gait, decreased ability w stair negotiation, increased swelling, decreased bed mobility, decreased transfer status, decreased endurance, decreased balance and decreased safety, increased pain in order to improve functional mobility/quality of life, decrease burden of care, reduce risk for re-hospitalization, work towards patient's personal goals of return to PLOF w decrease risk for falls. PLAN FOR NEXT VISIT: Discharge assessment from Supervising Physical Therapist         THE FOLLOWING DISCHARGE PLANNING WAS DISCUSSED WITH THE PATIENT/CAREGIVER: This is visit number 6  out of  7  planned visits. Patient would like to begin Cardiac rehab and reports a friend will provide transportation.   She will reports she will continue HEP until outpatient therpay begins         NEXT MD APPT:Patient reports she has a Thoracentisis and a visit with MD this week

## 2023-01-17 NOTE — DISCHARGE INSTRUCTIONS
Thoracentesis: What to Expect at Home  Your Recovery  Thoracentesis (say \"fyeo-up-dzd-DIDIER-sis\") is a procedure to remove fluid from the space between the lungs and the chest wall (pleural space). This procedure may also be called a \"chest tap. \" It's normal to have a small amount of fluid in the pleural space. But too much fluid can build up because of problems such as infection, heart failure, or lung cancer. The procedure may have been done to help with shortness of breath and pain caused by the fluid buildup. Or you may have had this procedure so the doctor could test the fluid to find the cause of the buildup. Your chest may be sore where the doctor put the needle or catheter into your skin (the puncture site). This usually gets better after a day or two. You can go back to work or your normal activities as soon as you feel up to it. If a large amount of pleural fluid was removed during the procedure, you will probably be able to breathe more easily. If more pleural fluid collects and needs to be removed, another thoracentesis may be done later. If the doctor sent the fluid to a lab for testing, it may take several days to get the results. The doctor or nurse will discuss the results with you. This care sheet gives you a general idea about how long it will take for you to recover. But each person recovers at a different pace. Follow the steps below to feel better as quickly as possible. How can you care for yourself at home? Activity    Rest when you feel tired. Getting enough sleep will help you recover. Avoid strenuous activities, such as bicycle riding, jogging, weight lifting, or aerobic exercise, until your doctor says it is okay. You may shower. Do not take a bath until the puncture site has healed, or until your doctor tells you it is okay. Ask your doctor when you can drive again. You may need to take 1 or 2 days off from work.  It depends on the type of work you do and how you feel.   Diet    You can eat your normal diet. Drink plenty of fluids (unless your doctor tells you not to). Medicines    Your doctor will tell you if and when you can restart your medicines. He or she will also give you instructions about taking any new medicines. If you stopped taking aspirin or some other blood thinner, your doctor will tell you when to start taking it again. Be safe with medicines. Take pain medicines exactly as directed. If the doctor gave you a prescription medicine for pain, take it as prescribed. If you are not taking a prescription pain medicine, ask your doctor if you can take an over-the-counter medicine. Do not take two or more pain medicines at the same time unless the doctor told you to. Many pain medicines have acetaminophen, which is Tylenol. Too much acetaminophen (Tylenol) can be harmful. If you think your pain medicine is making you sick to your stomach: Take your medicine after meals (unless your doctor has told you not to). Ask your doctor for a different pain medicine. If your doctor prescribed antibiotics, take them as directed. Do not stop taking them just because you feel better. You need to take the full course of antibiotics. Care of the puncture site    Wash the area daily with warm, soapy water, and pat it dry. Don't use hydrogen peroxide or alcohol, which may delay healing. You may cover the area with a gauze bandage if it weeps or rubs against clothing. Change the bandage every day. Keep the area clean and dry. Follow-up care is a key part of your treatment and safety. Be sure to make and go to all appointments, and call your doctor if you are having problems. It's also a good idea to know your test results and keep a list of the medicines you take. When should you call for help? Call 911 anytime you think you may need emergency care. For example, call if:    You passed out (lost consciousness).      You have severe trouble breathing. You have sudden chest pain and shortness of breath, or you cough up blood. Call your doctor now or seek immediate medical care if:    You have shortness of breath that is new or getting worse. You have new or worse pain in your chest, especially when you take a deep breath. You are sick to your stomach or cannot keep fluids down. You have a fever over 100°F.     Bright red blood has soaked through the bandage over your puncture site. You have signs of infection, such as: Increased pain, swelling, warmth, or redness. Red streaks leading from the puncture site. Pus draining from the puncture site. Swollen lymph nodes in your neck, armpits, or groin. A fever. You cough up a lot more mucus than normal, or your mucus changes color. Watch closely for changes in your health, and be sure to contact your doctor if you have any problems. Where can you learn more? Go to http://www.gray.com/  Enter Q755 in the search box to learn more about \"Thoracentesis: What to Expect at Home. \"  Current as of: March 9, 2022               Content Version: 13.4  © 9449-2138 [employer]. Care instructions adapted under license by [employer] (which disclaims liability or warranty for this information). If you have questions about a medical condition or this instruction, always ask your healthcare professional. [place] [address] any warranty or liability for your use of this information.

## 2023-01-17 NOTE — PROCEDURES
RADIOLOGY POST THORACENTESIS NOTE     January 17, 2023       2:34 PM     :  Danielle Selby PA-C    Assistant:  None. Attending: Dr. Kera Orta    Procedure:  US-guided left thoracentesis     Pre-operative Diagnosis: Pleural effusion    Post-operative Diagnosis: same    Procedure Details: Informed consent obtained. Under ultrasound guidance, the largest pocket of fluid collection was identified. Percutaneous access was achieved using a posterior intercostal approach with a one step needle. 1% lidocaine was utilized for local anesthesia. The 5 [place] [employer]h sheathed needle connected to manually controlled vacuum bottle. 1.5 L of clear yellow fluid removed. Images saved in PACS. Small left effusion remaining post-procedure. Complications:  No immediate. Specimens: none    Estimated blood Loss: Minimal               Condition: Stable. Impression:  Successful left thoracentesis. Plan: Post procedure chest xray pending.       Joao Haile PA-C

## 2023-01-17 NOTE — PATIENT INSTRUCTIONS
Okay to stop wearing heart hugger and support bra. Okay to drive. Decrease Lasix to 40 mg twice a day. Decrease potassium to 20 M EQ's twice a day. Follow-up with PCP and cardiology as scheduled. Follow-up with our service in 1 month with a chest x-ray to assess the left pleural effusion. Cardiac rehab referral placed. Continue leg elevation and Ace wrap lower legs as needed for leg edema.

## 2023-01-17 NOTE — H&P
History and Physical    Patient: Eunice Schmitt           Sex: female       DOA: 1/17/2023  YOB: 1955      Age:  79 y.o.     LOS:  LOS: 0 days        HPI:     Eunice Schmitt is a 79 y.o. female who presents for therapeutic left thoracentesis. Past Medical History:   Diagnosis Date    Aortic stenosis 11/28/2022    Coronary artery disease involving native coronary artery of native heart 11/28/2022    Primary hypertension 11/28/2022    S/P AVR (aortic valve replacement) and aortoplasty 12/7/2022    S/p CABG x2 (LIMA-LAD, rSVG-OM), AVR 23 mm Inspiris with aortic root enlargement - Dr. Krista Kuhn    S/P CABG x 2 12/7/2022    S/p CABG x2 (LIMA-LAD, rSVG-OM), AVR 23 mm Inspiris with aortic root enlargement - Dr. Krista Kuhn    Type 2 diabetes mellitus, without long-term current use of insulin [facility] 11/28/2022       Past Surgical History:   Procedure Laterality Date    HX HYSTERECTOMY       No family history on file. Social History     Socioeconomic History    Marital status:    Tobacco Use    Smoking status: Heavy Smoker     Packs/day: 1.50     Types: Cigarettes   Substance and Sexual Activity    Alcohol use: Yes     Comment: 1    Drug use: No       Prior to Admission medications    Medication Sig Start Date End Date Taking? Authorizing Provider   metoprolol tartrate (LOPRESSOR) 25 mg tablet Take 1 Tablet by mouth two (2) times a day. 1/4/23   Perlita Ureña PA-C   potassium chloride (K-DUR, KLOR-CON M20) 20 mEq tablet Take 1 Tablet by mouth three (3) times daily. 1/4/23   Yessenia Patel PA-C   aspirin delayed-release 81 mg tablet Take 1 Tablet by mouth daily. 12/29/22   José Luis Sam PA-C   furosemide (LASIX) 40 mg tablet Take 1 Tablet by mouth two (2) times a day. Patient taking differently: Take 40 mg by mouth two (2) times a day.  2 tablets po bid (to equal 80mg) 12/29/22   José Luis Sam PA-C   metoprolol tartrate (LOPRESSOR) 25 mg tablet Take 0.5 Tablets by mouth two (2) times a day. 12/29/22   Yuko Sam PA-C   acetaminophen (TYLENOL) 650 mg TbER Take 650 mg by mouth every eight (8) hours as needed for Fever or Pain. Provider, Historical   acetaminophen (TYLENOL) 500 mg tablet Take 500 mg by mouth every six (6) hours as needed for Fever or Pain. USES DIFFERENT DOSES OF TYLENOL DEPENING ON TYPE OF PAIN. Provider, Historical   dextromethorphan-guaiFENesin 60-1,200 mg Tb12 Take 1 Tablet by mouth every twelve (12) hours as needed for Congestion or Cough. Patient not taking: Reported on 1/4/2023    Provider, Historical   Omeprazole delayed release (PRILOSEC D/R) 20 mg tablet Take 20 mg by mouth daily. Provider, Historical   lisinopriL (PRINIVIL, ZESTRIL) 20 mg tablet Take 20 mg by mouth daily. Patient not taking: No sig reported    Provider, Historical   cartilage/collagen/bor/hyalur (JOINT HEALTH PO) Take 1 Tablet by mouth daily. Provider, Historical   acetaminophen (TYLENOL) 325 mg tablet Take 1-2 Tablets by mouth every four (4) hours as needed for Pain or Fever. Indications: pain 12/21/22   Catarino Narvaez MD   clopidogreL (PLAVIX) 75 mg tab Take 1 Tablet by mouth daily. 12/21/22   Catarino Narvaez MD   cyanocobalamin (VITAMIN B12) 500 mcg tablet Take 1 Tablet by mouth daily. 12/21/22   Geoff Taylor MD   folic acid (FOLVITE) 1 mg tablet Take 1 Tablet by mouth daily. 12/21/22   Catarino Narvaez MD   fexofenadine-pseudoephedrine (ALLEGRA-D)  mg Tb12 Take 1 Tablet by mouth every twelve (12) hours. 12/20/22   Geoff Taylor MD   multivitamin, tx-iron-ca-min (THERA-M w/ IRON) 9 mg iron-400 mcg tab tablet Take 1 Tablet by mouth daily. 12/16/22   Yuko Sam PA-C   azelastine (ASTELIN) 137 mcg (0.1 %) nasal spray 1 [place] by Both Nostrils route Every morning. Use in each nostril as directed    Provider, Historical   fluticasone propionate (FLONASE) 50 mcg/actuation nasal spray 2 Sprays by Both Nostrils route nightly.     Provider, Lorrie empagliflozin (JARDIANCE) 25 mg tablet Take 25 mg by mouth daily. Provider, Historical   metFORMIN (GLUCOPHAGE) 500 mg tablet Take 1,000 mg by mouth two (2) times daily (with meals). Provider, Historical   montelukast (SINGULAIR) 10 mg tablet Take 10 mg by mouth every evening. Provider, Historical   atorvastatin (LIPITOR) 40 mg tablet Take 40 mg by mouth in the morning. Provider, Historical   dulaglutide (Trulicity) 3 BJ/2.8 mL pnij 1 Dose by SubCUTAneous route every seven (7) days. SUNDAY NIGHT    Provider, Historical   multivitamin (ONE A DAY) tablet Take 1 Tablet by mouth daily. Other, MD Ayden       Allergies   Allergen Reactions    Albuterol Sulfate Shortness of Breath    Entex [Pseudoephedrine Tannate] Rash    Terbutaline Shortness of Breath    Terramycin [Oxytetracycline] Unknown (comments)       Physical Exam:      Visit Vitals  /61 (BP 1 Location: Right upper arm, BP Patient Position: At rest)   Pulse 90   Temp 97.8 °F (36.6 °C)   Resp 17   Ht 5' 11\" (1.803 m)   Wt 125.6 kg (277 lb)   SpO2 95%   BMI 38.63 kg/m²     Physical Exam:  General: A&O x 4, NAD  Heart: RRR  Lungs: Normal work of breathing on room air    Labs Reviewed: All lab results for the last 24 hours reviewed.     Assessment/Plan     The patient is an appropriate candidate to undergo the planned procedure    Antoni Lozano PA-C

## 2023-01-18 NOTE — CASE COMMUNICATION
Assessment and Summary of Care:  Patient's current functional status before discharge is as follows  Strength: N/T  ROM:  N/T  Bed Mobility: Patient is sleeping on her recliner sofa  Transfers: able to sit to stand without B UE to push up on first attempt  Gait/WC mobility: At best measure patient amb 150' within her home before fatigue-no longer using an AD within home-Independent. Patient is more aware of B foot clearance and utilize s a rollator for community amb  Stairs: able to ascend/descend 3 steps with step to patterning  Mod I using B rails or cane and rail for support  Special Tests: Tinetti test 28/28  Recommendations: Patient has a script to begin Cardiac rehab once home health is finished.  She is in agreement with discharge,

## 2023-01-19 ENCOUNTER — HOME CARE VISIT (OUTPATIENT)
Dept: SCHEDULING | Facility: HOME HEALTH | Age: 68
End: 2023-01-19
Payer: MEDICARE

## 2023-01-19 VITALS
HEART RATE: 77 BPM | OXYGEN SATURATION: 96 % | SYSTOLIC BLOOD PRESSURE: 115 MMHG | RESPIRATION RATE: 14 BRPM | DIASTOLIC BLOOD PRESSURE: 60 MMHG | TEMPERATURE: 97.7 F

## 2023-01-19 PROCEDURE — G0151 HHCP-SERV OF PT,EA 15 MIN: HCPCS

## 2023-01-19 NOTE — Clinical Note
Patient is s/p CAD s/p CABG x 2 and has been treated for , strengthening, gait training, stair training, HEP training, safety training, and balance training. On [facility] 12/27/22  strength was R hip flexors 3/5 R quads and hamstrings +3/5 R DF/PF 5/5  L hip flexors -3/5 L quads and hamstrings +3/5 L DF/PF 5/5 . Today at [place] strength is R hip flexors 4/5 R quads and hamstring +4/5  L hip flexors +4/5 L quads and hamstrings 5/5 . On [facility] bed mobility was Pt sleeps in recliner chair with B feet elevated. Today at PA bed mobility is :Pt sleeps  on the couch. supine <> sit on couch MOD I. On [facility] transfers were sit to stand from couch with SBA increaesd time needed to complete task . Pt has a wide CHIOMA on all transfers . Min vc needed for safety as pt tends to leave walker prematurely and sit down without A DEV. Today at PA transfers are  sit to stand from low  couch x 3 without B UE support MOD I. On SOC gait was Pt amb 20 ft x 2 with RW with reciprocal gait pattern with SBA vc needed for dyana as pt amb very quickly. Vc needed to decreased dependence on pushing down with BUE on walker O2 sat after amb with 88% with HR of 92 pt took  2 min of pursed lip breating to increase O2 to 93% with HR of 84. Today at [place] gait is Pt amb 100 ft without A DEV in home with reciprocal gait pattern. Normal dyana noted no balance checks noted with amb . Per PTA visit 1/16/23 within home humera 100' x 1 with cues for upright posturing and for good foot clearance no AD-patient reports she utilizes her rollator with community amb-patient decones to amb outside today \"I don't do well in the cold. \". On [facility] [place] were NA . Per PTA visit 1/16/23  steps B rails step to pattern MOD I-she reports she felt she really had to pull herself up the stairs with her arms but now just uses her legs-wither uses B rails or cane and 1 railing. On [facility] Pt scored 19/28 on Tinetti Balance Assessment placing pt at med risk for falls.  .  Today at [place] pt scored a 25/28 on Tinetti Balance Assessment placing pt as a low fall risk. . Pt did have more than a 4 point statistical improvement. Pt has made progress and has met all goals. Patient has met nursing goals and has progressed well. Patient has not had any falls throughout the course of in home nursing care, and patient has been able to manage pain. Patient's appetite is good and fluid intake is wonderful. Patient is well educated on her medications and able to educate this nurse on the purpose of each medication. Education reinforced on the importance of monitoring weight, blood sugar, and vitals daily and recording them in a notebook. Education reinforced on reporting weight gain of 2 lbs or more in 24/hrs. or 5 lbs. or more within 5 days to MD office immediately. Patient verbalized understanding. Patient verbalized coping activities for depression and was confident in education received from nursing staff and states she is independent in managing her disease process and medications. Safety precautions and infection control education were reinforced and patient is aware that any new medical questions should be directed to medical doctor. MD was notified. Discharge plan: Discharge from [facility] services as all goals have been met. Maikol Harvey MD office has been notified of DC from [facility]  with goals met.  Pt will follow up with Cardiac Rehab after she follows up with Cardiologist next week

## 2023-01-19 NOTE — HOME HEALTH
SUBJECTIVE: I am doing well today   REQUIRES CAREGIVER ASSISTANCE FOR: transportation, IADLS   MEDICATIONS REVIEWED AND RECONCILED no changes   NEXT MD APPT: Lorenza Swartz MD in month  Cardilogist next Fri 1/27/23   ROM:B LE WFL for pt size   STRENGTH: R hip flexors 4/5 R quads and hamstring +4/5  L hip flexors +4/5 L quads and hamstrings 5/5   WOUNDS:stenal incison clean dry and intact. L medial leg incsion open to air scabing along incision line. Pt has dried blisters on R and L  shin they are no longer wraped   BED MOBILITY:Pt sleeps  on the couch. supine <> sit on couch MOD I   TRANSFERS: sit to stand from low  couch x 3 without B UE support MOD I  GAIT: Pt amb 100 ft without A DEV in home with reciprocal gait pattern. Normal dyana noted no balance checks noted with amb . Per PTA visit 1/16/23 within home humera 100' x 1 with cues for upright posturing and for good foot clearance no AD-patient reports she utilizes her rollator with community amb-patient decones to amb outside today \"I don't do well in the cold. \"  STAIRS:Per PTA visit 1/16/23  steps B rails step to pattern MOD I-she reports she felt she really had to pull herself up the stairs with her arms but now just uses her legs-wither uses B rails or cane and 1 railing  BALANCE: Pt scored 25/28 on Tinetti Balance Assessment placing pt at low  risk for falls.    PATIENT RESPONE TO TX: Pt pain level remaiend the same throughout tx session   PATIENT LEVEL OF UNDERSTANDING OF EDUCATION PROVIDED Pt demonstrated and verbalized use of Rollator  at all times when amb outside for safety   PATIENT EDUCATION PROVIDED THIS VISIT: safety, HEP, walking, deep breathing,      HEP consisting of:  standing exercises: with support of the counter with a stool behind her: B toe raises, B hip abd, B marching, B mini squats 1 x 10-2 - 3 x daily as aknit   Walking every waking hour during the day    B LE seated hip flexion, LAQS, ,AP 3 daily x 10   Written HEP issued, patient/caregiver verbalized understanding. Patient is s/p CAD s/p CABG x 2 and has been treated for , strengthening, gait training, stair training, HEP training, safety training, and balance training. On [facility] 12/27/22  strength was R hip flexors 3/5 R quads and hamstrings +3/5 R DF/PF 5/5  L hip flexors -3/5 L quads and hamstrings +3/5 L DF/PF 5/5 . Today at [place] strength is R hip flexors 4/5 R quads and hamstring +4/5  L hip flexors +4/5 L quads and hamstrings 5/5 . On [facility] bed mobility was Pt sleeps in recliner chair with B feet elevated. Today at [place] bed mobility is :Pt sleeps  on the couch. supine <> sit on couch MOD I. On [facility] transfers were sit to stand from couch with SBA increaesd time needed to complete task . Pt has a wide CHIOMA on all transfers . Min vc needed for safety as pt tends to leave walker prematurely and sit down without A DEV. Today at [place] transfers are  sit to stand from low  couch x 3 without B UE support MOD I. On [facility] gait was Pt amb 20 ft x 2 with RW with reciprocal gait pattern with SBA vc needed for dyana as pt amb very quickly. Vc needed to decreased dependence on pushing down with BUE on walker O2 sat after amb with 88% with HR of 92 pt took  2 min of pursed lip breating to increase O2 to 93% with HR of 84. Today at [place] gait is Pt amb 100 ft without A DEV in home with reciprocal gait pattern. Normal dyana noted no balance checks noted with amb . Per PTA visit 1/16/23 within home humera 100' x 1 with cues for upright posturing and for good foot clearance no AD-patient reports she utilizes her rollator with community amb-patient decones to amb outside today \"I don't do well in the cold. \". On [facility] [place] were NA . Per PTA visit 1/16/23  steps B rails step to pattern MOD I-she reports she felt she really had to pull herself up the stairs with her arms but now just uses her legs-wither uses B rails or cane and 1 railing.  On [facility] Pt scored 19/28 on Tinetti Balance Assessment placing pt at med risk for falls. .  Today at [place] pt scored a 25/28 on Tinetti Balance Assessment placing pt as a low fall risk. . Pt did have more than a 4 point statistical improvement. Pt has made progress and has met all goals. Patient has met nursing goals and has progressed well. Patient has not had any falls throughout the course of in home nursing care, and patient has been able to manage pain. Patient's appetite is good and fluid intake is wonderful. Patient is well educated on her medications and able to educate this nurse on the purpose of each medication. Education reinforced on the importance of monitoring weight, blood sugar, and vitals daily and recording them in a notebook. Education reinforced on reporting weight gain of 2 lbs or more in 24/hrs. or 5 lbs. or more within 5 days to MD office immediately. Patient verbalized understanding. Patient verbalized coping activities for depression and was confident in education received from nursing staff and states she is independent in managing her disease process and medications. Safety precautions and infection control education were reinforced and patient is aware that any new medical questions should be directed to medical doctor. MD was notified. Discharge plan: Discharge from [facility] services as all goals have been met. Robbin White MD office has been notified of DC from [facility]  with goals met.  Pt will follow up with Cardiac Rehab after she follows up with Cardiologist next week

## 2023-02-01 DIAGNOSIS — J90 PLEURAL EFFUSION ON LEFT: Primary | ICD-10-CM

## 2023-02-05 DIAGNOSIS — J90 PLEURAL EFFUSION ON LEFT: Primary | ICD-10-CM

## 2023-02-17 ENCOUNTER — HOSPITAL ENCOUNTER (OUTPATIENT)
Facility: HOSPITAL | Age: 68
End: 2023-02-17
Payer: MEDICARE

## 2023-02-17 ENCOUNTER — OFFICE VISIT (OUTPATIENT)
Dept: CARDIOTHORACIC SURGERY | Age: 68
End: 2023-02-17

## 2023-02-17 VITALS
TEMPERATURE: 98.2 F | OXYGEN SATURATION: 94 % | HEART RATE: 80 BPM | WEIGHT: 293 LBS | SYSTOLIC BLOOD PRESSURE: 132 MMHG | BODY MASS INDEX: 41.02 KG/M2 | HEIGHT: 71 IN | DIASTOLIC BLOOD PRESSURE: 64 MMHG

## 2023-02-17 DIAGNOSIS — J90 PLEURAL EFFUSION: Primary | ICD-10-CM

## 2023-02-17 DIAGNOSIS — J90 PLEURAL EFFUSION: ICD-10-CM

## 2023-02-17 PROCEDURE — 99024 POSTOP FOLLOW-UP VISIT: CPT | Performed by: PHYSICIAN ASSISTANT

## 2023-02-17 PROCEDURE — 71046 X-RAY EXAM CHEST 2 VIEWS: CPT

## 2023-02-17 NOTE — PROGRESS NOTES
CARDIAC SURGERY FOLLOW-UP NOTE    2/17/2023    Subjective:   Sean Lopez  Pt returns 1 month after thoracentesis for follow up   S/p AVR/CABG 12/7/22    Assessment:  Recurrent LEFT pleural effusion    Plans / Recommendations:   LEFT pleural effusion needs additional drainage - IR consulted for thoracentesis  Elevate legs    Patient verbalizes understanding and agreement with the plan. Elias Contreras PA-C  [facility]  [phone]  _______________________________________________________________________________________________________________________________________________________  Subjective: The patient feels well. She reports being able to lie flat. She still has some BABCOCK and significant b/l leg swelling. Current medications include:  Current Outpatient Medications   Medication Sig Dispense Refill    acetaminophen (TYLENOL) 325 MG tablet Take 325-650 mg by mouth every 4 hours as needed      acetaminophen (TYLENOL) 500 MG tablet Take 500 mg by mouth every 6 hours as needed      acetaminophen (TYLENOL) 650 MG extended release tablet Take 650 mg by mouth every 8 hours as needed      aspirin 81 MG EC tablet Take 81 mg by mouth daily      atorvastatin (LIPITOR) 40 MG tablet Take 40 mg by mouth daily      azelastine (ASTELIN) 0.1 % nasal spray 1 spray by Nasal route every morning      clopidogrel (PLAVIX) 75 MG tablet Take 75 mg by mouth daily      Dulaglutide (TRULICITY) 3 OS/6.9SW SOPN Inject 1 Dose into the skin every 7 days      empagliflozin (JARDIANCE) 25 MG tablet Take by mouth daily      fexofenadine-pseudoephedrine (ALLEGRA-D 12HR)  MG per extended release tablet Take 1 tablet by mouth in the morning and 1 tablet in the evening.       fluticasone (FLONASE) 50 MCG/ACT nasal spray 2 sprays by Nasal route      furosemide (LASIX) 40 MG tablet Take 40 mg by mouth 2 times daily      metFORMIN (GLUCOPHAGE) 500 MG tablet Take 1,000 mg by mouth 2 times daily (with meals)      metoprolol tartrate (LOPRESSOR) 25 MG tablet Take 12.5 mg by mouth 2 times daily      montelukast (SINGULAIR) 10 MG tablet Take 10 mg by mouth every evening      omeprazole (PRILOSEC OTC) 20 MG tablet Take 20 mg by mouth daily      potassium chloride (KLOR-CON M) 20 MEQ extended release tablet Take 20 mEq by mouth 2 times daily      cyanocobalamin 500 MCG tablet Take 500 mcg by mouth daily (Patient not taking: Reported on 2/17/2023)      Dextromethorphan-guaiFENesin  MG TB12 Take 1 tablet by mouth (Patient not taking: Reported on 6/02/8309)      folic acid (FOLVITE) 1 MG tablet Take 1 mg by mouth daily (Patient not taking: Reported on 2/17/2023)      lisinopril (PRINIVIL;ZESTRIL) 20 MG tablet Take 20 mg by mouth daily (Patient not taking: Reported on 2/17/2023)       No current facility-administered medications for this visit. Objective:   Vital signs:    BP Readings from Last 3 Encounters:   02/17/23 132/64   01/19/23 115/60   01/17/23 129/64     Wt Readings from Last 3 Encounters:   02/17/23 294 lb (133.4 kg)   01/17/23 277 lb (125.6 kg)   01/11/23 285 lb (129.3 kg)     @TMAX(24)@  Wt Readings from Last 3 Encounters:   02/17/23 294 lb (133.4 kg)   01/17/23 277 lb (125.6 kg)   01/11/23 285 lb (129.3 kg)     Ht Readings from Last 3 Encounters:   02/17/23 5' 11\" (1.803 m)   01/17/23 5' 11\" (1.803 m)   01/04/23 5' 11\" (1.803 m)     CV: rrr  Respiratory exam: decreased LEFT base  Extre: b/l le edema to mid shins      CXR: clear lungs bilaterally with left  residual effusion.

## 2023-02-28 ENCOUNTER — APPOINTMENT (OUTPATIENT)
Facility: HOSPITAL | Age: 68
End: 2023-02-28
Attending: RADIOLOGY
Payer: MEDICARE

## 2023-02-28 ENCOUNTER — HOSPITAL ENCOUNTER (OUTPATIENT)
Facility: HOSPITAL | Age: 68
Discharge: HOME | End: 2023-02-28
Attending: RADIOLOGY
Payer: MEDICARE

## 2023-02-28 VITALS
DIASTOLIC BLOOD PRESSURE: 78 MMHG | SYSTOLIC BLOOD PRESSURE: 158 MMHG | OXYGEN SATURATION: 95 % | WEIGHT: 291.6 LBS | HEART RATE: 81 BPM | BODY MASS INDEX: 40.67 KG/M2 | RESPIRATION RATE: 20 BRPM | TEMPERATURE: 97.5 F

## 2023-02-28 DIAGNOSIS — J90 PLEURAL EFFUSION: ICD-10-CM

## 2023-02-28 LAB
ANION GAP SERPL CALC-SCNC: 2 MMOL/L (ref 3–18)
APTT PPP: 32.1 SEC (ref 23–36.4)
BASOPHILS # BLD: 0.1 K/UL (ref 0–0.1)
BASOPHILS NFR BLD: 1 % (ref 0–2)
BUN SERPL-MCNC: 22 MG/DL (ref 7–18)
BUN/CREAT SERPL: 21 (ref 12–20)
CALCIUM SERPL-MCNC: 8.8 MG/DL (ref 8.5–10.1)
CHLORIDE SERPL-SCNC: 105 MMOL/L (ref 100–111)
CO2 SERPL-SCNC: 30 MMOL/L (ref 21–32)
CREAT SERPL-MCNC: 1.06 MG/DL (ref 0.6–1.3)
DIFFERENTIAL METHOD BLD: ABNORMAL
EOSINOPHIL # BLD: 0.5 K/UL (ref 0–0.4)
EOSINOPHIL NFR BLD: 5 % (ref 0–5)
ERYTHROCYTE [DISTWIDTH] IN BLOOD BY AUTOMATED COUNT: 14.6 % (ref 11.6–14.5)
GLUCOSE SERPL-MCNC: 117 MG/DL (ref 74–99)
HCT VFR BLD AUTO: 34.9 % (ref 35–45)
HGB BLD-MCNC: 10.9 G/DL (ref 12–16)
IMM GRANULOCYTES # BLD AUTO: 0 K/UL (ref 0–0.04)
IMM GRANULOCYTES NFR BLD AUTO: 0 % (ref 0–0.5)
INR PPP: 1.1 (ref 0.8–1.2)
LYMPHOCYTES # BLD: 2.6 K/UL (ref 0.9–3.6)
LYMPHOCYTES NFR BLD: 28 % (ref 21–52)
MCH RBC QN AUTO: 28.7 PG (ref 24–34)
MCHC RBC AUTO-ENTMCNC: 31.2 G/DL (ref 31–37)
MCV RBC AUTO: 91.8 FL (ref 78–100)
MONOCYTES # BLD: 0.6 K/UL (ref 0.05–1.2)
MONOCYTES NFR BLD: 6 % (ref 3–10)
NEUTS SEG # BLD: 5.6 K/UL (ref 1.8–8)
NEUTS SEG NFR BLD: 59 % (ref 40–73)
NRBC # BLD: 0 K/UL (ref 0–0.01)
NRBC BLD-RTO: 0 PER 100 WBC
PLATELET # BLD AUTO: 311 K/UL (ref 135–420)
PMV BLD AUTO: 8.8 FL (ref 9.2–11.8)
POTASSIUM SERPL-SCNC: 4.2 MMOL/L (ref 3.5–5.5)
PROTHROMBIN TIME: 14.3 SEC (ref 11.5–15.2)
RBC # BLD AUTO: 3.8 M/UL (ref 4.2–5.3)
SODIUM SERPL-SCNC: 137 MMOL/L (ref 136–145)
WBC # BLD AUTO: 9.4 K/UL (ref 4.6–13.2)

## 2023-02-28 PROCEDURE — 85610 PROTHROMBIN TIME: CPT

## 2023-02-28 PROCEDURE — 71045 X-RAY EXAM CHEST 1 VIEW: CPT

## 2023-02-28 PROCEDURE — 7100000010 HC PHASE II RECOVERY - FIRST 15 MIN

## 2023-02-28 PROCEDURE — 7100000011 HC PHASE II RECOVERY - ADDTL 15 MIN

## 2023-02-28 PROCEDURE — 85730 THROMBOPLASTIN TIME PARTIAL: CPT

## 2023-02-28 PROCEDURE — 85025 COMPLETE CBC W/AUTO DIFF WBC: CPT

## 2023-02-28 PROCEDURE — 2709999900 US THORACENTESIS

## 2023-02-28 PROCEDURE — 80048 BASIC METABOLIC PNL TOTAL CA: CPT

## 2023-02-28 PROCEDURE — 2580000003 HC RX 258: Performed by: RADIOLOGY

## 2023-02-28 RX ORDER — SODIUM CHLORIDE 9 MG/ML
INJECTION, SOLUTION INTRAVENOUS PRN
Status: DISCONTINUED | OUTPATIENT
Start: 2023-02-28 | End: 2023-03-09 | Stop reason: HOSPADM

## 2023-02-28 RX ADMIN — SODIUM CHLORIDE: 9 INJECTION, SOLUTION INTRAVENOUS at 10:00

## 2023-02-28 ASSESSMENT — PAIN - FUNCTIONAL ASSESSMENT: PAIN_FUNCTIONAL_ASSESSMENT: 0-10

## 2023-02-28 NOTE — H&P
History and Physical    Patient: Alexey López           Sex: female       DOA: 2023  YOB: 1955      Age:  79 y.o.     LOS:  LOS: 0 days        HPI:     Alexey López is a 79 y.o. female with history of AVR/CABG 2022 with recurrent left pleural effusion who presents for image guided left thoracentesis. History reviewed. No pertinent past medical history. History reviewed. No pertinent surgical history. History reviewed. No pertinent family history. Social History     Socioeconomic History    Marital status:      Spouse name: None    Number of children: None    Years of education: None    Highest education level: None   Tobacco Use    Smoking status: Former     Packs/day: 2.00     Years: 40.00     Pack years: 80.00     Types: Cigarettes     Quit date:      Years since quittin.1    Smokeless tobacco: Never       Prior to Admission medications    Medication Sig Start Date End Date Taking?  Authorizing Provider   acetaminophen (TYLENOL) 325 MG tablet Take 325-650 mg by mouth every 4 hours as needed 22   Ar Automatic Reconciliation   acetaminophen (TYLENOL) 500 MG tablet Take 500 mg by mouth every 6 hours as needed    Ar Automatic Reconciliation   acetaminophen (TYLENOL) 650 MG extended release tablet Take 650 mg by mouth every 8 hours as needed    Ar Automatic Reconciliation   aspirin 81 MG EC tablet Take 81 mg by mouth daily 22   Ar Automatic Reconciliation   atorvastatin (LIPITOR) 40 MG tablet Take 40 mg by mouth daily    Ar Automatic Reconciliation   azelastine (ASTELIN) 0.1 % nasal spray 1 spray by Nasal route every morning    Ar Automatic Reconciliation   clopidogrel (PLAVIX) 75 MG tablet Take 75 mg by mouth daily 22   Ar Automatic Reconciliation   cyanocobalamin 500 MCG tablet Take 500 mcg by mouth daily  Patient not taking: Reported on 22   Ar Automatic Reconciliation   Dextromethorphan-guaiFENesin  MG TB12 Take 1 tablet by mouth  Patient not taking: Reported on 2/17/2023    Ar Automatic Reconciliation   Dulaglutide (TRULICITY) 3 MT/9.4UU SOPN Inject 1 Dose into the skin every 7 days    Ar Automatic Reconciliation   empagliflozin (JARDIANCE) 25 MG tablet Take by mouth daily    Ar Automatic Reconciliation   fexofenadine-pseudoephedrine (ALLEGRA-D 12HR)  MG per extended release tablet Take 1 tablet by mouth in the morning and 1 tablet in the evening.  12/20/22   Ar Automatic Reconciliation   fluticasone (FLONASE) 50 MCG/ACT nasal spray 2 sprays by Nasal route    Ar Automatic Reconciliation   folic acid (FOLVITE) 1 MG tablet Take 1 mg by mouth daily  Patient not taking: Reported on 2/17/2023 12/21/22   Ar Automatic Reconciliation   furosemide (LASIX) 40 MG tablet Take 40 mg by mouth 2 times daily 12/29/22   Ar Automatic Reconciliation   lisinopril (PRINIVIL;ZESTRIL) 20 MG tablet Take 20 mg by mouth daily  Patient not taking: Reported on 2/17/2023    Ar Automatic Reconciliation   metFORMIN (GLUCOPHAGE) 500 MG tablet Take 1,000 mg by mouth 2 times daily (with meals)    Ar Automatic Reconciliation   metoprolol tartrate (LOPRESSOR) 25 MG tablet Take 12.5 mg by mouth 2 times daily 12/29/22   Ar Automatic Reconciliation   montelukast (SINGULAIR) 10 MG tablet Take 10 mg by mouth every evening    Ar Automatic Reconciliation   omeprazole (PRILOSEC OTC) 20 MG tablet Take 20 mg by mouth daily    Ar Automatic Reconciliation   potassium chloride (KLOR-CON M) 20 MEQ extended release tablet Take 20 mEq by mouth 2 times daily 12/29/22   Ar Automatic Reconciliation       Allergies   Allergen Reactions    Albuterol Sulfate Shortness Of Breath    Terbutaline Shortness Of Breath    Oxytetracycline      Other reaction(s): Unknown (comments)    Pseudoephedrine Rash       Physical Exam:      Visit Vitals  BP (!) 158/78   Pulse 81   Temp 97.5 °F (36.4 °C) (Oral)   Resp 20   Wt 291 lb 9.6 oz (132.3 kg)   SpO2 95%   BMI 40.67 kg/m²     Physical Exam:  General: A&O x 4, NAD  Heart: RRR  Lungs: Normal work of breathing on room air    Labs Reviewed:   All lab results from last 24 hours reviewed    Assessment/Plan     The patient is an appropriate candidate to undergo the planned procedure    Ling Moreno PA-C

## 2023-02-28 NOTE — DISCHARGE INSTRUCTIONS
Thoracentesis: What to Expect at Home  Your Recovery  Thoracentesis (say \"njwm-eo-ruj-JOEL-sis\") is a procedure to remove fluid from the space between the lungs and the chest wall (pleural space). This procedure may also be called a \"chest tap. \" It's normal to have a small amount of fluid in the pleural space. But too much fluid can build up because of problems such as infection, heart failure, or lung cancer. The procedure may have been done to help with shortness of breath and pain caused by the fluid buildup. Or you may have had this procedure so the doctor could test the fluid to find the cause of the buildup. Your chest may be sore where the doctor put the needle or catheter into your skin (the puncture site). This usually gets better after a day or two. You can go back to work or your normal activities as soon as you feel up to it. If a large amount of pleural fluid was removed during the procedure, you will probably be able to breathe more easily. If more pleural fluid collects and needs to be removed, another thoracentesis may be done later. If the doctor sent the fluid to a lab for testing, it may take several days to get the results. The doctor or nurse will discuss the results with you. This care sheet gives you a general idea about how long it will take for you to recover. But each person recovers at a different pace. Follow the steps below to feel better as quickly as possible. How can you care for yourself at home? Activity    Rest when you feel tired. Getting enough sleep will help you recover. Avoid strenuous activities, such as bicycle riding, jogging, weight lifting, or aerobic exercise, until your doctor says it is okay. You may shower. Do not take a bath until the puncture site has healed, or until your doctor tells you it is okay. Ask your doctor when you can drive again. You may need to take 1 or 2 days off from work.  It depends on the type of work you do and how you feel.   Diet    You can eat your normal diet. Drink plenty of fluids (unless your doctor tells you not to). Medicines    Your doctor will tell you if and when you can restart your medicines. He or she will also give you instructions about taking any new medicines. If you stopped taking aspirin or some other blood thinner, your doctor will tell you when to start taking it again. Be safe with medicines. Take pain medicines exactly as directed. If the doctor gave you a prescription medicine for pain, take it as prescribed. If you are not taking a prescription pain medicine, ask your doctor if you can take an over-the-counter medicine. Do not take two or more pain medicines at the same time unless the doctor told you to. Many pain medicines have acetaminophen, which is Tylenol. Too much acetaminophen (Tylenol) can be harmful. If you think your pain medicine is making you sick to your stomach: Take your medicine after meals (unless your doctor has told you not to). Ask your doctor for a different pain medicine. If your doctor prescribed antibiotics, take them as directed. Do not stop taking them just because you feel better. You need to take the full course of antibiotics. Care of the puncture site    Wash the area daily with warm, soapy water, and pat it dry. Don't use hydrogen peroxide or alcohol, which may delay healing. You may cover the area with a gauze bandage if it weeps or rubs against clothing. Change the bandage every day. Keep the area clean and dry. Follow-up care is a key part of your treatment and safety. Be sure to make and go to all appointments, and call your doctor if you are having problems. It's also a good idea to know your test results and keep a list of the medicines you take. When should you call for help? Call 911 anytime you think you may need emergency care. For example, call if:    You passed out (lost consciousness).      You have severe trouble breathing. You have sudden chest pain and shortness of breath, or you cough up blood. Call your doctor now or seek immediate medical care if:    You have shortness of breath that is new or getting worse. You have new or worse pain in your chest, especially when you take a deep breath. You are sick to your stomach or cannot keep fluids down. You have a fever over 100°F.     Bright red blood has soaked through the bandage over your puncture site. You have signs of infection, such as: Increased pain, swelling, warmth, or redness. Red streaks leading from the puncture site. Pus draining from the puncture site. Swollen lymph nodes in your neck, armpits, or groin. A fever. You cough up a lot more mucus than normal, or your mucus changes color. Watch closely for changes in your health, and be sure to contact your doctor if you have any problems. Where can you learn more? Go to http://www.woods.com/ and enter Q755 to learn more about \"Thoracentesis: What to Expect at Home. \"  Current as of: March 9, 2022               Content Version: 13.5  © 5990-0727 [employer]. Care instructions adapted under license by [facility] ([facility]). If you have questions about a medical condition or this instruction, always ask your healthcare professional. [place] [address] any warranty or liability for your use of this information. DISCHARGE SUMMARY from Nurse    PATIENT INSTRUCTIONS:    After general anesthesia or intravenous sedation, for 24 hours or while taking prescription Narcotics:  Limit your activities  Do not drive and operate hazardous machinery  Do not make important personal or business decisions  Do  not drink alcoholic beverages  If you have not urinated within 8 hours after discharge, please contact your surgeon on call.     Report the following to your surgeon:  Excessive pain, swelling, redness or odor of or around the surgical area  Temperature over 100.5  Nausea and vomiting lasting longer than 4 hours or if unable to take medications  Any signs of decreased circulation or nerve impairment to extremity: change in color, persistent  numbness, tingling, coldness or increase pain  Any questions      These are general instructions for a healthy lifestyle:    No smoking/ No tobacco products/ Avoid exposure to second hand smoke  Surgeon General's Warning:  Quitting smoking now greatly reduces serious risk to your health. Obesity, smoking, and sedentary lifestyle greatly increases your risk for illness    A healthy diet, regular physical exercise & weight monitoring are important for maintaining a healthy lifestyle    You may be retaining fluid if you have a history of heart failure or if you experience any of the following symptoms:  Weight gain of 3 pounds or more overnight or 5 pounds in a week, increased swelling in our hands or feet or shortness of breath while lying flat in bed. Please call your doctor as soon as you notice any of these symptoms; do not wait until your next office visit. The discharge information has been reviewed with the patient. The patient verbalized understanding. Discharge medications reviewed with the patient and appropriate educational materials and side effects teaching were provided.   ___________________________________________________________________________________________________________________________________

## 2023-02-28 NOTE — PROCEDURES
RADIOLOGY POST THORACENTESIS NOTE     February 28, 2023       11:55 AM     :  Andrea Mason PA-C    Assistant:  None. Attending: Dr. Jarrod Estrada    Procedure:  US-guided left thoracentesis     Pre-operative Diagnosis: Pleural effusion    Post-operative Diagnosis: same    Procedure Details: Informed consent obtained. Under ultrasound guidance, the largest pocket of fluid collection was identified. Percutaneous access was achieved using a posterior intercostal approach with a one step needle. 1% lidocaine was utilized for local anesthesia. The 5 [employer] Cira Yueh sheathed needle connected to manually controlled vacuum bottle. 650 cc of clear, carin fluid removed. Procedure stopped due to patient discomfort. Images saved in PACS. Complications:  No immediate. Specimens: None    Estimated blood Loss: Minimal               Condition: Stable. Impression:  Successful left thoracentesis. Plan: Post procedure chest xray pending.

## 2023-11-16 NOTE — PROGRESS NOTES
Per Braden Mon of [facility], they might be able to admit pt today if ok by Dr. Woody Lombard.    Sent message to [place], [place].               Adam Conde, BSN RN  Care Management  Pager: [phone] current Plavix supply and then discontinue.   -Remain on all other medications   -BMP, CBC stable 6/2023. Hypertension  BP is stable today. BMP 5/2022 wnl    Hyperlipidemia  -2/25/21 LDLc 54, LFT wnl.    -5/2022 LDLc 64, A1c 5.8  -Will continue Lipitor 40 mg daily. -LDLc 54 6/2023  -repeat labs routinely with PCP. Will obtain copies. Bradycardia  Asymptomatic at this time with no reported s/s of bradycardia or hypotension. Stable since our last visit. Systolic murmur 9-9/7 has not changed. Asymptomatic. Instructed to obtain flu vaccine this season, annually per guidelines. Plan follow up in 9 months. Complexity of medical decision making-high  Thank you very much for allowing me to participate in the care of your patient. Please do not hesitate to contact me if you have any questions. Sincerely,  Lotus Dempsey MD    [facility]  [address] [facility], [address] [place] [address]  Ph: [phone]  Fax: [phone]    This note was scribed in the presence of Dr. Mary Beth Smith MD by Nanda Isaacs, ROSEMARY. Physician Attestation:  The scribes documentation has been prepared under my direction and personally reviewed by me in its entirety. I confirm that the note above accurately reflects all work, treatment, procedures, and medical decision making performed by me.

## 2024-06-19 NOTE — Clinical Note
Problem: At Risk for Falls  Goal: Patient does not fall  Outcome: Monitoring/Evaluating progress  Goal: Patient takes action to control fall-related risks  Outcome: Monitoring/Evaluating progress     Problem: At Risk for Injury Due to Fall  Goal: Patient does not fall  Outcome: Monitoring/Evaluating progress  Goal: Takes action to control condition specific risks  Outcome: Monitoring/Evaluating progress  Goal: Verbalizes understanding of fall-related injury personal risks  Description: Document education using the patient education activity  Outcome: Monitoring/Evaluating progress     Problem: Impaired Physical Mobility  Goal: Functional status is maintained or returned to baseline during hospitalization  Outcome: Monitoring/Evaluating progress  Goal: Tolerates activity for discharge setting with no abnormal symptoms  Outcome: Monitoring/Evaluating progress     Problem: Self Care Deficit  Goal: # Functional status is maintained or returned to baseline - REHAB ONLY  Outcome: Monitoring/Evaluating progress  Goal: Functional status is maintained or returned to baseline  Outcome: Monitoring/Evaluating progress  Goal: # Tolerates activity for d/c setting with no clinical problems  Outcome: Monitoring/Evaluating progress     Problem: Skin Integrity Alteration  Goal: Skin remains intact with no new/deterioration of wound or pressure injury  Outcome: Monitoring/Evaluating progress     Problem: Skin Integrity Alteration  Goal: Participates in wound care activities  Outcome: Monitoring/Evaluating progress      Diagnostic wire inserted.

## 2024-11-24 NOTE — ROUTINE PROCESS
SHIFT CHANGE NOTE FOR [facility]    Bedside and Verbal shift change report given to Tash Masters (oncoming nurse) by Anson Duenas RN (offgoing nurse). Report included the following information SBAR, Kardex, MAR and Recent Results.     Situation:   Code Status: Full Code   Hospital Day: 2   Problem List:   Hospital Problems  Never Reviewed            Codes Class Noted POA    CAD (coronary artery disease) ICD-10-CM: I25.10  ICD-9-CM: 414.00  12/5/2022 Unknown         Background:   Past Medical History:   Past Medical History:   Diagnosis Date    Aortic stenosis 11/28/2022    Coronary artery disease involving native coronary artery of native heart 11/28/2022    Primary hypertension 11/28/2022    S/P AVR (aortic valve replacement) and aortoplasty 12/7/2022    S/p CABG x2 (LIMA-LAD, rSVG-OM), AVR 23 mm Inspiris with aortic root enlargement - Dr. Med Quiñonez    S/P CABG x 2 12/7/2022    S/p CABG x2 (LIMA-LAD, rSVG-OM), AVR 23 mm Inspiris with aortic root enlargement - Dr. Med Quiñonez    Type 2 diabetes mellitus, without long-term current use of insulin ([facility] [address] 11/28/2022        Assessment:  Changes in Assessment throughout shift: No change to previous assessment    Patient has a central line: no Reasons if yes: na  Insertion date:na Last dressing date:na  Patient has Villagomez Cath: no Reasons if yes: na   Insertion date:na  Shift villagomez care completed: NO    Last Vitals:     Vitals:    12/16/22 1925 12/17/22 0823 12/17/22 1219 12/17/22 1551   BP: (!) 145/66 (!) 146/93 (!) 158/71 138/65   Pulse: 82 81 87 82   Resp: 18 19  19   Temp: 98.1 °F (36.7 °C) 98.6 °F (37 °C)  98.1 °F (36.7 °C)   SpO2: 94% 95%  94%   Height:           PAIN    Pain Assessment    Pain Intensity 1: 0 (12/17/22 1330) Pain Intensity 1: 2 (12/29/14 1105)    Pain Location 1: Rib cage Pain Location 1: Abdomen    Pain Intervention(s) 1: Medication (see MAR) Pain Intervention(s) 1: Medication (see MAR)  Patient Stated Pain Goal: 0 Patient Stated Pain Goal: 0  Intervention effective: yes  Other actions taken for pain: Medication (see MAR)    Skin Assessment  Skin color    Condition/Temperature    Integrity Skin Integrity: Incision (comment)  Turgor    Weekly Pressure Ulcer Documentation  Pressure  Injury Documentation: No Pressure Injury Noted-Pressure Ulcer Prevention Initiated  Wound Prevention & Protection Methods  Orientation of wound Orientation of Wound Prevention: Posterior  Location of Prevention Location of Wound Prevention: Sacrum/Coccyx  Dressing Present Dressing Present : No  Dressing Status Dressing Status: Intact  Wound Offloading Wound Offloading (Prevention Methods): Bed, pressure reduction mattress    INTAKE/OUPUT  Date 12/16/22 1900 - 12/17/22 0659 12/17/22 0700 - 12/18/22 0659   Shift 0669-6782 24 Hour Total 5388-9033 9175-4793 24 Hour Total   INTAKE   P.O.  1320 480 240 720     P. O.  1320 480 240 720   Shift Total  1320 480 240 720   OUTPUT   Urine          Urine Occurrence(s) 3 x 8 x 4 x  4 x   Stool          Stool Occurrence(s) 4 x 7 x 3 x  3 x   Shift Total        NET  1320 480 240 720   Weight (kg)              Recommendations:  Patient needs and requests: na    Pending tests/procedures: na     Functional Level/Equipment: Partial (one person) /      Fall Precautions:   Fall risk precautions were reinforced with the patient; she was instructed to call for help prior to getting up. The following fall risk precautions were continued: bed/ chair alarms, door signage, yellow bracelet and socks as well as update of the [employer] tool in the patient's room. Yanet Score: 3    HEALS Safety Check    A safety check occurred in the patient's room between off going nurse and oncoming nurse listed above.     The safety check included the below items  Area Items   H  High Alert Medications Verify all high alert medication drips (heparin, PCA, etc.)   E  Equipment Suction is set up for ALL patients (with yanker)  Red plugs utilized for all equipment (IV pumps, etc.)  WOWs wiped down at end of shift. Room stocked with oxygen, suction, and other unit-specific supplies   A  Alarms Bed alarm is set for fall risk patients  Ensure chair alarm is in place and activated if patient is up in a chair   L  Lines Check IV for any infiltration  Chapa bag is empty if patient has a Chapa   Tubing and IV bags are labeled   S  Safety   Room is clean, patient is clean, and equipment is clean. Hallways are clear from equipment besides carts. Fall bracelet on for fall risk patients  Ensure room is clear and free of clutter  Suction is set up for ALL patients (with nora)  Hallways are clear from equipment besides carts.    Isolation precautions followed, supplies available outside room, sign posted     Natali Hahn RN no

## 2025-01-20 NOTE — PROGRESS NOTES
Anesthesia Start and Stop Event Times       Date Time Event    1/20/2025 0806 Ready for Procedure     0846 Anesthesia Start     0914 Anesthesia Stop          Responsible Staff  01/20/25      Name Role Begin End    Abdiaziz Waldrop M.D. Anesth 0846 0914          Overtime Reason:  no overtime (within assigned shift)    Comments:                                                           SHIFT CHANGE NOTE FOR [facility]    Bedside and Verbal shift change report given to [facility] - GREG SWEET (oncoming nurse) by Parmjit Bill LPN (offgoing nurse). Report included the following information SBAR, Kardex, MAR and Recent Results. Situation:   Code Status: Full Code   Hospital Day: 0   Problem List:   Hospital Problems  Never Reviewed            Codes Class Noted POA    CAD (coronary artery disease) ICD-10-CM: I25.10  ICD-9-CM: 414.00  12/5/2022 Unknown           Background:   Past Medical History:   Past Medical History:   Diagnosis Date    Aortic stenosis 11/28/2022    Coronary artery disease involving native coronary artery of native heart 11/28/2022    Primary hypertension 11/28/2022    S/P AVR (aortic valve replacement) and aortoplasty 12/7/2022    S/p CABG x2 (LIMA-LAD, rSVG-OM), AVR 23 mm Inspiris with aortic root enlargement - Dr. Narda Das    S/P CABG x 2 12/7/2022    S/p CABG x2 (LIMA-LAD, rSVG-OM), AVR 23 mm Inspiris with aortic root enlargement - Dr. Narda Das    Type 2 diabetes mellitus, without long-term current use of insulin ([facility] [address] 11/28/2022        Assessment:  Changes in Assessment throughout shift:      Patient has a central line Reasons if yes: Insertion date: Last dressing date:  Patient has Villagomez Cath: no Reasons if yes:     Insertion date:  Shift villagomez care completed: YES    Last Vitals:     Vitals:    12/15/22 1639   BP: (!) 147/64   Pulse: 78   Resp: 20   Temp: 97.7 °F (36.5 °C)   SpO2: 94%   Height: 5' 11\" (1.803 m)       PAIN    Pain Assessment    Pain Intensity 1: 2 (12/15/22 1702) Pain Intensity 1: 2 (12/29/14 1105)    Pain Location 1: Chest Pain Location 1: Abdomen      Pain Intervention(s) 1: Medication (see MAR)  Patient Stated Pain Goal: 0 Patient Stated Pain Goal: 0  Intervention effective: yes  Other actions taken for pain:      Skin Assessment  Skin color    Condition/Temperature    Integrity Skin Integrity: Incision (comment)  Turgor    Weekly Pressure Ulcer Documentation Pressure  Injury Documentation: No Pressure Injury Noted-Pressure Ulcer Prevention Initiated  Wound Prevention & Protection Methods  Orientation of wound Orientation of Wound Prevention: Posterior  Location of Prevention Location of Wound Prevention: Buttocks, Sacrum/Coccyx  Dressing Present Dressing Present : No  Dressing Status Dressing Status: Intact  Wound Offloading      INTAKE/OUPUT  Date 12/14/22 1900 - 12/15/22 0659(Not Admitted) 12/15/22 0700 - 12/16/22 0659   Shift 1900-0659 24 Hour Total 7953-8550 7502-9455 24 Hour Total   INTAKE   P.O.   240  240     P. O.   240  240   Shift Total   240  240   OUTPUT   Urine          Urine Occurrence(s)   1 x  1 x   Stool          Stool Occurrence(s)   0 x  0 x   Shift Total        NET   240  240   Weight (kg)            Recommendations:  Patient needs and requests: TOILETING    Pending tests/procedures: LABS PENDING     Functional Level/Equipment: Partial (one person) /      Fall Precautions:   Fall risk precautions were reinforced with the patient; she was instructed to call for help prior to getting up. The following fall risk precautions were continued: bed/ chair alarms, door signage, yellow bracelet and socks as well as update of the Anna Bartholomew tool in the patient's room. Yanet Score:      HEALS Safety Check    A safety check occurred in the patient's room between off going nurse and oncoming nurse listed above. The safety check included the below items  Area Items   H  High Alert Medications Verify all high alert medication drips (heparin, PCA, etc.)   E  Equipment Suction is set up for ALL patients (with yanker)  Red plugs utilized for all equipment (IV pumps, etc.)  WOWs wiped down at end of shift.   Room stocked with oxygen, suction, and other unit-specific supplies   A  Alarms Bed alarm is set for fall risk patients  Ensure chair alarm is in place and activated if patient is up in a chair   L  Lines Check IV for any infiltration  Chapa bag is empty if patient has a Chapa   Tubing and IV bags are labeled   S  Safety   Room is clean, patient is clean, and equipment is clean. Hallways are clear from equipment besides carts. Fall bracelet on for fall risk patients  Ensure room is clear and free of clutter  Suction is set up for ALL patients (with nora)  Hallways are clear from equipment besides carts.    Isolation precautions followed, supplies available outside room, sign posted     Jaci Navarro LPN

## (undated) DEVICE — CATH KT RAD ART 20GX1.75IN --

## (undated) DEVICE — CANNULA PERF L55IN DIA7FR AORT ROOT AG STD TIP W  8IN VENT L

## (undated) DEVICE — PROBE VASC 8MHZ WTRPRF

## (undated) DEVICE — CANNULA COR 12FR OVERALL LEN 75IN 90DEG ANG ART OSTIAL HI FL

## (undated) DEVICE — STABILIZER SURG TISS W/ CANSTR TBNG EVOLUTION OCTPS

## (undated) DEVICE — NEEDLE HYPO 25GA L0.625IN BLU POLYPR HUB S STL REG BVL STR

## (undated) DEVICE — SUT PROL 6-0 30IN C1 DA BLU --

## (undated) DEVICE — TUBE SUCT 20FR RIG MACRO SUC

## (undated) DEVICE — COVER US PRB W15XL120CM W/ GEL RUBBERBAND TAPE STRP FLD GEN

## (undated) DEVICE — Device

## (undated) DEVICE — SUMP INTCARD W/ 1/4INCH CONN 20FRENCH 15INCH DLP

## (undated) DEVICE — PRESSURE MONITORING SET: Brand: TRUWAVE

## (undated) DEVICE — DRAPE TWL SURG 16X26IN BLU ORB04] ALLCARE INC]

## (undated) DEVICE — DECANTER BAG 9": Brand: MEDLINE INDUSTRIES, INC.

## (undated) DEVICE — LINER,SOFT,SUCTION CANISTER,1500CC: Brand: MEDLINE

## (undated) DEVICE — SUTURE NONABSORBABLE MONOFILAMENT 4-0 RB-1 36 IN BLU PROLENE 8557H

## (undated) DEVICE — SPONGE LAPAROTOMY W18XL18IN WHITE STRUNG RADIOPAQUE STERILE

## (undated) DEVICE — ADAPTER CARDPLG SET MGMT FEM LUER W/ CLR CODE CLMP DLP

## (undated) DEVICE — BARD® PTFE FELT PLEDGETS, (RECTANGLE), 4.8 MM X 6 MM: Brand: BARD® PTFE FELT PLEDGETS

## (undated) DEVICE — SET PERF MULT FEM LUER 4 LEG AND 3 FREE FLOW VES CANN 15IN

## (undated) DEVICE — 450 ML BOTTLE OF 0.05% CHLORHEXIDINE GLUCONATE IN 99.95% STERILE WATER FOR IRRIGATION, USP AND APPLICATOR.: Brand: IRRISEPT ANTIMICROBIAL WOUND LAVAGE

## (undated) DEVICE — BLANKET WRM AD W50XL85.8IN PACU FULL BODY FORC AIR

## (undated) DEVICE — HEART II: Brand: MEDLINE INDUSTRIES, INC.

## (undated) DEVICE — SPONGE HEMOSTAT CELLULS 4X8IN -- SURGICEL

## (undated) DEVICE — KIT,ANTI FOG,W/SPONGE & FLUID,SOFT PACK: Brand: MEDLINE

## (undated) DEVICE — PREP SKN CHLRAPRP APL 26ML STR --

## (undated) DEVICE — NDL PRT INJ NSAF BLNT 18GX1.5 --

## (undated) DEVICE — CONNECTOR TBNG W0.25XL0.375IN REDUC PLAS VLV BARB

## (undated) DEVICE — GLIDESHEATH SLENDER STAINLESS STEEL KIT: Brand: GLIDESHEATH SLENDER

## (undated) DEVICE — GLOVE SURG SZ 7 L11.33IN FNGR THK9.8MIL STRW LTX POLYMER

## (undated) DEVICE — SUTURE NRLN SZ 0 L18IN NONABSORBABLE BLK L36MM CT-1 1/2 CIR C521D

## (undated) DEVICE — MEDI-VAC TUBING CONNECTOR 5-IN-1 STRAIGHT: Brand: CARDINAL HEALTH

## (undated) DEVICE — Y BARBED CONNECTOR POLYPROPYLENE, LARGE: Brand: ARGYLE

## (undated) DEVICE — PENCIL ES L3M BTTN SWCH S STL HEX LOK BLDE ELECTRD HOLSTER

## (undated) DEVICE — STERILE POLYISOPRENE POWDER-FREE SURGICAL GLOVES: Brand: PROTEXIS

## (undated) DEVICE — Device: Brand: RETRACT-O-TAPE 18G X 30.5CM BLUNT NEEDLE

## (undated) DEVICE — SUT ETHBND 3-0 30IN RB1 DA GRN --

## (undated) DEVICE — OPTIFOAM GENTLE LIQUITRAP, SACRUM, 7"X7": Brand: MEDLINE

## (undated) DEVICE — SUTURE MCRYL SZ 4-0 L18IN ABSRB UD L19MM PS-2 3/8 CIR PRIM Y496G

## (undated) DEVICE — SHUNT CV L14MM DIA1.25MM IC BLB CLRVW
Type: IMPLANTABLE DEVICE | Site: HEART | Status: NON-FUNCTIONAL
Removed: 2022-12-07

## (undated) DEVICE — CANNULA VENT 16FR MAL INTRO SIL CONN 0.25IN NVENT BULL TIP

## (undated) DEVICE — SYR LR LCK 1ML GRAD NSAF 30ML --

## (undated) DEVICE — PROCEDURE KIT FLUID MGMT 10 FR CUST MAINFOLD

## (undated) DEVICE — CLIP INT SM WIDE WECK RED TI TRNSVRS GRV CHEVRON SHP W/ PERCIS TIP 24 PER PK

## (undated) DEVICE — CANNULA PERF VENTED 20 FRX12 IN ART BVL SUTURE CLLR EOPA

## (undated) DEVICE — FOGARTY SPRING CLIPS 6MM: Brand: FOGARTY SOFTJAW

## (undated) DEVICE — SYS VSL HARV HEMOPRO2 VASOVIEW -- HARV SYS MINIMUM ORDER 5/EA

## (undated) DEVICE — COR-KNOT® QUICK LOAD® 6-POUCH: Brand: COR-KNOT® QUICK LOAD®

## (undated) DEVICE — ROTATING SURGICAL PUNCHES, 1 PER POUCH: Brand: A&E MEDICAL / ROTATING SURGICAL PUNCHES

## (undated) DEVICE — EZ GLIDE AORTIC CANNULA: Brand: EDWARDS LIFESCIENCES EZ GLIDE AORTIC CANNULA

## (undated) DEVICE — OPTIFOAM GENTLE LIQUITRAP, SACRUM, 9X9: Brand: MEDLINE

## (undated) DEVICE — DRAIN SURG 24FR L5/16IN DIA8MM SIL RND HUBLESS FULL FLUT

## (undated) DEVICE — SOLUTION IV 1000ML 0.9% SOD CHL

## (undated) DEVICE — PAD,ABDOMINAL,8"X10",ST,LF: Brand: MEDLINE

## (undated) DEVICE — SET FLD ADMIN 3 W STPCOCK FIX FEM L BOR 1IN

## (undated) DEVICE — SUT MCRYL 4-0 27IN PS2 UD --

## (undated) DEVICE — CLIP LIG M BLU TI HRT SHP WIRE HORZ 600 PER BX

## (undated) DEVICE — STENT RONYX20022UX RESOLUTE ONYX 2.00X22
Type: IMPLANTABLE DEVICE | Status: NON-FUNCTIONAL
Brand: RESOLUTE ONYX™

## (undated) DEVICE — CATHETER GUID 5FR DIA0.058IN SHFT NYL STD JL 3 CRV ENH VIS

## (undated) DEVICE — BAND HEMOSTAT DRY D-STAT RAD --

## (undated) DEVICE — REM POLYHESIVE ADULT PATIENT RETURN ELECTRODE: Brand: VALLEYLAB

## (undated) DEVICE — STANDARD HYPODERMIC NEEDLE,POLYPROPYLENE HUB: Brand: MONOJECT

## (undated) DEVICE — ARGYLE FRAZIER SURGICAL SUCTION INSTRUMENT 8 FR/CH (2.7 MM): Brand: ARGYLE

## (undated) DEVICE — ELECTRD ECG RTS EDGE AD --

## (undated) DEVICE — GAUZE,SPONGE,4"X4",16PLY,STRL,LF,10/TRAY: Brand: MEDLINE

## (undated) DEVICE — CATHETER ART 20 GAX4 CM 22 GA RADIAL FEP

## (undated) DEVICE — SUTURE MCRYL SZ 4 0 L18IN ABSRB VLT PS 1 L24MM 3 8 CIR REV Y682H

## (undated) DEVICE — BRUSH SCRB 4% CHG RED DISP --

## (undated) DEVICE — 22G X 7"(178MM)PLASTIC HUBWITH WINGS, CALIBRATIONS: Brand: TUOHY EPIDURAL NEEDLE

## (undated) DEVICE — SUTURE PROL SZ 3-0 L36IN NONABSORBABLE BLU L26MM SH 1/2 CIR 8522H

## (undated) DEVICE — GOWN,AURORA,FABRIC-REINFORCED,LARGE: Brand: MEDLINE

## (undated) DEVICE — SUTURE VCRL SZ 1 L36IN ABSRB VLT L48MM CTX 1/2 CIR J371H

## (undated) DEVICE — CANNULA PERF L2IN BLNT TIP 2MM VES CLR RADPQ BODY FEM LUER

## (undated) DEVICE — SUTURE ETHBND 2-0 30IN NONABSORB GRN WHT V-5 17MM 1/2 PXX52N

## (undated) DEVICE — GUIDEWIRE VASC L25CM DIA0.018IN S STL STR SFT TIP

## (undated) DEVICE — TOWEL SURG W16XL26IN WHT NONFENESTRATED ST 4 PER PK

## (undated) DEVICE — AVID DUAL STAGE VENOUS DRAINAGE CANNULA: Brand: AVID DUAL STAGE VENOUS DRAINAGE CANNULA

## (undated) DEVICE — RUNTHROUGH NS EXTRA FLOPPY PTCA GUIDEWIRE: Brand: RUNTHROUGH

## (undated) DEVICE — SUTURE PERMA-HAND SZ 0 L30IN NONABSORBABLE BLK L36MM CT-1 424H

## (undated) DEVICE — SAWBLADE STERNUM OFFSET 31.4X6X64MM

## (undated) DEVICE — Device: Brand: PROWATER

## (undated) DEVICE — GOWN,SIRUS,NONRNF,SETINSLV,XL,20/CS: Brand: MEDLINE

## (undated) DEVICE — GARMENT,MEDLINE,DVT,INT,CALF,MED, GEN2: Brand: MEDLINE

## (undated) DEVICE — SUTURE ABSORBABLE BRAIDED 2-0 CT-1 27 IN UD VICRYL J259H

## (undated) DEVICE — KIT CATH 7FR L16CM CTRL VEN POLYUR 3 LUMN PRSS INJ BLU

## (undated) DEVICE — X-RAY SPONGES,12 PLY: Brand: DERMACEA

## (undated) DEVICE — STABILIZER SURG VAC STD BLADE POD MALL FT ACROBATI

## (undated) DEVICE — SWAN-GANZ CCOMBO V THERMODILUTION CATHETER: Brand: SWAN-GANZ CCOMBO V

## (undated) DEVICE — DR LANCEY ON PUMP PACK: Brand: MEDLINE INDUSTRIES, INC.

## (undated) DEVICE — SPONGE DRAIN NONWOVEN 4X4IN -- 2/PK

## (undated) DEVICE — STANDARD SURGICAL GOWN, L: Brand: CONVERTORS

## (undated) DEVICE — SYRINGE INSULIN 1ML 31GA L6MM SFGLDE

## (undated) DEVICE — CANNULA PERF 22FR L305CM W 3 8IN ART BLNT TIP HI FLOW VENT

## (undated) DEVICE — KIT VASCULAR TOURNIQUET 5IN

## (undated) DEVICE — RADIFOCUS GLIDEWIRE: Brand: GLIDEWIRE

## (undated) DEVICE — SUTURE PROL SZ 7-0 L24IN NONABSORBABLE BLU L8MM BV175-6 3/8 8735H

## (undated) DEVICE — INSERT SUT HLD F/OCTBS RETRCTR --

## (undated) DEVICE — BLADE ES ELASTOMERIC COAT INSUL DURABLE BEND UPTO 90DEG

## (undated) DEVICE — COR-KNOT® QUICK LOAD® SINGLES: Brand: COR-KNOT® QUICK LOAD®

## (undated) DEVICE — ZINACTIVE USE 2641837 CLIP LIG M BLU TI HRT SHP WIRE HORZ 600 PER BX

## (undated) DEVICE — SUTURE VCRL SZ 0 L36IN ABSRB UD L36MM CT-1 1/2 CIR J946H

## (undated) DEVICE — TRAY CATHETER 16FR W URIN M STATLOK STBL DEV FOR LUBRI-SIL 2 TEMP

## (undated) DEVICE — CATHETER L HRT VENT SIL 16 IN OVERALL LEN 20FR N VENT CONN

## (undated) DEVICE — BLOWER FLUID GAS MIX L165CM S STL SHFT MAL AND HNDPC

## (undated) DEVICE — COR-KNOT MINI® DEVICE KIT: Brand: COR-KNOT MINI®

## (undated) DEVICE — NEEDLE SURG FR SPR EYE 1 2 CIR TAPR PNT SIE 2

## (undated) DEVICE — GUIDEWIRE VASC L260CM DIA0035IN TIP L3MM PTFE J STD TAPR FIX

## (undated) DEVICE — RADIFOCUS OPTITORQUE ANGIOGRAPHIC CATHETER: Brand: OPTITORQUE

## (undated) DEVICE — ELECTRODE BLDE L4IN NONINSULATED EDGE

## (undated) DEVICE — SUTURE PERMA-HAND SZ 2-0 L30IN NONABSORBABLE BLK L26MM SH K833H

## (undated) DEVICE — SUTURE ETHBND EXCEL SZ 0 L30IN NONABSORBABLE GRN CT1 L36MM X424H

## (undated) DEVICE — GLOVE SURG SZ 8 L11.77IN FNGR THK9.8MIL STRW LTX POLYMER

## (undated) DEVICE — SUTURE VCRL SZ 2-0 L27IN ABSRB UD L26MM CT-2 1/2 CIR J269H

## (undated) DEVICE — DRAPE,ANGIO,BRACH,STERILE,38X44: Brand: MEDLINE

## (undated) DEVICE — SET TRNQT STD 12FR TB LEN 7 IN 2 RED 2 BLU 2 CLR 16FR 2 L

## (undated) DEVICE — PACK PROCEDURE SURG VASC CATH 161 MMC LF

## (undated) DEVICE — SUT PROL 4-0 30IN SH1 DA BLU --